# Patient Record
Sex: FEMALE | Race: WHITE | Employment: OTHER
[De-identification: names, ages, dates, MRNs, and addresses within clinical notes are randomized per-mention and may not be internally consistent; named-entity substitution may affect disease eponyms.]

---

## 2017-01-24 ENCOUNTER — OFFICE VISIT (OUTPATIENT)
Dept: ORTHOPEDIC SURGERY | Facility: CLINIC | Age: 57
End: 2017-01-24

## 2017-01-24 VITALS — WEIGHT: 101.41 LBS | HEIGHT: 65 IN | BODY MASS INDEX: 16.9 KG/M2

## 2017-01-24 DIAGNOSIS — S72.141A INTERTROCHANTERIC FRACTURE OF RIGHT FEMUR, CLOSED, INITIAL ENCOUNTER (HCC): Primary | ICD-10-CM

## 2017-01-24 PROCEDURE — 99024 POSTOP FOLLOW-UP VISIT: CPT | Performed by: ORTHOPAEDIC SURGERY

## 2017-01-29 ENCOUNTER — OUTSIDE SERVICES (OUTPATIENT)
Dept: INTERNAL MEDICINE | Age: 57
End: 2017-01-29

## 2017-01-29 DIAGNOSIS — E11.9 TYPE 2 DIABETES MELLITUS WITHOUT COMPLICATION, WITHOUT LONG-TERM CURRENT USE OF INSULIN (HCC): Chronic | ICD-10-CM

## 2017-01-29 DIAGNOSIS — S72.141D INTERTROCHANTERIC FRACTURE OF RIGHT FEMUR, CLOSED, WITH ROUTINE HEALING, SUBSEQUENT ENCOUNTER: Primary | ICD-10-CM

## 2017-01-29 DIAGNOSIS — E44.0 MODERATE MALNUTRITION (HCC): ICD-10-CM

## 2017-01-29 DIAGNOSIS — K70.10 ALCOHOLIC HEPATITIS WITHOUT ASCITES: ICD-10-CM

## 2017-01-29 DIAGNOSIS — I10 ESSENTIAL HYPERTENSION: ICD-10-CM

## 2017-01-29 DIAGNOSIS — K86.0 ALCOHOL-INDUCED CHRONIC PANCREATITIS (HCC): ICD-10-CM

## 2017-01-29 DIAGNOSIS — J44.9 CHRONIC OBSTRUCTIVE PULMONARY DISEASE, UNSPECIFIED COPD TYPE (HCC): Chronic | ICD-10-CM

## 2017-01-29 PROCEDURE — 99308 SBSQ NF CARE LOW MDM 20: CPT | Performed by: INTERNAL MEDICINE

## 2017-02-08 RX ORDER — NICOTINE 21 MG/24HR
1 PATCH, TRANSDERMAL 24 HOURS TRANSDERMAL DAILY
Qty: 30 PATCH | Refills: 0 | Status: SHIPPED | OUTPATIENT
Start: 2017-02-08 | End: 2020-09-17

## 2017-02-08 RX ORDER — ALBUTEROL SULFATE 2.5 MG/3ML
2.5 SOLUTION RESPIRATORY (INHALATION) EVERY 6 HOURS PRN
Qty: 120 EACH | Refills: 0 | Status: ON HOLD | OUTPATIENT
Start: 2017-02-08 | End: 2022-01-01 | Stop reason: HOSPADM

## 2017-02-08 RX ORDER — ASCORBIC ACID 500 MG
500 TABLET ORAL 2 TIMES DAILY
Qty: 60 TABLET | Refills: 0 | Status: SHIPPED | OUTPATIENT
Start: 2017-02-08 | End: 2020-09-17

## 2017-02-08 RX ORDER — PHENOL 1.4 %
1 AEROSOL, SPRAY (ML) MUCOUS MEMBRANE DAILY
Qty: 60 TABLET | Refills: 0 | Status: ON HOLD | OUTPATIENT
Start: 2017-02-08 | End: 2022-01-01 | Stop reason: HOSPADM

## 2017-02-08 RX ORDER — ALBUTEROL SULFATE 90 UG/1
1 AEROSOL, METERED RESPIRATORY (INHALATION) EVERY 4 HOURS PRN
Qty: 1 INHALER | Refills: 0 | Status: SHIPPED | OUTPATIENT
Start: 2017-02-08 | End: 2021-01-01

## 2017-02-08 RX ORDER — FERROUS SULFATE 325(65) MG
325 TABLET ORAL 2 TIMES DAILY WITH MEALS
Qty: 90 TABLET | Refills: 0 | Status: SHIPPED | OUTPATIENT
Start: 2017-02-08 | End: 2020-09-17

## 2017-02-08 RX ORDER — ERGOCALCIFEROL (VITAMIN D2) 1250 MCG
50000 CAPSULE ORAL WEEKLY
Qty: 6 CAPSULE | Refills: 0 | Status: SHIPPED | OUTPATIENT
Start: 2017-02-08 | End: 2020-09-17

## 2017-02-08 RX ORDER — OMEPRAZOLE 20 MG/1
20 CAPSULE, DELAYED RELEASE ORAL DAILY
Qty: 30 CAPSULE | Refills: 0 | Status: SHIPPED | OUTPATIENT
Start: 2017-02-08 | End: 2020-09-17

## 2017-02-08 RX ORDER — ONDANSETRON 4 MG/1
4 TABLET, FILM COATED ORAL EVERY 8 HOURS PRN
Qty: 20 TABLET | Refills: 0 | Status: SHIPPED | OUTPATIENT
Start: 2017-02-08 | End: 2020-09-17

## 2017-02-08 RX ORDER — MIRTAZAPINE 7.5 MG/1
7.5 TABLET, FILM COATED ORAL NIGHTLY
COMMUNITY
End: 2017-02-08 | Stop reason: SDUPTHER

## 2017-02-08 RX ORDER — ASCORBIC ACID 500 MG
500 TABLET ORAL 2 TIMES DAILY
COMMUNITY
End: 2017-02-08 | Stop reason: SDUPTHER

## 2017-02-08 RX ORDER — MIDODRINE HYDROCHLORIDE 2.5 MG/1
2.5 TABLET ORAL 3 TIMES DAILY
Qty: 90 TABLET | Refills: 0 | Status: SHIPPED | OUTPATIENT
Start: 2017-02-08 | End: 2020-09-17

## 2017-02-08 RX ORDER — TRAMADOL HYDROCHLORIDE 50 MG/1
50 TABLET ORAL EVERY 6 HOURS PRN
Qty: 30 TABLET | Refills: 0 | OUTPATIENT
Start: 2017-02-08 | End: 2021-01-01

## 2017-02-08 RX ORDER — OXYCODONE HYDROCHLORIDE 5 MG/1
5 TABLET ORAL EVERY 4 HOURS PRN
COMMUNITY
End: 2021-01-01

## 2017-02-08 RX ORDER — MIRTAZAPINE 7.5 MG/1
7.5 TABLET, FILM COATED ORAL NIGHTLY
Qty: 30 TABLET | Refills: 0 | Status: SHIPPED | OUTPATIENT
Start: 2017-02-08 | End: 2020-09-17

## 2017-03-01 ENCOUNTER — TELEPHONE (OUTPATIENT)
Dept: INTERNAL MEDICINE | Age: 57
End: 2017-03-01

## 2017-03-02 DIAGNOSIS — S72.141A INTERTROCHANTERIC FRACTURE OF RIGHT FEMUR, CLOSED, INITIAL ENCOUNTER (HCC): Primary | ICD-10-CM

## 2018-05-24 ENCOUNTER — APPOINTMENT (OUTPATIENT)
Dept: GENERAL RADIOLOGY | Age: 58
End: 2018-05-24
Payer: MEDICARE

## 2018-05-24 ENCOUNTER — HOSPITAL ENCOUNTER (EMERGENCY)
Age: 58
Discharge: HOME | End: 2018-05-24
Attending: EMERGENCY MEDICINE
Payer: MEDICARE

## 2018-05-24 VITALS
HEART RATE: 90 BPM | RESPIRATION RATE: 19 BRPM | OXYGEN SATURATION: 97 % | WEIGHT: 105 LBS | TEMPERATURE: 97.9 F | SYSTOLIC BLOOD PRESSURE: 113 MMHG | BODY MASS INDEX: 17.71 KG/M2 | DIASTOLIC BLOOD PRESSURE: 66 MMHG

## 2018-05-24 DIAGNOSIS — F10.10 ETOH ABUSE: Primary | ICD-10-CM

## 2018-05-24 LAB
ETHANOL PERCENT: 0.32 %
ETHANOL: 316 MG/DL

## 2018-05-24 PROCEDURE — G0480 DRUG TEST DEF 1-7 CLASSES: HCPCS

## 2018-05-24 PROCEDURE — 71046 X-RAY EXAM CHEST 2 VIEWS: CPT

## 2018-05-24 PROCEDURE — 99284 EMERGENCY DEPT VISIT MOD MDM: CPT

## 2018-05-24 ASSESSMENT — ENCOUNTER SYMPTOMS
COLOR CHANGE: 0
FACIAL SWELLING: 0
DIARRHEA: 0
CHOKING: 0
CHEST TIGHTNESS: 0
PHOTOPHOBIA: 0
STRIDOR: 0
NAUSEA: 0
VOMITING: 0
ABDOMINAL PAIN: 0
WHEEZING: 0
SHORTNESS OF BREATH: 0
BLOOD IN STOOL: 0
TROUBLE SWALLOWING: 0

## 2018-05-24 ASSESSMENT — PAIN DESCRIPTION - LOCATION: LOCATION: HIP

## 2018-05-24 ASSESSMENT — PAIN DESCRIPTION - FREQUENCY: FREQUENCY: CONTINUOUS

## 2018-05-24 ASSESSMENT — PAIN DESCRIPTION - DESCRIPTORS: DESCRIPTORS: CONSTANT

## 2018-05-24 ASSESSMENT — PAIN DESCRIPTION - ORIENTATION: ORIENTATION: LEFT

## 2018-05-24 ASSESSMENT — PAIN DESCRIPTION - PAIN TYPE: TYPE: CHRONIC PAIN;ACUTE PAIN

## 2018-05-24 ASSESSMENT — PAIN SCALES - GENERAL: PAINLEVEL_OUTOF10: 8

## 2019-01-14 ENCOUNTER — APPOINTMENT (OUTPATIENT)
Dept: GENERAL RADIOLOGY | Age: 59
End: 2019-01-14
Payer: MEDICARE

## 2019-01-14 ENCOUNTER — HOSPITAL ENCOUNTER (EMERGENCY)
Age: 59
Discharge: HOME | End: 2019-01-14
Attending: EMERGENCY MEDICINE
Payer: MEDICARE

## 2019-01-14 VITALS
RESPIRATION RATE: 18 BRPM | HEIGHT: 64 IN | TEMPERATURE: 98.1 F | OXYGEN SATURATION: 100 % | DIASTOLIC BLOOD PRESSURE: 60 MMHG | WEIGHT: 106 LBS | HEART RATE: 88 BPM | SYSTOLIC BLOOD PRESSURE: 120 MMHG | BODY MASS INDEX: 18.1 KG/M2
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
## 2019-01-14 DIAGNOSIS — S46.001A INJURY OF RIGHT ROTATOR CUFF, INITIAL ENCOUNTER: ICD-10-CM

## 2019-01-14 PROCEDURE — 99283 EMERGENCY DEPT VISIT LOW MDM: CPT

## 2019-01-14 PROCEDURE — 72040 X-RAY EXAM NECK SPINE 2-3 VW: CPT

## 2019-01-14 PROCEDURE — 73030 X-RAY EXAM OF SHOULDER: CPT

## 2019-01-14 RX ORDER — CYCLOBENZAPRINE HCL 10 MG
10 TABLET ORAL 3 TIMES DAILY PRN
Qty: 15 TABLET | Refills: 0 | Status: SHIPPED | OUTPATIENT
Start: 2019-01-14 | End: 2019-01-24

## 2019-01-14 ASSESSMENT — ENCOUNTER SYMPTOMS
PHOTOPHOBIA: 0
FACIAL SWELLING: 0
COLOR CHANGE: 0
VOMITING: 0
BACK PAIN: 0
SHORTNESS OF BREATH: 0
EYE PAIN: 0
ABDOMINAL PAIN: 0
NAUSEA: 0

## 2019-01-14 ASSESSMENT — PAIN DESCRIPTION - DESCRIPTORS: DESCRIPTORS: SHARP

## 2019-01-14 ASSESSMENT — PAIN DESCRIPTION - ORIENTATION: ORIENTATION: RIGHT;UPPER

## 2019-01-14 ASSESSMENT — PAIN SCALES - GENERAL: PAINLEVEL_OUTOF10: 10

## 2019-01-14 ASSESSMENT — PAIN DESCRIPTION - LOCATION: LOCATION: ARM

## 2019-01-14 ASSESSMENT — PAIN DESCRIPTION - ONSET: ONSET: SUDDEN

## 2019-01-14 ASSESSMENT — PAIN DESCRIPTION - PAIN TYPE: TYPE: ACUTE PAIN

## 2019-01-21 DIAGNOSIS — M25.531 RIGHT WRIST PAIN: Primary | ICD-10-CM

## 2019-01-23 ENCOUNTER — OFFICE VISIT (OUTPATIENT)
Dept: ORTHOPEDIC SURGERY | Age: 59
End: 2019-01-23
Payer: MEDICARE

## 2019-01-23 VITALS — HEIGHT: 65 IN | BODY MASS INDEX: 16.66 KG/M2 | WEIGHT: 100 LBS

## 2019-01-23 DIAGNOSIS — S40.021A CONTUSION OF RIGHT UPPER EXTREMITY, INITIAL ENCOUNTER: Primary | ICD-10-CM

## 2019-01-23 PROCEDURE — 3017F COLORECTAL CA SCREEN DOC REV: CPT | Performed by: ORTHOPAEDIC SURGERY

## 2019-01-23 PROCEDURE — G8427 DOCREV CUR MEDS BY ELIG CLIN: HCPCS | Performed by: ORTHOPAEDIC SURGERY

## 2019-01-23 PROCEDURE — G8419 CALC BMI OUT NRM PARAM NOF/U: HCPCS | Performed by: ORTHOPAEDIC SURGERY

## 2019-01-23 PROCEDURE — G8484 FLU IMMUNIZE NO ADMIN: HCPCS | Performed by: ORTHOPAEDIC SURGERY

## 2019-01-23 PROCEDURE — 99203 OFFICE O/P NEW LOW 30 MIN: CPT | Performed by: ORTHOPAEDIC SURGERY

## 2019-01-23 PROCEDURE — 4004F PT TOBACCO SCREEN RCVD TLK: CPT | Performed by: ORTHOPAEDIC SURGERY

## 2019-01-23 RX ORDER — HYDROCODONE BITARTRATE AND ACETAMINOPHEN 5; 325 MG/1; MG/1
1 TABLET ORAL EVERY 4 HOURS PRN
Qty: 30 TABLET | Refills: 0 | Status: SHIPPED | OUTPATIENT
Start: 2019-01-23 | End: 2019-01-28

## 2019-01-23 RX ORDER — ONDANSETRON 4 MG/1
4 TABLET, FILM COATED ORAL 3 TIMES DAILY PRN
Qty: 30 TABLET | Refills: 0 | Status: SHIPPED | OUTPATIENT
Start: 2019-01-23 | End: 2020-09-17

## 2019-02-12 ENCOUNTER — APPOINTMENT (OUTPATIENT)
Dept: CT IMAGING | Age: 59
End: 2019-02-12
Payer: MEDICARE

## 2019-02-12 ENCOUNTER — APPOINTMENT (OUTPATIENT)
Dept: GENERAL RADIOLOGY | Age: 59
End: 2019-02-12
Payer: MEDICARE

## 2019-02-12 ENCOUNTER — HOSPITAL ENCOUNTER (EMERGENCY)
Age: 59
Discharge: HOME | End: 2019-02-13
Attending: EMERGENCY MEDICINE
Payer: MEDICARE

## 2019-02-12 VITALS
HEART RATE: 98 BPM | DIASTOLIC BLOOD PRESSURE: 78 MMHG | TEMPERATURE: 98 F | HEIGHT: 64 IN | WEIGHT: 100 LBS | SYSTOLIC BLOOD PRESSURE: 118 MMHG | BODY MASS INDEX: 17.07 KG/M2 | OXYGEN SATURATION: 100 % | RESPIRATION RATE: 18 BRPM

## 2019-02-12 DIAGNOSIS — S63.502A SPRAIN OF LEFT WRIST, INITIAL ENCOUNTER: Primary | ICD-10-CM

## 2019-02-12 DIAGNOSIS — F10.920 ACUTE ALCOHOLIC INTOXICATION WITHOUT COMPLICATION (HCC): ICD-10-CM

## 2019-02-12 DIAGNOSIS — S09.90XA INJURY OF HEAD, INITIAL ENCOUNTER: ICD-10-CM

## 2019-02-12 DIAGNOSIS — E87.1 HYPONATREMIA: ICD-10-CM

## 2019-02-12 LAB
ABSOLUTE EOS #: 0.32 K/UL (ref 0–0.44)
ABSOLUTE IMMATURE GRANULOCYTE: 0.03 K/UL (ref 0–0.3)
ABSOLUTE LYMPH #: 3.17 K/UL (ref 1.1–3.7)
ABSOLUTE MONO #: 0.51 K/UL (ref 0.1–1.2)
ANION GAP SERPL CALCULATED.3IONS-SCNC: 16 MMOL/L (ref 9–17)
BASOPHILS # BLD: 1 % (ref 0–2)
BASOPHILS ABSOLUTE: 0.06 K/UL (ref 0–0.2)
BUN BLDV-MCNC: 4 MG/DL (ref 6–20)
BUN/CREAT BLD: ABNORMAL (ref 9–20)
CALCIUM SERPL-MCNC: 9.2 MG/DL (ref 8.6–10.4)
CHLORIDE BLD-SCNC: 92 MMOL/L (ref 98–107)
CO2: 20 MMOL/L (ref 20–31)
CREAT SERPL-MCNC: 0.4 MG/DL (ref 0.5–0.9)
DIFFERENTIAL TYPE: ABNORMAL
EOSINOPHILS RELATIVE PERCENT: 4 % (ref 1–4)
ETHANOL PERCENT: 0.31 %
ETHANOL: 310 MG/DL
GFR AFRICAN AMERICAN: >60 ML/MIN
GFR NON-AFRICAN AMERICAN: >60 ML/MIN
GFR SERPL CREATININE-BSD FRML MDRD: ABNORMAL ML/MIN/{1.73_M2}
GFR SERPL CREATININE-BSD FRML MDRD: ABNORMAL ML/MIN/{1.73_M2}
GLUCOSE BLD-MCNC: 100 MG/DL (ref 70–99)
HCT VFR BLD CALC: 37.3 % (ref 36.3–47.1)
HEMOGLOBIN: 13 G/DL (ref 11.9–15.1)
IMMATURE GRANULOCYTES: 0 %
LYMPHOCYTES # BLD: 42 % (ref 24–43)
MAGNESIUM: 1.6 MG/DL (ref 1.6–2.6)
MCH RBC QN AUTO: 34.5 PG (ref 25.2–33.5)
MCHC RBC AUTO-ENTMCNC: 34.9 G/DL (ref 28.4–34.8)
MCV RBC AUTO: 98.9 FL (ref 82.6–102.9)
MONOCYTES # BLD: 7 % (ref 3–12)
NRBC AUTOMATED: 0 PER 100 WBC
PDW BLD-RTO: 11.6 % (ref 11.8–14.4)
PLATELET # BLD: 236 K/UL (ref 138–453)
PLATELET ESTIMATE: ABNORMAL
PMV BLD AUTO: 9.8 FL (ref 8.1–13.5)
POTASSIUM SERPL-SCNC: 3.8 MMOL/L (ref 3.7–5.3)
RBC # BLD: 3.77 M/UL (ref 3.95–5.11)
RBC # BLD: ABNORMAL 10*6/UL
SEG NEUTROPHILS: 46 % (ref 36–65)
SEGMENTED NEUTROPHILS ABSOLUTE COUNT: 3.49 K/UL (ref 1.5–8.1)
SODIUM BLD-SCNC: 128 MMOL/L (ref 135–144)
WBC # BLD: 7.6 K/UL (ref 3.5–11.3)
WBC # BLD: ABNORMAL 10*3/UL

## 2019-02-12 PROCEDURE — 70450 CT HEAD/BRAIN W/O DYE: CPT

## 2019-02-12 PROCEDURE — 73110 X-RAY EXAM OF WRIST: CPT

## 2019-02-12 PROCEDURE — 6370000000 HC RX 637 (ALT 250 FOR IP): Performed by: PHYSICIAN ASSISTANT

## 2019-02-12 PROCEDURE — 2580000003 HC RX 258: Performed by: PHYSICIAN ASSISTANT

## 2019-02-12 PROCEDURE — 85025 COMPLETE CBC W/AUTO DIFF WBC: CPT

## 2019-02-12 PROCEDURE — 80048 BASIC METABOLIC PNL TOTAL CA: CPT

## 2019-02-12 PROCEDURE — 83735 ASSAY OF MAGNESIUM: CPT

## 2019-02-12 PROCEDURE — G0480 DRUG TEST DEF 1-7 CLASSES: HCPCS

## 2019-02-12 PROCEDURE — 99284 EMERGENCY DEPT VISIT MOD MDM: CPT

## 2019-02-12 RX ORDER — 0.9 % SODIUM CHLORIDE 0.9 %
1000 INTRAVENOUS SOLUTION INTRAVENOUS ONCE
Status: COMPLETED | OUTPATIENT
Start: 2019-02-12 | End: 2019-02-12

## 2019-02-12 RX ORDER — TRAMADOL HYDROCHLORIDE 50 MG/1
50 TABLET ORAL ONCE
Status: COMPLETED | OUTPATIENT
Start: 2019-02-12 | End: 2019-02-12

## 2019-02-12 RX ADMIN — SODIUM CHLORIDE 1000 ML: 9 INJECTION, SOLUTION INTRAVENOUS at 18:35

## 2019-02-12 RX ADMIN — TRAMADOL HYDROCHLORIDE 50 MG: 50 TABLET, FILM COATED ORAL at 18:35

## 2019-02-12 ASSESSMENT — PAIN DESCRIPTION - PAIN TYPE: TYPE: ACUTE PAIN

## 2019-02-12 ASSESSMENT — PAIN DESCRIPTION - DESCRIPTORS: DESCRIPTORS: SHARP

## 2019-02-12 ASSESSMENT — ENCOUNTER SYMPTOMS
NAUSEA: 0
BACK PAIN: 0
WHEEZING: 0
VOMITING: 0
RHINORRHEA: 0
COLOR CHANGE: 0
COUGH: 0
SORE THROAT: 0

## 2019-02-12 ASSESSMENT — PAIN DESCRIPTION - FREQUENCY: FREQUENCY: CONTINUOUS

## 2019-02-12 ASSESSMENT — PAIN SCALES - GENERAL: PAINLEVEL_OUTOF10: 10

## 2019-02-12 ASSESSMENT — PAIN DESCRIPTION - ONSET: ONSET: PROGRESSIVE

## 2019-02-12 ASSESSMENT — PAIN DESCRIPTION - LOCATION: LOCATION: WRIST

## 2019-02-12 ASSESSMENT — PAIN DESCRIPTION - PROGRESSION: CLINICAL_PROGRESSION: GRADUALLY WORSENING

## 2019-02-12 ASSESSMENT — PAIN DESCRIPTION - ORIENTATION: ORIENTATION: LEFT

## 2019-06-17 ENCOUNTER — HOSPITAL ENCOUNTER (EMERGENCY)
Age: 59
Discharge: HOME | End: 2019-06-18
Attending: EMERGENCY MEDICINE
Payer: MEDICARE

## 2019-06-17 VITALS
OXYGEN SATURATION: 96 % | SYSTOLIC BLOOD PRESSURE: 141 MMHG | TEMPERATURE: 96.8 F | DIASTOLIC BLOOD PRESSURE: 91 MMHG | HEART RATE: 62 BPM | RESPIRATION RATE: 20 BRPM

## 2019-06-17 DIAGNOSIS — F10.920 ACUTE ALCOHOLIC INTOXICATION WITHOUT COMPLICATION (HCC): Primary | ICD-10-CM

## 2019-06-17 PROCEDURE — 99284 EMERGENCY DEPT VISIT MOD MDM: CPT

## 2019-06-17 PROCEDURE — 96372 THER/PROPH/DIAG INJ SC/IM: CPT

## 2019-06-17 RX ORDER — ZIPRASIDONE MESYLATE 20 MG/ML
10 INJECTION, POWDER, LYOPHILIZED, FOR SOLUTION INTRAMUSCULAR ONCE
Status: DISCONTINUED | OUTPATIENT
Start: 2019-06-17 | End: 2019-06-18 | Stop reason: HOSPADM

## 2019-06-17 ASSESSMENT — ENCOUNTER SYMPTOMS
VOMITING: 0
EYE ITCHING: 0
ABDOMINAL PAIN: 0
SHORTNESS OF BREATH: 0
COUGH: 0
DIARRHEA: 0
RHINORRHEA: 0
BACK PAIN: 0
NAUSEA: 0
EYE REDNESS: 0

## 2019-06-17 NOTE — ED NOTES
Pt is now much more cooperative, laying on cot, security no longer at bedside. Writer attempting to contact pt artem Cates who is listed as emergency contact. No answer, writer left voice mail.       Marilee Hilton RN  06/17/19 8700

## 2019-06-17 NOTE — PROGRESS NOTES
Social Work    Attempted to call patients friend Sophia Toscano that is listed, no answer, voicemail comes on but did not leave message.

## 2019-06-17 NOTE — ED NOTES
Pt to ed via [facility]. Pt states its her birthday today and she was drinking liquor, she never drinks liquor she is a daily beer drinker. Pt was found passed out on a neighbors porch and EMS was unable to verify her permanent address and pt was not able to tell EMS where she lives. Pt is intoxicated, speech is slow and slurred. Pt wants to go home but unable to contact a sober ride at this time. Pt denies suicidal/homicidal ideations at this time.       Hilda Green RN  06/17/19 4858

## 2019-06-17 NOTE — ED NOTES
Pt is in [facility] angry, screaming at staff. Pt wants to go home. Pt unable to provide writer with contact information for someone to come and pick her up. Pt assisted back to bed, security at bedside.       Deb Velazquez RN  06/17/19 0118

## 2019-06-17 NOTE — ED PROVIDER NOTES
[facility]  ED  Emergency Department Encounter  EmergencyMedicine Resident     Pt Name:Emmie Jackson  MRN: [id number]  [place] 1960  Date of evaluation: 6/17/19  PCP:  Luisana Hernandez PA-C    CHIEF COMPLAINT       Chief Complaint   Patient presents with    Alcohol Intoxication       HISTORY OF PRESENT ILLNESS  (Location/Symptom, Timing/Onset, Context/Setting, Quality, Duration, Modifying Factors, Severity.)      Jez Borjas is a 64 y.o. female who presents with education. Patient was found on a neighbor's porch, unable to state where she lived. The neighbors are also unaware of which house exactly was hers, EMS brought her to our emergency department without anywhere else to take her. Patient states that she has been drinking because it is her birthday. Denies other ingestions, does not remember the events that led her to the emergency department. She denies pain, shortness of breath abdominal pain nausea vomiting diarrhea. States she normally drinks beer, drink liquor today. PAST MEDICAL / SURGICAL / SOCIAL / FAMILY HISTORY      has a past medical history of Alcohol withdrawal ([address] Alcohol withdrawal seizure with complication ([address] Alcohol-induced chronic pancreatitis ([address] Alcoholic hepatitis, Cellulitis of right hip, COPD (chronic obstructive pulmonary disease) ([address] Diabetes mellitus ([address] DM type 2 (diabetes mellitus, type 2) ([address] Dysphagia, Eczema, Elevated liver enzymes, FTT (failure to thrive) in adult, Hoarseness, Hypertension, Hypomagnesemia, Hyponatremia, Intertrochanteric fracture of right femur ([address] Iron deficiency anemia, Lesion of hard palate, Moderate malnutrition ([address] New onset seizure ([address] Poor historian, and Smoker. has a past surgical history that includes Tonsillectomy; Hip fracture surgery (Left); laryngoscopy (10/28/2016); and Femur fracture surgery (Right, 12/07/2016).     Social History     Socioeconomic History    Marital status: Single     Spouse name: Not on file    Number of children: Not on file    Years of education: Not on file    Highest education level: Not on file   Occupational History    Not on file   Social Needs    Financial resource strain: Not on file    Food insecurity:     Worry: Not on file     Inability: Not on file    Transportation needs:     Medical: Not on file     Non-medical: Not on file   Tobacco Use    Smoking status: Current Every Day Smoker     Packs/day: 0.50     Years: 35.00     Pack years: 17.50     Types: Cigarettes    Smokeless tobacco: Never Used   Substance and Sexual Activity    Alcohol use: Yes     Comment: daily    Drug use: No    Sexual activity: Not on file   Lifestyle    Physical activity:     Days per week: Not on file     Minutes per session: Not on file    Stress: Not on file   Relationships    Social connections:     Talks on phone: Not on file     Gets together: Not on file     Attends Amish service: Not on file     Active member of club or organization: Not on file     Attends meetings of clubs or organizations: Not on file     Relationship status: Not on file    Intimate partner violence:     Fear of current or ex partner: Not on file     Emotionally abused: Not on file     Physically abused: Not on file     Forced sexual activity: Not on file   Other Topics Concern    Not on file   Social History Narrative    Not on file       Family History   Problem Relation Age of Onset    Diabetes Father     Asthma Sister     Asthma Brother        Allergies:  Ibuprofen    Home Medications:  Prior to Admission medications    Medication Sig Start Date End Date Taking? Authorizing Provider   ondansetron (ZOFRAN) 4 MG tablet Take 1 tablet by mouth 3 times daily as needed for Nausea or Vomiting 1/23/19   Brie Baum MD   oxyCODONE (ROXICODONE) 5 MG immediate release tablet Take 5 mg by mouth every 4 hours as needed for Pain .     Historical Provider, MD Calcium-Vitamin D 600-200 MG-UNIT TABS Take 1 tablet by mouth 2 times daily    Historical Provider, MD   ergocalciferol (ERGOCALCIFEROL) 86521 UNITS capsule Take 1 capsule by mouth once a week for 11 doses 2/8/17 4/20/17  Aaron Dwyer, DO   ferrous sulfate 325 (65 FE) MG tablet Take 1 tablet by mouth 2 times daily (with meals) Do not take with calcium 2/8/17   Aaron Dwyer, DO   omeprazole (PRILOSEC) 20 MG delayed release capsule Take 1 capsule by mouth daily 2/8/17   Krupa Dwyer DO   nicotine (NICODERM CQ) 21 MG/24HR Place 1 patch onto the skin daily 2/8/17   Krupa Dwyer, DO   traMADol (ULTRAM) 50 MG tablet Take 1 tablet by mouth every 6 hours as needed for Pain 2/8/17   Aaron Dwyer DO   midodrine (PROAMATINE) 2.5 MG tablet Take 1 tablet by mouth 3 times daily 2/8/17   Aaron Dwyer, DO   albuterol (PROVENTIL) (2.5 MG/3ML) 0.083% nebulizer solution Take 3 mLs by nebulization every 6 hours as needed for Wheezing 2/8/17   Aaron Dwyer DO   albuterol sulfate  (90 BASE) MCG/ACT inhaler Inhale 1 puff into the lungs every 4 hours as needed for Wheezing or Shortness of Breath 2/8/17 2/22/17  Aaron Dwyer DO   ondansetron (ZOFRAN) 4 MG tablet Take 1 tablet by mouth every 8 hours as needed for Nausea 2/8/17   Krupa Dwyer DO   mirtazapine (REMERON) 7.5 MG tablet Take 1 tablet by mouth nightly 2/8/17   Aaron Dwyer DO   ascorbic acid (VITAMIN C) 500 MG tablet Take 1 tablet by mouth 2 times daily 2/8/17   Aaron Dwyer, DO   calcium carbonate (CALCIUM 600) 600 MG TABS tablet Take 1 tablet by mouth daily Do not take with iron 2/8/17   Aaron Dwyer, DO       REVIEW OF SYSTEMS    (2-9 systems for level 4, 10 or more for level 5)      Review of Systems   Constitutional: Negative for chills and fever. HENT: Negative for congestion and rhinorrhea. Eyes: Negative for redness and itching. Respiratory: Negative for cough and shortness of breath. Cardiovascular: Negative for chest pain, palpitations and leg swelling. Gastrointestinal: Negative for abdominal pain, diarrhea, nausea and vomiting. Genitourinary: Negative for dysuria and frequency. Musculoskeletal: Negative for back pain, joint swelling and myalgias. Skin: Negative for pallor and rash. Neurological: Negative for dizziness, weakness, light-headedness, numbness and headaches. Psychiatric/Behavioral: Positive for agitation and confusion. PHYSICAL EXAM   (up to 7 for level 4, 8 or more for level 5)      INITIAL VITALS:   BP (!) 141/91   Pulse 62   Temp 96.8 °F (36 °C) (Oral)   Resp 20   SpO2 96%     Physical Exam   Constitutional: She appears well-developed and well-nourished. No distress. HENT:   Head: Normocephalic and atraumatic. Eyes: Pupils are equal, round, and reactive to light. EOM are normal.   Cardiovascular: Normal rate, regular rhythm and normal heart sounds. Pulmonary/Chest: Effort normal and breath sounds normal. No respiratory distress. Abdominal: Soft. Bowel sounds are normal. She exhibits no distension. There is no tenderness. Musculoskeletal: Normal range of motion. Neurological: She is alert. She is not oriented to situation. Patient has an ataxic gait, nystagmus, loss of smooth pursuit. She is demanding to leave the emergency department. Skin: Skin is warm. No rash noted. DIFFERENTIAL  DIAGNOSIS     PLAN (LABS / IMAGING / EKG):  No orders of the defined types were placed in this encounter. MEDICATIONS ORDERED:  Orders Placed This Encounter   Medications    AND Linked Order Group     ziprasidone (GEODON) injection 10 mg     sterile water injection 1.2 mL       DIAGNOSTIC RESULTS / EMERGENCY DEPARTMENT COURSE / MDM     LABS:  No results found for this visit on 06/17/19.     IMPRESSION: Scarlett Maharaj is a 64 y.o. patient with alcohol intoxication, no coingestions suicidal homicidal ideation or hallucinations. Patient appears intoxicated, ataxic gait with slurred speech. Patient will be observed for sobriety. She is currently agitated, unable to calm down will be given Geodon for staff safety. RADIOLOGY:  None    EKG  None    All EKG's are interpreted by the Emergency Department Physician who either signs or Co-signs this chart in the absence of a cardiologist.    EMERGENCY DEPARTMENT COURSE:  ED Course as of Jun 18 0229   Mon Jun 17, 2019 2119 Reassessed at this time, still clinically intoxicated    [BA]   Tue Jun 18, 2019   0130 Patient sober at this time, patient discharged    [BA]      ED Course User Index  [BA] Jennifer Junior MD       PROCEDURES:  None    CONSULTS:  None    CRITICAL CARE:  None    FINAL IMPRESSION      1.  Acute alcoholic intoxication without complication ([facility] [address]          DISPOSITION / PLAN     DISPOSITION Decision To Discharge 06/18/2019 01:30:25 AM      PATIENT REFERRED TO:  Nathen Salas, [address]  [phone]    Schedule an appointment as soon as possible for a visit         DISCHARGE MEDICATIONS:  Discharge Medication List as of 6/18/2019  1:31 AM          Jennifer Junior MD  Emergency Medicine Resident    (Please note that portions of thisnote were completed with a voice recognition program.  Efforts were made to edit the dictations but occasionally words are mis-transcribed.)        Jennifer Junior MD  06/18/19 0661

## 2019-06-18 NOTE — ED NOTES
Dr Carolina Dubose and Dr Palma Dandy to see pt. Pt given boxed lunch and agreed to stay.      Elzbieta Castro RN  06/17/19 0571

## 2019-06-18 NOTE — ED PROVIDER NOTES
ORIENTED TO PERSON, DATE, HOME ADDRESS, NOT TO CIRCUMSTANCES RESULTING IN ED EVALUATION. APPEARS CLINICALLY SOBER. WILL DISCHARGE. LSW TO [facility].       Gregg Hugo MD  06/18/19 9423

## 2019-06-18 NOTE — ED NOTES
Pt c/o it being too bright for her to sleep. Pt agreed to go into room and have door closed for the quiet and darkness. Pt agreeable and calmer.         Trinh Escalante RN  06/17/19 5009

## 2019-06-18 NOTE — ED NOTES
Pt at desk yelling requesting shoes and to go home. Informed pt that she is to wait for Dr to come see and address her. Pt very impatient. Pt redirected to use inside voices.       Dorcas Landry RN  06/17/19 5314

## 2019-06-18 NOTE — ED NOTES
Report from [facility], [address] resting on cot with eyes closed, NAD noted. Pt quiet and cooperative to hold geodon.       Arnulfo Molina RN  06/17/19 4961

## 2020-09-17 ENCOUNTER — HOSPITAL ENCOUNTER (EMERGENCY)
Age: 60
Discharge: HOME | End: 2020-09-17
Attending: EMERGENCY MEDICINE
Payer: MEDICARE

## 2020-09-17 VITALS
DIASTOLIC BLOOD PRESSURE: 106 MMHG | HEIGHT: 64 IN | RESPIRATION RATE: 16 BRPM | TEMPERATURE: 97.6 F | SYSTOLIC BLOOD PRESSURE: 161 MMHG | HEART RATE: 80 BPM | OXYGEN SATURATION: 100 % | BODY MASS INDEX: 16.22 KG/M2 | WEIGHT: 95 LBS

## 2020-09-17 PROCEDURE — 6370000000 HC RX 637 (ALT 250 FOR IP): Performed by: STUDENT IN AN ORGANIZED HEALTH CARE EDUCATION/TRAINING PROGRAM

## 2020-09-17 PROCEDURE — 99282 EMERGENCY DEPT VISIT SF MDM: CPT

## 2020-09-17 RX ORDER — HYDROCODONE BITARTRATE AND ACETAMINOPHEN 5; 325 MG/1; MG/1
1 TABLET ORAL ONCE
Status: COMPLETED | OUTPATIENT
Start: 2020-09-17 | End: 2020-09-17

## 2020-09-17 RX ORDER — PREDNISONE 20 MG/1
TABLET ORAL
Qty: 40 TABLET | Refills: 0 | Status: SHIPPED | OUTPATIENT
Start: 2020-09-17 | End: 2020-10-07

## 2020-09-17 RX ORDER — DIPHENHYDRAMINE HCL 25 MG
25 TABLET ORAL ONCE
Status: COMPLETED | OUTPATIENT
Start: 2020-09-17 | End: 2020-09-17

## 2020-09-17 RX ORDER — DIPHENHYDRAMINE HCL 25 MG
25 CAPSULE ORAL EVERY 6 HOURS PRN
Qty: 12 CAPSULE | Refills: 0 | Status: ON HOLD | OUTPATIENT
Start: 2020-09-17 | End: 2022-01-01 | Stop reason: HOSPADM

## 2020-09-17 RX ADMIN — HYDROCODONE BITARTRATE AND ACETAMINOPHEN 1 TABLET: 5; 325 TABLET ORAL at 13:10

## 2020-09-17 RX ADMIN — DIPHENHYDRAMINE HCL 25 MG: 25 TABLET ORAL at 13:10

## 2020-09-17 ASSESSMENT — PAIN DESCRIPTION - PAIN TYPE: TYPE: ACUTE PAIN

## 2020-09-17 ASSESSMENT — ENCOUNTER SYMPTOMS
SHORTNESS OF BREATH: 0
BLOOD IN STOOL: 0
NAUSEA: 0
VOMITING: 0
COUGH: 0
WHEEZING: 0

## 2020-09-17 ASSESSMENT — PAIN SCALES - GENERAL
PAINLEVEL_OUTOF10: 9
PAINLEVEL_OUTOF10: 9

## 2020-09-17 NOTE — ED NOTES
Bed: 42  Expected date:   Expected time:   Means of arrival:   Comments:     Blanca Kelley RN  09/17/20 5500

## 2020-09-17 NOTE — ED PROVIDER NOTES
[address] [facility]     Emergency Department     Faculty Attestation        I performed a history and physical examination of the patient and discussed management with the resident. I reviewed the residents note and agree with the documented findings and plan of care. Any areas of disagreement are noted on the chart. I was personally present for the key portions of any procedures. I have documented in the chart those procedures where I was not present during the key portions. I have reviewed the emergency nurses triage note. I agree with the chief complaint, past medical history, past surgical history, allergies, medications, social and family history as documented unless otherwise noted below. For Physician Assistant/ Nurse Practitioner cases/documentation I have personally evaluated this patient and have completed at least one if not all key elements of the E/M (history, physical exam, and MDM). Additional findings are as noted. Vital Signs: BP: (!) 161/106  Pulse: 80  Resp: 16  Temp: 97.6 °F (36.4 °C) SpO2: 100 %  PCP:  Rosaura Chen PA-C    Pertinent Comments:         Critical Care  None    This patient was evaluated in the Emergency Department for symptoms described in the history of present illness. He/she was evaluated in the context of the global COVID-19 pandemic, which necessitated consideration that the patient might be at risk for infection with the SARS-CoV-2 virus that causes COVID-19. Institutional protocols and algorithms that pertain to the evaluation of patients at risk for COVID-19 are in a state of rapid change based on information released by regulatory bodies including the CDC and federal and state organizations. These policies and algorithms were followed during the patient's care in the ED.     (Please note that portions of this note were completed with a voice recognition program. Efforts were made to edit the dictations but occasionally words are mis-transcribed.  Whenever words are used in this note in any gender, they shall be construed as though they were used in the gender appropriate to the circumstances; and whenever words are used in this note in the singular or plural form, they shall be construed as though they were used in the form appropriate to the circumstances.)    Avery Abbe, MD Aletta Denver  Attending Emergency Medicine Physician           Harsh Leal MD  09/17/20 2732

## 2020-09-17 NOTE — ED TRIAGE NOTES
Pt arrived to the ED with c/o a rash that has been going on for the last couple of days. Pt states she was cleaning up some weeds and wasn't sure if it was poison ivy or poison  Oak. Pt states the rash is very painful and itching. Pt states the rash is all over her body. Pt is alert oriented x4.

## 2020-09-17 NOTE — ED PROVIDER NOTES
laryngoscopy (10/28/2016); and Femur fracture surgery (Right, 12/07/2016). Social History     Socioeconomic History    Marital status: Single     Spouse name: Not on file    Number of children: Not on file    Years of education: Not on file    Highest education level: Not on file   Occupational History    Not on file   Social Needs    Financial resource strain: Not on file    Food insecurity     Worry: Not on file     Inability: Not on file    Transportation needs     Medical: Not on file     Non-medical: Not on file   Tobacco Use    Smoking status: Current Every Day Smoker     Packs/day: 0.50     Years: 35.00     Pack years: 17.50     Types: Cigarettes    Smokeless tobacco: Never Used   Substance and Sexual Activity    Alcohol use: Yes     Comment: daily    Drug use: No    Sexual activity: Not on file   Lifestyle    Physical activity     Days per week: Not on file     Minutes per session: Not on file    Stress: Not on file   Relationships    Social connections     Talks on phone: Not on file     Gets together: Not on file     Attends Bahai service: Not on file     Active member of club or organization: Not on file     Attends meetings of clubs or organizations: Not on file     Relationship status: Not on file    Intimate partner violence     Fear of current or ex partner: Not on file     Emotionally abused: Not on file     Physically abused: Not on file     Forced sexual activity: Not on file   Other Topics Concern    Not on file   Social History Narrative    Not on file       Family History   Problem Relation Age of Onset    Diabetes Father     Asthma Sister     Asthma Brother        Allergies:  Ibuprofen    Home Medications:  Prior to Admission medications    Medication Sig Start Date End Date Taking?  Authorizing Provider   diphenhydrAMINE (BENADRYL) 25 MG capsule Take 1 capsule by mouth every 6 hours as needed for Itching 9/17/20  Yes Archana Parisi, DO   predniSONE (Dustin [place]aels) 20 MG tablet Take 3 tablets by mouth daily for 4 days, THEN 2.5 tablets daily for 4 days, THEN 2 tablets daily for 4 days, THEN 1.5 tablets daily for 4 days, THEN 1 tablet daily for 4 days. 9/17/20 10/7/20 Yes Archana Parisi,    oxyCODONE (ROXICODONE) 5 MG immediate release tablet Take 5 mg by mouth every 4 hours as needed for Pain . Historical Provider, MD   Calcium-Vitamin D 600-200 MG-UNIT TABS Take 1 tablet by mouth 2 times daily    Historical Provider, MD   traMADol (ULTRAM) 50 MG tablet Take 1 tablet by mouth every 6 hours as needed for Pain 2/8/17   Aaron Dwyer DO   albuterol (PROVENTIL) (2.5 MG/3ML) 0.083% nebulizer solution Take 3 mLs by nebulization every 6 hours as needed for Wheezing 2/8/17   Aaron Dwyer DO   albuterol sulfate  (90 BASE) MCG/ACT inhaler Inhale 1 puff into the lungs every 4 hours as needed for Wheezing or Shortness of Breath 2/8/17 2/22/17  Aaron Dwyer DO   calcium carbonate (CALCIUM 600) 600 MG TABS tablet Take 1 tablet by mouth daily Do not take with iron 2/8/17   Aaron Dwyer DO       REVIEW OF SYSTEMS    (2-9 systems for level 4, 10 or more for level 5)      Review of Systems   Constitutional: Negative for chills and fever. Respiratory: Negative for cough, shortness of breath and wheezing. Cardiovascular: Negative for chest pain and palpitations. Gastrointestinal: Negative for blood in stool, nausea and vomiting. Skin: Positive for rash. Negative for wound. Allergic/Immunologic: Negative for environmental allergies and food allergies. Neurological: Negative for weakness and numbness. PHYSICAL EXAM   (up to 7 for level 4, 8 or more for level 5)      INITIAL VITALS:   BP (!) 161/106   Pulse 80   Temp 97.6 °F (36.4 °C)   Resp 16   Ht 5' 4\" (1.626 m)   Wt 95 lb (43.1 kg)   LMP  (Approximate)   SpO2 100%   BMI 16.31 kg/m²     Physical Exam  Vitals signs and nursing note reviewed.    Constitutional: General: She is not in acute distress. Appearance: She is well-developed. She is not toxic-appearing or diaphoretic. HENT:      Head: Normocephalic and atraumatic. Right Ear: External ear normal.      Left Ear: External ear normal.      Nose: Nose normal.   Eyes:      Extraocular Movements: Extraocular movements intact. Conjunctiva/sclera: Conjunctivae normal.   Neck:      Musculoskeletal: Normal range of motion. No neck rigidity. Vascular: No JVD. Trachea: No tracheal deviation. Cardiovascular:      Rate and Rhythm: Normal rate and regular rhythm. Pulses: Normal pulses. Heart sounds: S1 normal and S2 normal.   Pulmonary:      Effort: Pulmonary effort is normal. No respiratory distress. Breath sounds: Normal breath sounds. Musculoskeletal: Normal range of motion. General: No tenderness. Skin:     General: Skin is warm and dry. Capillary Refill: Capillary refill takes less than 2 seconds. Comments: Numerous erythematous linear rashes on bilateral upper and lower extremities along with face and neck with excoriation marks. Neurological:      Mental Status: She is alert and oriented to person, place, and time. Motor: No abnormal muscle tone. DIFFERENTIAL  DIAGNOSIS     PLAN (LABS / IMAGING / EKG):  No orders of the defined types were placed in this encounter. MEDICATIONS ORDERED:  Orders Placed This Encounter   Medications    HYDROcodone-acetaminophen (NORCO) 5-325 MG per tablet 1 tablet    diphenhydrAMINE (BENADRYL) tablet 25 mg    diphenhydrAMINE (BENADRYL) 25 MG capsule     Sig: Take 1 capsule by mouth every 6 hours as needed for Itching     Dispense:  12 capsule     Refill:  0    predniSONE (DELTASONE) 20 MG tablet     Sig: Take 3 tablets by mouth daily for 4 days, THEN 2.5 tablets daily for 4 days, THEN 2 tablets daily for 4 days, THEN 1.5 tablets daily for 4 days, THEN 1 tablet daily for 4 days.      Dispense:  40 tablet Refill:  0       DDX: Poison oak/ivy, contact dermatitis    DIAGNOSTIC RESULTS / EMERGENCY DEPARTMENT COURSE / MDM   LAB RESULTS:  No results found for this visit on 09/17/20. IMPRESSION: 61-year-old female presenting with linear pruritic burning rashes. Patient appears to be in no acute distress and nontoxic-appearing. Patient initial vitals were stable and as above hypertension of 161/106 however is not needed for treatment as she is denying any other concerning review system questions. Patient appears to have diffuse linear rashes on all extremities neck and face with excoriation marks. Concern for above differential diagnosis. Will order Benadryl, and send patient home with a prescription for a Prednisone. RADIOLOGY:  None    EKG  None    All EKG's are interpreted by the Emergency Department Physician who either signs or Co-signs this chart in the absence of a cardiologist.    EMERGENCY DEPARTMENT COURSE:  Patient was agreeable to discharge plan. Patient was educated on strict return precautions. Patient ambulated the ER without difficulty. PROCEDURES:  None    CONSULTS:  None    CRITICAL CARE:  Please see attending note    FINAL IMPRESSION      1.  Irritant contact dermatitis due to plants, except food          DISPOSITION / PLAN     DISPOSITION Decision To Discharge 09/17/2020 12:56:28 PM      PATIENT REFERRED TO:  Lynne Douglas, [address] [facility] [address]  [phone]    Schedule an appointment as soon as possible for a visit in 5 days  If symptoms do not improve    [facility]  [address] [facility] [address]  [phone]  Go to   If symptoms worsen      DISCHARGE MEDICATIONS:  Discharge Medication List as of 9/17/2020  1:18 PM      START taking these medications    Details   diphenhydrAMINE (BENADRYL) 25 MG capsule Take 1 capsule by mouth every 6 hours as needed for Itching, Disp-12 capsule,R-0Print      predniSONE (DELTASONE) 20 MG tablet Take 3 tablets by mouth daily for 4 days, THEN 2.5 tablets daily for 4 days, THEN 2 tablets daily for 4 days, THEN 1.5 tablets daily for 4 days, THEN 1 tablet daily for 4 days. , Disp-40 tablet,R-0Print             Lambert Hernandez DO  Emergency Medicine Resident    (Please note that portions of thisnote were completed with a voice recognition program.  Efforts were made to edit the dictations but occasionally words are mis-transcribed.)       Lambert Hernandez DO  Resident  09/17/20 2120

## 2020-12-27 ENCOUNTER — APPOINTMENT (OUTPATIENT)
Dept: GENERAL RADIOLOGY | Age: 60
End: 2020-12-27
Payer: MEDICARE

## 2020-12-27 ENCOUNTER — HOSPITAL ENCOUNTER (EMERGENCY)
Age: 60
Discharge: LEFT BEFORE DISCHARGE | End: 2020-12-27
Payer: MEDICARE

## 2020-12-27 ENCOUNTER — HOSPITAL ENCOUNTER (EMERGENCY)
Age: 60
Discharge: LEFT BEFORE DISCHARGE | End: 2020-12-27
Attending: EMERGENCY MEDICINE
Payer: MEDICARE

## 2020-12-27 VITALS
SYSTOLIC BLOOD PRESSURE: 129 MMHG | RESPIRATION RATE: 18 BRPM | HEART RATE: 85 BPM | DIASTOLIC BLOOD PRESSURE: 68 MMHG | OXYGEN SATURATION: 97 % | HEIGHT: 64 IN | TEMPERATURE: 97.5 F | BODY MASS INDEX: 16.22 KG/M2 | WEIGHT: 95 LBS

## 2020-12-27 VITALS
HEART RATE: 89 BPM | DIASTOLIC BLOOD PRESSURE: 68 MMHG | HEIGHT: 64 IN | SYSTOLIC BLOOD PRESSURE: 112 MMHG | WEIGHT: 95 LBS | OXYGEN SATURATION: 100 % | TEMPERATURE: 97.7 F | RESPIRATION RATE: 16 BRPM | BODY MASS INDEX: 16.22 KG/M2

## 2020-12-27 PROCEDURE — 99282 EMERGENCY DEPT VISIT SF MDM: CPT

## 2020-12-27 ASSESSMENT — ENCOUNTER SYMPTOMS
VOMITING: 0
ABDOMINAL PAIN: 0
SORE THROAT: 0
CONSTIPATION: 0
SHORTNESS OF BREATH: 0
NAUSEA: 0
DIARRHEA: 0

## 2020-12-27 ASSESSMENT — PAIN SCALES - GENERAL: PAINLEVEL_OUTOF10: 0

## 2020-12-27 NOTE — ED PROVIDER NOTES
[facility]  ED  Emergency Department Encounter  EmergencyMedicine Resident     Pt Name:Emmie Cadena  MRN: [id number]  [place] 1960  Date of evaluation: 12/27/20  PCP:  Emi Martínez PA-C    CHIEF COMPLAINT       Chief Complaint   Patient presents with    Toe Pain     bilateral        HISTORY OF PRESENT ILLNESS  (Location/Symptom, Timing/Onset, Context/Setting, Quality, Duration, Modifying Factors, Severity.)      Sheree Valles is a 64 y.o. female who presents to the emergency department with a 1 month history of right great toe pain after an unspecified stubbing injury. Boyfriend brought patient to the emergency department because he was concerned that the pain was due to a blood clot. Patient himself denies fever, chills, chest pain, shortness of breath, nausea or vomiting, abdominal pain, problems with urinary bowel movements, or numbness or tingling anywhere. She states that the right great toe is painful when she moves it but she has no calf pain or swelling. Chart review does not demonstrate a personal history of blood clots but the patient does smoke heavily and spends a lot of time laying in the back of the [place]. Both patient and boyfriend refused  and resources on smoking cessation when offered.     PAST MEDICAL / SURGICAL / SOCIAL / FAMILY HISTORY      has a past medical history of Alcohol withdrawal ([address] Alcohol withdrawal seizure with complication ([address] Alcohol-induced chronic pancreatitis ([address] Alcoholic hepatitis, Cellulitis of right hip, COPD (chronic obstructive pulmonary disease) ([address] Diabetes mellitus ([address] DM type 2 (diabetes mellitus, type 2) ([address] Dysphagia, Eczema, Elevated liver enzymes, FTT (failure to thrive) in adult, Hoarseness, Hypertension, Hypomagnesemia, Hyponatremia, Intertrochanteric fracture of right femur ([address] Iron deficiency anemia, Lesion of hard palate, Moderate malnutrition ([address] New onset seizure ([address] Take 1 capsule by mouth every 6 hours as needed for Itching 9/17/20   Carol Pelayo, DO   oxyCODONE (ROXICODONE) 5 MG immediate release tablet Take 5 mg by mouth every 4 hours as needed for Pain . Historical Provider, MD   Calcium-Vitamin D 600-200 MG-UNIT TABS Take 1 tablet by mouth 2 times daily    Historical Provider, MD   traMADol (ULTRAM) 50 MG tablet Take 1 tablet by mouth every 6 hours as needed for Pain 2/8/17   Aaron Dwyer DO   albuterol (PROVENTIL) (2.5 MG/3ML) 0.083% nebulizer solution Take 3 mLs by nebulization every 6 hours as needed for Wheezing 2/8/17   Aaron Dwyer, DO   albuterol sulfate  (90 BASE) MCG/ACT inhaler Inhale 1 puff into the lungs every 4 hours as needed for Wheezing or Shortness of Breath 2/8/17 2/22/17  Aaron Dwyer DO   calcium carbonate (CALCIUM 600) 600 MG TABS tablet Take 1 tablet by mouth daily Do not take with iron 2/8/17   Aaron Dwyer, DO       REVIEW OF SYSTEMS    (2-9 systems for level 4, 10 or more for level 5)      Review of Systems   Constitutional: Negative for chills and fever. HENT: Negative for ear pain, hearing loss and sore throat. Eyes: Negative for visual disturbance. Respiratory: Negative for shortness of breath. Cardiovascular: Negative for chest pain. Gastrointestinal: Negative for abdominal pain, constipation, diarrhea, nausea and vomiting. Genitourinary: Negative for difficulty urinating and dysuria. Musculoskeletal: Negative for arthralgias and myalgias. Neurological: Negative for numbness. Psychiatric/Behavioral: Negative for agitation and confusion. PHYSICAL EXAM   (up to 7 for level 4, 8 or more for level 5)      INITIAL VITALS:   /68   Pulse 89   Temp 97.7 °F (36.5 °C) (Infrared)   Resp 16   Ht 5' 4\" (1.626 m)   Wt 95 lb (43.1 kg)   SpO2 100%   BMI 16.31 kg/m²     Physical Exam  Vitals signs and nursing note reviewed.    Constitutional:       General: She is not in acute distress. Appearance: Normal appearance. She is well-developed. She is not ill-appearing or diaphoretic. HENT:      Head: Normocephalic and atraumatic. Right Ear: External ear normal.      Left Ear: External ear normal.      Nose: Nose normal.      Mouth/Throat:      Mouth: Mucous membranes are moist.   Eyes:      Extraocular Movements: Extraocular movements intact. Conjunctiva/sclera: Conjunctivae normal.   Neck:      Musculoskeletal: Normal range of motion and neck supple. Trachea: No tracheal deviation. Cardiovascular:      Rate and Rhythm: Normal rate and regular rhythm. Pulmonary:      Effort: Pulmonary effort is normal. No respiratory distress. Abdominal:      General: Abdomen is flat. There is no distension. Musculoskeletal: Normal range of motion. General: No swelling, deformity or signs of injury. Comments: There is moderate clubbing of the toes with symmetric appearance of the right great toe compared to the left. Left foot is full strength, right foot great toe strength restricted secondary to pain. Appears well perfused with no asymmetric subungual swelling. No bluish or cyanotic appearance. No crepitus or deformity. Skin:     General: Skin is warm and dry. Capillary Refill: Capillary refill takes less than 2 seconds. Coloration: Skin is not jaundiced. Findings: No bruising or lesion. Neurological:      General: No focal deficit present. Mental Status: She is alert and oriented to person, place, and time. Mental status is at baseline. Motor: No abnormal muscle tone. DIFFERENTIAL  DIAGNOSIS     PLAN (LABS / IMAGING / EKG):  Orders Placed This Encounter   Procedures    XR FOOT RIGHT (MIN 3 VIEWS)    [employer] Post Op Shoe, Unisex - Right; SM (M5.5-7/F6.5-8)       MEDICATIONS ORDERED:  No orders of the defined types were placed in this encounter. DDX: Danica Bee is a 64 y.o. female who presents to the emergency department with right great toe pain for 1 month. Differential diagnosis includes overuse injury, poor footwear, occult fracture, clubbing    DIAGNOSTIC RESULTS / EMERGENCY DEPARTMENT COURSE / MDM   LAB RESULTS:  No results found for this visit on 12/27/20. IMPRESSION: Ricardo King is a 64 y.o. female who presents to the emergency department with right great toe pain for 1 month. On examination she is afebrile, vital signs unremarkable and examination demonstrates an otherwise thin appearing woman of stated age with strong smell of cigarettes, no posterior calf tenderness to palpation, and no asymmetric abnormalities of the right great toe and symmetric clubbing of the bilateral great toes. Counseled patient thoroughly on the need to stop smoking and increase physical activity to avoid blood clots as this was their primary concern, otherwise will obtain x-ray and place patient in Ortho shoe. Patient is precontemplative with lifestyle changes. RADIOLOGY:  No results found. EKG  None    All EKG's are interpreted by the Emergency Department Physician who either signs or co-signs this chart in the absence of a cardiologist.    EMERGENCY DEPARTMENT COURSE:       PROCEDURES:  None    CONSULTS:  None    CRITICAL CARE:  Please see attending note. FINAL IMPRESSION      1. Right foot pain          DISPOSITION / PLAN     DISPOSITION        PATIENT REFERRED TO:  No follow-up provider specified. DISCHARGE MEDICATIONS:  Discharge Medication List as of 12/27/2020  4:42 PM          Edmund Austin MD  Emergency Medicine Resident    This patient was evaluated in the Emergency Department for symptoms described in the history of present illness. He/she was evaluated in the context of the global COVID-19 pandemic, which necessitated consideration that the patient might be at risk for infection with the SARS-CoV-2 virus that causes COVID-19.  Institutional protocols and

## 2020-12-27 NOTE — ED PROVIDER NOTES
[facility] [address] [facility]     Emergency Department     Faculty Attestation        I performed a history and physical examination of the patient and discussed management with the resident. I reviewed the residents note and agree with the documented findings and plan of care. Any areas of disagreement are noted on the chart. I was personally present for the key portions of any procedures. I have documented in the chart those procedures where I was not present during the key portions. I have reviewed the emergency nurses triage note. I agree with the chief complaint, past medical history, past surgical history, allergies, medications, social and family history as documented unless otherwise noted below. For mid-level providers such as nurse practitioners as well as physicians assistants:    I have personally seen and evaluated the patient. I find the patient's history and physical exam are consistent with NP/PA documentation. I agree with the care provided, treatment rendered, disposition, & follow-up plan. Additional findings are as noted. Vital Signs: /68   Pulse 89   Temp 97.7 °F (36.5 °C) (Infrared)   Resp 16   Ht 5' 4\" (1.626 m)   Wt 95 lb (43.1 kg)   SpO2 100%   BMI 16.31 kg/m²   PCP:  Toño Redd PA-C, PA-C    Pertinent Comments:     Right big toe pain after hitting it about multiple months ago. There is no bruising ecchymosis erythema warmth digit is pink warm perfused well. Will image.       Critical Care  None          Theo Blanchard MD  Attending Emergency Medicine Physician              Georgina Galvan MD  12/27/20 1220

## 2020-12-27 NOTE — ED TRIAGE NOTES
Pt arrived to the ED with c/o \"cold feet\". Pt states her feet hurt but she is unable to feel her toes. Upon assessment she felt my hands on her feet and stated her toes are very cold and painful to the touch. Pt states she has been living [facility] for quite some time. Pt is ssaying with her significant other in the [facility] and he tested positive for COVID on Nov 22. Pt is alert and orineted x4.

## 2021-01-01 ENCOUNTER — HOSPITAL ENCOUNTER (EMERGENCY)
Age: 61
Discharge: HOME | End: 2021-06-29
Attending: EMERGENCY MEDICINE
Payer: MEDICARE

## 2021-01-01 ENCOUNTER — APPOINTMENT (OUTPATIENT)
Dept: CT IMAGING | Age: 61
DRG: 441 | End: 2021-01-01
Payer: MEDICARE

## 2021-01-01 ENCOUNTER — TELEPHONE (OUTPATIENT)
Dept: GASTROENTEROLOGY | Age: 61
End: 2021-01-01

## 2021-01-01 ENCOUNTER — HOSPITAL ENCOUNTER (OUTPATIENT)
Dept: CT IMAGING | Age: 61
Discharge: HOME | End: 2021-05-23
Payer: MEDICARE

## 2021-01-01 ENCOUNTER — APPOINTMENT (OUTPATIENT)
Dept: GENERAL RADIOLOGY | Age: 61
DRG: 441 | End: 2021-01-01
Payer: MEDICARE

## 2021-01-01 ENCOUNTER — HOSPITAL ENCOUNTER (OUTPATIENT)
Age: 61
Discharge: HOME | End: 2021-04-27
Payer: MEDICARE

## 2021-01-01 ENCOUNTER — APPOINTMENT (OUTPATIENT)
Dept: GENERAL RADIOLOGY | Age: 61
End: 2021-01-01
Payer: MEDICARE

## 2021-01-01 ENCOUNTER — ANESTHESIA (OUTPATIENT)
Dept: OPERATING ROOM | Age: 61
DRG: 441 | End: 2021-01-01
Payer: MEDICARE

## 2021-01-01 ENCOUNTER — HOSPITAL ENCOUNTER (OUTPATIENT)
Dept: ULTRASOUND IMAGING | Age: 61
Discharge: HOME | End: 2021-04-17
Payer: MEDICARE

## 2021-01-01 ENCOUNTER — ANESTHESIA EVENT (OUTPATIENT)
Dept: OPERATING ROOM | Age: 61
DRG: 441 | End: 2021-01-01
Payer: MEDICARE

## 2021-01-01 ENCOUNTER — TELEPHONE (OUTPATIENT)
Dept: ONCOLOGY | Age: 61
End: 2021-01-01

## 2021-01-01 ENCOUNTER — HOSPITAL ENCOUNTER (INPATIENT)
Age: 61
LOS: 7 days | Discharge: NURSING, REHAB OR LONG-TERM CARE | DRG: 441 | End: 2021-08-28
Attending: STUDENT IN AN ORGANIZED HEALTH CARE EDUCATION/TRAINING PROGRAM | Admitting: INTERNAL MEDICINE
Payer: MEDICARE

## 2021-01-01 VITALS
BODY MASS INDEX: 17.23 KG/M2 | DIASTOLIC BLOOD PRESSURE: 98 MMHG | RESPIRATION RATE: 14 BRPM | HEART RATE: 100 BPM | TEMPERATURE: 98.6 F | WEIGHT: 100.9 LBS | HEIGHT: 64 IN | OXYGEN SATURATION: 99 % | SYSTOLIC BLOOD PRESSURE: 145 MMHG

## 2021-01-01 VITALS
HEIGHT: 64 IN | OXYGEN SATURATION: 96 % | TEMPERATURE: 98.4 F | SYSTOLIC BLOOD PRESSURE: 110 MMHG | HEART RATE: 83 BPM | WEIGHT: 105.06 LBS | BODY MASS INDEX: 17.93 KG/M2 | RESPIRATION RATE: 16 BRPM | DIASTOLIC BLOOD PRESSURE: 70 MMHG

## 2021-01-01 VITALS
DIASTOLIC BLOOD PRESSURE: 85 MMHG | OXYGEN SATURATION: 93 % | RESPIRATION RATE: 12 BRPM | SYSTOLIC BLOOD PRESSURE: 122 MMHG

## 2021-01-01 DIAGNOSIS — Z76.0 ENCOUNTER FOR MEDICATION REFILL: ICD-10-CM

## 2021-01-01 DIAGNOSIS — E87.6 HYPOKALEMIA: ICD-10-CM

## 2021-01-01 DIAGNOSIS — R14.0 BLOATING: ICD-10-CM

## 2021-01-01 DIAGNOSIS — J18.9 MULTIFOCAL PNEUMONIA: ICD-10-CM

## 2021-01-01 DIAGNOSIS — K92.2 GASTROINTESTINAL HEMORRHAGE, UNSPECIFIED GASTROINTESTINAL HEMORRHAGE TYPE: Primary | ICD-10-CM

## 2021-01-01 DIAGNOSIS — D64.9 ANEMIA, UNSPECIFIED TYPE: ICD-10-CM

## 2021-01-01 DIAGNOSIS — Z87.09 HISTORY OF ASTHMA: ICD-10-CM

## 2021-01-01 DIAGNOSIS — R74.8 ELEVATED LIVER ENZYMES: ICD-10-CM

## 2021-01-01 DIAGNOSIS — K76.0 FATTY LIVER: ICD-10-CM

## 2021-01-01 DIAGNOSIS — F10.20 UNCOMPLICATED ALCOHOL DEPENDENCE (HCC): ICD-10-CM

## 2021-01-01 DIAGNOSIS — F10.29 ALCOHOL DEPENDENCE WITH UNSPECIFIED ALCOHOL-INDUCED DISORDER (HCC): ICD-10-CM

## 2021-01-01 DIAGNOSIS — M79.671 PAIN OF RIGHT HEEL: Primary | ICD-10-CM

## 2021-01-01 DIAGNOSIS — Z01.812 PRE-OPERATIVE LABORATORY EXAMINATION: ICD-10-CM

## 2021-01-01 DIAGNOSIS — F17.210 CIGARETTE SMOKER: ICD-10-CM

## 2021-01-01 LAB
-: ABNORMAL
ABO/RH: NORMAL
ABSOLUTE EOS #: 0 K/UL (ref 0–0.4)
ABSOLUTE EOS #: 0 K/UL (ref 0–0.44)
ABSOLUTE EOS #: 0.12 K/UL (ref 0–0.44)
ABSOLUTE EOS #: 0.21 K/UL (ref 0–0.4)
ABSOLUTE IMMATURE GRANULOCYTE: 0 K/UL (ref 0–0.3)
ABSOLUTE IMMATURE GRANULOCYTE: 0.06 K/UL (ref 0–0.3)
ABSOLUTE IMMATURE GRANULOCYTE: 0.06 K/UL (ref 0–0.3)
ABSOLUTE IMMATURE GRANULOCYTE: 0.09 K/UL (ref 0–0.3)
ABSOLUTE LYMPH #: 0.45 K/UL (ref 1.1–3.7)
ABSOLUTE LYMPH #: 0.46 K/UL (ref 1–4.8)
ABSOLUTE LYMPH #: 0.54 K/UL (ref 1.1–3.7)
ABSOLUTE LYMPH #: 0.7 K/UL (ref 1–4.8)
ABSOLUTE MONO #: 0.19 K/UL (ref 0.1–1.2)
ABSOLUTE MONO #: 0.3 K/UL (ref 0.1–1.2)
ABSOLUTE MONO #: 0.42 K/UL (ref 0.2–0.8)
ABSOLUTE MONO #: 0.62 K/UL (ref 0.2–0.8)
ALBUMIN SERPL-MCNC: 2 G/DL (ref 3.5–5.2)
ALBUMIN SERPL-MCNC: 2.1 G/DL (ref 3.5–5.2)
ALBUMIN SERPL-MCNC: 2.1 G/DL (ref 3.5–5.2)
ALBUMIN SERPL-MCNC: 2.2 G/DL (ref 3.5–5.2)
ALBUMIN SERPL-MCNC: 2.4 G/DL (ref 3.5–5.2)
ALBUMIN SERPL-MCNC: 2.8 G/DL (ref 3.5–5.2)
ALBUMIN/GLOBULIN RATIO: ABNORMAL (ref 1–2.5)
ALP BLD-CCNC: 253 U/L (ref 35–104)
ALP BLD-CCNC: 254 U/L (ref 35–104)
ALP BLD-CCNC: 263 U/L (ref 35–104)
ALP BLD-CCNC: 273 U/L (ref 35–104)
ALP BLD-CCNC: 299 U/L (ref 35–104)
ALP BLD-CCNC: 379 U/L (ref 35–104)
ALT SERPL-CCNC: 39 U/L (ref 5–33)
ALT SERPL-CCNC: 50 U/L (ref 5–33)
ALT SERPL-CCNC: 52 U/L (ref 5–33)
ALT SERPL-CCNC: 55 U/L (ref 5–33)
ALT SERPL-CCNC: 65 U/L (ref 5–33)
ALT SERPL-CCNC: 78 U/L (ref 5–33)
AMMONIA: 48 UMOL/L (ref 11–51)
AMMONIA: 54 UMOL/L (ref 11–51)
AMORPHOUS: ABNORMAL
ANION GAP SERPL CALCULATED.3IONS-SCNC: 10 MMOL/L (ref 9–17)
ANION GAP SERPL CALCULATED.3IONS-SCNC: 12 MMOL/L (ref 9–17)
ANION GAP SERPL CALCULATED.3IONS-SCNC: 32 MMOL/L (ref 9–17)
ANION GAP SERPL CALCULATED.3IONS-SCNC: 6 MMOL/L (ref 9–17)
ANION GAP SERPL CALCULATED.3IONS-SCNC: 8 MMOL/L (ref 9–17)
ANION GAP SERPL CALCULATED.3IONS-SCNC: 9 MMOL/L (ref 9–17)
ANION GAP SERPL CALCULATED.3IONS-SCNC: 9 MMOL/L (ref 9–17)
ANTIBODY SCREEN: NEGATIVE
ARM BAND NUMBER: NORMAL
AST SERPL-CCNC: 152 U/L
AST SERPL-CCNC: 236 U/L
AST SERPL-CCNC: 248 U/L
AST SERPL-CCNC: 253 U/L
AST SERPL-CCNC: 301 U/L
AST SERPL-CCNC: 370 U/L
BACTERIA: ABNORMAL
BASOPHILS # BLD: 0 %
BASOPHILS # BLD: 0 % (ref 0–2)
BASOPHILS # BLD: 0 % (ref 0–2)
BASOPHILS # BLD: 1 %
BASOPHILS ABSOLUTE: 0 K/UL (ref 0–0.2)
BASOPHILS ABSOLUTE: 0.04 K/UL (ref 0–0.2)
BILIRUB SERPL-MCNC: 2.71 MG/DL (ref 0.3–1.2)
BILIRUB SERPL-MCNC: 3.71 MG/DL (ref 0.3–1.2)
BILIRUB SERPL-MCNC: 3.93 MG/DL (ref 0.3–1.2)
BILIRUB SERPL-MCNC: 3.97 MG/DL (ref 0.3–1.2)
BILIRUB SERPL-MCNC: 4.15 MG/DL (ref 0.3–1.2)
BILIRUB SERPL-MCNC: 4.38 MG/DL (ref 0.3–1.2)
BILIRUBIN DIRECT: 2.32 MG/DL
BILIRUBIN DIRECT: 3.32 MG/DL
BILIRUBIN DIRECT: 3.38 MG/DL
BILIRUBIN DIRECT: 3.42 MG/DL
BILIRUBIN DIRECT: 3.55 MG/DL
BILIRUBIN URINE: ABNORMAL
BILIRUBIN, INDIRECT: 0.39 MG/DL (ref 0–1)
BILIRUBIN, INDIRECT: 0.42 MG/DL (ref 0–1)
BILIRUBIN, INDIRECT: 0.55 MG/DL (ref 0–1)
BILIRUBIN, INDIRECT: 0.73 MG/DL (ref 0–1)
BILIRUBIN, INDIRECT: 1.06 MG/DL (ref 0–1)
BLD PROD TYP BPU: NORMAL
BLD PROD TYP BPU: NORMAL
BUN BLDV-MCNC: 2 MG/DL (ref 6–20)
BUN BLDV-MCNC: 2 MG/DL (ref 6–20)
BUN BLDV-MCNC: 3 MG/DL (ref 6–20)
BUN BLDV-MCNC: 4 MG/DL (ref 6–20)
BUN BLDV-MCNC: 5 MG/DL (ref 6–20)
BUN BLDV-MCNC: 8 MG/DL (ref 6–20)
BUN BLDV-MCNC: <2 MG/DL (ref 6–20)
BUN/CREAT BLD: 20 (ref 9–20)
BUN/CREAT BLD: ABNORMAL (ref 9–20)
CALCIUM SERPL-MCNC: 6.5 MG/DL (ref 8.6–10.4)
CALCIUM SERPL-MCNC: 6.6 MG/DL (ref 8.6–10.4)
CALCIUM SERPL-MCNC: 6.6 MG/DL (ref 8.6–10.4)
CALCIUM SERPL-MCNC: 6.7 MG/DL (ref 8.6–10.4)
CALCIUM SERPL-MCNC: 7 MG/DL (ref 8.6–10.4)
CALCIUM SERPL-MCNC: 7.2 MG/DL (ref 8.6–10.4)
CALCIUM SERPL-MCNC: 8.1 MG/DL (ref 8.6–10.4)
CASTS UA: ABNORMAL /LPF
CHLORIDE BLD-SCNC: 101 MMOL/L (ref 98–107)
CHLORIDE BLD-SCNC: 102 MMOL/L (ref 98–107)
CHLORIDE BLD-SCNC: 102 MMOL/L (ref 98–107)
CHLORIDE BLD-SCNC: 103 MMOL/L (ref 98–107)
CHLORIDE BLD-SCNC: 107 MMOL/L (ref 98–107)
CHLORIDE BLD-SCNC: 90 MMOL/L (ref 98–107)
CHLORIDE BLD-SCNC: 98 MMOL/L (ref 98–107)
CO2: 12 MMOL/L (ref 20–31)
CO2: 19 MMOL/L (ref 20–31)
CO2: 20 MMOL/L (ref 20–31)
CO2: 21 MMOL/L (ref 20–31)
CO2: 21 MMOL/L (ref 20–31)
CO2: 22 MMOL/L (ref 20–31)
CO2: 22 MMOL/L (ref 20–31)
COLOR: YELLOW
COMMENT UA: ABNORMAL
CREAT SERPL-MCNC: 0.4 MG/DL (ref 0.5–0.9)
CREAT SERPL-MCNC: 0.49 MG/DL (ref 0.5–0.9)
CREAT SERPL-MCNC: <0.4 MG/DL (ref 0.5–0.9)
CROSSMATCH RESULT: NORMAL
CROSSMATCH RESULT: NORMAL
CRYSTALS, UA: ABNORMAL /HPF
CULTURE: NORMAL
CULTURE: NORMAL
DATE, STOOL #1: ABNORMAL
DATE, STOOL #2: ABNORMAL
DATE, STOOL #3: ABNORMAL
DIFFERENTIAL TYPE: ABNORMAL
DISPENSE STATUS BLOOD BANK: NORMAL
DISPENSE STATUS BLOOD BANK: NORMAL
EOSINOPHILS RELATIVE PERCENT: 0 % (ref 1–4)
EOSINOPHILS RELATIVE PERCENT: 0 % (ref 1–4)
EOSINOPHILS RELATIVE PERCENT: 2 % (ref 1–4)
EOSINOPHILS RELATIVE PERCENT: 5 % (ref 1–4)
EPITHELIAL CELLS UA: ABNORMAL /HPF (ref 0–5)
ETHANOL PERCENT: 0.04 %
ETHANOL: 45 MG/DL
EXPIRATION DATE: NORMAL
FERRITIN: 3079 UG/L (ref 13–150)
FIBRINOGEN: 311 MG/DL (ref 179–518)
FOLATE: 3 NG/ML
GFR AFRICAN AMERICAN: >60 ML/MIN
GFR AFRICAN AMERICAN: >60 ML/MIN
GFR AFRICAN AMERICAN: ABNORMAL ML/MIN
GFR NON-AFRICAN AMERICAN: >60 ML/MIN
GFR NON-AFRICAN AMERICAN: >60 ML/MIN
GFR NON-AFRICAN AMERICAN: ABNORMAL ML/MIN
GFR SERPL CREATININE-BSD FRML MDRD: ABNORMAL ML/MIN/{1.73_M2}
GLOBULIN: ABNORMAL G/DL (ref 1.5–3.8)
GLUCOSE BLD-MCNC: 103 MG/DL (ref 70–99)
GLUCOSE BLD-MCNC: 111 MG/DL (ref 70–99)
GLUCOSE BLD-MCNC: 115 MG/DL (ref 65–105)
GLUCOSE BLD-MCNC: 122 MG/DL (ref 70–99)
GLUCOSE BLD-MCNC: 129 MG/DL (ref 70–99)
GLUCOSE BLD-MCNC: 79 MG/DL (ref 70–99)
GLUCOSE URINE: NEGATIVE
HAV IGM SER IA-ACNC: NONREACTIVE
HCT VFR BLD CALC: 16.5 % (ref 36.3–47.1)
HCT VFR BLD CALC: 19.4 % (ref 36.3–47.1)
HCT VFR BLD CALC: 22.4 % (ref 36.3–47.1)
HCT VFR BLD CALC: 22.4 % (ref 36.3–47.1)
HCT VFR BLD CALC: 22.7 % (ref 36.3–47.1)
HCT VFR BLD CALC: 22.8 % (ref 36.3–47.1)
HCT VFR BLD CALC: 22.9 % (ref 36.3–47.1)
HCT VFR BLD CALC: 23.1 % (ref 36.3–47.1)
HCT VFR BLD CALC: 24.1 % (ref 36.3–47.1)
HCT VFR BLD CALC: 24.4 % (ref 36.3–47.1)
HCT VFR BLD CALC: 24.6 % (ref 36.3–47.1)
HCT VFR BLD CALC: 24.8 % (ref 36.3–47.1)
HCT VFR BLD CALC: 25.4 % (ref 36.3–47.1)
HCT VFR BLD CALC: 25.7 % (ref 36.3–47.1)
HCT VFR BLD CALC: 25.8 % (ref 36.3–47.1)
HCT VFR BLD CALC: 27.2 % (ref 36.3–47.1)
HCT VFR BLD CALC: 28.6 % (ref 36.3–47.1)
HEMOCCULT SP1 STL QL: POSITIVE
HEMOCCULT SP2 STL QL: ABNORMAL
HEMOCCULT SP3 STL QL: ABNORMAL
HEMOGLOBIN: 5.1 G/DL (ref 11.9–15.1)
HEMOGLOBIN: 6.3 G/DL (ref 11.9–15.1)
HEMOGLOBIN: 7.5 G/DL (ref 11.9–15.1)
HEMOGLOBIN: 7.5 G/DL (ref 11.9–15.1)
HEMOGLOBIN: 7.6 G/DL (ref 11.9–15.1)
HEMOGLOBIN: 7.8 G/DL (ref 11.9–15.1)
HEMOGLOBIN: 7.8 G/DL (ref 11.9–15.1)
HEMOGLOBIN: 8 G/DL (ref 11.9–15.1)
HEMOGLOBIN: 8 G/DL (ref 11.9–15.1)
HEMOGLOBIN: 8.1 G/DL (ref 11.9–15.1)
HEMOGLOBIN: 8.2 G/DL (ref 11.9–15.1)
HEMOGLOBIN: 8.2 G/DL (ref 11.9–15.1)
HEMOGLOBIN: 8.4 G/DL (ref 11.9–15.1)
HEMOGLOBIN: 8.5 G/DL (ref 11.9–15.1)
HEMOGLOBIN: 8.6 G/DL (ref 11.9–15.1)
HEMOGLOBIN: 8.8 G/DL (ref 11.9–15.1)
HEPATITIS B CORE IGM ANTIBODY: NONREACTIVE
HEPATITIS B SURFACE ANTIGEN: NONREACTIVE
HEPATITIS C ANTIBODY: NONREACTIVE
IMMATURE GRANULOCYTES: 0 %
IMMATURE GRANULOCYTES: 1 %
INR BLD: 1.1
INR BLD: 1.2
IRON SATURATION: ABNORMAL % (ref 20–55)
IRON SATURATION: ABNORMAL % (ref 20–55)
IRON: 121 UG/DL (ref 37–145)
IRON: 144 UG/DL (ref 37–145)
KETONES, URINE: ABNORMAL
LACTIC ACID, SEPSIS WHOLE BLOOD: ABNORMAL MMOL/L (ref 0.5–1.9)
LACTIC ACID, SEPSIS WHOLE BLOOD: ABNORMAL MMOL/L (ref 0.5–1.9)
LACTIC ACID, SEPSIS WHOLE BLOOD: NORMAL MMOL/L (ref 0.5–1.9)
LACTIC ACID, SEPSIS: 1.7 MMOL/L (ref 0.5–1.9)
LACTIC ACID, SEPSIS: 2.1 MMOL/L (ref 0.5–1.9)
LACTIC ACID, SEPSIS: 5.9 MMOL/L (ref 0.5–1.9)
LACTIC ACID: 8.2 MMOL/L (ref 0.5–2.2)
LEUKOCYTE ESTERASE, URINE: NEGATIVE
LIPASE: 150 U/L (ref 13–60)
LYMPHOCYTES # BLD: 11 % (ref 24–44)
LYMPHOCYTES # BLD: 7 % (ref 24–43)
LYMPHOCYTES # BLD: 8 % (ref 24–44)
LYMPHOCYTES # BLD: 9 % (ref 24–43)
Lab: NORMAL
Lab: NORMAL
MAGNESIUM: 0.9 MG/DL (ref 1.6–2.6)
MAGNESIUM: 1.6 MG/DL (ref 1.6–2.6)
MAGNESIUM: 1.6 MG/DL (ref 1.6–2.6)
MAGNESIUM: 2.2 MG/DL (ref 1.6–2.6)
MCH RBC QN AUTO: 31.4 PG (ref 25.2–33.5)
MCH RBC QN AUTO: 31.5 PG (ref 25.2–33.5)
MCH RBC QN AUTO: 31.9 PG (ref 25.2–33.5)
MCH RBC QN AUTO: 31.9 PG (ref 25.2–33.5)
MCH RBC QN AUTO: 32.1 PG (ref 25.2–33.5)
MCH RBC QN AUTO: 32.8 PG (ref 25.2–33.5)
MCH RBC QN AUTO: 32.9 PG (ref 25.2–33.5)
MCH RBC QN AUTO: 34.9 PG (ref 25.2–33.5)
MCHC RBC AUTO-ENTMCNC: 30.9 G/DL (ref 28.4–34.8)
MCHC RBC AUTO-ENTMCNC: 31.7 G/DL (ref 28.4–34.8)
MCHC RBC AUTO-ENTMCNC: 31.8 G/DL (ref 28.4–34.8)
MCHC RBC AUTO-ENTMCNC: 32.5 G/DL (ref 28.4–34.8)
MCHC RBC AUTO-ENTMCNC: 32.5 G/DL (ref 28.4–34.8)
MCHC RBC AUTO-ENTMCNC: 32.8 G/DL (ref 28.4–34.8)
MCHC RBC AUTO-ENTMCNC: 33.3 G/DL (ref 28.4–34.8)
MCHC RBC AUTO-ENTMCNC: 33.9 G/DL (ref 28.4–34.8)
MCV RBC AUTO: 100.4 FL (ref 82.6–102.9)
MCV RBC AUTO: 101 FL (ref 82.6–102.9)
MCV RBC AUTO: 113 FL (ref 82.6–102.9)
MCV RBC AUTO: 92.6 FL (ref 82.6–102.9)
MCV RBC AUTO: 97.2 FL (ref 82.6–102.9)
MCV RBC AUTO: 98.7 FL (ref 82.6–102.9)
MCV RBC AUTO: 98.7 FL (ref 82.6–102.9)
MCV RBC AUTO: 99.2 FL (ref 82.6–102.9)
MONOCYTES # BLD: 10 % (ref 1–7)
MONOCYTES # BLD: 3 % (ref 3–12)
MONOCYTES # BLD: 5 % (ref 3–12)
MONOCYTES # BLD: 7 % (ref 1–7)
MORPHOLOGY: ABNORMAL
MUCUS: ABNORMAL
NITRITE, URINE: NEGATIVE
NRBC AUTOMATED: 0 PER 100 WBC
NRBC AUTOMATED: ABNORMAL PER 100 WBC
OTHER OBSERVATIONS UA: ABNORMAL
PARTIAL THROMBOPLASTIN TIME: 27.8 SEC (ref 23.9–33.8)
PARTIAL THROMBOPLASTIN TIME: 28 SEC (ref 23.9–33.8)
PDW BLD-RTO: 15 % (ref 11.8–14.4)
PDW BLD-RTO: 16 % (ref 11.8–14.4)
PDW BLD-RTO: 16.1 % (ref 11.8–14.4)
PDW BLD-RTO: 16.3 % (ref 11.8–14.4)
PDW BLD-RTO: 16.7 % (ref 11.8–14.4)
PDW BLD-RTO: 18.5 % (ref 11.8–14.4)
PDW BLD-RTO: 19 % (ref 11.8–14.4)
PDW BLD-RTO: 19.3 % (ref 11.8–14.4)
PH UA: 6 (ref 5–8)
PLATELET # BLD: 103 K/UL (ref 138–453)
PLATELET # BLD: 138 K/UL (ref 138–453)
PLATELET # BLD: 148 K/UL (ref 138–453)
PLATELET # BLD: 164 K/UL (ref 138–453)
PLATELET # BLD: 179 K/UL (ref 138–453)
PLATELET # BLD: 200 K/UL (ref 138–453)
PLATELET # BLD: 302 K/UL (ref 138–453)
PLATELET # BLD: 99 K/UL (ref 138–453)
PLATELET ESTIMATE: ABNORMAL
PMV BLD AUTO: 10.4 FL (ref 8.1–13.5)
PMV BLD AUTO: 10.6 FL (ref 8.1–13.5)
PMV BLD AUTO: 10.7 FL (ref 8.1–13.5)
PMV BLD AUTO: 10.8 FL (ref 8.1–13.5)
PMV BLD AUTO: 10.8 FL (ref 8.1–13.5)
PMV BLD AUTO: 10.9 FL (ref 8.1–13.5)
PMV BLD AUTO: 10.9 FL (ref 8.1–13.5)
PMV BLD AUTO: 11.7 FL (ref 8.1–13.5)
POTASSIUM SERPL-SCNC: 2.7 MMOL/L (ref 3.7–5.3)
POTASSIUM SERPL-SCNC: 3.4 MMOL/L (ref 3.7–5.3)
POTASSIUM SERPL-SCNC: 3.5 MMOL/L (ref 3.7–5.3)
POTASSIUM SERPL-SCNC: 3.6 MMOL/L (ref 3.7–5.3)
POTASSIUM SERPL-SCNC: 3.7 MMOL/L (ref 3.7–5.3)
POTASSIUM SERPL-SCNC: 4 MMOL/L (ref 3.7–5.3)
POTASSIUM SERPL-SCNC: 4 MMOL/L (ref 3.7–5.3)
POTASSIUM SERPL-SCNC: 4.9 MMOL/L (ref 3.7–5.3)
PROTEIN UA: NEGATIVE
PROTHROMBIN TIME: 14 SEC (ref 11.5–14.2)
PROTHROMBIN TIME: 15.4 SEC (ref 11.5–14.2)
RBC # BLD: 1.46 M/UL (ref 3.95–5.11)
RBC # BLD: 1.92 M/UL (ref 3.95–5.11)
RBC # BLD: 2.31 M/UL (ref 3.95–5.11)
RBC # BLD: 2.34 M/UL (ref 3.95–5.11)
RBC # BLD: 2.42 M/UL (ref 3.95–5.11)
RBC # BLD: 2.48 M/UL (ref 3.95–5.11)
RBC # BLD: 2.51 M/UL (ref 3.95–5.11)
RBC # BLD: 2.57 M/UL (ref 3.95–5.11)
RBC # BLD: ABNORMAL 10*6/UL
RBC UA: ABNORMAL /HPF (ref 0–2)
RENAL EPITHELIAL, UA: ABNORMAL /HPF
SARS-COV-2, RAPID: NOT DETECTED
SEG NEUTROPHILS: 73 % (ref 36–66)
SEG NEUTROPHILS: 83 % (ref 36–65)
SEG NEUTROPHILS: 84 % (ref 36–66)
SEG NEUTROPHILS: 89 % (ref 36–65)
SEGMENTED NEUTROPHILS ABSOLUTE COUNT: 3.07 K/UL (ref 1.8–7.7)
SEGMENTED NEUTROPHILS ABSOLUTE COUNT: 4.98 K/UL (ref 1.5–8.1)
SEGMENTED NEUTROPHILS ABSOLUTE COUNT: 5.7 K/UL (ref 1.5–8.1)
SEGMENTED NEUTROPHILS ABSOLUTE COUNT: 7.39 K/UL (ref 1.8–7.7)
SODIUM BLD-SCNC: 129 MMOL/L (ref 135–144)
SODIUM BLD-SCNC: 130 MMOL/L (ref 135–144)
SODIUM BLD-SCNC: 131 MMOL/L (ref 135–144)
SODIUM BLD-SCNC: 132 MMOL/L (ref 135–144)
SODIUM BLD-SCNC: 134 MMOL/L (ref 135–144)
SODIUM BLD-SCNC: 134 MMOL/L (ref 135–144)
SODIUM BLD-SCNC: 136 MMOL/L (ref 135–144)
SPECIFIC GRAVITY UA: 1.01 (ref 1–1.03)
SPECIMEN DESCRIPTION: NORMAL
SURGICAL PATHOLOGY REPORT: NORMAL
THYROXINE, FREE: 0.81 NG/DL (ref 0.93–1.7)
TIME, STOOL #1: ABNORMAL
TIME, STOOL #2: ABNORMAL
TIME, STOOL #3: ABNORMAL
TOTAL IRON BINDING CAPACITY: ABNORMAL UG/DL (ref 250–450)
TOTAL IRON BINDING CAPACITY: ABNORMAL UG/DL (ref 250–450)
TOTAL PROTEIN: 4.3 G/DL (ref 6.4–8.3)
TOTAL PROTEIN: 4.4 G/DL (ref 6.4–8.3)
TOTAL PROTEIN: 4.4 G/DL (ref 6.4–8.3)
TOTAL PROTEIN: 4.8 G/DL (ref 6.4–8.3)
TOTAL PROTEIN: 5.1 G/DL (ref 6.4–8.3)
TOTAL PROTEIN: 5.4 G/DL (ref 6.4–8.3)
TRANSFUSION STATUS: NORMAL
TRANSFUSION STATUS: NORMAL
TRICHOMONAS: ABNORMAL
TSH SERPL DL<=0.05 MIU/L-ACNC: 7.05 MIU/L (ref 0.3–5)
TURBIDITY: CLEAR
UNIT DIVISION: 0
UNIT DIVISION: 0
UNIT NUMBER: NORMAL
UNIT NUMBER: NORMAL
UNSATURATED IRON BINDING CAPACITY: <17 UG/DL (ref 112–347)
UNSATURATED IRON BINDING CAPACITY: <17 UG/DL (ref 112–347)
URINE HGB: ABNORMAL
UROBILINOGEN, URINE: ABNORMAL
VITAMIN B-12: 1060 PG/ML (ref 232–1245)
WBC # BLD: 4.2 K/UL (ref 3.5–11.3)
WBC # BLD: 6 K/UL (ref 3.5–11.3)
WBC # BLD: 6.3 K/UL (ref 3.5–11.3)
WBC # BLD: 6.4 K/UL (ref 3.5–11.3)
WBC # BLD: 7 K/UL (ref 3.5–11.3)
WBC # BLD: 7.2 K/UL (ref 3.5–11.3)
WBC # BLD: 7.6 K/UL (ref 3.5–11.3)
WBC # BLD: 8.8 K/UL (ref 3.5–11.3)
WBC # BLD: ABNORMAL 10*3/UL
WBC UA: ABNORMAL /HPF (ref 0–5)
YEAST: ABNORMAL

## 2021-01-01 PROCEDURE — 82746 ASSAY OF FOLIC ACID SERUM: CPT

## 2021-01-01 PROCEDURE — 80074 ACUTE HEPATITIS PANEL: CPT

## 2021-01-01 PROCEDURE — 97167 OT EVAL HIGH COMPLEX 60 MIN: CPT

## 2021-01-01 PROCEDURE — 6360000002 HC RX W HCPCS: Performed by: INTERNAL MEDICINE

## 2021-01-01 PROCEDURE — 3700000000 HC ANESTHESIA ATTENDED CARE: Performed by: INTERNAL MEDICINE

## 2021-01-01 PROCEDURE — 2500000003 HC RX 250 WO HCPCS: Performed by: NURSE ANESTHETIST, CERTIFIED REGISTERED

## 2021-01-01 PROCEDURE — 6360000002 HC RX W HCPCS: Performed by: CLINICAL NURSE SPECIALIST

## 2021-01-01 PROCEDURE — 84439 ASSAY OF FREE THYROXINE: CPT

## 2021-01-01 PROCEDURE — 94640 AIRWAY INHALATION TREATMENT: CPT

## 2021-01-01 PROCEDURE — 6360000004 HC RX CONTRAST MEDICATION: Performed by: STUDENT IN AN ORGANIZED HEALTH CARE EDUCATION/TRAINING PROGRAM

## 2021-01-01 PROCEDURE — 2580000003 HC RX 258: Performed by: STUDENT IN AN ORGANIZED HEALTH CARE EDUCATION/TRAINING PROGRAM

## 2021-01-01 PROCEDURE — 6370000000 HC RX 637 (ALT 250 FOR IP): Performed by: INTERNAL MEDICINE

## 2021-01-01 PROCEDURE — C9113 INJ PANTOPRAZOLE SODIUM, VIA: HCPCS | Performed by: STUDENT IN AN ORGANIZED HEALTH CARE EDUCATION/TRAINING PROGRAM

## 2021-01-01 PROCEDURE — 36430 TRANSFUSION BLD/BLD COMPNT: CPT

## 2021-01-01 PROCEDURE — 96374 THER/PROPH/DIAG INJ IV PUSH: CPT

## 2021-01-01 PROCEDURE — 97535 SELF CARE MNGMENT TRAINING: CPT | Performed by: NURSE PRACTITIONER

## 2021-01-01 PROCEDURE — 82565 ASSAY OF CREATININE: CPT

## 2021-01-01 PROCEDURE — 2580000003 HC RX 258: Performed by: INTERNAL MEDICINE

## 2021-01-01 PROCEDURE — 2580000003 HC RX 258: Performed by: CLINICAL NURSE SPECIALIST

## 2021-01-01 PROCEDURE — 81001 URINALYSIS AUTO W/SCOPE: CPT

## 2021-01-01 PROCEDURE — 85018 HEMOGLOBIN: CPT

## 2021-01-01 PROCEDURE — 83605 ASSAY OF LACTIC ACID: CPT

## 2021-01-01 PROCEDURE — 97530 THERAPEUTIC ACTIVITIES: CPT | Performed by: NURSE PRACTITIONER

## 2021-01-01 PROCEDURE — 97530 THERAPEUTIC ACTIVITIES: CPT

## 2021-01-01 PROCEDURE — 2700000000 HC OXYGEN THERAPY PER DAY

## 2021-01-01 PROCEDURE — 88305 TISSUE EXAM BY PATHOLOGIST: CPT

## 2021-01-01 PROCEDURE — G0328 FECAL BLOOD SCRN IMMUNOASSAY: HCPCS

## 2021-01-01 PROCEDURE — 99284 EMERGENCY DEPT VISIT MOD MDM: CPT

## 2021-01-01 PROCEDURE — 86901 BLOOD TYPING SEROLOGIC RH(D): CPT

## 2021-01-01 PROCEDURE — 85027 COMPLETE CBC AUTOMATED: CPT

## 2021-01-01 PROCEDURE — 74177 CT ABD & PELVIS W/CONTRAST: CPT

## 2021-01-01 PROCEDURE — 83550 IRON BINDING TEST: CPT

## 2021-01-01 PROCEDURE — 99232 SBSQ HOSP IP/OBS MODERATE 35: CPT | Performed by: INTERNAL MEDICINE

## 2021-01-01 PROCEDURE — 2580000003 HC RX 258: Performed by: PHYSICIAN ASSISTANT

## 2021-01-01 PROCEDURE — 85025 COMPLETE CBC W/AUTO DIFF WBC: CPT

## 2021-01-01 PROCEDURE — 82140 ASSAY OF AMMONIA: CPT

## 2021-01-01 PROCEDURE — C9113 INJ PANTOPRAZOLE SODIUM, VIA: HCPCS | Performed by: CLINICAL NURSE SPECIALIST

## 2021-01-01 PROCEDURE — 99223 1ST HOSP IP/OBS HIGH 75: CPT | Performed by: INTERNAL MEDICINE

## 2021-01-01 PROCEDURE — 80048 BASIC METABOLIC PNL TOTAL CA: CPT

## 2021-01-01 PROCEDURE — 94761 N-INVAS EAR/PLS OXIMETRY MLT: CPT

## 2021-01-01 PROCEDURE — 74174 CTA ABD&PLVS W/CONTRAST: CPT

## 2021-01-01 PROCEDURE — 86850 RBC ANTIBODY SCREEN: CPT

## 2021-01-01 PROCEDURE — APPSS30 APP SPLIT SHARED TIME 16-30 MINUTES: Performed by: NURSE PRACTITIONER

## 2021-01-01 PROCEDURE — 36415 COLL VENOUS BLD VENIPUNCTURE: CPT

## 2021-01-01 PROCEDURE — 87635 SARS-COV-2 COVID-19 AMP PRB: CPT

## 2021-01-01 PROCEDURE — 97535 SELF CARE MNGMENT TRAINING: CPT

## 2021-01-01 PROCEDURE — 83690 ASSAY OF LIPASE: CPT

## 2021-01-01 PROCEDURE — 96361 HYDRATE IV INFUSION ADD-ON: CPT

## 2021-01-01 PROCEDURE — 99222 1ST HOSP IP/OBS MODERATE 55: CPT | Performed by: INTERNAL MEDICINE

## 2021-01-01 PROCEDURE — 80053 COMPREHEN METABOLIC PANEL: CPT

## 2021-01-01 PROCEDURE — 86920 COMPATIBILITY TEST SPIN: CPT

## 2021-01-01 PROCEDURE — 82947 ASSAY GLUCOSE BLOOD QUANT: CPT

## 2021-01-01 PROCEDURE — 7100000001 HC PACU RECOVERY - ADDTL 15 MIN: Performed by: INTERNAL MEDICINE

## 2021-01-01 PROCEDURE — C9113 INJ PANTOPRAZOLE SODIUM, VIA: HCPCS | Performed by: INTERNAL MEDICINE

## 2021-01-01 PROCEDURE — 86900 BLOOD TYPING SEROLOGIC ABO: CPT

## 2021-01-01 PROCEDURE — 85730 THROMBOPLASTIN TIME PARTIAL: CPT

## 2021-01-01 PROCEDURE — 6360000002 HC RX W HCPCS: Performed by: NURSE ANESTHETIST, CERTIFIED REGISTERED

## 2021-01-01 PROCEDURE — 85014 HEMATOCRIT: CPT

## 2021-01-01 PROCEDURE — 85610 PROTHROMBIN TIME: CPT

## 2021-01-01 PROCEDURE — 96375 TX/PRO/DX INJ NEW DRUG ADDON: CPT

## 2021-01-01 PROCEDURE — 80076 HEPATIC FUNCTION PANEL: CPT

## 2021-01-01 PROCEDURE — 97116 GAIT TRAINING THERAPY: CPT

## 2021-01-01 PROCEDURE — 2000000000 HC ICU R&B

## 2021-01-01 PROCEDURE — 71045 X-RAY EXAM CHEST 1 VIEW: CPT

## 2021-01-01 PROCEDURE — 6370000000 HC RX 637 (ALT 250 FOR IP): Performed by: NURSE PRACTITIONER

## 2021-01-01 PROCEDURE — 84443 ASSAY THYROID STIM HORMONE: CPT

## 2021-01-01 PROCEDURE — 88342 IMHCHEM/IMCYTCHM 1ST ANTB: CPT

## 2021-01-01 PROCEDURE — 6360000004 HC RX CONTRAST MEDICATION: Performed by: PHYSICIAN ASSISTANT

## 2021-01-01 PROCEDURE — 6360000002 HC RX W HCPCS: Performed by: STUDENT IN AN ORGANIZED HEALTH CARE EDUCATION/TRAINING PROGRAM

## 2021-01-01 PROCEDURE — 83735 ASSAY OF MAGNESIUM: CPT

## 2021-01-01 PROCEDURE — 99283 EMERGENCY DEPT VISIT LOW MDM: CPT

## 2021-01-01 PROCEDURE — 99233 SBSQ HOSP IP/OBS HIGH 50: CPT | Performed by: INTERNAL MEDICINE

## 2021-01-01 PROCEDURE — 73630 X-RAY EXAM OF FOOT: CPT

## 2021-01-01 PROCEDURE — 2580000003 HC RX 258: Performed by: NURSE ANESTHETIST, CERTIFIED REGISTERED

## 2021-01-01 PROCEDURE — 0DB78ZX EXCISION OF STOMACH, PYLORUS, VIA NATURAL OR ARTIFICIAL OPENING ENDOSCOPIC, DIAGNOSTIC: ICD-10-PCS | Performed by: INTERNAL MEDICINE

## 2021-01-01 PROCEDURE — 2060000000 HC ICU INTERMEDIATE R&B

## 2021-01-01 PROCEDURE — 82728 ASSAY OF FERRITIN: CPT

## 2021-01-01 PROCEDURE — 84132 ASSAY OF SERUM POTASSIUM: CPT

## 2021-01-01 PROCEDURE — 87040 BLOOD CULTURE FOR BACTERIA: CPT

## 2021-01-01 PROCEDURE — P9016 RBC LEUKOCYTES REDUCED: HCPCS

## 2021-01-01 PROCEDURE — 97163 PT EVAL HIGH COMPLEX 45 MIN: CPT

## 2021-01-01 PROCEDURE — 82607 VITAMIN B-12: CPT

## 2021-01-01 PROCEDURE — 76705 ECHO EXAM OF ABDOMEN: CPT

## 2021-01-01 PROCEDURE — 85384 FIBRINOGEN ACTIVITY: CPT

## 2021-01-01 PROCEDURE — 83540 ASSAY OF IRON: CPT

## 2021-01-01 PROCEDURE — 43239 EGD BIOPSY SINGLE/MULTIPLE: CPT | Performed by: INTERNAL MEDICINE

## 2021-01-01 PROCEDURE — 7100000000 HC PACU RECOVERY - FIRST 15 MIN: Performed by: INTERNAL MEDICINE

## 2021-01-01 PROCEDURE — G0480 DRUG TEST DEF 1-7 CLASSES: HCPCS

## 2021-01-01 PROCEDURE — 3609012400 HC EGD TRANSORAL BIOPSY SINGLE/MULTIPLE: Performed by: INTERNAL MEDICINE

## 2021-01-01 PROCEDURE — 2709999900 HC NON-CHARGEABLE SUPPLY: Performed by: INTERNAL MEDICINE

## 2021-01-01 PROCEDURE — 99239 HOSP IP/OBS DSCHRG MGMT >30: CPT | Performed by: INTERNAL MEDICINE

## 2021-01-01 RX ORDER — MAGNESIUM SULFATE 1 G/100ML
1000 INJECTION INTRAVENOUS PRN
Status: DISCONTINUED | OUTPATIENT
Start: 2021-01-01 | End: 2021-01-01 | Stop reason: HOSPADM

## 2021-01-01 RX ORDER — AZITHROMYCIN 250 MG/1
500 TABLET, FILM COATED ORAL DAILY
Status: COMPLETED | OUTPATIENT
Start: 2021-01-01 | End: 2021-01-01

## 2021-01-01 RX ORDER — PANTOPRAZOLE SODIUM 40 MG/10ML
40 INJECTION, POWDER, LYOPHILIZED, FOR SOLUTION INTRAVENOUS DAILY
Status: DISCONTINUED | OUTPATIENT
Start: 2021-01-01 | End: 2021-01-01

## 2021-01-01 RX ORDER — CEFDINIR 300 MG/1
300 CAPSULE ORAL 2 TIMES DAILY
Qty: 11 CAPSULE | Refills: 0 | DISCHARGE
Start: 2021-01-01 | End: 2021-01-01

## 2021-01-01 RX ORDER — SODIUM CHLORIDE 0.9 % (FLUSH) 0.9 %
10 SYRINGE (ML) INJECTION PRN
Status: DISCONTINUED | OUTPATIENT
Start: 2021-01-01 | End: 2021-01-01

## 2021-01-01 RX ORDER — SODIUM CHLORIDE 9 MG/ML
25 INJECTION, SOLUTION INTRAVENOUS PRN
Status: DISCONTINUED | OUTPATIENT
Start: 2021-01-01 | End: 2021-01-01 | Stop reason: HOSPADM

## 2021-01-01 RX ORDER — CEFDINIR 300 MG/1
300 CAPSULE ORAL 2 TIMES DAILY
Qty: 10 CAPSULE | Refills: 0 | DISCHARGE
Start: 2021-01-01 | End: 2021-01-01 | Stop reason: SDUPTHER

## 2021-01-01 RX ORDER — PREDNISONE 20 MG/1
20 TABLET ORAL DAILY
Status: DISCONTINUED | OUTPATIENT
Start: 2021-01-01 | End: 2021-01-01 | Stop reason: HOSPADM

## 2021-01-01 RX ORDER — HYDROCODONE BITARTRATE AND ACETAMINOPHEN 5; 325 MG/1; MG/1
1 TABLET ORAL ONCE
Status: COMPLETED | OUTPATIENT
Start: 2021-01-01 | End: 2021-01-01

## 2021-01-01 RX ORDER — PANTOPRAZOLE SODIUM 40 MG/1
40 TABLET, DELAYED RELEASE ORAL
Qty: 30 TABLET | Refills: 3 | Status: ON HOLD | DISCHARGE
Start: 2021-01-01 | End: 2022-01-01 | Stop reason: HOSPADM

## 2021-01-01 RX ORDER — LANOLIN ALCOHOL/MO/W.PET/CERES
100 CREAM (GRAM) TOPICAL DAILY
Status: DISCONTINUED | OUTPATIENT
Start: 2021-01-01 | End: 2021-01-01 | Stop reason: SDUPTHER

## 2021-01-01 RX ORDER — 0.9 % SODIUM CHLORIDE 0.9 %
1000 INTRAVENOUS SOLUTION INTRAVENOUS ONCE
Status: COMPLETED | OUTPATIENT
Start: 2021-01-01 | End: 2021-01-01

## 2021-01-01 RX ORDER — POTASSIUM CHLORIDE 20 MEQ/1
40 TABLET, EXTENDED RELEASE ORAL PRN
Status: DISCONTINUED | OUTPATIENT
Start: 2021-01-01 | End: 2021-01-01 | Stop reason: HOSPADM

## 2021-01-01 RX ORDER — ALBUTEROL SULFATE 2.5 MG/3ML
2.5 SOLUTION RESPIRATORY (INHALATION) EVERY 6 HOURS PRN
Status: DISCONTINUED | OUTPATIENT
Start: 2021-01-01 | End: 2021-01-01

## 2021-01-01 RX ORDER — 0.9 % SODIUM CHLORIDE 0.9 %
80 INTRAVENOUS SOLUTION INTRAVENOUS ONCE
Status: COMPLETED | OUTPATIENT
Start: 2021-01-01 | End: 2021-01-01

## 2021-01-01 RX ORDER — NICOTINE 21 MG/24HR
1 PATCH, TRANSDERMAL 24 HOURS TRANSDERMAL DAILY
Status: DISCONTINUED | OUTPATIENT
Start: 2021-01-01 | End: 2021-01-01 | Stop reason: HOSPADM

## 2021-01-01 RX ORDER — ARFORMOTEROL TARTRATE 15 UG/2ML
15 SOLUTION RESPIRATORY (INHALATION) 2 TIMES DAILY
Status: DISCONTINUED | OUTPATIENT
Start: 2021-01-01 | End: 2021-01-01 | Stop reason: HOSPADM

## 2021-01-01 RX ORDER — IPRATROPIUM BROMIDE AND ALBUTEROL SULFATE 2.5; .5 MG/3ML; MG/3ML
1 SOLUTION RESPIRATORY (INHALATION) 4 TIMES DAILY
Status: DISCONTINUED | OUTPATIENT
Start: 2021-01-01 | End: 2021-01-01

## 2021-01-01 RX ORDER — PROPRANOLOL HYDROCHLORIDE 10 MG/1
10 TABLET ORAL 3 TIMES DAILY
Status: DISCONTINUED | OUTPATIENT
Start: 2021-01-01 | End: 2021-01-01 | Stop reason: HOSPADM

## 2021-01-01 RX ORDER — FOLIC ACID 1 MG/1
1 TABLET ORAL DAILY
Qty: 30 TABLET | Refills: 3 | Status: ON HOLD | DISCHARGE
Start: 2021-01-01 | End: 2022-01-01 | Stop reason: HOSPADM

## 2021-01-01 RX ORDER — SODIUM CHLORIDE, SODIUM LACTATE, POTASSIUM CHLORIDE, CALCIUM CHLORIDE 600; 310; 30; 20 MG/100ML; MG/100ML; MG/100ML; MG/100ML
INJECTION, SOLUTION INTRAVENOUS CONTINUOUS PRN
Status: DISCONTINUED | OUTPATIENT
Start: 2021-01-01 | End: 2021-01-01 | Stop reason: SDUPTHER

## 2021-01-01 RX ORDER — LANOLIN ALCOHOL/MO/W.PET/CERES
100 CREAM (GRAM) TOPICAL DAILY
Qty: 30 TABLET | Refills: 3 | Status: ON HOLD | DISCHARGE
Start: 2021-01-01 | End: 2022-01-01 | Stop reason: HOSPADM

## 2021-01-01 RX ORDER — SODIUM CHLORIDE 0.9 % (FLUSH) 0.9 %
5-40 SYRINGE (ML) INJECTION PRN
Status: DISCONTINUED | OUTPATIENT
Start: 2021-01-01 | End: 2021-01-01 | Stop reason: HOSPADM

## 2021-01-01 RX ORDER — FENTANYL CITRATE 50 UG/ML
INJECTION, SOLUTION INTRAMUSCULAR; INTRAVENOUS PRN
Status: DISCONTINUED | OUTPATIENT
Start: 2021-01-01 | End: 2021-01-01 | Stop reason: SDUPTHER

## 2021-01-01 RX ORDER — MORPHINE SULFATE 4 MG/ML
4 INJECTION, SOLUTION INTRAMUSCULAR; INTRAVENOUS ONCE
Status: COMPLETED | OUTPATIENT
Start: 2021-01-01 | End: 2021-01-01

## 2021-01-01 RX ORDER — PROPOFOL 10 MG/ML
INJECTION, EMULSION INTRAVENOUS PRN
Status: DISCONTINUED | OUTPATIENT
Start: 2021-01-01 | End: 2021-01-01 | Stop reason: SDUPTHER

## 2021-01-01 RX ORDER — SODIUM CHLORIDE 0.9 % (FLUSH) 0.9 %
10 SYRINGE (ML) INJECTION PRN
Status: DISCONTINUED | OUTPATIENT
Start: 2021-01-01 | End: 2021-01-01 | Stop reason: HOSPADM

## 2021-01-01 RX ORDER — ONDANSETRON 2 MG/ML
4 INJECTION INTRAMUSCULAR; INTRAVENOUS EVERY 6 HOURS PRN
Status: DISCONTINUED | OUTPATIENT
Start: 2021-01-01 | End: 2021-01-01 | Stop reason: HOSPADM

## 2021-01-01 RX ORDER — HYDROCODONE BITARTRATE AND ACETAMINOPHEN 5; 325 MG/1; MG/1
1 TABLET ORAL EVERY 6 HOURS PRN
Qty: 9 TABLET | Refills: 0 | Status: SHIPPED | OUTPATIENT
Start: 2021-01-01 | End: 2021-01-01

## 2021-01-01 RX ORDER — LORAZEPAM 1 MG/1
1 TABLET ORAL
Status: DISCONTINUED | OUTPATIENT
Start: 2021-01-01 | End: 2021-01-01 | Stop reason: HOSPADM

## 2021-01-01 RX ORDER — SODIUM CHLORIDE 0.9 % (FLUSH) 0.9 %
5-40 SYRINGE (ML) INJECTION EVERY 12 HOURS SCHEDULED
Status: DISCONTINUED | OUTPATIENT
Start: 2021-01-01 | End: 2021-01-01 | Stop reason: HOSPADM

## 2021-01-01 RX ORDER — PREDNISONE 20 MG/1
20 TABLET ORAL DAILY
Qty: 4 TABLET | Refills: 0 | DISCHARGE
Start: 2021-01-01 | End: 2021-01-01

## 2021-01-01 RX ORDER — IBUPROFEN 600 MG/1
600 TABLET ORAL EVERY 6 HOURS PRN
Qty: 15 TABLET | Refills: 0 | Status: SHIPPED | OUTPATIENT
Start: 2021-01-01 | End: 2021-01-01

## 2021-01-01 RX ORDER — AZITHROMYCIN 250 MG/1
250 TABLET, FILM COATED ORAL DAILY
Refills: 0 | DISCHARGE
Start: 2021-01-01 | End: 2021-01-01

## 2021-01-01 RX ORDER — THIAMINE HYDROCHLORIDE 100 MG/ML
100 INJECTION, SOLUTION INTRAMUSCULAR; INTRAVENOUS DAILY
Status: DISCONTINUED | OUTPATIENT
Start: 2021-01-01 | End: 2021-01-01

## 2021-01-01 RX ORDER — GAUZE BANDAGE 2" X 2"
100 BANDAGE TOPICAL DAILY
Status: DISCONTINUED | OUTPATIENT
Start: 2021-01-01 | End: 2021-01-01 | Stop reason: HOSPADM

## 2021-01-01 RX ORDER — POTASSIUM CHLORIDE 7.45 MG/ML
10 INJECTION INTRAVENOUS PRN
Status: DISCONTINUED | OUTPATIENT
Start: 2021-01-01 | End: 2021-01-01 | Stop reason: HOSPADM

## 2021-01-01 RX ORDER — ONDANSETRON 4 MG/1
4 TABLET, ORALLY DISINTEGRATING ORAL EVERY 8 HOURS PRN
Status: DISCONTINUED | OUTPATIENT
Start: 2021-01-01 | End: 2021-01-01 | Stop reason: HOSPADM

## 2021-01-01 RX ORDER — ALBUTEROL SULFATE 2.5 MG/3ML
2.5 SOLUTION RESPIRATORY (INHALATION) 2 TIMES DAILY
Status: DISCONTINUED | OUTPATIENT
Start: 2021-01-01 | End: 2021-01-01

## 2021-01-01 RX ORDER — ONDANSETRON 2 MG/ML
4 INJECTION INTRAMUSCULAR; INTRAVENOUS ONCE
Status: COMPLETED | OUTPATIENT
Start: 2021-01-01 | End: 2021-01-01

## 2021-01-01 RX ORDER — FOLIC ACID 1 MG/1
1 TABLET ORAL DAILY
Status: DISCONTINUED | OUTPATIENT
Start: 2021-01-01 | End: 2021-01-01 | Stop reason: HOSPADM

## 2021-01-01 RX ORDER — THIAMINE HYDROCHLORIDE 100 MG/ML
100 INJECTION, SOLUTION INTRAMUSCULAR; INTRAVENOUS DAILY
Qty: 1 SYRINGE | Refills: 0 | DISCHARGE
Start: 2021-01-01 | End: 2021-01-01 | Stop reason: HOSPADM

## 2021-01-01 RX ORDER — AZITHROMYCIN 250 MG/1
250 TABLET, FILM COATED ORAL DAILY
Status: DISCONTINUED | OUTPATIENT
Start: 2021-01-01 | End: 2021-01-01 | Stop reason: HOSPADM

## 2021-01-01 RX ORDER — SODIUM CHLORIDE, SODIUM LACTATE, POTASSIUM CHLORIDE, AND CALCIUM CHLORIDE .6; .31; .03; .02 G/100ML; G/100ML; G/100ML; G/100ML
1000 INJECTION, SOLUTION INTRAVENOUS ONCE
Status: COMPLETED | OUTPATIENT
Start: 2021-01-01 | End: 2021-01-01

## 2021-01-01 RX ORDER — PREDNISONE 20 MG/1
20 TABLET ORAL DAILY
Qty: 3 TABLET | Refills: 0 | DISCHARGE
Start: 2021-01-01 | End: 2021-01-01

## 2021-01-01 RX ORDER — CEFDINIR 300 MG/1
300 CAPSULE ORAL ONCE
Status: COMPLETED | OUTPATIENT
Start: 2021-01-01 | End: 2021-01-01

## 2021-01-01 RX ORDER — ACETAMINOPHEN 325 MG/1
650 TABLET ORAL EVERY 4 HOURS PRN
Status: DISCONTINUED | OUTPATIENT
Start: 2021-01-01 | End: 2021-01-01 | Stop reason: HOSPADM

## 2021-01-01 RX ORDER — LORAZEPAM 2 MG/ML
1 INJECTION INTRAMUSCULAR
Status: DISCONTINUED | OUTPATIENT
Start: 2021-01-01 | End: 2021-01-01 | Stop reason: HOSPADM

## 2021-01-01 RX ORDER — ALBUTEROL SULFATE 2.5 MG/3ML
2.5 SOLUTION RESPIRATORY (INHALATION) 4 TIMES DAILY
Status: DISCONTINUED | OUTPATIENT
Start: 2021-01-01 | End: 2021-01-01 | Stop reason: HOSPADM

## 2021-01-01 RX ORDER — PROPRANOLOL HYDROCHLORIDE 10 MG/1
10 TABLET ORAL 3 TIMES DAILY
Qty: 90 TABLET | Refills: 3 | Status: ON HOLD | DISCHARGE
Start: 2021-01-01 | End: 2022-01-01 | Stop reason: HOSPADM

## 2021-01-01 RX ORDER — PANTOPRAZOLE SODIUM 40 MG/1
40 TABLET, DELAYED RELEASE ORAL
Status: DISCONTINUED | OUTPATIENT
Start: 2021-01-01 | End: 2021-01-01 | Stop reason: HOSPADM

## 2021-01-01 RX ORDER — SODIUM CHLORIDE 9 MG/ML
INJECTION, SOLUTION INTRAVENOUS PRN
Status: DISCONTINUED | OUTPATIENT
Start: 2021-01-01 | End: 2021-01-01 | Stop reason: HOSPADM

## 2021-01-01 RX ORDER — LORAZEPAM 2 MG/ML
2 INJECTION INTRAMUSCULAR
Status: DISCONTINUED | OUTPATIENT
Start: 2021-01-01 | End: 2021-01-01 | Stop reason: HOSPADM

## 2021-01-01 RX ORDER — POTASSIUM CHLORIDE AND SODIUM CHLORIDE 900; 300 MG/100ML; MG/100ML
INJECTION, SOLUTION INTRAVENOUS CONTINUOUS
Status: DISCONTINUED | OUTPATIENT
Start: 2021-01-01 | End: 2021-01-01

## 2021-01-01 RX ORDER — CEFDINIR 300 MG/1
300 CAPSULE ORAL 2 TIMES DAILY
Qty: 11 CAPSULE | Refills: 0 | Status: CANCELLED | DISCHARGE
Start: 2021-01-01 | End: 2021-01-01

## 2021-01-01 RX ORDER — SODIUM CHLORIDE 9 MG/ML
10 INJECTION INTRAVENOUS DAILY
Status: DISCONTINUED | OUTPATIENT
Start: 2021-01-01 | End: 2021-01-01 | Stop reason: HOSPADM

## 2021-01-01 RX ORDER — MAGNESIUM SULFATE IN WATER 40 MG/ML
2000 INJECTION, SOLUTION INTRAVENOUS ONCE
Status: COMPLETED | OUTPATIENT
Start: 2021-01-01 | End: 2021-01-01

## 2021-01-01 RX ORDER — ALBUTEROL SULFATE 2.5 MG/3ML
2.5 SOLUTION RESPIRATORY (INHALATION)
Status: DISCONTINUED | OUTPATIENT
Start: 2021-01-01 | End: 2021-01-01 | Stop reason: HOSPADM

## 2021-01-01 RX ORDER — POTASSIUM CHLORIDE AND SODIUM CHLORIDE 900; 300 MG/100ML; MG/100ML
INJECTION, SOLUTION INTRAVENOUS CONTINUOUS
Status: DISCONTINUED | OUTPATIENT
Start: 2021-01-01 | End: 2021-01-01 | Stop reason: SDUPTHER

## 2021-01-01 RX ORDER — ALBUTEROL SULFATE 90 UG/1
2 AEROSOL, METERED RESPIRATORY (INHALATION) EVERY 6 HOURS PRN
Qty: 1 INHALER | Refills: 0 | Status: ON HOLD
Start: 2021-01-01 | End: 2022-01-01 | Stop reason: HOSPADM

## 2021-01-01 RX ORDER — LORAZEPAM 1 MG/1
2 TABLET ORAL
Status: DISCONTINUED | OUTPATIENT
Start: 2021-01-01 | End: 2021-01-01 | Stop reason: HOSPADM

## 2021-01-01 RX ORDER — CEFDINIR 300 MG/1
300 CAPSULE ORAL 2 TIMES DAILY
Qty: 14 CAPSULE | Refills: 0 | DISCHARGE
Start: 2021-01-01 | End: 2021-01-01 | Stop reason: SDUPTHER

## 2021-01-01 RX ORDER — LIDOCAINE HYDROCHLORIDE 20 MG/ML
INJECTION, SOLUTION EPIDURAL; INFILTRATION; INTRACAUDAL; PERINEURAL PRN
Status: DISCONTINUED | OUTPATIENT
Start: 2021-01-01 | End: 2021-01-01 | Stop reason: SDUPTHER

## 2021-01-01 RX ADMIN — ARFORMOTEROL TARTRATE 15 MCG: 15 SOLUTION RESPIRATORY (INHALATION) at 20:17

## 2021-01-01 RX ADMIN — ARFORMOTEROL TARTRATE 15 MCG: 15 SOLUTION RESPIRATORY (INHALATION) at 07:37

## 2021-01-01 RX ADMIN — LORAZEPAM 1 MG: 2 INJECTION INTRAMUSCULAR; INTRAVENOUS at 06:31

## 2021-01-01 RX ADMIN — AZITHROMYCIN MONOHYDRATE 500 MG: 250 TABLET ORAL at 10:55

## 2021-01-01 RX ADMIN — IPRATROPIUM BROMIDE AND ALBUTEROL SULFATE 1 AMPULE: .5; 2.5 SOLUTION RESPIRATORY (INHALATION) at 11:36

## 2021-01-01 RX ADMIN — PANTOPRAZOLE SODIUM 40 MG: 40 TABLET, DELAYED RELEASE ORAL at 05:25

## 2021-01-01 RX ADMIN — THIAMINE HYDROCHLORIDE 100 MG: 100 INJECTION, SOLUTION INTRAMUSCULAR; INTRAVENOUS at 09:38

## 2021-01-01 RX ADMIN — ONDANSETRON 4 MG: 4 TABLET, ORALLY DISINTEGRATING ORAL at 16:54

## 2021-01-01 RX ADMIN — MAGNESIUM SULFATE HEPTAHYDRATE 1000 MG: 1 INJECTION, SOLUTION INTRAVENOUS at 06:30

## 2021-01-01 RX ADMIN — IOPAMIDOL 75 ML: 755 INJECTION, SOLUTION INTRAVENOUS at 12:59

## 2021-01-01 RX ADMIN — PROPRANOLOL HYDROCHLORIDE 10 MG: 10 TABLET ORAL at 09:21

## 2021-01-01 RX ADMIN — PROPRANOLOL HYDROCHLORIDE 10 MG: 10 TABLET ORAL at 09:15

## 2021-01-01 RX ADMIN — MAGNESIUM SULFATE HEPTAHYDRATE 1000 MG: 1 INJECTION, SOLUTION INTRAVENOUS at 09:11

## 2021-01-01 RX ADMIN — ALBUTEROL SULFATE 2.5 MG: 2.5 SOLUTION RESPIRATORY (INHALATION) at 20:28

## 2021-01-01 RX ADMIN — Medication 50 MCG: at 10:17

## 2021-01-01 RX ADMIN — ARFORMOTEROL TARTRATE 15 MCG: 15 SOLUTION RESPIRATORY (INHALATION) at 20:28

## 2021-01-01 RX ADMIN — Medication 100 MG: at 08:02

## 2021-01-01 RX ADMIN — ARFORMOTEROL TARTRATE 15 MCG: 15 SOLUTION RESPIRATORY (INHALATION) at 18:29

## 2021-01-01 RX ADMIN — FOLIC ACID 1 MG: 1 TABLET ORAL at 08:02

## 2021-01-01 RX ADMIN — OCTREOTIDE ACETATE 25 MCG/HR: 200 INJECTION, SOLUTION INTRAVENOUS; SUBCUTANEOUS at 23:01

## 2021-01-01 RX ADMIN — ARFORMOTEROL TARTRATE 15 MCG: 15 SOLUTION RESPIRATORY (INHALATION) at 07:14

## 2021-01-01 RX ADMIN — ARFORMOTEROL TARTRATE 15 MCG: 15 SOLUTION RESPIRATORY (INHALATION) at 07:17

## 2021-01-01 RX ADMIN — SODIUM CHLORIDE, PRESERVATIVE FREE 10 ML: 5 INJECTION INTRAVENOUS at 08:15

## 2021-01-01 RX ADMIN — IOHEXOL 30 ML: 300 INJECTION, SOLUTION INTRAVENOUS at 13:00

## 2021-01-01 RX ADMIN — SODIUM CHLORIDE, PRESERVATIVE FREE 10 ML: 5 INJECTION INTRAVENOUS at 14:04

## 2021-01-01 RX ADMIN — CEFTRIAXONE SODIUM 1000 MG: 1 INJECTION, POWDER, FOR SOLUTION INTRAMUSCULAR; INTRAVENOUS at 15:09

## 2021-01-01 RX ADMIN — ARFORMOTEROL TARTRATE 15 MCG: 15 SOLUTION RESPIRATORY (INHALATION) at 08:13

## 2021-01-01 RX ADMIN — POTASSIUM CHLORIDE AND SODIUM CHLORIDE: 900; 300 INJECTION, SOLUTION INTRAVENOUS at 20:05

## 2021-01-01 RX ADMIN — PANTOPRAZOLE SODIUM 8 MG/HR: 40 INJECTION, POWDER, FOR SOLUTION INTRAVENOUS at 09:54

## 2021-01-01 RX ADMIN — MAGNESIUM SULFATE HEPTAHYDRATE 1000 MG: 1 INJECTION, SOLUTION INTRAVENOUS at 00:26

## 2021-01-01 RX ADMIN — MAGNESIUM SULFATE HEPTAHYDRATE 1000 MG: 1 INJECTION, SOLUTION INTRAVENOUS at 01:29

## 2021-01-01 RX ADMIN — SODIUM CHLORIDE, PRESERVATIVE FREE 10 ML: 5 INJECTION INTRAVENOUS at 21:08

## 2021-01-01 RX ADMIN — MAGNESIUM GLUCONATE 500 MG ORAL TABLET 400 MG: 500 TABLET ORAL at 22:24

## 2021-01-01 RX ADMIN — PANTOPRAZOLE SODIUM 8 MG/HR: 40 INJECTION, POWDER, FOR SOLUTION INTRAVENOUS at 03:58

## 2021-01-01 RX ADMIN — ARFORMOTEROL TARTRATE 15 MCG: 15 SOLUTION RESPIRATORY (INHALATION) at 23:46

## 2021-01-01 RX ADMIN — ARFORMOTEROL TARTRATE 15 MCG: 15 SOLUTION RESPIRATORY (INHALATION) at 18:11

## 2021-01-01 RX ADMIN — ONDANSETRON 4 MG: 2 INJECTION INTRAMUSCULAR; INTRAVENOUS at 22:14

## 2021-01-01 RX ADMIN — ONDANSETRON 4 MG: 4 TABLET, ORALLY DISINTEGRATING ORAL at 20:32

## 2021-01-01 RX ADMIN — PANTOPRAZOLE SODIUM 40 MG: 40 TABLET, DELAYED RELEASE ORAL at 10:03

## 2021-01-01 RX ADMIN — FOLIC ACID 1 MG: 1 TABLET ORAL at 09:14

## 2021-01-01 RX ADMIN — THIAMINE HYDROCHLORIDE 100 MG: 100 INJECTION, SOLUTION INTRAMUSCULAR; INTRAVENOUS at 09:01

## 2021-01-01 RX ADMIN — MORPHINE SULFATE 4 MG: 4 INJECTION INTRAVENOUS at 13:27

## 2021-01-01 RX ADMIN — ARFORMOTEROL TARTRATE 15 MCG: 15 SOLUTION RESPIRATORY (INHALATION) at 06:29

## 2021-01-01 RX ADMIN — FOLIC ACID 1 MG: 1 TABLET ORAL at 09:38

## 2021-01-01 RX ADMIN — IOPAMIDOL 100 ML: 755 INJECTION, SOLUTION INTRAVENOUS at 14:04

## 2021-01-01 RX ADMIN — ONDANSETRON 4 MG: 4 TABLET, ORALLY DISINTEGRATING ORAL at 10:56

## 2021-01-01 RX ADMIN — Medication 100 MG: at 09:14

## 2021-01-01 RX ADMIN — POTASSIUM CHLORIDE AND SODIUM CHLORIDE: 900; 300 INJECTION, SOLUTION INTRAVENOUS at 15:05

## 2021-01-01 RX ADMIN — SODIUM CHLORIDE, POTASSIUM CHLORIDE, SODIUM LACTATE AND CALCIUM CHLORIDE: 600; 310; 30; 20 INJECTION, SOLUTION INTRAVENOUS at 10:22

## 2021-01-01 RX ADMIN — PREDNISONE 20 MG: 20 TABLET ORAL at 08:02

## 2021-01-01 RX ADMIN — FOLIC ACID 1 MG: 1 TABLET ORAL at 09:15

## 2021-01-01 RX ADMIN — SODIUM CHLORIDE 80 ML: 9 INJECTION, SOLUTION INTRAVENOUS at 12:59

## 2021-01-01 RX ADMIN — IPRATROPIUM BROMIDE AND ALBUTEROL SULFATE 1 AMPULE: .5; 2.5 SOLUTION RESPIRATORY (INHALATION) at 15:43

## 2021-01-01 RX ADMIN — CEFDINIR 300 MG: 300 CAPSULE ORAL at 09:29

## 2021-01-01 RX ADMIN — PANTOPRAZOLE SODIUM 8 MG/HR: 40 INJECTION, POWDER, FOR SOLUTION INTRAVENOUS at 00:50

## 2021-01-01 RX ADMIN — THIAMINE HYDROCHLORIDE 100 MG: 100 INJECTION, SOLUTION INTRAMUSCULAR; INTRAVENOUS at 10:04

## 2021-01-01 RX ADMIN — ONDANSETRON 4 MG: 2 INJECTION INTRAMUSCULAR; INTRAVENOUS at 09:51

## 2021-01-01 RX ADMIN — MAGNESIUM GLUCONATE 500 MG ORAL TABLET 400 MG: 500 TABLET ORAL at 09:21

## 2021-01-01 RX ADMIN — FOLIC ACID 1 MG: 1 TABLET ORAL at 09:01

## 2021-01-01 RX ADMIN — ALBUTEROL SULFATE 2.5 MG: 2.5 SOLUTION RESPIRATORY (INHALATION) at 08:17

## 2021-01-01 RX ADMIN — PANTOPRAZOLE SODIUM 8 MG/HR: 40 INJECTION, POWDER, FOR SOLUTION INTRAVENOUS at 09:22

## 2021-01-01 RX ADMIN — ALBUTEROL SULFATE 2.5 MG: 2.5 SOLUTION RESPIRATORY (INHALATION) at 07:14

## 2021-01-01 RX ADMIN — OCTREOTIDE ACETATE 25 MCG/HR: 200 INJECTION, SOLUTION INTRAVENOUS; SUBCUTANEOUS at 13:59

## 2021-01-01 RX ADMIN — POTASSIUM CHLORIDE AND SODIUM CHLORIDE: 900; 300 INJECTION, SOLUTION INTRAVENOUS at 22:42

## 2021-01-01 RX ADMIN — SODIUM CHLORIDE, PRESERVATIVE FREE 10 ML: 5 INJECTION INTRAVENOUS at 12:59

## 2021-01-01 RX ADMIN — PROPOFOL 30 MG: 10 INJECTION, EMULSION INTRAVENOUS at 10:25

## 2021-01-01 RX ADMIN — ALBUTEROL SULFATE 2.5 MG: 2.5 SOLUTION RESPIRATORY (INHALATION) at 06:29

## 2021-01-01 RX ADMIN — ALBUTEROL SULFATE 2.5 MG: 2.5 SOLUTION RESPIRATORY (INHALATION) at 18:15

## 2021-01-01 RX ADMIN — PANTOPRAZOLE SODIUM 8 MG/HR: 40 INJECTION, POWDER, FOR SOLUTION INTRAVENOUS at 23:25

## 2021-01-01 RX ADMIN — FOLIC ACID 1 MG: 1 TABLET ORAL at 09:49

## 2021-01-01 RX ADMIN — PROPOFOL 30 MG: 10 INJECTION, EMULSION INTRAVENOUS at 10:29

## 2021-01-01 RX ADMIN — POTASSIUM CHLORIDE AND SODIUM CHLORIDE 200 ML/HR: 900; 300 INJECTION, SOLUTION INTRAVENOUS at 14:32

## 2021-01-01 RX ADMIN — POTASSIUM CHLORIDE AND SODIUM CHLORIDE: 900; 300 INJECTION, SOLUTION INTRAVENOUS at 06:02

## 2021-01-01 RX ADMIN — PANTOPRAZOLE SODIUM 40 MG: 40 TABLET, DELAYED RELEASE ORAL at 06:07

## 2021-01-01 RX ADMIN — Medication 100 MG: at 09:05

## 2021-01-01 RX ADMIN — SODIUM CHLORIDE, PRESERVATIVE FREE 10 ML: 5 INJECTION INTRAVENOUS at 10:04

## 2021-01-01 RX ADMIN — PANTOPRAZOLE SODIUM 40 MG: 40 TABLET, DELAYED RELEASE ORAL at 06:30

## 2021-01-01 RX ADMIN — Medication 50 MCG: at 10:21

## 2021-01-01 RX ADMIN — ARFORMOTEROL TARTRATE 15 MCG: 15 SOLUTION RESPIRATORY (INHALATION) at 08:15

## 2021-01-01 RX ADMIN — MAGNESIUM SULFATE HEPTAHYDRATE 1000 MG: 1 INJECTION, SOLUTION INTRAVENOUS at 10:00

## 2021-01-01 RX ADMIN — POTASSIUM CHLORIDE AND SODIUM CHLORIDE: 900; 300 INJECTION, SOLUTION INTRAVENOUS at 15:35

## 2021-01-01 RX ADMIN — LORAZEPAM 2 MG: 2 INJECTION INTRAMUSCULAR; INTRAVENOUS at 03:24

## 2021-01-01 RX ADMIN — PROPOFOL 30 MG: 10 INJECTION, EMULSION INTRAVENOUS at 10:23

## 2021-01-01 RX ADMIN — ALBUTEROL SULFATE 2.5 MG: 2.5 SOLUTION RESPIRATORY (INHALATION) at 23:38

## 2021-01-01 RX ADMIN — SODIUM CHLORIDE, PRESERVATIVE FREE 10 ML: 5 INJECTION INTRAVENOUS at 09:25

## 2021-01-01 RX ADMIN — PANTOPRAZOLE SODIUM 8 MG/HR: 40 INJECTION, POWDER, FOR SOLUTION INTRAVENOUS at 18:59

## 2021-01-01 RX ADMIN — ALBUTEROL SULFATE 2.5 MG: 2.5 SOLUTION RESPIRATORY (INHALATION) at 11:18

## 2021-01-01 RX ADMIN — THIAMINE HYDROCHLORIDE 100 MG: 100 INJECTION, SOLUTION INTRAMUSCULAR; INTRAVENOUS at 12:00

## 2021-01-01 RX ADMIN — THIAMINE HYDROCHLORIDE 100 MG: 100 INJECTION, SOLUTION INTRAMUSCULAR; INTRAVENOUS at 23:28

## 2021-01-01 RX ADMIN — POTASSIUM CHLORIDE AND SODIUM CHLORIDE: 900; 300 INJECTION, SOLUTION INTRAVENOUS at 23:01

## 2021-01-01 RX ADMIN — SODIUM CHLORIDE 1000 ML: 9 INJECTION, SOLUTION INTRAVENOUS at 12:48

## 2021-01-01 RX ADMIN — PROPRANOLOL HYDROCHLORIDE 10 MG: 10 TABLET ORAL at 08:09

## 2021-01-01 RX ADMIN — CEFTRIAXONE SODIUM 1000 MG: 1 INJECTION, POWDER, FOR SOLUTION INTRAMUSCULAR; INTRAVENOUS at 12:01

## 2021-01-01 RX ADMIN — MAGNESIUM GLUCONATE 500 MG ORAL TABLET 400 MG: 500 TABLET ORAL at 20:32

## 2021-01-01 RX ADMIN — MAGNESIUM GLUCONATE 500 MG ORAL TABLET 400 MG: 500 TABLET ORAL at 09:14

## 2021-01-01 RX ADMIN — MAGNESIUM SULFATE HEPTAHYDRATE 2000 MG: 40 INJECTION, SOLUTION INTRAVENOUS at 17:24

## 2021-01-01 RX ADMIN — OCTREOTIDE ACETATE 25 MCG/HR: 200 INJECTION, SOLUTION INTRAVENOUS; SUBCUTANEOUS at 07:05

## 2021-01-01 RX ADMIN — AZITHROMYCIN MONOHYDRATE 250 MG: 250 TABLET ORAL at 08:02

## 2021-01-01 RX ADMIN — PREDNISONE 20 MG: 20 TABLET ORAL at 09:15

## 2021-01-01 RX ADMIN — OCTREOTIDE ACETATE 25 MCG/HR: 200 INJECTION, SOLUTION INTRAVENOUS; SUBCUTANEOUS at 19:49

## 2021-01-01 RX ADMIN — ONDANSETRON 4 MG: 4 TABLET, ORALLY DISINTEGRATING ORAL at 22:24

## 2021-01-01 RX ADMIN — PANTOPRAZOLE SODIUM 8 MG/HR: 40 INJECTION, POWDER, FOR SOLUTION INTRAVENOUS at 16:39

## 2021-01-01 RX ADMIN — LORAZEPAM 2 MG: 2 INJECTION INTRAMUSCULAR; INTRAVENOUS at 21:04

## 2021-01-01 RX ADMIN — ARFORMOTEROL TARTRATE 15 MCG: 15 SOLUTION RESPIRATORY (INHALATION) at 20:31

## 2021-01-01 RX ADMIN — MAGNESIUM SULFATE HEPTAHYDRATE 1000 MG: 1 INJECTION, SOLUTION INTRAVENOUS at 08:04

## 2021-01-01 RX ADMIN — SODIUM CHLORIDE 80 MG: 9 INJECTION, SOLUTION INTRAVENOUS at 14:42

## 2021-01-01 RX ADMIN — SODIUM CHLORIDE, PRESERVATIVE FREE 10 ML: 5 INJECTION INTRAVENOUS at 22:18

## 2021-01-01 RX ADMIN — ALBUTEROL SULFATE 2.5 MG: 2.5 SOLUTION RESPIRATORY (INHALATION) at 00:57

## 2021-01-01 RX ADMIN — MAGNESIUM SULFATE HEPTAHYDRATE 1000 MG: 1 INJECTION, SOLUTION INTRAVENOUS at 07:09

## 2021-01-01 RX ADMIN — IPRATROPIUM BROMIDE AND ALBUTEROL SULFATE 1 AMPULE: .5; 2.5 SOLUTION RESPIRATORY (INHALATION) at 08:15

## 2021-01-01 RX ADMIN — ALBUTEROL SULFATE 2.5 MG: 2.5 SOLUTION RESPIRATORY (INHALATION) at 07:37

## 2021-01-01 RX ADMIN — POTASSIUM CHLORIDE 40 MEQ: 20 TABLET, EXTENDED RELEASE ORAL at 10:48

## 2021-01-01 RX ADMIN — IPRATROPIUM BROMIDE AND ALBUTEROL SULFATE 1 AMPULE: .5; 2.5 SOLUTION RESPIRATORY (INHALATION) at 07:17

## 2021-01-01 RX ADMIN — SODIUM CHLORIDE 80 ML: 9 INJECTION, SOLUTION INTRAVENOUS at 14:04

## 2021-01-01 RX ADMIN — POTASSIUM CHLORIDE AND SODIUM CHLORIDE: 900; 300 INJECTION, SOLUTION INTRAVENOUS at 07:05

## 2021-01-01 RX ADMIN — POTASSIUM CHLORIDE 40 MEQ: 20 TABLET, EXTENDED RELEASE ORAL at 09:05

## 2021-01-01 RX ADMIN — ALBUTEROL SULFATE 2.5 MG: 2.5 SOLUTION RESPIRATORY (INHALATION) at 11:33

## 2021-01-01 RX ADMIN — ALBUTEROL SULFATE 2.5 MG: 2.5 SOLUTION RESPIRATORY (INHALATION) at 20:31

## 2021-01-01 RX ADMIN — PROPRANOLOL HYDROCHLORIDE 10 MG: 10 TABLET ORAL at 22:24

## 2021-01-01 RX ADMIN — ALBUTEROL SULFATE 2.5 MG: 2.5 SOLUTION RESPIRATORY (INHALATION) at 14:27

## 2021-01-01 RX ADMIN — MAGNESIUM GLUCONATE 500 MG ORAL TABLET 400 MG: 500 TABLET ORAL at 08:02

## 2021-01-01 RX ADMIN — ALBUTEROL SULFATE 2.5 MG: 2.5 SOLUTION RESPIRATORY (INHALATION) at 14:17

## 2021-01-01 RX ADMIN — HYDROCODONE BITARTRATE AND ACETAMINOPHEN 1 TABLET: 5; 325 TABLET ORAL at 10:38

## 2021-01-01 RX ADMIN — POTASSIUM CHLORIDE AND SODIUM CHLORIDE: 900; 300 INJECTION, SOLUTION INTRAVENOUS at 22:40

## 2021-01-01 RX ADMIN — ONDANSETRON 4 MG: 2 INJECTION INTRAMUSCULAR; INTRAVENOUS at 13:27

## 2021-01-01 RX ADMIN — IPRATROPIUM BROMIDE AND ALBUTEROL SULFATE 1 AMPULE: .5; 2.5 SOLUTION RESPIRATORY (INHALATION) at 18:09

## 2021-01-01 RX ADMIN — IPRATROPIUM BROMIDE AND ALBUTEROL SULFATE 1 AMPULE: .5; 2.5 SOLUTION RESPIRATORY (INHALATION) at 12:00

## 2021-01-01 RX ADMIN — POTASSIUM CHLORIDE AND SODIUM CHLORIDE: 900; 300 INJECTION, SOLUTION INTRAVENOUS at 06:39

## 2021-01-01 RX ADMIN — SODIUM CHLORIDE, PRESERVATIVE FREE 10 ML: 5 INJECTION INTRAVENOUS at 09:20

## 2021-01-01 RX ADMIN — PROPRANOLOL HYDROCHLORIDE 10 MG: 10 TABLET ORAL at 20:32

## 2021-01-01 RX ADMIN — ONDANSETRON 4 MG: 2 INJECTION INTRAMUSCULAR; INTRAVENOUS at 17:46

## 2021-01-01 RX ADMIN — LIDOCAINE HYDROCHLORIDE 100 MG: 20 INJECTION, SOLUTION EPIDURAL; INFILTRATION; INTRACAUDAL; PERINEURAL at 10:23

## 2021-01-01 RX ADMIN — ARFORMOTEROL TARTRATE 15 MCG: 15 SOLUTION RESPIRATORY (INHALATION) at 18:15

## 2021-01-01 RX ADMIN — SODIUM CHLORIDE 1000 ML: 9 INJECTION, SOLUTION INTRAVENOUS at 13:26

## 2021-01-01 RX ADMIN — MAGNESIUM SULFATE HEPTAHYDRATE 1000 MG: 1 INJECTION, SOLUTION INTRAVENOUS at 10:48

## 2021-01-01 RX ADMIN — SODIUM CHLORIDE, POTASSIUM CHLORIDE, SODIUM LACTATE AND CALCIUM CHLORIDE 1000 ML: 600; 310; 30; 20 INJECTION, SOLUTION INTRAVENOUS at 14:36

## 2021-01-01 RX ADMIN — LORAZEPAM 2 MG: 2 INJECTION INTRAMUSCULAR; INTRAVENOUS at 15:04

## 2021-01-01 RX ADMIN — IPRATROPIUM BROMIDE AND ALBUTEROL SULFATE 1 AMPULE: .5; 2.5 SOLUTION RESPIRATORY (INHALATION) at 18:29

## 2021-01-01 ASSESSMENT — PULMONARY FUNCTION TESTS
PIF_VALUE: 1
PIF_VALUE: 0
PIF_VALUE: 0
PIF_VALUE: 1
PIF_VALUE: 0
PIF_VALUE: 1
PIF_VALUE: 0

## 2021-01-01 ASSESSMENT — PAIN SCALES - GENERAL
PAINLEVEL_OUTOF10: 0
PAINLEVEL_OUTOF10: 5
PAINLEVEL_OUTOF10: 0
PAINLEVEL_OUTOF10: 5
PAINLEVEL_OUTOF10: 8
PAINLEVEL_OUTOF10: 0
PAINLEVEL_OUTOF10: 0

## 2021-01-01 ASSESSMENT — ENCOUNTER SYMPTOMS
EYE DISCHARGE: 0
EYE REDNESS: 0
COLOR CHANGE: 0
ABDOMINAL PAIN: 1
SHORTNESS OF BREATH: 1
BACK PAIN: 0
VOMITING: 1
NAUSEA: 1

## 2021-01-13 ENCOUNTER — APPOINTMENT (OUTPATIENT)
Dept: CT IMAGING | Age: 61
End: 2021-01-13
Payer: MEDICARE

## 2021-01-13 ENCOUNTER — APPOINTMENT (OUTPATIENT)
Dept: GENERAL RADIOLOGY | Age: 61
End: 2021-01-13
Payer: MEDICARE

## 2021-01-13 ENCOUNTER — HOSPITAL ENCOUNTER (EMERGENCY)
Age: 61
Discharge: HOME | End: 2021-01-13
Attending: EMERGENCY MEDICINE
Payer: MEDICARE

## 2021-01-13 VITALS
OXYGEN SATURATION: 98 % | BODY MASS INDEX: 16.9 KG/M2 | RESPIRATION RATE: 16 BRPM | SYSTOLIC BLOOD PRESSURE: 122 MMHG | TEMPERATURE: 98.2 F | WEIGHT: 99 LBS | HEART RATE: 84 BPM | HEIGHT: 64 IN | DIASTOLIC BLOOD PRESSURE: 75 MMHG

## 2021-01-13 DIAGNOSIS — K40.90 INDIRECT INGUINAL HERNIA: Primary | ICD-10-CM

## 2021-01-13 LAB
ABSOLUTE EOS #: 0.29 K/UL (ref 0–0.44)
ABSOLUTE IMMATURE GRANULOCYTE: 0.02 K/UL (ref 0–0.3)
ABSOLUTE LYMPH #: 2.47 K/UL (ref 1.1–3.7)
ABSOLUTE MONO #: 0.69 K/UL (ref 0.1–1.2)
ALBUMIN SERPL-MCNC: 4.1 G/DL (ref 3.5–5.2)
ALBUMIN/GLOBULIN RATIO: ABNORMAL (ref 1–2.5)
ALP BLD-CCNC: 94 U/L (ref 35–104)
ALT SERPL-CCNC: 30 U/L (ref 5–33)
ANION GAP SERPL CALCULATED.3IONS-SCNC: 18 MMOL/L (ref 9–17)
AST SERPL-CCNC: 73 U/L
BASOPHILS # BLD: 1 % (ref 0–2)
BASOPHILS ABSOLUTE: 0.06 K/UL (ref 0–0.2)
BILIRUB SERPL-MCNC: 0.31 MG/DL (ref 0.3–1.2)
BUN BLDV-MCNC: 7 MG/DL (ref 6–20)
BUN/CREAT BLD: 17 (ref 9–20)
CALCIUM SERPL-MCNC: 9.1 MG/DL (ref 8.6–10.4)
CHLORIDE BLD-SCNC: 98 MMOL/L (ref 98–107)
CO2: 18 MMOL/L (ref 20–31)
CREAT SERPL-MCNC: 0.41 MG/DL (ref 0.5–0.9)
DIFFERENTIAL TYPE: ABNORMAL
EOSINOPHILS RELATIVE PERCENT: 4 % (ref 1–4)
GFR AFRICAN AMERICAN: >60 ML/MIN
GFR NON-AFRICAN AMERICAN: >60 ML/MIN
GFR SERPL CREATININE-BSD FRML MDRD: ABNORMAL ML/MIN/{1.73_M2}
GFR SERPL CREATININE-BSD FRML MDRD: ABNORMAL ML/MIN/{1.73_M2}
GLUCOSE BLD-MCNC: 90 MG/DL (ref 70–99)
HCT VFR BLD CALC: 33.5 % (ref 36.3–47.1)
HEMOGLOBIN: 11.4 G/DL (ref 11.9–15.1)
IMMATURE GRANULOCYTES: 0 %
LIPASE: 93 U/L (ref 13–60)
LYMPHOCYTES # BLD: 36 % (ref 24–43)
MAGNESIUM: 1.7 MG/DL (ref 1.6–2.6)
MCH RBC QN AUTO: 34.2 PG (ref 25.2–33.5)
MCHC RBC AUTO-ENTMCNC: 34 G/DL (ref 28.4–34.8)
MCV RBC AUTO: 100.6 FL (ref 82.6–102.9)
MONOCYTES # BLD: 10 % (ref 3–12)
NRBC AUTOMATED: 0 PER 100 WBC
PDW BLD-RTO: 12.9 % (ref 11.8–14.4)
PLATELET # BLD: 169 K/UL (ref 138–453)
PLATELET ESTIMATE: ABNORMAL
PMV BLD AUTO: 10 FL (ref 8.1–13.5)
POTASSIUM SERPL-SCNC: 3.4 MMOL/L (ref 3.7–5.3)
RBC # BLD: 3.33 M/UL (ref 3.95–5.11)
RBC # BLD: ABNORMAL 10*6/UL
SEG NEUTROPHILS: 49 % (ref 36–65)
SEGMENTED NEUTROPHILS ABSOLUTE COUNT: 3.41 K/UL (ref 1.5–8.1)
SODIUM BLD-SCNC: 134 MMOL/L (ref 135–144)
TOTAL PROTEIN: 6.9 G/DL (ref 6.4–8.3)
WBC # BLD: 6.9 K/UL (ref 3.5–11.3)
WBC # BLD: ABNORMAL 10*3/UL

## 2021-01-13 PROCEDURE — 83690 ASSAY OF LIPASE: CPT

## 2021-01-13 PROCEDURE — 99284 EMERGENCY DEPT VISIT MOD MDM: CPT

## 2021-01-13 PROCEDURE — 96374 THER/PROPH/DIAG INJ IV PUSH: CPT

## 2021-01-13 PROCEDURE — 96376 TX/PRO/DX INJ SAME DRUG ADON: CPT

## 2021-01-13 PROCEDURE — 83735 ASSAY OF MAGNESIUM: CPT

## 2021-01-13 PROCEDURE — 6370000000 HC RX 637 (ALT 250 FOR IP): Performed by: EMERGENCY MEDICINE

## 2021-01-13 PROCEDURE — 74177 CT ABD & PELVIS W/CONTRAST: CPT

## 2021-01-13 PROCEDURE — 6360000002 HC RX W HCPCS: Performed by: EMERGENCY MEDICINE

## 2021-01-13 PROCEDURE — 96375 TX/PRO/DX INJ NEW DRUG ADDON: CPT

## 2021-01-13 PROCEDURE — 6360000004 HC RX CONTRAST MEDICATION: Performed by: EMERGENCY MEDICINE

## 2021-01-13 PROCEDURE — 2580000003 HC RX 258: Performed by: EMERGENCY MEDICINE

## 2021-01-13 PROCEDURE — 85025 COMPLETE CBC W/AUTO DIFF WBC: CPT

## 2021-01-13 PROCEDURE — 80053 COMPREHEN METABOLIC PANEL: CPT

## 2021-01-13 RX ORDER — ONDANSETRON 4 MG/1
4 TABLET, ORALLY DISINTEGRATING ORAL ONCE
Status: COMPLETED | OUTPATIENT
Start: 2021-01-13 | End: 2021-01-13

## 2021-01-13 RX ORDER — ONDANSETRON 2 MG/ML
4 INJECTION INTRAMUSCULAR; INTRAVENOUS ONCE
Status: COMPLETED | OUTPATIENT
Start: 2021-01-13 | End: 2021-01-13

## 2021-01-13 RX ORDER — ONDANSETRON 4 MG/1
4 TABLET, ORALLY DISINTEGRATING ORAL EVERY 8 HOURS PRN
Qty: 20 TABLET | Refills: 0 | Status: SHIPPED | OUTPATIENT
Start: 2021-01-13 | End: 2021-01-01

## 2021-01-13 RX ORDER — SODIUM CHLORIDE 0.9 % (FLUSH) 0.9 %
10 SYRINGE (ML) INJECTION PRN
Status: DISCONTINUED | OUTPATIENT
Start: 2021-01-13 | End: 2021-01-13 | Stop reason: HOSPADM

## 2021-01-13 RX ORDER — MORPHINE SULFATE 4 MG/ML
4 INJECTION, SOLUTION INTRAMUSCULAR; INTRAVENOUS ONCE
Status: COMPLETED | OUTPATIENT
Start: 2021-01-13 | End: 2021-01-13

## 2021-01-13 RX ORDER — 0.9 % SODIUM CHLORIDE 0.9 %
80 INTRAVENOUS SOLUTION INTRAVENOUS ONCE
Status: COMPLETED | OUTPATIENT
Start: 2021-01-13 | End: 2021-01-13

## 2021-01-13 RX ORDER — 0.9 % SODIUM CHLORIDE 0.9 %
1000 INTRAVENOUS SOLUTION INTRAVENOUS ONCE
Status: COMPLETED | OUTPATIENT
Start: 2021-01-13 | End: 2021-01-13

## 2021-01-13 RX ADMIN — SODIUM CHLORIDE 80 ML: 9 INJECTION, SOLUTION INTRAVENOUS at 03:17

## 2021-01-13 RX ADMIN — IOPAMIDOL 75 ML: 755 INJECTION, SOLUTION INTRAVENOUS at 03:17

## 2021-01-13 RX ADMIN — ONDANSETRON 4 MG: 4 TABLET, ORALLY DISINTEGRATING ORAL at 09:47

## 2021-01-13 RX ADMIN — ONDANSETRON 4 MG: 2 INJECTION INTRAMUSCULAR; INTRAVENOUS at 03:33

## 2021-01-13 RX ADMIN — ONDANSETRON 4 MG: 2 INJECTION INTRAMUSCULAR; INTRAVENOUS at 01:59

## 2021-01-13 RX ADMIN — MORPHINE SULFATE 4 MG: 4 INJECTION, SOLUTION INTRAMUSCULAR; INTRAVENOUS at 02:00

## 2021-01-13 RX ADMIN — SODIUM CHLORIDE 1000 ML: 9 INJECTION, SOLUTION INTRAVENOUS at 01:59

## 2021-01-13 RX ADMIN — Medication 10 ML: at 03:17

## 2021-01-13 ASSESSMENT — PAIN SCALES - GENERAL: PAINLEVEL_OUTOF10: 7

## 2021-01-13 NOTE — ED PROVIDER NOTES
EMERGENCY DEPARTMENT ENCOUNTER    Pt Name: Sofie Lawrence  MRN: [id number]  [place] 1960  Date of evaluation: 1/13/21  CHIEF COMPLAINT       Chief Complaint   Patient presents with    Abdominal Pain    Flank Pain     HISTORY OF PRESENT ILLNESS   Patient is a 62-year-old female with multiple comorbidities listed below the ED for evaluation of abdominal pain. Pain started after dinner this evening. Pain located left lower quadrant. Pain described as constant, severe, rated 8/10, nonradiating. No fever, nausea, vomiting, diarrhea. She has never had this pain before. No history of kidney stones. Also no reports of cough, shortness of breath, chest pain. REVIEW OF SYSTEMS     Review of Systems   All other systems reviewed and are negative.     PASTMEDICAL HISTORY     Past Medical History:   Diagnosis Date    Alcohol withdrawal ([address]     Alcohol withdrawal seizure with complication ([address] 63/3/4269    Alcohol-induced chronic pancreatitis ([address] 88/4/5976    Alcoholic hepatitis 88/7/8561    Cellulitis of right hip 12/18/2016    COPD (chronic obstructive pulmonary disease) ([address] 10/2/2015    Diabetes mellitus ([address]     \"borderline\"    DM type 2 (diabetes mellitus, type 2) ([address] 10/2/2015    Dysphagia     Eczema     Elevated liver enzymes 10/2/2015    FTT (failure to thrive) in adult 10/2/2015    Hoarseness     Hypertension     denies,used to take bp meds    Hypomagnesemia     Hyponatremia 12/7/2016    Intertrochanteric fracture of right femur ([address] 12/7/2016    Iron deficiency anemia 12/18/2016    Lesion of hard palate     rt side,dx with laryngoscopy 8/11/16    Moderate malnutrition ([address] 10/2/2015    New onset seizure ([address]     states last one was oct 2015, but states told she had an \"alcohol seizure\" this past month    Poor historian     Smoker 10/2/2015     SURGICAL HISTORY       Past Surgical History:   Procedure Laterality Date    FEMUR FRACTURE SURGERY Right 12/07/2016    HIP (*)     Sodium 134 (*)     Potassium 3.4 (*)     CO2 18 (*)     Anion Gap 18 (*)     AST 73 (*)     All other components within normal limits   LIPASE - Abnormal; Notable for the following components:    Lipase 93 (*)     All other components within normal limits   MAGNESIUM       EMERGENCY DEPARTMENTCOURSE:   Patient did well in the ED. Pain well controlled morphine and Zofran. CT and pelvis shows left indirect inguinal hernia without involvement of bowel. Can follow-up with surgery as an outpatient. No further work-up indicated this time. Return to this emergency room immediately if your symptoms persist, worsen or if new ones form. Make sure you follow-up with your primary care doctor within the next 1-2 business days. Vitals:    Vitals:    01/13/21 0131   BP: 122/75   Pulse: 84   Resp: 16   Temp: 98.2 °F (36.8 °C)   TempSrc: Oral   SpO2: 98%   Weight: 99 lb (44.9 kg)   Height: 5' 4\" (1.626 m)       The patient was given the following medications while in the emergency department:  Orders Placed This Encounter   Medications    0.9 % sodium chloride bolus    morphine sulfate (PF) injection 4 mg    ondansetron (ZOFRAN) injection 4 mg    0.9 % sodium chloride bolus    iopamidol (ISOVUE-370) 76 % injection 75 mL    sodium chloride flush 0.9 % injection 10 mL    ondansetron (ZOFRAN) injection 4 mg     CONSULTS:  IP CONSULT TO GENERAL SURGERY    FINAL IMPRESSION      1.  Indirect inguinal hernia          DISPOSITION/PLAN   DISPOSITION        PATIENT REFERRED TO:  Mikayla Smith PA-C  [address]  [phone]    In 2 days      Ron Page MD  . [facility] [address] [facility]  [phone]    Call   As needed    DISCHARGE MEDICATIONS:  New Prescriptions    No medications on file     Montez Bull MD  Attending Emergency Physician                    Charisse Kenney MD  01/13/21 8432

## 2021-01-13 NOTE — ED NOTES
Pt awakened again sts staying at a hotel but does not know name of hotel. Pt told  if remembers it we can send her there by cab.      Senaida Ahumada, RN  01/13/21 ADONIS Salomon  01/13/21 [id number]

## 2021-01-13 NOTE — CARE COORDINATION
LSW met with patient to discuss concern of alcohol abuse. Patient stated she drinks 2-3 beers every few days. Patient denied any further issues issue and declined resources. RN notified.

## 2021-01-13 NOTE — ED NOTES
Pt sleeping on cart awakened and tried friend for ride home. sts not answering.      Cheryl Hu RN  01/13/21 8315

## 2021-06-29 NOTE — ED NOTES
Pt states she stepped on a rock four days ago. She reports unable to bear weight. Pt smells strongly of cigarettes and states her asthma is flaring up. Lungs diminished.       Tyler Hanna RN  06/29/21 0488

## 2021-06-29 NOTE — ED PROVIDER NOTES
eMERGENCY dEPARTMENT eNCOUnter   [address] Name: Dale Sage  MRN: [id number]  [place] 1960  Date of evaluation: 6/29/21     Dale Sage is a 63 y.o. female with CC: Foot Injury      Based on the medical record the care appears appropriate. I was personally available for consultation in the Emergency Department.     Zeferino Alvarado MD  Attending Emergency Physician                    Zeferino Alvarado MD  06/29/21 7921

## 2021-06-29 NOTE — ED NOTES
She needs pain pills and an inhaler per pt. Andres PEREZ is notified.      Rosario Mccray, ADONIS  06/29/21 9333

## 2021-06-29 NOTE — ED PROVIDER NOTES
[facility] ED  eMERGENCY dEPARTMENT eNCOUnter      Pt Name: Kandy Horne  MRN: [id number]  [place] 1960  Date of evaluation: 6/29/2021  Provider: RONI Baum 6626       Chief Complaint   Patient presents with    Foot Injury         HISTORY OF PRESENT ILLNESS  (Location/Symptom, Timing/Onset, Context/Setting, Quality, Duration, Modifying Factors, Severity.)   Kandy Horne is a 63 y.o. female who presents to the emergency department via private auto for pain to her right heel. Onset was after stepping onto the threshold of a doorway 2 days ago. States she cannot bear weight on the heel area due to the pain. States the pain is now affecting her breathing. Rates her pain 5/10 at this time. She is also requesting a refill of an albuterol inhaler due to her history of asthma. Nursing Notes were reviewed. ALLERGIES     Ibuprofen    CURRENT MEDICATIONS       Previous Medications    ALBUTEROL (PROVENTIL) (2.5 MG/3ML) 0.083% NEBULIZER SOLUTION    Take 3 mLs by nebulization every 6 hours as needed for Wheezing    CALCIUM CARBONATE (CALCIUM 600) 600 MG TABS TABLET    Take 1 tablet by mouth daily Do not take with iron    CALCIUM-VITAMIN D 600-200 MG-UNIT TABS    Take 1 tablet by mouth 2 times daily    DIPHENHYDRAMINE (BENADRYL) 25 MG CAPSULE    Take 1 capsule by mouth every 6 hours as needed for Itching    ONDANSETRON (ZOFRAN ODT) 4 MG DISINTEGRATING TABLET    Take 1 tablet by mouth every 8 hours as needed for Nausea    OXYCODONE (ROXICODONE) 5 MG IMMEDIATE RELEASE TABLET    Take 5 mg by mouth every 4 hours as needed for Pain .     TRAMADOL (ULTRAM) 50 MG TABLET    Take 1 tablet by mouth every 6 hours as needed for Pain       PAST MEDICAL HISTORY         Diagnosis Date    Alcohol withdrawal ([address]     Alcohol withdrawal seizure with complication ([address] 69/7/5399    Alcohol-induced chronic pancreatitis ([facility] 12/1/6439    Alcoholic hepatitis 89/5/5191    Cellulitis of right hip 2016    COPD (chronic obstructive pulmonary disease) ([address] 10/2/2015    Diabetes mellitus ([address]     \"borderline\"    DM type 2 (diabetes mellitus, type 2) ([address] 10/2/2015    Dysphagia     Eczema     Elevated liver enzymes 10/2/2015    FTT (failure to thrive) in adult 10/2/2015    Hoarseness     Hypertension     denies,used to take bp meds    Hypomagnesemia     Hyponatremia 2016    Intertrochanteric fracture of right femur ([address] 2016    Iron deficiency anemia 2016    Lesion of hard palate     rt side,dx with laryngoscopy 16    Moderate malnutrition ([address] 10/2/2015    New onset seizure ([facility]     states last one was oct 2015, but states told she had an \"alcohol seizure\" this past month    Poor historian     Smoker 10/2/2015       SURGICAL HISTORY           Procedure Laterality Date    FEMUR FRACTURE SURGERY Right 2016    HIP FRACTURE SURGERY Left     LARYNGOSCOPY  10/28/2016    biopsie base of tongue    TONSILLECTOMY           FAMILY HISTORY           Problem Relation Age of Onset    Diabetes Father     Asthma Sister     Asthma Brother      Family Status   Relation Name Status    Mother      Father      Sister  Alive    Brother  Alive    Brother  Alive    Sister  [address]      reports that she has been smoking cigarettes. She has a 17.50 pack-year smoking history. She has never used smokeless tobacco. She reports current alcohol use. She reports that she does not use drugs. REVIEW OF SYSTEMS    (2-9 systems for level 4, 10 or more for level 5)     Review of Systems   Constitutional: Negative for chills, diaphoresis, fatigue and fever. Musculoskeletal: Positive for arthralgias and myalgias. Skin: Negative for color change, rash and wound. Neurological: Negative for weakness and numbness. Except as noted above the remainder of the review of systems was reviewed and negative.      PHYSICAL EXAM    (up to 7 for level 4, 8 or more for level 5)     ED Triage Vitals [06/29/21 0913]   BP Temp Temp Source Pulse Resp SpO2 Height Weight   (!) 145/98 98.6 °F (37 °C) Oral 100 14 99 % 5' 4\" (1.626 m) 100 lb 14.4 oz (45.8 kg)     Physical Exam  Vitals reviewed. Constitutional:       General: She is not in acute distress. Appearance: She is well-developed. She is not diaphoretic. Eyes:      General: No scleral icterus. Conjunctiva/sclera: Conjunctivae normal.   Cardiovascular:      Rate and Rhythm: Normal rate. Pulses: Normal pulses. Pulmonary:      Effort: Pulmonary effort is normal. No respiratory distress. Breath sounds: No stridor. Musculoskeletal:      Right foot: Normal range of motion and normal capillary refill. Tenderness present. No swelling, deformity or bony tenderness. Normal pulse. Feet:    Skin:     General: Skin is warm and dry. Capillary Refill: Capillary refill takes less than 2 seconds. Findings: No rash. Neurological:      Mental Status: She is alert and oriented to person, place, and time. Psychiatric:         Behavior: Behavior normal.         DIAGNOSTIC RESULTS     RADIOLOGY:   Non-plain film images such as CT, Ultrasound and MRI are read by the radiologist. Plain radiographic images are visualized and preliminarily interpreted by the emergency physician with the below findings:    Interpretation per the Radiologist below, if available at the time of this note:    XR FOOT RIGHT (MIN 3 VIEWS)    Addendum Date: 6/29/2021    ADDENDUM: There is no evidence of acute fracture. There is normal alignment of the tarsometatarsal joints. No acute joint abnormality. No focal osseous lesion. No focal soft tissue abnormality. Result Date: 6/29/2021  EXAMINATION: THREE XRAY VIEWS OF THE RIGHT FOOT 6/29/2021 9:57 am COMPARISON: None. HISTORY: ORDERING SYSTEM PROVIDED HISTORY: fell 2 days ago. Cannot put wt on foot heel pain.  TECHNOLOGIST PROVIDED HISTORY: fell 2 days ago. Cannot put wt on foot heel pain. Reason for Exam: Patient states heel pain after fall 1 day ago Acuity: Acute Type of Exam: Initial FINDINGS: Insert normal bone       EMERGENCY DEPARTMENT COURSE and DIFFERENTIAL DIAGNOSIS/MDM:   Vitals:    Vitals:    06/29/21 0913   BP: (!) 145/98   Pulse: 100   Resp: 14   Temp: 98.6 °F (37 °C)   TempSrc: Oral   SpO2: 99%   Weight: 100 lb 14.4 oz (45.8 kg)   Height: 5' 4\" (1.626 m)       MEDICATIONS GIVEN IN THE ED:  Medications   HYDROcodone-acetaminophen (NORCO) 5-325 MG per tablet 1 tablet (1 tablet Oral Given 6/29/21 1038)       CLINICAL DECISION MAKING:  The patient presented alert with a nontoxic appearance and was seen in conjunction with Dr. Sahil Katz. Imaging was negative for fracture. Tendonitis was discussed with the patient. OARRS was reviewed. Prescriptions were written for motrin, norco, and Proventil. A ride was present. The patient was advised to not drink alcohol, drive, or operate heavy machinery while taking the norco. Follow up with pcp and podiatry, return to ED if condition worsens. A post-op shoe was applied. The application was checked and was appropriate; the RLE remained NVI. She was fitted for crutches and teaching was done. FINAL IMPRESSION      1. Pain of right heel    2. Encounter for medication refill    3.  History of asthma            Problem List  Patient Active Problem List   Diagnosis Code    Hypertension I10    Alcohol withdrawal seizure with complication (HCC) L87.259, R56.9    Alcohol withdrawal ([facility] F10.239    Alcohol-induced chronic pancreatitis ([facility] O37.7    Alcoholic hepatitis G72.15    Elevated liver enzymes R74.8    FTT (failure to thrive) in adult R62.7    Moderate malnutrition (HCC) E44.0    Smoker F17.200    COPD (chronic obstructive pulmonary disease) ([address] J44.9    Noncompliance Z91.19    Intertrochanteric fracture of right femur (HCC) S72.141A    Hyponatremia E87.1    Anemia D64.9    Cellulitis of right hip L03.115    Iron deficiency anemia D50.9    Type 2 diabetes mellitus without complication, without long-term current use of insulin ([facility] E11.9         DISPOSITION/PLAN   DISPOSITION Decision To Discharge 06/29/2021 10:25:21 AM      PATIENT REFERRED TO:   Krupa Rice PA-C  [address] [facility] [address]  [phone]          [facility] IF, [address] [facility] [address]  [address] [place] [address]  [phone]    Schedule an appointment as soon as possible for a visit         DISCHARGE MEDICATIONS:     New Prescriptions    ALBUTEROL SULFATE HFA (PROVENTIL HFA) 108 (90 BASE) MCG/ACT INHALER    Inhale 2 puffs into the lungs every 6 hours as needed for Wheezing or Shortness of Breath    HYDROCODONE-ACETAMINOPHEN (NORCO) 5-325 MG PER TABLET    Take 1 tablet by mouth every 6 hours as needed for Pain (\) for up to 3 days.     IBUPROFEN (IBU) 600 MG TABLET    Take 1 tablet by mouth every 6 hours as needed for Pain           (Please note that portions of this note were completed with a voice recognition program.  Efforts were made to edit the dictations but occasionally words are mis-transcribed.)    Anu Lama, [address] [facility], APRN - CNP  06/29/21 0436

## 2021-08-13 NOTE — LETTER
8/13/2021        [address] [facility] [address]    Dear Glenis Rucker: Your healthcare provider has ordered a low dose CT scan of the chest for lung cancer screening. You will find enclosed, information about CT lung screening. Please review the statement of understanding, you will be asked to sign a copy of this at the time of your CT scan    If you have not already been contacted to make the appointment for your scan, please call our scheduling department at [phone]    Keep in mind that CT lung screening does not take the place of smoking cessation. If you are a current smoker, you will find enclosed smoking cessation resources. Please do not hesitate to contact me if you have any questions or concerns.     [address] [place] [address] Lung Screening Program  [phone]

## 2021-08-21 PROBLEM — F10.10 ALCOHOL ABUSE: Status: ACTIVE | Noted: 2021-01-01

## 2021-08-21 PROBLEM — R79.89 ELEVATED LFTS: Status: ACTIVE | Noted: 2021-01-01

## 2021-08-21 PROBLEM — K92.2 GIB (GASTROINTESTINAL BLEEDING): Status: ACTIVE | Noted: 2021-01-01

## 2021-08-21 PROBLEM — K76.0 HEPATIC STEATOSIS: Chronic | Status: ACTIVE | Noted: 2021-01-01

## 2021-08-21 NOTE — TELEPHONE ENCOUNTER
Writer spoke to male who answered the phone and he states pt is an inpt at [facility] right now for a blood transfusion. Writer informed male we will call back another time and thanked him for information.

## 2021-08-21 NOTE — H&P
[facility]  Office: [address], DO, Ines Smoker, DO, Yudycarolina Ruffin, DO, Marietta Robinsn Blood, DO, Colonel Ashley MD, Ladonna Barbour MD, Adrienne Mckeon MD, Vivi Martel MD, Baljinder Ahn MD, Valerio Rodriguez MD, Fanny Licona MD, Floridalma Harley, DO, Siddhartha Umana MD, Dillon Shin, DO, Livia Chapin MD,  Robert Yost, DO, Abhijit Wilkerson MD, Chana Gillespie MD, Oliva Albert MD, Reyna Mclean MD, Eliseo Cazares MD, Batool Watson MD, Alex Pham MD, Idris Lopez, [facility], [facility], CNP, Radha Saleem, CNP, Lydia Quevedo, CNS, Tashi Pittman, CNP, Emir Dawson, CNP, Delana Spatz, CNP, Maykel Mary, CNP, Jerome Danielle, CNP, Elva Kent PA-C, Alice Osei, [facility], Solange Ceja, CNP, Tristan Covington, CNP, Lakshmi Burdick, CNP, Yoly Terry, CNP, Hardik Rios, CNP, Kyle Wu, CNP, Cleo Mackey, CNP, Isis Haddad, CNP         [facility]    HISTORY AND PHYSICAL EXAMINATION            Date:   8/21/2021  Patient name:  Brennen Justin  Date of admission:  8/21/2021 12:30 PM  MRN:   [id number]  Account:  [de-identified]  YOB: 1960  PCP:    Pedro Pablo Owens PA-C  Room:   [place] [address]  Code Status:    Prior    Chief Complaint:     Chief Complaint   Patient presents with    Emesis    Diarrhea       History Obtained From:     patient    History of Present Illness:     Brennen Justin is a 63 y.o. Non- / non  female who presents with Emesis and Diarrhea   and is admitted to the hospital for the management of GIB (gastrointestinal bleeding). This is a 61-year-old female that presents with progressive weakness with vomiting and diarrhea over the last several days. She denies any black stools or lele blood in her bowel movements or emesis. She is found to have anemia with a hemoglobin less than 6 concerning for gastrointestinal bleed.   She has a history of alcohol abuse with hepatic steatosis, denies any history of cirrhosis. Denies any known history of varices or prior gastrointestinal bleeding. She denies any abdominal pain or other acute complaints. She has a longstanding history of alcohol abuse and is drinking at least a pint of vodka daily. She reports a history of diabetes, hypertension, COPD. Reports that she currently takes no medications at home. Past Medical History:     Past Medical History:   Diagnosis Date    Alcohol withdrawal ([facility]     Alcohol withdrawal seizure with complication ([facility] 54/4/5047    Alcohol-induced chronic pancreatitis ([facility] 80/6/2220    Alcoholic hepatitis 91/0/0249    Cellulitis of right hip 12/18/2016    COPD (chronic obstructive pulmonary disease) ([address] 10/2/2015    Diabetes mellitus ([facility]     \"borderline\"    DM type 2 (diabetes mellitus, type 2) ([facility] 10/2/2015    Dysphagia     Eczema     Elevated liver enzymes 10/2/2015    FTT (failure to thrive) in adult 10/2/2015    Hoarseness     Hypertension     denies,used to take bp meds    Hypomagnesemia     Hyponatremia 12/7/2016    Intertrochanteric fracture of right femur ([address] 12/7/2016    Iron deficiency anemia 12/18/2016    Lesion of hard palate     rt side,dx with laryngoscopy 8/11/16    Moderate malnutrition ([address] 10/2/2015    New onset seizure ([facility]     states last one was oct 2015, but states told she had an \"alcohol seizure\" this past month    Poor historian     Smoker 10/2/2015        Past Surgical History:     Past Surgical History:   Procedure Laterality Date    FEMUR FRACTURE SURGERY Right 12/07/2016    HIP FRACTURE SURGERY Left     LARYNGOSCOPY  10/28/2016    biopsie base of tongue    TONSILLECTOMY          Medications Prior to Admission:     Prior to Admission medications    Medication Sig Start Date End Date Taking?  Authorizing Provider   salmeterol (SEREVENT DISKUS) 50 MCG/DOSE diskus inhaler Inhale 1 puff into the lungs 2 times daily    Historical Provider, MD   albuterol sulfate HFA (PROVENTIL HFA) 108 (90 Base) MCG/ACT inhaler Inhale 2 puffs into the lungs every 6 hours as needed for Wheezing or Shortness of Breath 6/29/21   RONI Ramirez CNP   diphenhydrAMINE (BENADRYL) 25 MG capsule Take 1 capsule by mouth every 6 hours as needed for Itching 9/17/20   Martine Rajan,    Calcium-Vitamin D 600-200 MG-UNIT TABS Take 1 tablet by mouth 2 times daily    Historical Provider, MD   albuterol (PROVENTIL) (2.5 MG/3ML) 0.083% nebulizer solution Take 3 mLs by nebulization every 6 hours as needed for Wheezing 2/8/17   Aaron Dwyer DO   calcium carbonate (CALCIUM 600) 600 MG TABS tablet Take 1 tablet by mouth daily Do not take with iron 2/8/17   Marian Dwyer, DO        Allergies:     Ibuprofen    Social History:     Tobacco:    reports that she has been smoking cigarettes. She has a 35.00 pack-year smoking history. She has never used smokeless tobacco.  Alcohol:      reports current alcohol use. Drug Use:  reports no history of drug use. Family History:     Family History   Problem Relation Age of Onset    Heart Disease Mother     Diabetes Father     Asthma Sister     Asthma Brother        Review of Systems:     Positive and Negative as described in HPI.     CONSTITUTIONAL:  negative for fevers, chills, sweats, weight loss, positive for fatigue and weakness  HEENT:  negative for vision, hearing changes, runny nose, throat pain  RESPIRATORY:  negative for shortness of breath, cough, congestion, wheezing  CARDIOVASCULAR:  negative for chest pain, palpitations  GASTROINTESTINAL: Positive for nausea, vomiting, diarrhea, negative for constipation, change in bowel habits, abdominal pain, denies hematemesis, melena or hematochezia  GENITOURINARY:  negative for difficulty of urination, burning with urination, frequency   INTEGUMENT:  negative for rash, skin lesions, easy bruising   HEMATOLOGIC/LYMPHATIC:  negative for swelling/edema   ALLERGIC/IMMUNOLOGIC: negative for urticaria , itching  ENDOCRINE:  negative increase in drinking, increase in urination, hot or cold intolerance  MUSCULOSKELETAL:  negative joint pains, muscle aches, swelling of joints  NEUROLOGICAL:  negative for headaches, dizziness, lightheadedness, numbness, pain, tingling extremities  BEHAVIOR/PSYCH:  negative for depression, anxiety    Physical Exam:   /68   Pulse 112   Temp 98 °F (36.7 °C) (Oral)   Resp 18   Ht 5' 4\" (1.626 m)   Wt 105 lb (47.6 kg)   SpO2 96%   BMI 18.02 kg/m²   Temp (24hrs), Av °F (36.7 °C), Min:98 °F (36.7 °C), Max:98 °F (36.7 °C)    No results for input(s): POCGLU in the last 72 hours. No intake or output data in the 24 hours ending 21 1831    General Appearance:  alert, thin frail and chronically ill appearing, and in no acute distress  Mental status: oriented to person, place, and time  Head:  normocephalic, atraumatic  Eye: no icterus, redness, pupils equal and reactive, extraocular eye movements intact, conjunctiva clear  Ear: normal external ear, no discharge, hearing intact  Nose:  no drainage noted  Mouth: mucous membranes moist  Neck: supple, no carotid bruits, thyroid not palpable  Lungs: Bilateral equal air entry, clear to auscultation, no wheezing, rales or rhonchi, normal effort  Cardiovascular: normal rate, regular rhythm, no murmur, gallop, rub.   Abdomen: Soft, nontender, nondistended, normal bowel sounds, no hepatomegaly or splenomegaly  Neurologic: There are no new focal motor or sensory deficits, normal muscle tone, no abnormal sensation, normal speech, cranial nerves II through XII grossly intact, positive for generalized weakness  Skin: No gross lesions, rashes, bruising or bleeding on exposed skin area  Extremities:  peripheral pulses palpable, no pedal edema or calf pain with palpation, decreased muscular bulk  Psych: normal affect     Investigations:      Laboratory Testing:  Recent Results (from the past 24 hour(s))   CBC Auto Differential    Collection Time: 08/21/21 12:38 PM   Result Value Ref Range    WBC 8.8 3.5 - 11.3 k/uL    RBC 1.46 (L) 3.95 - 5.11 m/uL    Hemoglobin 5.1 (LL) 11.9 - 15.1 g/dL    Hematocrit 16.5 (L) 36.3 - 47.1 %    .0 (H) 82.6 - 102.9 fL    MCH 34.9 (H) 25.2 - 33.5 pg    MCHC 30.9 28.4 - 34.8 g/dL    RDW 15.0 (H) 11.8 - 14.4 %    Platelets 057 538 - 617 k/uL    MPV 10.9 8.1 - 13.5 fL    NRBC Automated NOT REPORTED 0.0 per 100 WBC    Differential Type NOT REPORTED     WBC Morphology NOT REPORTED     RBC Morphology NOT REPORTED     Platelet Estimate NOT REPORTED     Seg Neutrophils 84 (H) 36 - 66 %    Lymphocytes 8 (L) 24 - 44 %    Monocytes 7 1 - 7 %    Eosinophils % 0 (L) 1 - 4 %    Basophils 0 %    Immature Granulocytes 1 (H) 0 %    Segs Absolute 7.39 1.8 - 7.7 k/uL    Absolute Lymph # 0.70 (L) 1.0 - 4.8 k/uL    Absolute Mono # 0.62 0.2 - 0.8 k/uL    Absolute Eos # 0.00 0.0 - 0.4 k/uL    Basophils Absolute 0.00 0.0 - 0.2 k/uL    Absolute Immature Granulocyte 0.09 0.00 - 0.30 k/uL    Morphology MACROCYTOSIS PRESENT    Comprehensive Metabolic Panel    Collection Time: 08/21/21 12:38 PM   Result Value Ref Range    Glucose 103 (H) 70 - 99 mg/dL    BUN 8 6 - 20 mg/dL    CREATININE 0.40 (L) 0.50 - 0.90 mg/dL    Bun/Cre Ratio 20 9 - 20    Calcium 8.1 (L) 8.6 - 10.4 mg/dL    Sodium 134 (L) 135 - 144 mmol/L    Potassium 2.7 (LL) 3.7 - 5.3 mmol/L    Chloride 90 (L) 98 - 107 mmol/L    CO2 12 (L) 20 - 31 mmol/L    Anion Gap 32 (H) 9 - 17 mmol/L    Alkaline Phosphatase 379 (H) 35 - 104 U/L    ALT 78 (H) 5 - 33 U/L     (H) <32 U/L    Total Bilirubin 3.71 (H) 0.3 - 1.2 mg/dL    Total Protein 5.4 (L) 6.4 - 8.3 g/dL    Albumin 2.8 (L) 3.5 - 5.2 g/dL    Albumin/Globulin Ratio NOT REPORTED 1.0 - 2.5    GFR Non-African American >60 >60 mL/min    GFR African American >60 >60 mL/min    GFR Comment          GFR Staging NOT REPORTED    Lipase    Collection Time: 08/21/21 12:38 PM   Result Value Ref Range    Lipase 150 (H) 13 - 60 U/L   TSH w/reflex to FT4    Collection Time: 08/21/21 12:38 PM   Result Value Ref Range    TSH 7.05 (H) 0.30 - 5.00 mIU/L   Lactic Acid    Collection Time: 08/21/21 12:38 PM   Result Value Ref Range    Lactic Acid 8.2 (H) 0.5 - 2.2 mmol/L   T4, Free    Collection Time: 08/21/21 12:38 PM   Result Value Ref Range    Thyroxine, Free 0.81 (L) 0.93 - 1.70 ng/dL   TYPE AND SCREEN    Collection Time: 08/21/21  1:57 PM   Result Value Ref Range    Expiration Date 08/24/2021,2359     Arm Band Number [id number]     ABO/Rh O NEGATIVE     Antibody Screen NEGATIVE     Unit Number [id number]     Product Code Leukocyte Reduced Red Cell     Unit Divison 00     Dispense Status ISSUED     Transfusion Status OK TO TRANSFUSE     Crossmatch Result COMPATIBLE     Unit Number [id number]     Product Code Leukocyte Reduced Red Cell     Unit Divison 00     Dispense Status ALLOCATED     Transfusion Status OK TO TRANSFUSE     Crossmatch Result COMPATIBLE    APTT    Collection Time: 08/21/21  1:57 PM   Result Value Ref Range    PTT 27.8 23.9 - 33.8 sec   Protime-INR    Collection Time: 08/21/21  1:57 PM   Result Value Ref Range    Protime 15.4 (H) 11.5 - 14.2 sec    INR 1.2    ETHANOL    Collection Time: 08/21/21  1:57 PM   Result Value Ref Range    Ethanol 45 (H) <10 mg/dL    Ethanol percent 0.045 (H) <0.010 %   Ammonia    Collection Time: 08/21/21  1:57 PM   Result Value Ref Range    Ammonia 54 (H) 11 - 51 umol/L   Occ Bld, Fecal Scrn    Collection Time: 08/21/21  2:20 PM   Result Value Ref Range    Occult Blood, Stool #1 POSITIVE (A) NEGATIVE    Date, Stool #1 NOT REPORTED     Time, Stool #1 NOT REPORTED     Occult Blood, Stool #2 NOT REPORTED NEGATIVE    Date, Stool #2 NOT REPORTED     Time, Stool #2 NOT REPORTED     Occult Blood, Stool #3 NOT REPORTED NEGATIVE    Date, Stool #3 NOT REPORTED     Time, Stool #3 NOT REPORTED    Lactate, Sepsis    Collection Time: 08/21/21  3:15 PM   Result Value Ref Range    Lactic Acid, Sepsis 5.9 (H) 0.5 - 1.9 mmol/L    Lactic Acid, Sepsis, Whole Blood NOT REPORTED 0.5 - 1.9 mmol/L       Imaging/Diagnostics:  XR CHEST PORTABLE    Result Date: 8/21/2021  Probable bibasilar atelectasis; no consolidation or sizable pleural effusion. No pneumothorax. Possible tiny nodular density right upper lung. RECOMMENDATION: Depending upon the patient's history, consider follow-up two-view chest x-ray in 3-4 months versus CT chest (if smoking history or other risk factors). CTA ABDOMEN PELVIS W CONTRAST    Result Date: 8/21/2021  1. No convincing evidence for bowel ischemia. The aorta is normal in caliber with scattered atheromatous plaque. 2.  Mild apparent wall thickening of the ascending and transverse colon, for which underlying colitis may be present. This finding may be accentuated by underdistention. 3.  Marked hepatic steatosis. 4.  Mild central airway congestion. Assessment :      Hospital Problems         Last Modified POA    * (Principal) GIB (gastrointestinal bleeding) 8/21/2021 Yes    Hypokalemia 8/21/2021 Yes    Tobacco abuse (Chronic) 8/21/2021 Yes    COPD without exacerbation ([facility] 8/21/2021 Yes    Macrocytic anemia 8/21/2021 Yes    Alcohol abuse 8/21/2021 Yes    Hepatic steatosis (Chronic) 8/21/2021 Yes    Elevated LFTs 8/21/2021 Yes          Plan:     Patient status inpatient in the Medical ICU    Admit to ICU  Protonix and octreotide drips  GI consultation, will likely need upper and lower endoscopies  Check iron studies, J44 and folic acid  Transfuse packed cells to keep hemoglobin above 7  Alcohol cessation discussed in detail. Will require outpatient comprehensive treatment program.  Discussed need to change relationships as her significant other also has a drinking problem. Tobacco cessation  CIWA scale while hospitalized. Seizure precautions, concern for withdrawal seizure with chronic alcoholism.   PT and OT  Dietitian evaluation, has low BMI and likely malnourished  Oxygen and aerosols as needed  Supplement electrolytes  Discussed with family at bedside. Consultations:   IP CONSULT TO PRIMARY CARE PROVIDER  IP CONSULT TO GI  IP CONSULT TO SOCIAL WORK     Patient is admitted as inpatient status because of co-morbidities listed above, severity of signs and symptoms as outlined, requirement for current medical therapies and most importantly because of direct risk to patient if care not provided in a hospital setting. Expected length of stay > 48 hours.     Karime Benavidez DO  8/21/2021  6:31 PM    Copy sent to Dr. Krupa Rice PA-C

## 2021-08-21 NOTE — ED PROVIDER NOTES
 Highest education level: Not on file   Occupational History    Not on file   Tobacco Use    Smoking status: Current Every Day Smoker     Packs/day: 1.00     Years: 35.00     Pack years: 35.00     Types: Cigarettes    Smokeless tobacco: Never Used   Substance and Sexual Activity    Alcohol use: Yes     Comment: daily    Drug use: No    Sexual activity: Not on file   Other Topics Concern    Not on file   Social History Narrative    Not on file     Social Determinants of Health     Financial Resource Strain:     Difficulty of Paying Living Expenses:    Food Insecurity:     Worried About Running Out of Food in the Last Year:     Ran Out of Food in the Last Year:    Transportation Needs:     Lack of Transportation (Medical):  Lack of Transportation (Non-Medical):    Physical Activity:     Days of Exercise per Week:     Minutes of Exercise per Session:    Stress:     Feeling of Stress :    Social Connections:     Frequency of Communication with Friends and Family:     Frequency of Social Gatherings with Friends and Family:     Attends Restorationist Services:     Active Member of Clubs or Organizations:     Attends Club or Organization Meetings:     Marital Status:    Intimate Partner Violence:     Fear of Current or Ex-Partner:     Emotionally Abused:     Physically Abused:     Sexually Abused:        Family History   Problem Relation Age of Onset    Heart Disease Mother     Diabetes Father     Asthma Sister     Asthma Brother        Allergies:  Ibuprofen    Home Medications:  Prior to Admission medications    Medication Sig Start Date End Date Taking?  Authorizing Provider   salmeterol (SEREVENT DISKUS) 50 MCG/DOSE diskus inhaler Inhale 1 puff into the lungs 2 times daily    Historical Provider, MD   albuterol sulfate HFA (PROVENTIL HFA) 108 (90 Base) MCG/ACT inhaler Inhale 2 puffs into the lungs every 6 hours as needed for Wheezing or Shortness of Breath 6/29/21   RONI Molina - CNP   diphenhydrAMINE (BENADRYL) 25 MG capsule Take 1 capsule by mouth every 6 hours as needed for Itching 9/17/20   Floridalma Cools, DO   Calcium-Vitamin D 600-200 MG-UNIT TABS Take 1 tablet by mouth 2 times daily    Historical Provider, MD   albuterol (PROVENTIL) (2.5 MG/3ML) 0.083% nebulizer solution Take 3 mLs by nebulization every 6 hours as needed for Wheezing 2/8/17   Aaron Dwyer, DO   calcium carbonate (CALCIUM 600) 600 MG TABS tablet Take 1 tablet by mouth daily Do not take with iron 2/8/17   Aaron Dwyer, DO       REVIEW OF SYSTEMS    (2-9 systems for level 4, 10 or more for level 5)      Review of Systems   Constitutional: Positive for chills and fatigue. Negative for fever. Eyes: Negative for discharge and redness. Respiratory: Positive for shortness of breath. Cardiovascular: Negative for chest pain. Gastrointestinal: Positive for abdominal pain, nausea and vomiting. Genitourinary: Negative for flank pain. Musculoskeletal: Negative for back pain. Skin: Negative for rash. Allergic/Immunologic: Negative for environmental allergies. Neurological: Negative for headaches. Psychiatric/Behavioral: Negative for agitation and confusion. PHYSICAL EXAM   (up to 7 for level 4, 8 or more for level 5)     INITIAL VITALS:    height is 5' 4\" (1.626 m) and weight is 105 lb (47.6 kg). Her oral temperature is 98 °F (36.7 °C). Her blood pressure is 135/82 and her pulse is 94. Her respiration is 13 and oxygen saturation is 98%. Physical Exam  Vitals and nursing note reviewed. Constitutional:       Appearance: She is well-developed. HENT:      Head: Normocephalic and atraumatic. Nose: Nose normal.      Mouth/Throat:      Mouth: Mucous membranes are moist.   Eyes:      General: No scleral icterus. Conjunctiva/sclera: Conjunctivae normal.      Pupils: Pupils are equal, round, and reactive to light. Neck:      Trachea: No tracheal deviation.    Cardiovascular: Rate and Rhythm: Normal rate and regular rhythm. Heart sounds: Normal heart sounds. No murmur heard. No friction rub. No gallop. Pulmonary:      Effort: Pulmonary effort is normal. No respiratory distress. Breath sounds: Normal breath sounds. No wheezing or rales. Abdominal:      General: Bowel sounds are normal. There is no distension. Palpations: Abdomen is soft. There is no mass. Tenderness: There is abdominal tenderness. There is no guarding or rebound. Comments: Patient with mild generalized abdominal tenderness, no guarding no mass no rebound   Genitourinary:     Comments: Rectal exam performed with RN chaperone in room, small amount of dark stool in rectal vault, sent for Hemoccult testing, no active bleeding  Musculoskeletal:         General: Normal range of motion. Cervical back: Neck supple. Skin:     General: Skin is warm and dry. Coloration: Skin is jaundiced and pale. Neurological:      Mental Status: She is alert and oriented to person, place, and time.    Psychiatric:         Behavior: Behavior normal.         DIFFERENTIAL  DIAGNOSIS     PLAN (LABS / IMAGING / EKG):  Orders Placed This Encounter   Procedures    Culture, Blood 1    Culture, Blood 1    CTA ABDOMEN PELVIS W CONTRAST    XR CHEST PORTABLE    CBC Auto Differential    Comprehensive Metabolic Panel    Lipase    Urinalysis Reflex to Culture    TSH w/reflex to FT4    Lactic Acid    APTT    Protime-INR    T4, Free    ETHANOL    Ammonia    Occ Bld, Fecal Scrn    Lactate, Sepsis    CBC Auto Differential    Vitamin B12 & Folate    Iron and TIBC    Ferritin    VITAL SIGNS PER TRANSFUSION PROTOCOL    Verify hospital blood consent form has been signed and witnessed   [address] consult to Primary Care Provider    TYPE AND SCREEN    PREPARE RBC (CROSSMATCH), 2 Units    PATIENT STATUS (FROM ED OR OR/PROCEDURAL) Inpatient       MEDICATIONS (*)     Albumin 2.8 (*)     All other components within normal limits   LIPASE - Abnormal; Notable for the following components:    Lipase 150 (*)     All other components within normal limits   TSH WITH REFLEX - Abnormal; Notable for the following components:    TSH 7.05 (*)     All other components within normal limits   LACTIC ACID - Abnormal; Notable for the following components:    Lactic Acid 8.2 (*)     All other components within normal limits   PROTIME-INR - Abnormal; Notable for the following components:    Protime 15.4 (*)     All other components within normal limits   T4, FREE - Abnormal; Notable for the following components:    Thyroxine, Free 0.81 (*)     All other components within normal limits   ETHANOL - Abnormal; Notable for the following components:    Ethanol 45 (*)     Ethanol percent 0.045 (*)     All other components within normal limits   AMMONIA - Abnormal; Notable for the following components:    Ammonia 54 (*)     All other components within normal limits   OCCULT BLOOD SCREEN - Abnormal; Notable for the following components:    Occult Blood, Stool #1 POSITIVE (*)     All other components within normal limits   LACTATE, SEPSIS - Abnormal; Notable for the following components:    Lactic Acid, Sepsis 5.9 (*)     All other components within normal limits   CBC WITH AUTO DIFFERENTIAL - Abnormal; Notable for the following components:    RBC 1.92 (*)     Hemoglobin 6.3 (*)     Hematocrit 19.4 (*)     RDW 16.3 (*)     Platelets 520 (*)     All other components within normal limits   CULTURE, BLOOD 1   CULTURE, BLOOD 1   APTT   URINE RT REFLEX TO CULTURE   VITAMIN B12 & FOLATE   IRON AND TIBC   FERRITIN   TYPE AND SCREEN   PREPARE RBC (CROSSMATCH)          RADIOLOGY:  XR CHEST PORTABLE    Result Date: 8/21/2021  EXAMINATION: ONE XRAY VIEW OF THE CHEST 8/21/2021 1:46 pm COMPARISON: Two-view chest from 05/24/2018 HISTORY: ORDERING SYSTEM PROVIDED HISTORY: SOB TECHNOLOGIST PROVIDED HISTORY: SOB Acuity: Acute Type of Exam: Unknown FINDINGS: Overlying ECG monitor leads. Cardiac silhouette WNL for AP technique, and likely unchanged. Mediastinal structures midline, again with calcification aortic knob. Probable mild basilar atelectasis and slight cephalization of blood flow. Possible tiny nodular density right upper lung. No other localized pulmonary opacity or significant blunting of the costophrenic angles. No pneumothorax. Bones appear intact. Probable bibasilar atelectasis; no consolidation or sizable pleural effusion. No pneumothorax. Possible tiny nodular density right upper lung. RECOMMENDATION: Depending upon the patient's history, consider follow-up two-view chest x-ray in 3-4 months versus CT chest (if smoking history or other risk factors). CTA ABDOMEN PELVIS W CONTRAST    Result Date: 8/21/2021  EXAMINATION: CTA OF THE ABDOMEN AND PELVIS WITH CONTRAST 8/21/2021 1:50 pm: TECHNIQUE: CTA of the abdomen and pelvis was performed with the administration of intravenous contrast. Multiplanar reformatted images are provided for review. MIP images are provided for review. Dose modulation, iterative reconstruction, and/or weight based adjustment of the mA/kV was utilized to reduce the radiation dose to as low as reasonably achievable. COMPARISON: 05/21/2021 HISTORY: ORDERING SYSTEM PROVIDED HISTORY: Anemic, generalized abdominal pain, lactic acidosis, hemoglobin 5, rule out ischemic bowel TECHNOLOGIST PROVIDED HISTORY: Anemic, generalized abdominal pain, lactic acidosis, hemoglobin 5, rule out ischemic bowel Decision Support Exception - unselect if not a suspected or confirmed emergency medical condition->Emergency Medical Condition (MA) Reason for Exam: Anemic, generalized abdominal pain, lactic acidosis, hemoglobin 5, rule out ischemic bowel Acuity: Acute Type of Exam: Initial FINDINGS: VASCULAR: The aorta is normal in caliber. No dissection. Scattered atheromatous plaque is present.   The visceral branches are patent without significant luminal narrowing. NONVASCULAR ABDOMEN AND PELVIS: The visualized lung bases reveal mild central airway congestion. No focal airspace disease or effusion. Marked hepatic steatosis. The gallbladder, pancreas, spleen, adrenals and kidneys reveal no acute findings. Incidental pancreas divisum. Punctate stone in the lower pole of the right kidney. No bowel dilatation or wall thickening identified questionable long segment wall thickening of the ascending and transverse colon. No significant surrounding inflammatory change. No evidence for appendicitis. No free air or hematoma. No ascites. No mesenteric edema. No lymphadenopathy. The bladder and pelvic viscera reveal no acute findings. Trochanteric fixation hardware in the right femur is partially visualized. No acute osseous abnormality identified. Advanced disc disease at L4-5 and L5-S1.     1.  No convincing evidence for bowel ischemia. The aorta is normal in caliber with scattered atheromatous plaque. 2.  Mild apparent wall thickening of the ascending and transverse colon, for which underlying colitis may be present. This finding may be accentuated by underdistention. 3.  Marked hepatic steatosis. 4.  Mild central airway congestion. EMERGENCY DEPARTMENT COURSE:  ED Course as of Aug 21 2036   Sat Aug 21, 2021   1329 Hemoglobin Quant(!!): 5.1 [MS]   1332 Potassium(!!): 2.7 [MS]   1513 Will discuss with PCP for ICU admission. No active bleeding. Currently however grossly Hemoccult positive dark stool. [MS]      ED Course User Index  [MS] Sherryle CreamerDO     Patient found to have elevated lactic at 8, anemic at 5, potassium 2.7. Remarkably patient with normotensive blood pressures, is markedly tachycardic in the 130s. Does have elevated lactic acid of 8. Most likely due to demand with low hemoglobin.   We will transfuse 2 units PRBCs, IV potassium patient is continually nauseous, does have OG as well. Electrolyte replacement. CTA obtained patient with elevated lactic rule out bowel ischemia. No signs of bowel ischemia. Most likely upper versus lower GI bleed. Started on Protonix bolus, Rocephin however doubt infectious cause. Blood cultures obtained. Lactic did not improve to 5. Although not hemodynamically unstable discussed with Jonathan for admission will benefit from ICU admission. No active bleeding emergency department, patient admitted to ICU. Awaiting bed. PROCEDURES:  None    CONSULTS:  IP CONSULT TO PRIMARY CARE PROVIDER  IP CONSULT TO GI  IP CONSULT TO SOCIAL WORK    CRITICAL CARE:  CRITICAL CARE TIME     Due to the high probability of sudden and clinically significant deterioration in the patient's condition she required highest level of my preparedness to intervene urgently. I provided critical care time including documentation time, medication orders and management, reevaluation, vital sign assessment, ordering and reviewing of of lab tests ordering and reviewing of x-ray studies, and admission orders. Aggregate critical care time is between 50 minutes including only time during which I was engaged in work directly related to her care and did not include time spent treating other patients simultaneously. FINAL IMPRESSION      1. Gastrointestinal hemorrhage, unspecified gastrointestinal hemorrhage type    2. Anemia, unspecified type    3. Hypokalemia    4. Alcohol dependence with unspecified alcohol-induced disorder ([facility] [address]          DISPOSITION / PLAN     DISPOSITION Admitted 08/21/2021 04:08:13 PM        PATIENTREFERRED TO:  No follow-up provider specified.     DISCHARGE MEDICATIONS:  New Prescriptions    No medications on file       Tina Player, DO  EmergencyMedicine Attending    (Please note that portions of this note were completed with a voice recognition program.  Efforts were made to edit the dictations but occasionally words are mis-transcribed.)       Laureen Johnson DO Candy  08/21/21 2035

## 2021-08-22 PROBLEM — D52.0 DIETARY FOLATE DEFICIENCY ANEMIA: Status: ACTIVE | Noted: 2021-01-01

## 2021-08-22 NOTE — PROGRESS NOTES
Dr. Luis Fuentes phones in and updated on pt. Informed of hg, patient history, blood given, vitals stable. No new orders received. States to call if patient develops any further issues.

## 2021-08-22 NOTE — FLOWSHEET NOTE
Increase in agitation and anxiety. Oriented to person and year as on admission. Had improved/less confusion in late morning, and early afternoon.

## 2021-08-22 NOTE — RT PROTOCOL NOTE

## 2021-08-22 NOTE — PROGRESS NOTES
[facility]  Office: [address], DO, Janice Roman, DO, Alley Mary, DO, Melvina Irena Joel, DO, Candida Baker MD, Alvina Iqbal MD, Carla Gongora MD, Vinh Mcgee MD, Reina Jeffrey MD, Libby Irvin MD, Flor Teixeira MD, Camila Galvan, DO, Pascual Carlos MD, Marlon Davila DO, Tammie Enrique MD,  Amanda Pascal DO, Cristhian Tobar MD, Lucio Riggs MD, Chilango Reddy MD, Lou Salinas MD, Hubert Soriano MD, Champ Newman MD, Xiao Cordero MD, Randy Hein [facility], [facility], CNP, Justino Dias, CNP, Eamon Bellamy, CNS, Rowena Medellin, CNP, Cecille Patricia, CNP, Ariane Wiseman, CNP, Theresa Mabry, [facility], Jaden Garcia, CNP, Dominga Reynolds PA-C, Naty Pritchett, [facility], Sravani Delaney, CNP, Rand Spence, CNP, Rodriguez Pisano, CNP, Bia Torres, CNP, Binh Mcghee, CNP, Jennifer De León, CNP, Aide Lazaro CNP, [facility]    Progress Note    8/22/2021    2:20 PM    Name:   Lisa Quiñones  MRN:     [id number]     Acct:      [de-identified]   Room:   [address] [place] [address] Day:  1  Admit Date:  8/21/2021 12:30 PM    PCP:   Aarti Green PA-C  Code Status:  Full Code    Subjective:     C/C:   Chief Complaint   Patient presents with    Emesis    Diarrhea     Interval History Status: improved. Patient is resting comfortably denies any further nausea, vomiting or diarrhea. Denies any abdominal pain, melena, hematochezia, chest pain, shortness of breath or other acute complaints. No tremors or signs or symptoms of withdrawal.    Brief History: This is a 51-year-old female who presents with progressive weakness associated with vomiting and diarrhea over several days. She is found to have significant anemia with a hemoglobin less than 6 with concern for gastrointestinal bleeding. She denied any melena, hematemesis or hematochezia. She is admitted for Protonix drip, octreotide drip and GI consultation. She is been placed on CIWA scale due to her history of alcoholism. Review of Systems:     Constitutional:  negative for chills, fevers, sweats  Respiratory:  negative for cough, dyspnea on exertion, shortness of breath, wheezing  Cardiovascular:  negative for chest pain, chest pressure/discomfort, lower extremity edema, palpitations  Gastrointestinal:  negative for abdominal pain, constipation, diarrhea, nausea, vomiting  Neurological:  negative for dizziness, headache    Medications: Allergies:     Allergies   Allergen Reactions    Ibuprofen Nausea And Vomiting       Current Meds:   Scheduled Meds:    folic acid  1 mg Oral Daily    Arformoterol Tartrate  15 mcg Nebulization BID    sodium chloride flush  5-40 mL Intravenous 2 times per day    pantoprazole (PROTONIX) bolus  80 mg Intravenous Once    [START ON 8/24/2021] pantoprazole  40 mg Intravenous Daily    And    [START ON 8/24/2021] sodium chloride (PF)  10 mL Intravenous Daily    thiamine  100 mg Intravenous Daily     Continuous Infusions:    sodium chloride      sodium chloride      0.9% NaCl with KCl 40 mEq 125 mL/hr at 08/21/21 8395    pantoprozole (PROTONIX) infusion 8 mg/hr (08/22/21 0954)    octreotide (SANDOSTATIN) infusion 25 mcg/hr (08/22/21 1359)     PRN Meds: sodium chloride, albuterol, sodium chloride flush, sodium chloride, ondansetron **OR** ondansetron, potassium chloride **OR** potassium alternative oral replacement **OR** potassium chloride, magnesium sulfate, LORazepam **OR** LORazepam **OR** LORazepam **OR** LORazepam    Data:     Past Medical History:   has a past medical history of Alcohol withdrawal ([facility] [address] [facility] Alcohol-induced chronic pancreatitis ([facility] Alcoholic hepatitis, Cellulitis of right hip, COPD (chronic obstructive pulmonary disease) ([facility] [address] Diabetes mellitus ([facility] [address] DM type 2 (diabetes mellitus, type 2) ([facility] Dysphagia, Eczema, Elevated liver enzymes, FTT (failure to thrive) in adult, Hoarseness, Hypertension, Hypomagnesemia, Hyponatremia, Intertrochanteric fracture of right femur (HCC), Iron deficiency anemia, Lesion of hard palate, Moderate malnutrition ([address] New onset seizure ([address] Poor historian, and Smoker. Social History:   reports that she has been smoking cigarettes. She has a 35.00 pack-year smoking history. She has never used smokeless tobacco. She reports current alcohol use. She reports that she does not use drugs. Family History:   Family History   Problem Relation Age of Onset    Heart Disease Mother     Diabetes Father     Asthma Sister     Asthma Brother        Vitals:  /74   Pulse 89   Temp 98.1 °F (36.7 °C) (Oral)   Resp 24   Ht 5' 4\" (1.626 m)   Wt 105 lb (47.6 kg)   SpO2 (!) 84%   BMI 18.02 kg/m²   Temp (24hrs), Av.1 °F (36.7 °C), Min:97.9 °F (36.6 °C), Max:98.6 °F (37 °C)    No results for input(s): POCGLU in the last 72 hours. I/O (24Hr): Intake/Output Summary (Last 24 hours) at 2021 1420  Last data filed at 2021 1330  Gross per 24 hour   Intake 1999.17 ml   Output 325 ml   Net 1674.17 ml       Labs:  Hematology:  Recent Labs     21  1238 21  1238 21  1357 21  2351 21  0717 21  1303   WBC 8.8  --   --  6.4  --   --  6.0  --    RBC 1.46*  --   --  1.92*  --   --  2.51*  --    HGB 5.1*   < >  --  6.3*   < > 8.8* 8.0* 7.6*   HCT 16.5*   < >  --  19.4*   < > 28.6* 24.4* 22.7*   .0*  --   --  101.0  --   --  97.2  --    MCH 34.9*  --   --  32.8  --   --  31.9  --    MCHC 30.9  --   --  32.5  --   --  32.8  --    RDW 15.0*  --   --  16.3*  --   --  16.1*  --      --   --  103*  --   --  99*  --    MPV 10.9  --   --  10.9  --   --  10.8  --    INR  --   --  1.2  --   --   --  1.1  --     < > = values in this interval not displayed.      Chemistry:  Recent Labs     21  1238 21  2351 21  0717   *  --  134*   K 2.7* 3. 6* 4.0   CL 90*  --  102   CO2 12*  --  20   GLUCOSE 103*  --  129*   BUN 8  --  3*   CREATININE 0.40*  --  <0.40*   MG  --  1.6  --    ANIONGAP 32*  --  12   LABGLOM >60  --  CANNOT BE CALCULATED   GFRAA >60  --  CANNOT BE CALCULATED   CALCIUM 8.1*  --  6.6*     Recent Labs     08/21/21  1238 08/21/21  1357 08/22/21  0717   PROT 5.4*  --   --    LABALBU 2.8*  --   --    TSH 7.05*  --   --    *  --   --    ALT 78*  --   --    ALKPHOS 379*  --   --    BILITOT 3.71*  --   --    AMMONIA  --  54* 48   LIPASE 150*  --   --      ABG:No results found for: POCPH, PHART, PH, POCPCO2, DRC0IOY, PCO2, POCPO2, PO2ART, PO2, POCHCO3, ZRL9TMN, HCO3, NBEA, PBEA, BEART, BE, THGBART, THB, FWQ4JYQ, HRYF6SON, D6APVGFY, O2SAT, FIO2  Lab Results   Component Value Date/Time    SPECIAL NOT REPORTED 08/21/2021 01:45 PM    SPECIAL NOT REPORTED 08/21/2021 01:45 PM     Lab Results   Component Value Date/Time    CULTURE NO GROWTH 16 HOURS 08/21/2021 01:45 PM    CULTURE NO GROWTH 16 HOURS 08/21/2021 01:45 PM       Radiology:  XR CHEST PORTABLE    Result Date: 8/21/2021  Probable bibasilar atelectasis; no consolidation or sizable pleural effusion. No pneumothorax. Possible tiny nodular density right upper lung. RECOMMENDATION: Depending upon the patient's history, consider follow-up two-view chest x-ray in 3-4 months versus CT chest (if smoking history or other risk factors). CTA ABDOMEN PELVIS W CONTRAST    Result Date: 8/21/2021  1. No convincing evidence for bowel ischemia. The aorta is normal in caliber with scattered atheromatous plaque. 2.  Mild apparent wall thickening of the ascending and transverse colon, for which underlying colitis may be present. This finding may be accentuated by underdistention. 3.  Marked hepatic steatosis. 4.  Mild central airway congestion.        Physical Examination:        General appearance:  alert, cooperative and no distress  Mental Status:  oriented to person, place and time and normal affect  Lungs:  clear to auscultation bilaterally, normal effort  Heart:  regular rate and rhythm, no murmur  Abdomen:  soft, nontender, nondistended, normal bowel sounds, no masses, hepatomegaly, splenomegaly  Extremities:  no edema, redness, tenderness in the calves  Skin:  no gross lesions, rashes, induration    Assessment:        Hospital Problems         Last Modified POA    * (Principal) GIB (gastrointestinal bleeding) 8/21/2021 Yes    Hypokalemia 8/21/2021 Yes    Tobacco abuse (Chronic) 8/21/2021 Yes    COPD without exacerbation ([facility] [address] 8/21/2021 Yes    Noncompliance (Chronic) 8/21/2021 Yes    Macrocytic anemia 8/21/2021 Yes    Alcohol abuse 8/21/2021 Yes    Hepatic steatosis (Chronic) 8/21/2021 Yes    Elevated LFTs 8/21/2021 Yes    Dietary folate deficiency anemia 8/22/2021 Yes    Alcohol dependence with unspecified alcohol-induced disorder [facility] 8/22/2021 Yes          Plan:        Continue serial H&H, transfuse as needed  Octreotide and Protonix drips  GI consultation  Tobacco and alcohol cessation  She will likely need EEG +/- colonoscopy  DVT prophylaxis, EPC cuffs  Oxygen and aerosols as needed  Thiamine and folic acid as ordered  Correct electrolytes as needed  See orders for details    Daniel Carcamo DO  8/22/2021  2:20 PM

## 2021-08-22 NOTE — CONSULTS
GI Consult Note:    Name: Bing Hernnadez  MRN: [id number]     [place]: [de-identified]  Room: [address] [place] [address]    Admit Date: 8/21/2021  PCP: Carlos Amor PA-C    Physician Requesting Consult: Marco A Thomas, DO     Reason for Consult:    Severe anemia  Melanotic stools? Nausea vomiting and diarrhea  Elevated LFTs  Elevated lipase  Alcohol abuse  Malnutrition      Chief Complaint:     Chief Complaint   Patient presents with    Emesis    Diarrhea       History Obtained From:     Patient and EMR    History of Present Illness:      Bing Hernandez is a  63 y.o.  female who presents with Emesis and Diarrhea    This 81-year-old  female has history significant multiple chronic medical issues  She history for chronic alcohol abuse  Has been admitted with history of weakness fatigue tiredness nausea vomiting and diarrhea  She has rather moderate to severe malnutrition  She was found to be severely anemic with hemoglobin less than 6  At home she denies having any black stools nausea vomiting hematemesis  However in ICU nursing staff says that she had black stool this morning?   She was given blood transfusion  Currently seems to be going some mild to moderate withdrawal symptoms  Has some abdominal discomfort  Complains of weakness fatigue and tiredness  Has no current hematemesis coffee-ground emesis  She is a chronic smoker  Denies any illicit drug usage  No GI work-up history is available  Symptoms:  Onset:  Location:  abdomen  Duration:  day(s)  Severity:  moderate  Quality:  intermittent      Past Medical History:     Past Medical History:   Diagnosis Date    [facility]     Alcohol withdrawal seizure with complication ([facility] 08/8/4891    Alcohol-induced chronic pancreatitis ([facility] 58/3/9408    Alcoholic hepatitis 39/9/2730    Cellulitis of right hip 12/18/2016    COPD (chronic obstructive pulmonary disease) ([facility] 10/2/2015    Diabetes mellitus ([facility]     \"borderline\"    DM type 2 (diabetes mellitus, type 2) ([facility] 10/2/2015    Dysphagia     Eczema     Elevated liver enzymes 10/2/2015    FTT (failure to thrive) in adult 10/2/2015    Hoarseness     Hypertension     denies,used to take bp meds    Hypomagnesemia     Hyponatremia 12/7/2016    Intertrochanteric fracture of right femur ([address] 12/7/2016    Iron deficiency anemia 12/18/2016    Lesion of hard palate     rt side,dx with laryngoscopy 8/11/16    Moderate malnutrition ([address] 10/2/2015    New onset seizure ([facility]     states last one was oct 2015, but states told she had an \"alcohol seizure\" this past month    Poor historian     Smoker 10/2/2015        Past Surgical History:     Past Surgical History:   Procedure Laterality Date    FEMUR FRACTURE SURGERY Right 12/07/2016    HIP FRACTURE SURGERY Left     LARYNGOSCOPY  10/28/2016    biopsie base of tongue    TONSILLECTOMY          Medications Prior to Admission:       Prior to Admission medications    Medication Sig Start Date End Date Taking?  Authorizing Provider   salmeterol (SEREVENT DISKUS) 50 MCG/DOSE diskus inhaler Inhale 1 puff into the lungs 2 times daily    Historical Provider, MD   albuterol sulfate HFA (PROVENTIL HFA) 108 (90 Base) MCG/ACT inhaler Inhale 2 puffs into the lungs every 6 hours as needed for Wheezing or Shortness of Breath 6/29/21   RONI Das CNP   diphenhydrAMINE (BENADRYL) 25 MG capsule Take 1 capsule by mouth every 6 hours as needed for Itching 9/17/20   Carol Pelayo DO   Calcium-Vitamin D 600-200 MG-UNIT TABS Take 1 tablet by mouth 2 times daily    Historical Provider, MD   albuterol (PROVENTIL) (2.5 MG/3ML) 0.083% nebulizer solution Take 3 mLs by nebulization every 6 hours as needed for Wheezing 2/8/17   Aaron Dwyer DO   calcium carbonate (CALCIUM 600) 600 MG TABS tablet Take 1 tablet by mouth daily Do not take with iron 2/8/17   Vivian Dwyer, DO        Allergies:       Ibuprofen    Social History: Tobacco:    reports that she has been smoking cigarettes. She has a 35.00 pack-year smoking history. She has never used smokeless tobacco.  Alcohol:      reports current alcohol use. Drug Use:  reports no history of drug use.     Family History:     Family History   Problem Relation Age of Onset    Heart Disease Mother     Diabetes Father     Asthma Sister     Asthma Brother        Review of Systems:     Positive and Negative as described in HPI    Constitutional:  negative for  fevers, chills, sweats, positive fatigue, and weight loss  HEENT:  negative for vision or hearing changes,   Respiratory:  negative for shortness of breath, cough, or congestion  Cardiovascular:  negative for  chest pain, palpitations  Gastrointestinal: Positive nausea, vomiting, diarrhea, constipation, abdominal pain  Genitourinary:  negative for frequency, dysuria  Integument: Positive rash, skin lesions  Musculoskeletal: Positive muscle aches or joint pain  Neurological: Positive headaches, dizziness, lightheadedness, numbness, pain and tingling extrimities  Behavior/Psych: Positive depression and positive anxiety    Code Status:  Full Code    Physical Exam:     Vitals:  /68   Pulse 88   Temp 98.1 °F (36.7 °C) (Oral)   Resp 21   Ht 5' 4\" (1.626 m)   Wt 105 lb (47.6 kg)   SpO2 (!) 84%   BMI 18.02 kg/m²   Temp (24hrs), Av.1 °F (36.7 °C), Min:97.9 °F (36.6 °C), Max:98.6 °F (37 °C)      General appearance - alert, weak lethargic sick appearing, and in no acute distress  Mental status - oriented to person, place, and time with anxious affect  Head - normocephalic and atraumatic  Eyes - pupils equal and reactive, extraocular eye movements intact, conjunctiva clear  Ears - hearing appears to be intact  Nose - no drainage noted  Mouth - mucous membranes dry  Neck - supple, no carotid bruits, thyroid not palpable  Chest -bilateral Rales to auscultation, normal effort  Heart - normal rate, regular rhythm, no murmurs  Abdomen - soft, diffuse tenderness , nondistended, bowel sounds present all four quadrants, no masses, hepatomegaly or splenomegaly.  No hernias  Neurological -slow slurry speech, positive focal findings or movement disorder noted, cranial nerves II not tested  Extremities mild pedal edema or calf pain with palpation  Skin -positive induration noted  Cranial Nerves not done at this time  Lymph nodes: not done at this time    Data:   CBC:   Lab Results   Component Value Date    WBC 6.0 08/22/2021    RBC 2.51 08/22/2021    HGB 8.0 08/22/2021    HCT 24.4 08/22/2021    MCV 97.2 08/22/2021    MCH 31.9 08/22/2021    MCHC 32.8 08/22/2021    RDW 16.1 08/22/2021    PLT 99 08/22/2021    MPV 10.8 08/22/2021     CBC with Differential:    Lab Results   Component Value Date    WBC 6.0 08/22/2021    RBC 2.51 08/22/2021    HGB 8.0 08/22/2021    HCT 24.4 08/22/2021    PLT 99 08/22/2021    MCV 97.2 08/22/2021    MCH 31.9 08/22/2021    MCHC 32.8 08/22/2021    RDW 16.1 08/22/2021    METASPCT 1 10/05/2015    LYMPHOPCT 9 08/22/2021    LYMPHOPCT 24 07/10/2013    MONOPCT 5 08/22/2021    MONOPCT 5 07/10/2013    EOSPCT 4 07/10/2013    BASOPCT 0 08/22/2021    BASOPCT 1 07/10/2013    MONOSABS 0.30 08/22/2021    MONOSABS 0.46 07/10/2013    LYMPHSABS 0.54 08/22/2021    LYMPHSABS 2.19 07/10/2013    EOSABS 0.12 08/22/2021    EOSABS 0.36 07/10/2013    BASOSABS 0.00 08/22/2021    DIFFTYPE NOT REPORTED 08/22/2021     Hemoglobin/Hematocrit:    Lab Results   Component Value Date    HGB 8.0 08/22/2021    HCT 24.4 08/22/2021     CMP:    Lab Results   Component Value Date     08/22/2021    K 4.0 08/22/2021     08/22/2021    CO2 20 08/22/2021    BUN 3 08/22/2021    CREATININE <0.40 08/22/2021    GFRAA CANNOT BE CALCULATED 08/22/2021    LABGLOM CANNOT BE CALCULATED 08/22/2021    GLUCOSE 129 08/22/2021    PROT 5.4 08/21/2021    LABALBU 2.8 08/21/2021    CALCIUM 6.6 08/22/2021    BILITOT 3.71 08/21/2021    ALKPHOS 379 08/21/2021     08/21/2021    ALT 78 08/21/2021     BMP:    Lab Results   Component Value Date     08/22/2021    K 4.0 08/22/2021     08/22/2021    CO2 20 08/22/2021    BUN 3 08/22/2021    LABALBU 2.8 08/21/2021    CREATININE <0.40 08/22/2021    CALCIUM 6.6 08/22/2021    GFRAA CANNOT BE CALCULATED 08/22/2021    LABGLOM CANNOT BE CALCULATED 08/22/2021    GLUCOSE 129 08/22/2021     PT/INR:    Lab Results   Component Value Date    PROTIME 14.0 08/22/2021    INR 1.1 08/22/2021     PTT:    Lab Results   Component Value Date    APTT 28.0 08/22/2021   [APTT}    Assesment:     Primary Problem  GIB (gastrointestinal bleeding)    Active Hospital Problems    Diagnosis Date Noted    Dietary folate deficiency anemia [D52.0] 08/22/2021    GIB (gastrointestinal bleeding) [K92.2] 08/21/2021    Alcohol abuse [F10.10] 08/21/2021    Hepatic steatosis [K76.0] 08/21/2021    Elevated LFTs [R79.89] 08/21/2021    Macrocytic anemia [D53.9]     Noncompliance [Z91.19] 10/02/2015    Hypokalemia [E87.6]     COPD without exacerbation (HCC) [J44.9]     Tobacco abuse [Z72.0]      Severe anemia  Melanotic stools? Nausea vomiting and diarrhea  Elevated LFTs  Elevated lipase  Alcohol abuse  Malnutrition  Plan:     1. Continue supportive symptomatic care  2. IV PPI  3. IV octreotide  4. Follow hemoglobin hematocrit  5. Transfuse hemoglobin drops below 7  6. Keep her n.p.o.  7. Thiamine folic acid multivitamins EtOH rehab  8. Plan upper endoscopy on her tomorrow  9. Explained to the patient  10. Discussed with nursing staff in ICU        Thank you for allowing me to participate in the care of your patient. Please feel free to contact me with any questions or concerns.      Electronically signed by Dalton Hamilton MD on 8/22/2021 at 11:48 AM     Copy sent to Dr. Gregorio Navarro PA-C

## 2021-08-22 NOTE — PROGRESS NOTES
One unit packed cells given. Repeat hg 8.8. Mg 1.6 and given 2 gm mag sulfate. K+ 3.6. Pt dozing. No complaints voiced. Monitor sr without ectopy. No family present. Side rails up x2. Call light within reach.  Pleasant and cooperative during shift

## 2021-08-23 PROBLEM — K31.89 PORTAL HYPERTENSIVE GASTROPATHY (HCC): Status: ACTIVE | Noted: 2021-01-01

## 2021-08-23 PROBLEM — E44.0 MODERATE MALNUTRITION (HCC): Status: ACTIVE | Noted: 2021-01-01

## 2021-08-23 PROBLEM — K76.6 PORTAL HYPERTENSIVE GASTROPATHY (HCC): Status: ACTIVE | Noted: 2021-01-01

## 2021-08-23 NOTE — PLAN OF CARE
Problem: Skin Integrity:  Goal: Absence of new skin breakdown  Description: Absence of new skin breakdown  Outcome: Ongoing   Continuing to monitor for skin integrity risks. Patient intervention includes turn and position every 2 hours. Turning/repositioning encouraged at least once every 2 hrs, and prn basis. Hygiene care being completed dependently per nursing staff; assistance provided when necessary from pt. No evidence of any new skin integrity issues noted. Problem: Falls - Risk of:  Goal: Will remain free from falls  Description: Will remain free from falls  Outcome: Ongoing   Pt remains free from falls and in fall precautions at this time. Bed is in lowest position with all wheels locked and 3/4 side rails up. Bed alarm is on d/t pt having intermittent confusion. Call light, bedside table, and personal belongings within reach of pt. Nurse educated on proper use of call light. Pt acknowledged teaching. Nurse will continue to assess and monitor pt with hourly rounds and offer toileting. Problem: Falls - Risk of:  Goal: Absence of physical injury  Description: Absence of physical injury  Outcome: Ongoing   Pt remains free from any physical injuries at this time. Will continue to provide a safe environment for pt. Will continue to assess and monitor pt with hourly rounds. Problem: Bleeding:  Goal: Will show no signs and symptoms of excessive bleeding  Description: Will show no signs and symptoms of excessive bleeding  Outcome: Ongoing   Hgb being monitored q6 hrs. Hgb stable at this time. Will continue to monitor.

## 2021-08-23 NOTE — FLOWSHEET NOTE
Per RN, patient would like information on Advance Directives. Patient in bed; appears very sleepy; daughter at bedside. Daughter states patient just woke up. Patient does not engage with writer; declines spiritual care at this time. Writer provides brief presence and support; leaves Advance Directive booklet with patient. Later in hallway daughter approaches; thanks writer for information; expresses concern that patient's boyfriend may try to get a POA for the patient. Writer explains that patient must have capacity to complete documents; reassures daughter that boyfriend cannot complete a POA for patient. Daughter expresses gratitude for support and information. Spiritual Care will follow as needed.      08/23/21 1416   Encounter Summary   Services provided to: Patient and family together   Referral/Consult From: Rounding;Nurse   Support System Children   Continue Visiting   (8/23/21)   Complexity of Encounter Low   Length of Encounter 15 minutes   Spiritual Assessment Completed Yes   Routine   Type Initial   Assessment Passive   Intervention Active listening   Outcome Expressed gratitude

## 2021-08-23 NOTE — ANESTHESIA PRE PROCEDURE
0.9 % sodium chloride infusion   Intravenous PRN Yandel Christine, APRN - CNS        [MAR Hold] Arformoterol Tartrate (BROVANA) nebulizer solution 15 mcg  15 mcg Nebulization BID Yandel Christine, APRN - CNS   15 mcg at 08/23/21 0813    [MAR Hold] sodium chloride flush 0.9 % injection 5-40 mL  5-40 mL Intravenous 2 times per day Yandel Christine, APRN - CNS       [facility] Hold] sodium chloride flush 0.9 % injection 5-40 mL  5-40 mL Intravenous PRN Yandel Christine, APRN - CNS       [facility] Hold] 0.9 % sodium chloride infusion  25 mL Intravenous PRN Yandel Christine, APRN - CNS        [MAR Hold] ondansetron (ZOFRAN-ODT) disintegrating tablet 4 mg  4 mg Oral Q8H PRN Yandel Christine, APRN - CNS        Or    [MAR Hold] ondansetron (ZOFRAN) injection 4 mg  4 mg Intravenous Q6H PRN Yandel Christine, APRN - CNS   4 mg at 08/22/21 0951    [MAR Hold] 0.9% NaCl with KCl 40 mEq infusion   Intravenous Continuous Yandel Christine, APRN -  mL/hr at 08/23/21 0705 New Bag at 08/23/21 0705    [MAR Hold] pantoprazole (PROTONIX) 80 mg in sodium chloride 0.9 % 50 mL bolus  80 mg Intravenous Once Yandel Christine, APRN - CNS        [MAR Hold] pantoprazole (PROTONIX) 80 mg in sodium chloride 0.9 % 100 mL infusion  8 mg/hr Intravenous Continuous Yandel Christine, APRN - CNS 10 mL/hr at 08/23/21 0922 8 mg/hr at 08/23/21 0922    [MAR Hold] pantoprazole (PROTONIX) injection 40 mg  40 mg Intravenous Daily Yandel Christine, APRN - CNS        And   [facility] Hold] sodium chloride (PF) 0.9 % injection 10 mL  10 mL Intravenous Daily Yandel Christine, APRN - CNS       [facility] Hold] potassium chloride (KLOR-CON M) extended release tablet 40 mEq  40 mEq Oral PRN Yandel Christine, APRN - CNS        Or   [facility] Hold] potassium bicarb-citric acid (EFFER-K) effervescent tablet 40 mEq  40 mEq Oral PRN RONI Uribe - [facility]        Or   [facility] Hold] potassium chloride 10 mEq/100 mL IVPB (Peripheral Line)  10 mEq Intravenous PRN RONI Uribe - CNS        [MAR Hold] magnesium sulfate 1000 mg in dextrose 5% 100 mL IVPB  1,000 mg Intravenous PRN Debbie Bumps, APRN - CNS   Stopped at 08/22/21 0237    [MAR Hold] thiamine (B-1) injection 100 mg  100 mg Intravenous Daily Debbie Bumps, APRN - CNS   100 mg at 08/22/21 0938    [MAR Hold] LORazepam (ATIVAN) tablet 1 mg  1 mg Oral Q3H PRN Debbie Bumps, APRN - CNS        Or   [facility] Hold] LORazepam (ATIVAN) injection 1 mg  1 mg Intravenous Q3H PRN Debbie Bumps, APRN - CNS        Or   [facility] Hold] LORazepam (ATIVAN) tablet 2 mg  2 mg Oral Q3H PRN Debbie Bumps, APRN - CNS        Or   [facility] Hold] LORazepam (ATIVAN) injection 2 mg  2 mg Intravenous Q3H PRN Debbie Bumps, APRN - CNS   2 mg at 08/22/21 2104    [MAR Hold] Octreotide Acetate 500 mcg in sodium chloride 0.9 % 100 mL infusion  25 mcg/hr Intravenous Continuous [facility] DO 5 mL/hr at 08/23/21 0705 25 mcg/hr at 08/23/21 0705     Facility-Administered Medications Ordered in Other Encounters   Medication Dose Route Frequency Provider Last Rate Last Admin    lactated ringers infusion   Intravenous Continuous PRN Forest Grates, APRN - CRNA   New Bag at 08/23/21 1022    fentaNYL (SUBLIMAZE) injection   Intravenous PRN Forest Grates, APRN - CRNA   50 mcg at 08/23/21 1021    propofol injection   Intravenous PRN Forest Grates, APRN - CRNA   30 mg at 08/23/21 1029    lidocaine PF 2 % injection   Intravenous PRN Forest Grates, APRN - CRNA   100 mg at 08/23/21 1023       Allergies:     Allergies   Allergen Reactions    Ibuprofen Nausea And Vomiting       Problem List:    Patient Active Problem List   Diagnosis Code    Hypertension I10    Alcohol withdrawal seizure with complication ([facility] H41.693, R56.9    Alcohol withdrawal ([facility] F10.239    Hypokalemia E87.6    Alcohol-induced chronic pancreatitis ([facility] B94.0    Alcoholic hepatitis V82.62    Elevated liver enzymes R74.8    FTT (failure to thrive) in adult R62.7    Moderate malnutrition ([address] E44.0    Tobacco abuse Z72.0    COPD without exacerbation ([address] J44.9    Noncompliance Z91.19    Intertrochanteric fracture of right femur ([address] S72.141A    Hyponatremia E87.1    Macrocytic anemia D53.9    Cellulitis of right hip L03.115    Iron deficiency anemia D50.9    Type 2 diabetes mellitus without complication, without long-term current use of insulin (HCC) E11.9    GIB (gastrointestinal bleeding) K92.2    Alcohol abuse F10.10    Hepatic steatosis K76.0    Elevated LFTs R79.89    Dietary folate deficiency anemia D52.0    Alcohol dependence with unspecified alcohol-induced disorder (HCC) F10.29       Past Medical History:        Diagnosis Date    Alcohol withdrawal ([address]     Alcohol withdrawal seizure with complication ([address] 05/4/9799    Alcohol-induced chronic pancreatitis ([address] 94/0/9994    Alcoholic hepatitis 64/1/0313    Cellulitis of right hip 12/18/2016    COPD (chronic obstructive pulmonary disease) ([address] 10/2/2015    Diabetes mellitus ([address]     \"borderline\"    DM type 2 (diabetes mellitus, type 2) ([address] 10/2/2015    Dysphagia     Eczema     Elevated liver enzymes 10/2/2015    FTT (failure to thrive) in adult 10/2/2015    Hoarseness     Hypertension     denies,used to take bp meds    Hypomagnesemia     Hyponatremia 12/7/2016    Intertrochanteric fracture of right femur ([address] 12/7/2016    Iron deficiency anemia 12/18/2016    Lesion of hard palate     rt side,dx with laryngoscopy 8/11/16    Moderate malnutrition ([address] 10/2/2015    New onset seizure ([address]     states last one was oct 2015, but states told she had an \"alcohol seizure\" this past month    Poor historian     Smoker 10/2/2015       Past Surgical History:        Procedure Laterality Date    FEMUR FRACTURE SURGERY Right 12/07/2016    HIP FRACTURE SURGERY Left     LARYNGOSCOPY  10/28/2016    biopsie base of tongue    TONSILLECTOMY         Social History:    Social History     Tobacco Use  Smoking status: Current Every Day Smoker     Packs/day: 1.00     Years: 35.00     Pack years: 35.00     Types: Cigarettes    Smokeless tobacco: Never Used   Substance Use Topics    Alcohol use: Yes     Comment: daily                                Ready to quit: Not Answered  Counseling given: Not Answered      Vital Signs (Current):   Vitals:    08/23/21 0700 08/23/21 0800 08/23/21 0814 08/23/21 0900   BP: 139/89 (!) 130/92  127/83   Pulse:  107  102   Resp:  25 29   Temp:  99.8 °F (37.7 °C)     TempSrc:  Oral     SpO2: 95% 98% 97% 92%   Weight:       Height:                                                  BP Readings from Last 3 Encounters:   08/23/21 127/83   08/23/21 122/85   06/29/21 (!) 145/98       NPO Status:                                                                                 BMI:   Wt Readings from Last 3 Encounters:   08/21/21 105 lb (47.6 kg)   06/29/21 100 lb 14.4 oz (45.8 kg)   01/13/21 99 lb (44.9 kg)     Body mass index is 18.02 kg/m². CBC:   Lab Results   Component Value Date    WBC 4.2 08/23/2021    RBC 2.31 08/23/2021    HGB 7.6 08/23/2021    HCT 22.8 08/23/2021    MCV 98.7 08/23/2021    RDW 16.7 08/23/2021     08/23/2021       CMP:   Lab Results   Component Value Date     08/23/2021    K 4.9 08/23/2021     08/23/2021    CO2 19 08/23/2021    BUN 2 08/23/2021    CREATININE <0.40 08/23/2021    GFRAA CANNOT BE CALCULATED 08/23/2021    LABGLOM CANNOT BE CALCULATED 08/23/2021    GLUCOSE 122 08/23/2021    PROT 4.4 08/23/2021    CALCIUM 7.0 08/23/2021    BILITOT 2.71 08/23/2021    ALKPHOS 253 08/23/2021     08/23/2021    ALT 55 08/23/2021       POC Tests: No results for input(s): POCGLU, POCNA, POCK, POCCL, POCBUN, POCHEMO, POCHCT in the last 72 hours.     Coags:   Lab Results   Component Value Date    PROTIME 14.0 08/22/2021    INR 1.1 08/22/2021    APTT 28.0 08/22/2021       HCG (If Applicable): No results found for: PREGTESTUR, PREGSERUM, HCG, HCGQUANT ABGs: No results found for: PHART, PO2ART, XCX1VCJ, KYS7IIQ, BEART, Y4NZEIOS     Type & Screen (If Applicable):  No results found for: LABABO, LABRH    Drug/Infectious Status (If Applicable):  Lab Results   Component Value Date    HEPCAB NONREACTIVE 04/27/2021       COVID-19 Screening (If Applicable):   Lab Results   Component Value Date    COVID19 Not Detected 08/21/2021           Anesthesia Evaluation  Patient summary reviewed and Nursing notes reviewed no history of anesthetic complications:   Airway: Mallampati: II  TM distance: >3 FB   Neck ROM: full  Mouth opening: > = 3 FB Dental: normal exam         Pulmonary:normal exam    (+) COPD:                             Cardiovascular:  Exercise tolerance: no interval change,   (+) hypertension:,           Rate: normal                    Neuro/Psych:   (+) psychiatric history:            GI/Hepatic/Renal:   (+) liver disease:,           Endo/Other:    (+) Diabetes, . Abdominal:             Vascular: Other Findings:             Anesthesia Plan      MAC and general     ASA 3       Induction: intravenous. MIPS: prophylactic pharmacologic antiemetic agents not administered perioperatively for documented reasons. Anesthetic plan and risks discussed with patient. Plan discussed with CRNA.     Attending anesthesiologist reviewed and agrees with Preprocedure content              Latea Day DO   8/23/2021

## 2021-08-23 NOTE — ANESTHESIA POSTPROCEDURE EVALUATION
Department of Anesthesiology  Postprocedure Note    Patient: Jessica Hopkins  MRN: [id number]  YOB: 1960  Date of evaluation: 8/23/2021  Time:  12:05 PM     Procedure Summary     Date: 08/23/21 Room / Location: [facility] [address] [address]    Anesthesia Start: 1782 Anesthesia Stop: 8379    Procedure: EGD BIOPSY (N/A ) Diagnosis: (GI BLEED)    Surgeons: Anthony Townsend MD Responsible Provider: Gifty Quan DO    Anesthesia Type: MAC, general ASA Status: 3          Anesthesia Type: MAC, general    Maricruz Phase I: Maricruz Score: 8    Maricruz Phase II:      Last vitals: Reviewed and per EMR flowsheets.        Anesthesia Post Evaluation    Patient location during evaluation: PACU  Patient participation: complete - patient participated  Level of consciousness: awake and alert  Airway patency: patent  Nausea & Vomiting: no nausea and no vomiting  Complications: no  Cardiovascular status: hemodynamically stable  Respiratory status: acceptable  Hydration status: stable

## 2021-08-23 NOTE — PROGRESS NOTES
Comprehensive Nutrition Assessment    Type and Reason for Visit:  Positive Nutrition Screen (Dietitian consult needed: poor intake)    Nutrition Recommendations/Plan:   1. Continue ADULT DIET; Easy to Chew  2. Start Ensure Julia Char and Ensure Clear 2x/day so that patient can decide which supplement she prefers  3. Monitor p.o intakes, diet tolerance, labs and GI status    Nutrition Assessment:  Patient admission is related to hypokalemia, GI bleed, alcohol dependence and severe anemia. Patient was admitted with a hemoglobin of 5.1 (L) and received two units of PRBC. Patient was NPO this morning for EGD which revealed evidence of portal vein hypertension. Patient diet advanced to [facility]. Patient only took a few bites of beef and noodles at lunch time. Patient states she usually has an appetite (this is subjective)  but she does not feel well today and is very weak. RN reports patient lives in a hotel with her boyfriend and is a heavy drinker and smoker. Patient has been to rehab several times without success. At this time patient is refusing rehab for substance abuse. Patient has noticable fat and muscle mass loss and appears to be moderately malnourished. Continue Easy to [employer]. Will allow patient to try Ensure Enlive and Ensure Clear 2x/day to decide which she prefers. Monitor p.o intakes, diet tolerance, labs and GI status. Malnutrition Assessment:  Malnutrition Status:   Moderate malnutrition    Context:  Social/Environmental Circumstances     Findings of the 6 clinical characteristics of malnutrition:  Energy Intake:  7 - 50% or less estimated energy requirements for 1 month or longer  Weight Loss:  No significant weight loss     Body Fat Loss:  1 - Mild body fat loss Triceps   Muscle Mass Loss:  1 - Mild muscle mass loss Clavicles (pectoralis & deltoids)  Fluid Accumulation:  No significant fluid accumulation Extremities   Strength:  Not Performed    Estimated Daily Nutrient Needs:  Energy (kcal):  8838-8363 kcal (30-33 kcal/kg); Weight Used for Energy Requirements:  Current     Protein (g):  62-71 gm of protein (1.3-1.5 gm/kg); Weight Used for Protein Requirements:  Current          Nutrition Related Findings:  No edema. GI issues: Nausea, mild jaundice, lack of appetite. Med Hx: Alcohol abuse and heavy smoker. Labs: alk phos: 253 (H), AST: 253 (H), ALT: 55 (H), bilirubin: 2.71 (H).  Patient is very weak      Wounds:  None       Current Nutrition Therapies:    ADULT DIET; Easy to Chew  Adult Oral Nutrition Supplement; Standard High Calorie/High Protein Oral Supplement  Adult Oral Nutrition Supplement; Clear Liquid Oral Supplement    Anthropometric Measures:  · Height: 5' 4\" (162.6 cm)  · Current Body Weight: 105 lb (47.6 kg)   · Admission Body Weight: 105 lb (47.6 kg)    · Usual Body Weight: 100 lb (45.4 kg)     · Ideal Body Weight: 120 lbs; % Ideal Body Weight 87.5 %   · BMI: 18   · BMI Categories: Underweight (BMI less than 22) age over 72       Nutrition Diagnosis:   · In context of social or environmental circumstances, Moderate malnutrition related to inadequate protein-energy intake as evidenced by intake 0-25%, poor intake prior to admission, BMI, mild muscle loss, mild loss of subcutaneous fat (underweight)      Nutrition Interventions:   Food and/or Nutrient Delivery:  Continue Current Diet, Continue Oral Nutrition Supplement  Nutrition Education/Counseling:  Education not indicated   Coordination of Nutrition Care:  Continue to monitor while inpatient    Goals:  PO intakes are greater than 50% at meals       Nutrition Monitoring and Evaluation:   Food/Nutrient Intake Outcomes:  Supplement Intake, Food and Nutrient Intake  Physical Signs/Symptoms Outcomes:  Biochemical Data, Skin, Weight     Discharge Planning:    Continue current diet, Continue Oral Nutrition Supplement       Bella XIONG, RDN, LDN  Lead Clinical Dietitian  RD Office Phone [phone]

## 2021-08-23 NOTE — PROGRESS NOTES
[facility]  Office: [address], DO, Miryam Shell, DO, Kemperlisa Mancera, DO, Emmett Montalvo Blood, DO, Gonzalo Yao MD, Bill Fletcher MD, Milton Briceno MD, Leonard Manley MD, Harmeet Jack MD, Eben Lehman MD, Mayte Holliday MD, Ellyn Baltazar, DO, Nicole Garcia MD, Timothy Lopez, DO, Salina Ortiz MD,  Alvaro Carrillo, DO, Kiet Campos MD, James Collins MD, John Velazquez MD, Hope Opitz, MD, Santos Bran MD, Lior Armendariz MD, Manohar Sanon MD, Wendy Jackson, [facility], [facility], CNP, Obi Palm, CNP, Zachery Carbajal, CNS, Baylee Marks, CNP, Alicja Juares, CNP, Anneliese Arreaga, CNP, Valentin Lopez, CNP, Susanne Dunlap, CNP, Deb Torres PA-C, Paul Louis, [place], Abdoulaye Daniels, CNP, Erick Chandler, CNP, Paige Burkett, CNP, Natalie Bagley, CNP, Barbra Prader, CNP, Hali Bain, CNP, Brant Grider, [facility], Priyanka Freedman [facility]    Progress Note    8/23/2021    11:41 AM    Name:   Tammie Boateng  MRN:     [id number]     Acct:      [de-identified]   Room:   [address] [place] [address] Day:  2  Admit Date:  8/21/2021 12:30 PM    PCP:   Rickie Galaviz PA-C  Code Status:  Full Code    Subjective:     C/C:   Chief Complaint   Patient presents with   Sheree Curl Emesis    Diarrhea     Interval History Status: not changed. Patient is resting comfortably, sleeping at the time I am in the room. Was able to be aroused with tactile stimuli. Denies any chest pain, shortness of breath, nausea or vomiting. She is more confused today. Brief History: This is a 65-year-old female who presents with progressive weakness associated with vomiting and diarrhea over several days. She is found to have significant anemia with a hemoglobin less than 6 with concern for gastrointestinal bleeding. She denied any melena, hematemesis or hematochezia. She is admitted for Protonix drip, octreotide drip and GI consultation.   She is been placed on > = values in this interval not displayed. Chemistry:  Recent Labs     08/21/21  1238 08/21/21  1238 08/21/21  2351 08/22/21  0717 08/23/21  0418   *  --   --  134* 136   K 2.7*   < > 3.6* 4.0 4.9   CL 90*  --   --  102 107   CO2 12*  --   --  20 19*   GLUCOSE 103*  --   --  129* 122*   BUN 8  --   --  3* 2*   CREATININE 0.40*  --   --  <0.40* <0.40*   MG  --   --  1.6  --   --    ANIONGAP 32*  --   --  12 10   LABGLOM >60  --   --  CANNOT BE CALCULATED CANNOT BE CALCULATED   GFRAA >60  --   --  CANNOT BE CALCULATED CANNOT BE CALCULATED   CALCIUM 8.1*  --   --  6.6* 7.0*    < > = values in this interval not displayed. Recent Labs     08/21/21  1238 08/21/21  1357 08/22/21 0717 08/23/21 0418 08/23/21  1057   PROT 5.4*  --   --  4.4*  --    LABALBU 2.8*  --   --  2.2*  --    TSH 7.05*  --   --   --   --    *  --   --  253*  --    ALT 78*  --   --  55*  --    ALKPHOS 379*  --   --  253*  --    BILITOT 3.71*  --   --  2.71*  --    BILIDIR  --   --   --  2.32*  --    AMMONIA  --  54* 48  --   --    LIPASE 150*  --   --   --   --    POCGLU  --   --   --   --  115*     ABG:No results found for: POCPH, PHART, PH, POCPCO2, XZZ0ATZ, PCO2, POCPO2, PO2ART, PO2, POCHCO3, SJJ1UAA, HCO3, NBEA, PBEA, BEART, BE, THGBART, THB, HDC1BEV, HLNE5CEO, K7AFPRRG, O2SAT, FIO2  Lab Results   Component Value Date/Time    SPECIAL NOT REPORTED 08/21/2021 01:45 PM    SPECIAL NOT REPORTED 08/21/2021 01:45 PM     Lab Results   Component Value Date/Time    CULTURE NO GROWTH 2 DAYS 08/21/2021 01:45 PM    CULTURE NO GROWTH 2 DAYS 08/21/2021 01:45 PM       Radiology:  XR CHEST PORTABLE    Result Date: 8/21/2021  Probable bibasilar atelectasis; no consolidation or sizable pleural effusion. No pneumothorax. Possible tiny nodular density right upper lung. RECOMMENDATION: Depending upon the patient's history, consider follow-up two-view chest x-ray in 3-4 months versus CT chest (if smoking history or other risk factors).      CTA ABDOMEN PELVIS W CONTRAST    Result Date: 8/21/2021  1. No convincing evidence for bowel ischemia. The aorta is normal in caliber with scattered atheromatous plaque. 2.  Mild apparent wall thickening of the ascending and transverse colon, for which underlying colitis may be present. This finding may be accentuated by underdistention. 3.  Marked hepatic steatosis. 4.  Mild central airway congestion. Physical Examination:        General appearance:  alert, cooperative and no distress  Mental Status:  oriented to person only with normal affect, confused, no tremors noted.   Had received a few doses of lorazepam overnight for CIWA protocol  Lungs:  clear to auscultation bilaterally, normal effort  Heart:  regular rate and rhythm, no murmur  Abdomen:  soft, nontender, nondistended, normal bowel sounds, no masses, hepatomegaly, splenomegaly  Extremities:  no edema, redness, tenderness in the calves  Skin:  no gross lesions, rashes, induration    Assessment:        Hospital Problems         Last Modified POA    * (Principal) GIB (gastrointestinal bleeding) 8/21/2021 Yes    Hypokalemia 8/21/2021 Yes    Tobacco abuse (Chronic) 8/21/2021 Yes    COPD without exacerbation ([address] 8/21/2021 Yes    Noncompliance (Chronic) 8/21/2021 Yes    Macrocytic anemia 8/21/2021 Yes    Alcohol abuse 8/21/2021 Yes    Hepatic steatosis (Chronic) 8/21/2021 Yes    Elevated LFTs 8/21/2021 Yes    Dietary folate deficiency anemia 8/22/2021 Yes    Alcohol dependence with unspecified alcohol-induced disorder ([address] 8/22/2021 Yes    Portal hypertensive gastropathy ([address] 8/23/2021 Yes          Plan:        Continue CIWA scale  EGD today  Trend H&H, transfuse as needed  Strongly encourage alcohol cessation  GI and DVT prophylaxis, EPC cuffs  PT and OT  Oxygen and aerosols as needed  Folic acid supplementation  Thiamine as ordered  Further recommendations and plans pending EGD findings  Possible transfer out of ICU if no significant withdrawal symptoms throughout the day.     Anton Soni DO  8/23/2021  11:41 AM

## 2021-08-23 NOTE — OP NOTE
PROCEDURE NOTE    DATE OF PROCEDURE: 8/23/2021     SURGEON: Aleksandar Nava MD    ASSISTANT: None    PREOPERATIVE DIAGNOSIS: SEVERE ANEMIA  ETOH ABUSE  ? MELENA    POSTOPERATIVE DIAGNOSIS: As described below    OPERATION: Upper GI endoscopy with Biopsy    ANESTHESIA: MAC PER ANESTHESIA     ESTIMATED BLOOD LOSS: Less than 50 ml    COMPLICATIONS: None. SPECIMENS:  Was Obtained:     HISTORY: The patient is a 63y.o. year old female with history of above preop diagnosis. I recommended esophagogastroduodenoscopy with possible biopsy and I explained the risk, benefits, expected outcome, and alternatives to the procedure. Risks included but are not limited to bleeding, infection, respiratory distress, hypotension, and perforation of the esophagus, stomach, or duodenum. Patient understands and is in agreement. PROCEDURE: The patient was given IV conscious sedation. The patient's SPO2 remained above 90% throughout the procedure. The gastroscope was inserted orally and advanced under direct vision through the esophagus, through the stomach, through the pylorus, and into the descending duodenum. Findings:    Retropharyngeal area was grossly normal appearing    Esophagus: abnormal: SMALL HIATAL HERNIA 1-2 CM  NO OBVIOUS VARICES WERE SEEN    Stomach:    Fundus: abnormal: EVIDENCE OF PORTAL HYPERTENSIVE GASTROPATHY    Body: abnormal: EVIDENCE OF PORTAL HYPERTENSIVE GASTROPATHY    Antrum: abnormal: EVIDENCE OF PORTAL HYPERTENSIVE GASTROPATHY BIOPSIES WERE TAKEN NO ACTIVE BLEEDING     Duodenum:     Descending: normal    Bulb: normal    The scope was removed and the patient tolerated the procedure well. Recommendations/Plan:   1. F/U Biopsies  2. CONT PPI  3. F/U HGB  4. SOFT DIET  5. ETOH REHAB  6.  Post sedation patient was stable with stable vital signs and stable O2 saturations    Electronically signed by Aleksandar Nava MD  on 8/23/2021 at 10:30 AM

## 2021-08-24 NOTE — PROGRESS NOTES
Physician Progress Note      Nicol Hooker  CSN #:                  [id number]  :                       1960  ADMIT DATE:       2021 12:30 PM  DISCH DATE:  RESPONDING  PROVIDER #:        Leena OBRIEN BLOOD DO          QUERY TEXT:    Patient admitted with anemia and suspicion of GI bleeding with stool positive   for occult blood. Patient noted to have portal hypertensive gastropathy on   EGD. If possible, please document in progress notes and discharge summary the   cause of the GI bleeding: The medical record reflects the following:  Risk Factors: alcoholism with hepatic steatosis and portal hypertensive   gastropathy on  EGD  Clinical Indicators: patient presented with malaise, fatigue, n/v/d and   admitted with anemia Hgb 5.1, found to have stool positive for occult blood     EGD:  portal hypertensive gastropathy without active bleeding  Treatment: protonix and octreotide, monitoring H/H, transfusion pRBCs, GI   consult and EGD  Options provided:  -- GI bleeding due to portal hypertensive gastropathy  -- Melena due to, Please specify. -- Other - I will add my own diagnosis  -- Disagree - Not applicable / Not valid  -- Disagree - Clinically unable to determine / Unknown  -- Refer to Clinical Documentation Reviewer    PROVIDER RESPONSE TEXT:    This patient has GI bleeding due to port hypertensive gastropathy. Query created by: Pau Hurst on 2021 3:03 PM      QUERY TEXT:    Patient admitted with melena and nutritional anemia with documented moderate   to severe malnutrition by GI consultant. If possible, please document in   progress notes and discharge summary if you are evaluating and /or treating   any of the following: The medical record reflects the following:  Risk Factors: alcoholism with folate deficiency, reported poor intake/appetite  Clinical Indicators:  GI consultant:  Kristy Rodriguez has rather moderate to severe   malnutrition. \"  Per nursing screening malnutrition assessment score 1 out of 5   (5 being highest risk) as patient with poor appetite and intake but no   unintentional weight loss;  patient described as thin and frail per H & P   physical assessment;  Per 8/23 dietician note:  patient meets criteria for   moderate malnutrition in the setting of social/environmental circumstances   based on the following: Energy Intake:  7 - 50% or less estimated energy   requirements for 1 month or longer, Body Fat Loss:  1 - M  Treatment: ordered easy to chew diet with supplements twice daily    ASPEN Criteria:    https://aspenjournals. onlinelibrary. uribe. com/doi/full/10.1177/853246367275994  5  Options provided:  -- Moderate Protein calorie malnutrition  -- Other - I will add my own diagnosis  -- Disagree - Not applicable / Not valid  -- Disagree - Clinically unable to determine / Unknown  -- Refer to Clinical Documentation Reviewer    PROVIDER RESPONSE TEXT:    This patient has moderate protein calorie malnutrition.     Query created by: Zandra Ho on 8/24/2021 3:41 PM      Electronically signed by:  Charlie CASEY DO 8/24/2021 5:45 PM

## 2021-08-24 NOTE — PLAN OF CARE
Problem: Skin Integrity:  Goal: Will show no infection signs and symptoms  Description: Will show no infection signs and symptoms  Outcome: Ongoing  Goal: Absence of new skin breakdown  Description: Absence of new skin breakdown  8/23/2021 2112 by Kulwinder Sanchez RN  Outcome: Ongoing  8/23/2021 1445 by Awilda Najera RN  Outcome: Ongoing     Problem: Falls - Risk of:  Goal: Will remain free from falls  Description: Will remain free from falls  8/23/2021 2112 by Kulwinder Sanchez RN  Outcome: Ongoing  8/23/2021 1445 by Awilda Najera RN  Outcome: Ongoing  Goal: Absence of physical injury  Description: Absence of physical injury  8/23/2021 2112 by Kulwinder Sanchez RN  Outcome: Ongoing  8/23/2021 1445 by Awilda Najera RN  Outcome: Ongoing     Problem: Bleeding:  Goal: Will show no signs and symptoms of excessive bleeding  Description: Will show no signs and symptoms of excessive bleeding  8/23/2021 2112 by Kulwinder Sanchez RN  Outcome: Ongoing  8/23/2021 1445 by Awilda Najera RN  Outcome: Ongoing     Problem: Sleep Pattern Disturbance:  Goal: Appears well-rested  Description: Appears well-rested  Outcome: Ongoing

## 2021-08-24 NOTE — PROGRESS NOTES
[facility]  Office: [address], DO, Sharon Angelo, DO, Yobany Leblanc, DO, Denia Reddy Blood, DO, Romeo Rios MD, Lakisha Briscoe MD, Janak Cameron MD, Amna Wilson MD, Walt Avila MD, Aylin Collins MD, Dano Leblanc MD, Margoth Anna, DO, Franciso Schaumann, MD, Yecenia Jackson DO, Ollie Barrow MD,  Celina Castro DO, Janeth Mancini MD, Denman Najjar, MD, Dontae Fine MD, Pawan Templeton MD, Radha Apodaca MD, Theresa Gray MD, Bethany Rayo MD, Ignacio Meadows, [facility], [facility], CNP, Kika Pham, CNP, Silvia Alvarado, CNS, Michael Avalos, CNP, Cristobal Johnson, CNP, Charisse Freire, CNP, Lima Hewitt, CNP, Andrzej Rasheed CNP, Rocky Dillard PA-C, Sukh Sharma, [facility], Valerie Mclean, CNP, Alverto Mcneil, CNP, Marycarmen Henderson, CNP, Bhaskar Fermin CNP, Thom Blake CNP, Abiodun Ayala CNP, Cecy Jefferson, CNP, Geeta Gonzalez[facility]    Progress Note    8/24/2021    7:54 AM    Name:   Sidney Webster  MRN:     [id number]     Acct:      [de-identified]   Room:   [address] [place] [address] Day:  3  Admit Date:  8/21/2021 12:30 PM    PCP:   Celine Mckinley PA-C  Code Status:  Full Code    Subjective:     C/C:   Chief Complaint   Patient presents with    Emesis    Diarrhea     Interval History Status: improved. Overall feels better  Had egd which showed portal hypertensive gastropathy    Denies cp/sob/n/v    No further bleeding noted    Brief History:     Per my partner: \"This is a 60-year-old female who presents with progressive weakness associated with vomiting and diarrhea over several days. Alessandra Frazier is found to have significant anemia with a hemoglobin less than 6 with concern for gastrointestinal bleeding.  She denied any melena, hematemesis or hematochezia.  She is admitted for Protonix drip, octreotide drip and GI consultation. Alessandra Frazier is been placed on CIWA scale due to her history of alcoholism. \"    Review of Systems: Constitutional:  negative for chills, fevers, sweats  Respiratory:  negative for cough, dyspnea on exertion, shortness of breath, wheezing  Cardiovascular:  negative for chest pain, chest pressure/discomfort, lower extremity edema, palpitations  Gastrointestinal:  negative for abdominal pain, constipation, diarrhea, nausea, vomiting  Neurological:  negative for dizziness, headache    Medications: Allergies:     Allergies   Allergen Reactions    Ibuprofen Nausea And Vomiting       Current Meds:   Scheduled Meds:    nicotine  1 patch Transdermal Daily    ipratropium-albuterol  1 ampule Inhalation 4x daily    folic acid  1 mg Oral Daily    Arformoterol Tartrate  15 mcg Nebulization BID    sodium chloride flush  5-40 mL Intravenous 2 times per day    pantoprazole (PROTONIX) bolus  80 mg Intravenous Once    pantoprazole  40 mg Intravenous Daily    And    sodium chloride (PF)  10 mL Intravenous Daily    thiamine  100 mg Intravenous Daily     Continuous Infusions:    sodium chloride      sodium chloride      0.9% NaCl with KCl 40 mEq 125 mL/hr at 08/24/21 0602    pantoprozole (PROTONIX) infusion 8 mg/hr (08/24/21 0358)    octreotide (SANDOSTATIN) infusion 25 mcg/hr (08/23/21 2301)     PRN Meds: acetaminophen, albuterol, sodium chloride, sodium chloride flush, sodium chloride, ondansetron **OR** ondansetron, potassium chloride **OR** potassium alternative oral replacement **OR** potassium chloride, magnesium sulfate, LORazepam **OR** LORazepam **OR** LORazepam **OR** LORazepam    Data:     Past Medical History:   has a past medical history of Alcohol withdrawal ([facility] Alcohol withdrawal seizure with complication ([facility] Alcohol-induced chronic pancreatitis ([facility] Alcoholic hepatitis, Cellulitis of right hip, COPD (chronic obstructive pulmonary disease) ([facility] Diabetes mellitus ([facility] DM type 2 (diabetes mellitus, type 2) ([facility] Dysphagia, Eczema, Elevated liver enzymes, FTT (failure to thrive) in adult, Hoarseness, Hypertension, Hypomagnesemia, Hyponatremia, Intertrochanteric fracture of right femur (HCC), Iron deficiency anemia, Lesion of hard palate, Moderate malnutrition ([facility]), New onset seizure ([facility]), Poor historian, and Smoker. Social History:   reports that she has been smoking cigarettes. She has a 35.00 pack-year smoking history. She has never used smokeless tobacco. She reports current alcohol use. She reports that she does not use drugs. Family History:   Family History   Problem Relation Age of Onset    Heart Disease Mother     Diabetes Father     Asthma Sister     Asthma Brother        Vitals:  /76   Pulse 111   Temp 98.5 °F (36.9 °C) (Oral)   Resp 29   Ht 5' 4\" (1.626 m)   Wt 105 lb (47.6 kg)   SpO2 97%   BMI 18.02 kg/m²   Temp (24hrs), Av.7 °F (37.1 °C), Min:97.7 °F (36.5 °C), Max:99.8 °F (37.7 °C)    Recent Labs     21  1057   POCGLU 115*       I/O (24Hr):     Intake/Output Summary (Last 24 hours) at 2021 0754  Last data filed at 2021 0510  Gross per 24 hour   Intake 3492.36 ml   Output --   Net 3492.36 ml       Labs:  Hematology:  Recent Labs     21  1238 21  1357 21  2351 21  0717 21  1303 21  0418 21  1134 21  1628 21  2221 21  0433   WBC   < >  --  6.4  --  6.0  --  4.2  --   --   --   --    RBC   < >  --  1.92*  --  2.51*  --  2.31*  --   --   --   --    HGB   < >  --  6.3*   < > 8.0*   < > 7.6*   < > 8.6* 8.1* 8.4*   HCT   < >  --  19.4*   < > 24.4*   < > 22.8*   < > 27.2* 24.8* 25.4*   MCV   < >  --  101.0  --  97.2  --  98.7  --   --   --   --    MCH   < >  --  32.8  --  31.9  --  32.9  --   --   --   --    MCHC   < >  --  32.5  --  32.8  --  33.3  --   --   --   --    RDW   < >  --  16.3*  --  16.1*  --  16.7*  --   --   --   --    PLT   < >  --  103*  --  99*  --  164  --   --   --   --    MPV   < >  --  10.9  --  10.8  --  10.8  --   --   --   --    INR  --  1.2  --   -- 1.1  --   --   --   --   --   --     < > = values in this interval not displayed. Chemistry:  Recent Labs     08/21/21  1238 08/21/21  1238 08/21/21  2351 08/22/21  0717 08/23/21  0418   *  --   --  134* 136   K 2.7*   < > 3.6* 4.0 4.9   CL 90*  --   --  102 107   CO2 12*  --   --  20 19*   GLUCOSE 103*  --   --  129* 122*   BUN 8  --   --  3* 2*   CREATININE 0.40*  --   --  <0.40* <0.40*   MG  --   --  1.6  --   --    ANIONGAP 32*  --   --  12 10   LABGLOM >60  --   --  CANNOT BE CALCULATED CANNOT BE CALCULATED   GFRAA >60  --   --  CANNOT BE CALCULATED CANNOT BE CALCULATED   CALCIUM 8.1*  --   --  6.6* 7.0*    < > = values in this interval not displayed. Recent Labs     08/21/21  1238 08/21/21  1357 08/22/21  0717 08/23/21  0418 08/23/21  1057 08/24/21  0433   PROT 5.4*  --   --  4.4*  --  5.1*   LABALBU 2.8*  --   --  2.2*  --  2.4*   TSH 7.05*  --   --   --   --   --    *  --   --  253*  --  301*   ALT 78*  --   --  55*  --  65*   ALKPHOS 379*  --   --  253*  --  299*   BILITOT 3.71*  --   --  2.71*  --  4.15*   BILIDIR  --   --   --  2.32*  --  3.42*   AMMONIA  --  54* 48  --   --   --    LIPASE 150*  --   --   --   --   --    POCGLU  --   --   --   --  115*  --      ABG:No results found for: POCPH, PHART, PH, POCPCO2, VKN6DLF, PCO2, POCPO2, PO2ART, PO2, POCHCO3, SFN9RXD, HCO3, NBEA, PBEA, BEART, BE, THGBART, THB, RIC1CCZ, CPAH5ACO, Z0DEAMBD, O2SAT, FIO2  Lab Results   Component Value Date/Time    SPECIAL NOT REPORTED 08/21/2021 01:45 PM    SPECIAL NOT REPORTED 08/21/2021 01:45 PM     Lab Results   Component Value Date/Time    CULTURE NO GROWTH 3 DAYS 08/21/2021 01:45 PM    CULTURE NO GROWTH 3 DAYS 08/21/2021 01:45 PM       Radiology:  XR CHEST PORTABLE    Result Date: 8/21/2021  Probable bibasilar atelectasis; no consolidation or sizable pleural effusion. No pneumothorax. Possible tiny nodular density right upper lung.  RECOMMENDATION: Depending upon the patient's history, consider follow-up two-view chest x-ray in 3-4 months versus CT chest (if smoking history or other risk factors). CTA ABDOMEN PELVIS W CONTRAST    Result Date: 8/21/2021  1. No convincing evidence for bowel ischemia. The aorta is normal in caliber with scattered atheromatous plaque. 2.  Mild apparent wall thickening of the ascending and transverse colon, for which underlying colitis may be present. This finding may be accentuated by underdistention. 3.  Marked hepatic steatosis. 4.  Mild central airway congestion.        Physical Examination:        General appearance:  alert, cooperative and no distress  Mental Status:  oriented to person, place and time and normal affect  Lungs:  clear to auscultation bilaterally, normal effort  Heart: tachy, regular rhythm, no murmur  Abdomen:  soft, nontender, nondistended, normal bowel sounds, no masses, hepatomegaly, splenomegaly  Extremities:  no edema, redness, tenderness in the calves  Skin:  no gross lesions, rashes, induration    Assessment:        Hospital Problems         Last Modified POA    * (Principal) GIB (gastrointestinal bleeding) 8/21/2021 Yes    Hypokalemia 8/21/2021 Yes    Moderate malnutrition ([address] 8/23/2021 Yes    Tobacco abuse (Chronic) 8/21/2021 Yes    COPD without exacerbation ([address] 8/21/2021 Yes    Noncompliance (Chronic) 8/21/2021 Yes    Macrocytic anemia 8/21/2021 Yes    Alcohol abuse 8/21/2021 Yes    Hepatic steatosis (Chronic) 8/21/2021 Yes    Elevated LFTs 8/21/2021 Yes    Dietary folate deficiency anemia 8/22/2021 Yes    Alcohol dependence with unspecified alcohol-induced disorder ([address] 8/22/2021 Yes    Portal hypertensive gastropathy ([address] 8/23/2021 Yes          Plan:        Transfer out of icu to stepdown  Reduce frequency of h/h checks  Stop octreotide and protonix drips: no bleeding seen    Rosa M Joel DO  8/24/2021  7:54 AM

## 2021-08-24 NOTE — PROGRESS NOTES
Physical Therapy    [place] [facility]  [place] Assessment    NAME: Tammie Boateng  : 1960  MRN: [id number]    Date of Service: 2021    Discharge Recommendations: Will continue to assess       Assessment   Body structures, Functions, Activity limitations: Decreased functional mobility ; Decreased safe awareness;Decreased balance;Decreased cognition;Decreased endurance;Decreased strength  Assessment: Pt. lethargic and confused at time of eval.  Standing / steps at bedside-  Did not amb. away from bed. Will continue to progress as tolerated. Specific instructions for Next Treatment: attempt up to chair if approp. Prognosis: Good  Decision Making: High Complexity  PT Education: Goals;PT Role;Plan of Care;General Safety  Patient Education: impt. of OOB, walker safety- will need review  Barriers to Learning: currently confused- unsure of baseline  REQUIRES PT FOLLOW UP: Yes  Activity Tolerance  Activity Tolerance: Treatment limited secondary to medical complications (free text)       Patient Diagnosis(es): The primary encounter diagnosis was Gastrointestinal hemorrhage, unspecified gastrointestinal hemorrhage type. Diagnoses of Anemia, unspecified type, Hypokalemia, and Alcohol dependence with unspecified alcohol-induced disorder [facility]) were also pertinent to this visit.      has a past medical history of Alcohol withdrawal ([address] Alcohol withdrawal seizure with complication ([address] Alcohol-induced chronic pancreatitis ([address] Alcoholic hepatitis, Cellulitis of right hip, COPD (chronic obstructive pulmonary disease) ([address] Diabetes mellitus ([address] DM type 2 (diabetes mellitus, type 2) ([address] Dysphagia, Eczema, Elevated liver enzymes, FTT (failure to thrive) in adult, Hoarseness, Hypertension, Hypomagnesemia, Hyponatremia, Intertrochanteric fracture of right femur ([address] Iron deficiency anemia, Lesion of hard palate, Moderate malnutrition ([address] New onset seizure ([address] Poor historian, and Smoker. has a past surgical history that includes Tonsillectomy; Hip fracture surgery (Left); laryngoscopy (10/28/2016); Femur fracture surgery (Right, 12/07/2016); and Upper gastrointestinal endoscopy (N/A, 8/23/2021). Restrictions  Restrictions/Precautions  Restrictions/Precautions: General Precautions, Fall Risk  Position Activity Restriction  Other position/activity restrictions: seizure precautions, ETOH/ drug assessment  Vision/Hearing        Subjective  General  Chart Reviewed: Yes  Patient assessed for rehabilitation services?: Yes  Family / Caregiver Present: No  Follows Commands: Within Functional Limits (pt. more alert after sitting EOB)  Pain Screening  Patient Currently in Pain: Denies          Orientation  Orientation  Overall Orientation Status: Impaired  Orientation Level: Disoriented to place; Disoriented to time;Disoriented to situation  Social/Functional History  Social/Functional History  Lives With: Significant other  Type of Home:  [facility])  Home Layout: One level  Bathroom Shower/Tub: Tub/Shower unit  Ambulation Assistance: Independent (pt. stated at one time she used a cane but \"went to therapy\" and now does not need cane. Also no longer has the cane.)  Active : Yes  Additional Comments: Pt. lethargic, confused and with poor memory at time of eval.  Need to confirm and add to above info. Cognition   Cognition  Overall Cognitive Status: Exceptions  Following Commands: Follows one step commands with increased time; Follows one step commands with repetition  Memory: Decreased recall of recent events;Decreased recall of biographical Information;Decreased short term memory  Safety Judgement: Decreased awareness of need for assistance;Decreased awareness of need for safety  Problem Solving: Assistance required to generate solutions;Assistance required to implement solutions;Assistance required to identify errors made;Assistance required to correct errors made  Insights: Decreased awareness of deficits  Initiation: Requires cues for some  Sequencing: Requires cues for some    Objective     Observation/Palpation  Posture: Fair (weakness)  Observation: telemetry, mariyack, IV    AROM RLE (degrees)  RLE AROM: WFL  AROM LLE (degrees)  LLE AROM : WFL  Strength RLE  Strength RLE: WFL  Strength LLE  Strength LLE: WFL  Strength Other  Other: difficulty following commands for MMT; see OT eval for UE ROM/MMT  Tone RLE  RLE Tone: Normotonic  Tone LLE  LLE Tone: Normotonic     Bed mobility  Supine to Sit: Moderate assistance;2 Person assistance  Sit to Supine: Moderate assistance  Transfers  Sit to Stand: Moderate Assistance;2 Person Assistance  Stand to sit: Minimal Assistance;2 Person Assistance (fair eccentric control)  Ambulation  Ambulation?: Yes  Ambulation 1  Device: Rolling Walker  Assistance: Minimal assistance;2 Person assistance  Quality of Gait: sidestepping with RW along EOB;  pt. too lethartic and confused to amb. away from bed at this time. Distance: 4 ft. Comments: back to bed     Balance  Posture: Fair  Sitting - Static: Fair;+  Sitting - Dynamic: Fair;+  Standing - Static: Poor        Plan   Plan  Times per week: 1-2x/day; 5-6days/wk  Specific instructions for Next Treatment: attempt up to chair if approp. Current Treatment Recommendations: Strengthening, Transfer Training, ROM, Balance Training, Functional Mobility Training, Gait Training, Safety Education & Training, Home Exercise Program, Patient/Caregiver Education & Training  Safety Devices  Type of devices:  All fall risk precautions in place, Gait belt, Patient at risk for falls, Left in bed, Nurse notified, Call light within reach    G-Code       OutComes Score                                                  AM-PAC Score  AM-PAC Inpatient Mobility Raw Score : 10 (08/24/21 1304)  AM-PAC Inpatient T-Scale Score : 32.29 (08/24/21 1304)  Mobility Inpatient CMS 0-100% Score: 76.75 (08/24/21 1304)  Mobility Inpatient CMS G-Code Modifier : CL (08/24/21 9714)       Functional Outcome Measure-   Single Leg Stance Test: 0 sec. (<5 sec.= fall risk)     Goals  Short term goals  Time Frame for Short term goals: 12 visits:  Short term goal 1: Pt. to be indep with bed mob. Short term goal 2: Pt. to be SBA for sit to stand with approp. AD  Short term goal 3: Pt. to be SBA for gait with approp. AD, 75ft. Short term goal 4: Pt. to tolerate 25+ min. of PT daily for ther ex/ gait/ balance/ functional mob. training  Short term goal 5: Pt. may need to practice stairs based on location of discharge. Will continue to assess. (At time of eval, pt. living in hotel)  Patient Goals   Patient goals : did not state       Therapy Time   Individual Concurrent Group Co-treatment   Time In 1107         Time Out 1140         Minutes 33              Treatment time:  23min. Co-treatment with OT warranted secondary to decreased safety and independence requiring 2 skilled therapy professionals to address individual discipline's goals.   Johnny Laboy, PT

## 2021-08-24 NOTE — PROGRESS NOTES
Devices in place: Yes  Type of devices: Call light within reach;Nurse notified; Left in bed;Gait belt;Patient at risk for falls           Patient Diagnosis(es): The primary encounter diagnosis was Gastrointestinal hemorrhage, unspecified gastrointestinal hemorrhage type. Diagnoses of Anemia, unspecified type, Hypokalemia, and Alcohol dependence with unspecified alcohol-induced disorder [facility]) were also pertinent to this visit. has a past medical history of Alcohol withdrawal ([address] Alcohol withdrawal seizure with complication ([address] Alcohol-induced chronic pancreatitis ([address] Alcoholic hepatitis, Cellulitis of right hip, COPD (chronic obstructive pulmonary disease) ([address] Diabetes mellitus ([address] DM type 2 (diabetes mellitus, type 2) ([address] Dysphagia, Eczema, Elevated liver enzymes, FTT (failure to thrive) in adult, Hoarseness, Hypertension, Hypomagnesemia, Hyponatremia, Intertrochanteric fracture of right femur ([address] Iron deficiency anemia, Lesion of hard palate, Moderate malnutrition ([address] New onset seizure ([address] Poor historian, and Smoker. has a past surgical history that includes Tonsillectomy; Hip fracture surgery (Left); laryngoscopy (10/28/2016); Femur fracture surgery (Right, 12/07/2016); and Upper gastrointestinal endoscopy (N/A, 8/23/2021).            Restrictions  Restrictions/Precautions  Restrictions/Precautions: General Precautions, Fall Risk  Position Activity Restriction  Other position/activity restrictions: seizure precautions, ETOH/ drug assessment    Subjective   General  Chart Reviewed: Yes  Patient assessed for rehabilitation services?: Yes  Family / Caregiver Present: No  Vital Signs  Resp: 20  Oxygen Therapy  SpO2: 95 %  Pulse Oximeter Device Mode: Continuous  Pulse Oximeter Device Location: Finger  O2 Device: None (Room air)  Social/Functional History  Social/Functional History  Lives With: Significant other  Type of Home:  [facility])  Home Layout: One level  Bathroom Shower/Tub: Tub/Shower unit  Ambulation Assistance: Independent (pt. stated at one time she used a cane but \"went to therapy\" and now does not need cane. Also no longer has the cane.)  Active : Yes  Additional Comments: Pt. lethargic, confused and with poor memory at time of eval.  Need to confirm and add to above info. Objective   Hearing: Within functional limits    Orientation  Overall Orientation Status: Impaired  Orientation Level: Oriented to person;Disoriented to situation;Disoriented to time  Observation/Palpation  Posture: Fair (weakness)  Observation: telemetry, selene, IV  Balance  Sitting Balance: Stand by assistance  Standing Balance: Moderate assistance (min-mod A x 2)  Functional Mobility  Functional - Mobility Device: Rolling Walker  Assist Level: Moderate assistance (min-mod A x 2 for ~2-3 small side steps along bedside. Pt is unsteady, fatigues very easily)  ADL  Feeding: Stand by assistance;Setup  Grooming: Moderate assistance;Minimal assistance  UE Bathing: Moderate assistance  LE Bathing: Maximum assistance  UE Dressing: Moderate assistance  LE Dressing: Maximum assistance  Toileting: Maximum assistance  Additional Comments: pt with dec. activity tolerance in general; very lethargic and needing mod-max cues for initiation of all tasks. Tone RUE  RUE Tone: Normotonic  Tone LUE  LUE Tone: Normotonic  Coordination  Movements Are Fluid And Coordinated: Yes     Bed mobility  Supine to Sit: Moderate assistance;2 Person assistance  Sit to Supine: Moderate assistance  Comment: mod verbal cues for tech to use bedrails/UB assist  Transfers  Sit to stand: Moderate assistance;Minimal assistance;2 Person assistance  Stand to sit: 2 Person assistance; Moderate assistance;Minimal assistance  Transfer Comments: mod cues for hand placement/RW safety techs. Cognition  Overall Cognitive Status: Exceptions  Following Commands: Follows one step commands with increased time; Follows one step commands with repetition  Memory: Decreased recall of recent events;Decreased recall of biographical Information;Decreased short term memory  Safety Judgement: Decreased awareness of need for assistance;Decreased awareness of need for safety  Problem Solving: Assistance required to generate solutions;Assistance required to implement solutions;Assistance required to identify errors made;Assistance required to correct errors made  Insights: Decreased awareness of deficits  Initiation: Requires cues for some  Sequencing: Requires cues for some  Perception  Overall Perceptual Status: WFL     Sensation  Overall Sensation Status: WFL        LUE AROM (degrees)  LUE AROM : WFL  RUE AROM (degrees)  RUE AROM : WFL  LUE Strength  Gross LUE Strength: Exceptions to [facility] [address] [facility]  LUE Strength Comment: B UE 3+/5  RUE Strength  Gross RUE Strength: Exceptions to [address] [place] [address]  Times per week: 4-5x/week, 1-2x/day  Current Treatment Recommendations: Strengthening, Balance Training, Functional Mobility Training, Endurance Training, Safety Education & Training, Self-Care / ADL, Patient/Caregiver Education & Training, Equipment Evaluation, Education, & procurement, Positioning, Cognitive/Perceptual Training       AM-[facility] Inpatient Daily Activity Raw Score: 16 (08/24/21 1337)  AM-PAC Inpatient ADL T-Scale Score : 35.96 (08/24/21 1337)  ADL Inpatient CMS 0-100% Score: 53.32 (08/24/21 1337)  ADL Inpatient CMS G-Code Modifier : CK (08/24/21 1337)    Goals  Short term goals  Time Frame for Short term goals: by discharge, pt will  Short term goal 1: demo CGA with ADL transfers with AD/DME and good safety  Short term goal 2: demo CGA with functional mob in room distances with good safety/pacing for ADL completion  Short term goal 3: demo CGA with toileting routine with good safety  Short term goal 4: demo SBA with UB ADLs and CGA with UB ADLs with DME as needed and good safety/pacing  Short term goal 5: demo and verb good understanding of fall prevention techs, EC/WS techs, equip needs, B UE HEP, and d/c recommendations  Patient Goals   Patient goals : to go home       Therapy Time   Individual Concurrent Group Co-treatment   Time In 1119         Time Out 1148         Minutes 29             treatment: 23    Co-treatment with PT warranted secondary to decreased safety and independence requiring 2 skilled therapy professionals to address individual discipline's goals. OT addressing preparation for ADL transfer, sitting balance for increased ADL performance, sitting/activity tolerance, functional reaching, environmental safety/scanning, fall prevention, functional mobility for ADL transfers, ability to sequence and follow directions and functional UE strength.     Yennifer Youssef, OT

## 2021-08-24 NOTE — PROGRESS NOTES
Transitions of Care Pharmacy Service   Medication Review    The patient's list of current home medications has been reviewed. Source(s) of information: PCP Shiraz Bynum, nina    Based on information provided by the above source(s), no changes to the patient's home medication list were necessary. Other Notes Attempted to interview patient, she is unsure of what medications she is supposed to be on - Not on any maintenance medications per PCP office - No changes made to med list          Please feel free to call me with any questions about this encounter. Thank you. This note will be reviewed and co-signed by the Transitions of [facility] [place] Pharmacist. The pharmacist will review inpatient orders and contact the physician about any discrepancies. Andres Antonio, pharmacy technician  Transitions of [facility] [place] Pharmacy Service  Phone:  [phone]  Fax: [phone]      Electronically signed by Andres Antonio on 8/24/2021 at 5:05 PM     Prior to Admission medications    Medication Sig Start Date End Date Taking?  Authorizing Provider   salmeterol (SEREVENT DISKUS) 50 MCG/DOSE diskus inhaler Inhale 1 puff into the lungs 2 times daily       albuterol sulfate HFA (PROVENTIL HFA) 108 (90 Base) MCG/ACT inhaler Inhale 2 puffs into the lungs every 6 hours as needed for Wheezing or Shortness of Breath       diphenhydrAMINE (BENADRYL) 25 MG capsule Take 1 capsule by mouth every 6 hours as needed for Itching       Calcium-Vitamin D 600-200 MG-UNIT TABS Take 1 tablet by mouth 2 times daily       albuterol (PROVENTIL) (2.5 MG/3ML) 0.083% nebulizer solution Take 3 mLs by nebulization every 6 hours as needed for Wheezing       calcium carbonate (CALCIUM 600) 600 MG TABS tablet Take 1 tablet by mouth daily Do not take with iron

## 2021-08-24 NOTE — RT PROTOCOL NOTE
RT Nebulizer Bronchodilator Protocol Note    There is a bronchodilator order in the chart from a provider indicating to follow the RT Bronchodilator Protocol and there is an Initiate RT Bronchodilator Protocol order as well (see protocol at bottom of note). The findings from the last RT Protocol Assessment were as follows:  Smoking: >15 Pack years  Surgical Status: No surgery  Xray: One lobe infiltrates/atelectasis/pleural effusion/edema  Respiratory Pattern: Dyspnea with exertion  Mental Status: Confused but follows commands  Breath Sounds: Wheezing and/or rhonchi  Cough: Weak, non-productive  Activity Level: Walking with assistance  Oxygen Requirement: Room Air - 2LNC/28% or home setting  Indication for Bronchodilator Therapy: On home bronchodilators, Wheezing associated with pulm disorder  Bronchodilator Assessment Score: 12    Aerosolized bronchodilator medication orders have been revised according to the RT Bronchodilator Protocol. RT Bronchodilator Protocol:    Respiratory Therapist to perform RT Therapy Protocol Assessment then follow the protocol. No Indications - adjust the frequency to every 6 hours PRN wheezing or bronchospasm, if no treatments needed after 48 hours then discontinue using Per Protocol order mode. If indication present, adjust the RT bronchodilator orders based on the Bronchodilator Assessment Score as follows:    0-6 - enter or revise RT Bronchodilator order to Albuterol Nebulizer order with frequency of every 2 hours PRN for wheezing or increased work of breathing using Per Protocol order mode. Repeat RT Therapy Protocol Assessment as needed. 7-10 - discontinue any other Inpatient aerosolized bronchodilator medication orders and enter or revise two Albuterol Nebulizer orders, one with BID frequency and one with frequency of every 2 hours PRN wheezing or increased work of breathing using Per Protocol order mode.   Repeat RT Therapy Protocol Assessment with second treatment then BID and as needed. 11-13 - discontinue any other Inpatient aerosolized bronchodilator medication orders and enter DuoNeb Nebulizer order with QID frequency and an Albuterol Nebulizer order with frequency of every 2 hours PRN wheezing or increased work of breathing using Per Protocol order mode. Repeat RT Therapy Protocol Assessment with second treatment then QID and as needed. Greater than 13 - discontinue any other Inpatient aerosolized bronchodilator medication orders and enter a DuoNeb Nebulizer order with every 4 hours frequency and an Albuterol Nebulizer order with frequency of every 2 hours PRN wheezing or increased work of breathing using Per Protocol order mode. Repeat RT Therapy Protocol Assessment with second treatment then every 4 hours and as needed. RT to enter RT Home Evaluation for COPD & MDI Assessment order using Per Protocol order mode.     Electronically signed by Berta Garibay RCP on 8/24/2021 at 8:42 AM

## 2021-08-25 NOTE — PROGRESS NOTES
Occupational Therapy  Facility/Department: [facility] PROGRESSIVE CARE  Daily Treatment Note  NAME: Paul Andersen  : 1960  MRN: [id number]    Date of Service: 2021    Discharge Recommendations:  Patient would benefit from continued therapy after discharge   d/t recent hospitalization and medical conditions, pt would benefit from additional therapy at time of discharge to ensure safety. Please refer to AM-PAC score for current functional status. Assessment   Performance deficits / Impairments: Decreased functional mobility ; Decreased ADL status; Decreased strength;Decreased safe awareness;Decreased endurance;Decreased balance;Decreased cognition;Decreased posture  Assessment: Pt required skilled OT services to increase IND and safety with self-care and functional tasks to return to OF. Prognosis: Good  OT Education: OT Role;Plan of Care;Precautions;Transfer Training  Patient Education: benefits of moving/OOB activity, safety/fall prevention wtih RW, use of call light  Barriers to Learning: decreased cognition  REQUIRES OT FOLLOW UP: Yes  Activity Tolerance  Activity Tolerance: Patient limited by fatigue  Safety Devices  Safety Devices in place: Yes  Type of devices: Call light within reach;Nurse notified;Gait belt;Patient at risk for falls; Chair alarm in place; Left in chair         Patient Diagnosis(es): The primary encounter diagnosis was Gastrointestinal hemorrhage, unspecified gastrointestinal hemorrhage type. Diagnoses of Anemia, unspecified type, Hypokalemia, and Alcohol dependence with unspecified alcohol-induced disorder [facility]) were also pertinent to this visit.       has a past medical history of Alcohol withdrawal ([facility] [address] Alcohol withdrawal seizure with complication ([facility] [address] Alcohol-induced chronic pancreatitis ([facility] [address] Alcoholic hepatitis, Cellulitis of right hip, COPD (chronic obstructive pulmonary disease) ([facility] [address] Diabetes mellitus ([facility] [address] DM type 2 (diabetes mellitus, type 2) ([facility] [address] Dysphagia, Eczema, Elevated liver enzymes, FTT (failure to thrive) in adult, Hoarseness, Hypertension, Hypomagnesemia, Hyponatremia, Intertrochanteric fracture of right femur (HCC), Iron deficiency anemia, Lesion of hard palate, Moderate malnutrition ([address] New onset seizure ([address] Poor historian, and Smoker. has a past surgical history that includes Tonsillectomy; Hip fracture surgery (Left); laryngoscopy (10/28/2016); Femur fracture surgery (Right, 12/07/2016); and Upper gastrointestinal endoscopy (N/A, 8/23/2021). Restrictions  Restrictions/Precautions  Restrictions/Precautions: General Precautions, Fall Risk  Position Activity Restriction  Other position/activity restrictions: seizure precautions, ETOH/ drug assessment  Subjective   General  Chart Reviewed: Yes  Patient assessed for rehabilitation services?: Yes  Family / Caregiver Present: No        Objective    ADL  Additional Comments: With MAX education and encourgement, pt declined all ADLs. Pt only willing to sit up in a chair for breakfast. OT provided MAX education on benefits of OOB activity. Balance  Sitting Balance: Stand by assistance  Standing Balance: Minimal assistance  Standing Balance  Time: ~1 min act tolerance  Activity: standing EOB and functional mob to chair  Functional Mobility  Functional - Mobility Device: Rolling Walker  Assist Level: Moderate assistance (min to mod A)  Functional Mobility Comments: Pt required MAX VC/tactile cues for RW mgmt/safety, scanning room, upright posture, all to increase safety and reduce fall risk. Bed mobility  Supine to Sit: Stand by assistance  Comment: HOB raised and Good use of bed rails  Transfers  Sit to stand: Minimal assistance  Stand to sit: Minimal assistance  Transfer Comments: MAX VC/tactile cues for hand placement and square self/AD to surface                       Cognition  Overall Cognitive Status: Exceptions  Following Commands: Follows one step commands with increased time; Follows one step commands with repetition  Memory: Decreased recall of recent events;Decreased recall of biographical Information;Decreased short term memory  Safety Judgement: Decreased awareness of need for assistance;Decreased awareness of need for safety  Problem Solving: Assistance required to generate solutions;Assistance required to implement solutions;Assistance required to identify errors made;Assistance required to correct errors made  Insights: Decreased awareness of deficits  Initiation: Requires cues for some  Sequencing: Requires cues for some                                         Plan   Plan  Times per week: 4-5x/week, 1-2x/day  Current Treatment Recommendations: Strengthening, Balance Training, Functional Mobility Training, Endurance Training, Safety Education & Training, Self-Care / ADL, Patient/Caregiver Education & Training, Equipment Evaluation, Education, & procurement, Positioning, Cognitive/Perceptual Training    AM-PAC Score        AM-PAC Inpatient Daily Activity Raw Score: 16 (08/25/21 1107)  AM-PAC Inpatient ADL T-Scale Score : 35.96 (08/25/21 1107)  ADL Inpatient CMS 0-100% Score: 53.32 (08/25/21 1107)  ADL Inpatient CMS G-Code Modifier : CK (08/25/21 1107)    Goals  Short term goals  Time Frame for Short term goals: by discharge, pt will  Short term goal 1: demo CGA with ADL transfers with AD/DME and good safety  Short term goal 2: demo CGA with functional mob in room distances with good safety/pacing for ADL completion  Short term goal 3: demo CGA with toileting routine with good safety  Short term goal 4: demo SBA with UB ADLs and CGA with UB ADLs with DME as needed and good safety/pacing  Short term goal 5: demo and verb good understanding of fall prevention techs, EC/WS techs, equip needs, B UE HEP, and d/c recommendations  Patient Goals   Patient goals : to go home       Therapy Time   Individual Concurrent Group Co-treatment   Time In 1005         Time Out 1024         Minutes 19 Alana Romero OTR/L

## 2021-08-25 NOTE — FLOWSHEET NOTE
08/25/21 1200   Vital Signs   Temp 99.1 °F (37.3 °C)   Temp Source Oral   Pulse 114   Heart Rate Source Monitor   Resp 18   BP (!) 78/53   BP Location Left upper arm   MAP (mmHg) (!) 62   Patient Position Right side   Dr. Joel notified with secure message. See orders.

## 2021-08-25 NOTE — PLAN OF CARE
Problem: Skin Integrity:  Goal: Will show no infection signs and symptoms  Description: Will show no infection signs and symptoms  Outcome: Ongoing  Goal: Absence of new skin breakdown  Description: Absence of new skin breakdown  Outcome: Ongoing     Problem: Falls - Risk of:  Goal: Will remain free from falls  Description: Will remain free from falls  Outcome: Ongoing  Goal: Absence of physical injury  Description: Absence of physical injury  Outcome: Ongoing     Problem: Bleeding:  Goal: Will show no signs and symptoms of excessive bleeding  Description: Will show no signs and symptoms of excessive bleeding  Outcome: Ongoing

## 2021-08-25 NOTE — DISCHARGE INSTR - COC
Continuity of Care Form    Patient Name: Andrea Forbes   :  1960  MRN:  [id number]    Admit date:  2021  Discharge date:  2021    Code Status Order: Full Code   Advance Directives:      Admitting Physician:  Meagan Hearn DO  PCP: Gregorio Navarro PA-C    Discharging Nurse: Leida Freeman [address] Unit/Room#: 1001/1001-02  Discharging Unit Phone Number: [phone]    Emergency Contact:   Extended Emergency Contact Information  Primary Emergency Contact: Cecy Zavala [address] Phone: [phone]  Relation: Other   needed? No  Secondary Emergency Contact: ToyaMichelle  Address: NOT AVAILABLE  Home Phone: [phone]  Relation: Child    Past Surgical History:  Past Surgical History:   Procedure Laterality Date    FEMUR FRACTURE SURGERY Right 2016    HIP FRACTURE SURGERY Left     LARYNGOSCOPY  10/28/2016    biopsie base of tongue    TONSILLECTOMY      UPPER GASTROINTESTINAL ENDOSCOPY N/A 2021    EGD BIOPSY performed by Dalton Hamilton MD at [facility]       Immunization History: There is no immunization history on file for this patient.     Active Problems:  Patient Active Problem List   Diagnosis Code    Hypertension I10    Alcohol withdrawal seizure with complication (HCC) G16.862, R56.9    Alcohol withdrawal ([facility] [address] F10.239    Hypokalemia E87.6    Alcohol-induced chronic pancreatitis ([facility] [address] N51.6    Alcoholic hepatitis F68.94    Elevated liver enzymes R74.8    FTT (failure to thrive) in adult R62.7    Moderate malnutrition (HCC) E44.0    Tobacco abuse Z72.0    COPD without exacerbation (HCC) J44.9    Noncompliance Z91.19    Intertrochanteric fracture of right femur (HCC) S72.141A    Hyponatremia E87.1    Macrocytic anemia D53.9    Cellulitis of right hip L03.115    Iron deficiency anemia D50.9    Type 2 diabetes mellitus without complication, without long-term current use of insulin (HCC) E11.9    GIB (gastrointestinal bleeding) K92.2    Alcohol abuse F10.10    Hepatic steatosis K76.0    Elevated LFTs R79.89    Dietary folate deficiency anemia D52.0    Alcohol dependence with unspecified alcohol-induced disorder (HCC) F10.29    Portal hypertensive gastropathy (HCC) K76.6, K31.89       Isolation/Infection:   Isolation            No Isolation          Patient Infection Status       None to display            Nurse Assessment:  Last Vital Signs: /84   Pulse 115   Temp 99 °F (37.2 °C) (Oral)   Resp 16   Ht 5' 4\" (1.626 m)   Wt 105 lb (47.6 kg)   SpO2 95%   BMI 18.02 kg/m²     Last documented pain score (0-10 scale): Pain Level: 0  Last Weight:   Wt Readings from Last 1 Encounters:   08/21/21 105 lb (47.6 kg)     Mental Status:  alert/oriented; has occasional disorientation/confusion    IV Access:  - None    Nursing Mobility/ADLs:  Walking   Assisted  Transfer  Assisted  Bathing  Assisted  Dressing  Assisted  Toileting  Assisted  Feeding  Assisted  Med Admin  Independent  Med Delivery   whole    Wound Care Documentation and Therapy: N/A    Elimination:  Continence: occasional incontinence  · Bowel: No  · Bladder: No  Urinary Catheter: None   Colostomy/Ileostomy/Ileal Conduit: No       Date of Last BM: 08/27/2021    Intake/Output Summary (Last 24 hours) at 8/25/2021 0824  Last data filed at 8/25/2021 0446  Gross per 24 hour   Intake --   Output 1400 ml   Net -1400 ml     I/O last 3 completed shifts:  In: -   Out: [id number] [[id number]]    Safety Concerns: At Risk for Falls and Aspiration Risk    Impairments/Disabilities:      Hearing    Nutrition Therapy:  Current Nutrition Therapy:   - Oral Diet:  Dental Soft    Routes of Feeding: Oral  Liquids: Thin Liquids  Daily Fluid Restriction: no  Last Modified Barium Swallow with Video (Video Swallowing Test): not done    Treatments at the Time of Hospital Discharge:   Respiratory Treatments: N/A   Oxygen Therapy:  is not on home oxygen therapy.   Ventilator: - No ventilator support    Rehab Therapies: Physical Therapy and Occupational Therapy  Weight Bearing Status/Restrictions: No weight bearing restirctions  Other Medical Equipment (for information only, NOT a DME order):  wheelchair, walker, and bath bench  Other Treatments:   1. Skilled RN assessment     Patient's personal belongings (please select all that are sent with patient):  None    RN SIGNATURE:  Electronically signed by Nathaniel Man RN on 8/27/21 at 5:42 PM EDT    CASE MANAGEMENT/SOCIAL WORK SECTION    Inpatient Status Date: 8/21    Readmission Risk Assessment Score:  Readmission Risk              Risk of Unplanned Readmission:  16           Discharging to Facility/ Agency   · Name: Bryce Morgan  · Address[facility] [address] [place] [address] [facility]  · Phone: [phone]  · Fax:[phone]      / signature: Electronically signed by WESLEY Arrington, LSW on 8/26/21 at 2:48 PM EDT    PHYSICIAN SECTION    Prognosis: Fair    Condition at Discharge: Stable    Rehab Potential (if transferring to Rehab): Fair    Recommended Labs or Other Treatments After Discharge: pt/ot    Physician Certification: I certify the above information and transfer of Elma Patel  is necessary for the continuing treatment of the diagnosis listed and that she requires [facility] for less 30 days.      Update Admission H&P: No change in H&P    PHYSICIAN SIGNATURE:  Electronically signed by Omkar Joel DO on 8/25/21 at 8:32 AM EDT

## 2021-08-25 NOTE — PROGRESS NOTES
[facility]  Office: [address], DO, Levell Ebbs, DO, Solomon Carlee, DO, Rosita Nelson Blood, DO, Tom Howell MD, Javier Whitley MD, Marychuy Yi MD, Tomy Baltazar MD, Natividad Rascon MD, Yohana Talavera MD, Juanita Adame MD, Glen Sawyer, DO, Cristela De León MD, Luis Eduardo Horvath, DO, Inez Giles MD,  Pierre Junior, DO, Carolina Lubin MD, Gareth Gaona MD, Raenette Felty, MD, Oumar Shaw MD, Jovanni Bragg MD, Vinay Augustin MD, Claudia Pisano MD, Nory Zapata, [facility], [facility], CNP, Stephanie Garcia, CNP, Calderon Griggs, CNS, Homero Reis, CNP, Yoshi Talley, CNP, Jessica Vazquez, CNP, Darrion Patterson, CNP, Daniella Gorman, CNP, Walker Waite PA-C, Alicia Shore, [facility], Marcel Martel, CNP, Rere Alex, CNP, Roldan Potts, CNP, Lexii Sigala, CNP, Carlos Slade, CNP, Rain Colon, CNP, Mj Renteria, CNP, René Watt, Hernandez [facility]    Progress Note    8/25/2021    8:24 AM    Name:   Elva Moreno  MRN:     [id number]     Acct:      [de-identified]   Room:   [address] [place] [address] Day:  4  Admit Date:  8/21/2021 12:30 PM    PCP:   Krupa Rice PA-C  Code Status:  Full Code    Subjective:     C/C:   Chief Complaint   Patient presents with    Emesis    Diarrhea     Interval History Status: improved. Overall feels better  Had egd which showed portal hypertensive gastropathy    Denies cp/sob/n/v    No further bleeding noted    Brief History:     Per my partner: \"This is a 68-year-old female who presents with progressive weakness associated with vomiting and diarrhea over several days. Yoshi Guzman is found to have significant anemia with a hemoglobin less than 6 with concern for gastrointestinal bleeding.  She denied any melena, hematemesis or hematochezia.  She is admitted for Protonix drip, octreotide drip and GI consultation. Yoshi Guzman is been placed on CIWA scale due to her history of alcoholism. \"    Review of Systems: Moderate malnutrition ([address] New onset seizure ([facility] [address] Poor historian, and Smoker. Social History:   reports that she has been smoking cigarettes. She has a 35.00 pack-year smoking history. She has never used smokeless tobacco. She reports current alcohol use. She reports that she does not use drugs. Family History:   Family History   Problem Relation Age of Onset    Heart Disease Mother     Diabetes Father     Asthma Sister     Asthma Brother        Vitals:  /84   Pulse 115   Temp 99 °F (37.2 °C) (Oral)   Resp 16   Ht 5' 4\" (1.626 m)   Wt 105 lb (47.6 kg)   SpO2 95%   BMI 18.02 kg/m²   Temp (24hrs), Av.7 °F (37.1 °C), Min:97.3 °F (36.3 °C), Max:99.5 °F (37.5 °C)    Recent Labs     21  1057   POCGLU 115*       I/O (24Hr): Intake/Output Summary (Last 24 hours) at 2021 0824  Last data filed at 2021 0446  Gross per 24 hour   Intake --   Output 1400 ml   Net -1400 ml       Labs:  Hematology:  Recent Labs     21  0418 21  1134 21  0433 21  1634 21  0441   WBC 4.2  --   --   --  7.2   RBC 2.31*  --   --   --  2.42*   HGB 7.6*   < > 8.4* 8.5* 7.6*   HCT 22.8*   < > 25.4* 25.7* 22.4*   MCV 98.7  --   --   --  92.6   MCH 32.9  --   --   --  31.4   MCHC 33.3  --   --   --  33.9   RDW 16.7*  --   --   --  16.0*     --   --   --  148   MPV 10.8  --   --   --  10.4    < > = values in this interval not displayed.      Chemistry:  Recent Labs     218 21  0441    129*   K 4.9 4.0    98   CO2 19* 22   GLUCOSE 122* 111*   BUN 2* <2*   CREATININE <0.40* <0.40*   ANIONGAP 10 9   LABGLOM CANNOT BE CALCULATED CANNOT BE CALCULATED   GFRAA CANNOT BE CALCULATED CANNOT BE CALCULATED   CALCIUM 7.0* 6.5*     Recent Labs     21  0418 21  1057 21  0433 21  0441   PROT 4.4*  --  5.1* 4.3*   LABALBU 2.2*  --  2.4* 2.0*   *  --  301* 248*   ALT 55*  --  65* 52*   ALKPHOS 253*  --  299* 263*   BILITOT 2.71* --  4.15* 3.97*   BILIDIR 2.32*  --  3.42* 3.55*   POCGLU  --  115*  --   --      ABG:No results found for: POCPH, PHART, PH, POCPCO2, LXR0OLX, PCO2, POCPO2, PO2ART, PO2, POCHCO3, NEJ3RAR, HCO3, NBEA, PBEA, BEART, BE, THGBART, THB, WZK9CTG, PODF6SBO, O9BHOTST, O2SAT, FIO2  Lab Results   Component Value Date/Time    SPECIAL NOT REPORTED 08/21/2021 01:45 PM    SPECIAL NOT REPORTED 08/21/2021 01:45 PM     Lab Results   Component Value Date/Time    CULTURE NO GROWTH 4 DAYS 08/21/2021 01:45 PM    CULTURE NO GROWTH 4 DAYS 08/21/2021 01:45 PM       Radiology:  XR CHEST PORTABLE    Result Date: 8/21/2021  Probable bibasilar atelectasis; no consolidation or sizable pleural effusion. No pneumothorax. Possible tiny nodular density right upper lung. RECOMMENDATION: Depending upon the patient's history, consider follow-up two-view chest x-ray in 3-4 months versus CT chest (if smoking history or other risk factors). CTA ABDOMEN PELVIS W CONTRAST    Result Date: 8/21/2021  1. No convincing evidence for bowel ischemia. The aorta is normal in caliber with scattered atheromatous plaque. 2.  Mild apparent wall thickening of the ascending and transverse colon, for which underlying colitis may be present. This finding may be accentuated by underdistention. 3.  Marked hepatic steatosis. 4.  Mild central airway congestion.        Physical Examination:        General appearance:  alert, cooperative and no distress  Mental Status:  oriented to person, place and time and normal affect  Lungs:  clear to auscultation bilaterally, normal effort  Heart: tachy, regular rhythm, no murmur  Abdomen:  soft, nontender, nondistended, normal bowel sounds, no masses, hepatomegaly, splenomegaly  Extremities:  no edema, redness, tenderness in the calves  Skin:  no gross lesions, rashes, induration    Assessment:        Hospital Problems         Last Modified POA    * (Principal) GIB (gastrointestinal bleeding) 8/21/2021 Yes    Hypokalemia 8/21/2021

## 2021-08-25 NOTE — PROGRESS NOTES
Patient awoke and was confused,attempting to get out of bed pulling @ lines and gown. See ciwa score . Medicated with ativan as ordered.

## 2021-08-25 NOTE — PROGRESS NOTES
GI Progress notes    8/25/2021   10:06 AM    Name:  Sanaz Lopez  MRN:    [id number]     Acct:     [de-identified]   Room:  [address] [place] [address] Day: 4     Admit Date: 8/21/2021 12:30 PM  PCP: Judythe Paget, PA-C    Subjective:     C/C:   Chief Complaint   Patient presents with    Emesis    Diarrhea       Interval History: Status: not changed. Patient seen and examined. No acute events overnight. Patient is drowsy but arousable to verbal stim. Alert to person, place. No abdominal pain, nausea, vomiting. LFTs slowly improving  Poor oral intake  Hgb 7.6  No overt GI bleeding  Planning d/c to SNF    ROS:  Constitutional: negative for chills, fevers and sweats  Gastrointestinal: negative for abdominal pain, constipation, diarrhea, nausea and vomiting      Medications: Allergies:    Allergies   Allergen Reactions    Ibuprofen Nausea And Vomiting       Current Meds: albuterol (PROVENTIL) nebulizer solution 2.5 mg, BID  acetaminophen (TYLENOL) tablet 650 mg, Q4H PRN  pantoprazole (PROTONIX) tablet 40 mg, QAM AC  nicotine (NICODERM CQ) 21 MG/24HR 1 patch, Daily  folic acid (FOLVITE) tablet 1 mg, Daily  albuterol (PROVENTIL) nebulizer solution 2.5 mg, Q2H PRN  0.9 % sodium chloride infusion, PRN  Arformoterol Tartrate (BROVANA) nebulizer solution 15 mcg, BID  sodium chloride flush 0.9 % injection 5-40 mL, 2 times per day  sodium chloride flush 0.9 % injection 5-40 mL, PRN  0.9 % sodium chloride infusion, PRN  ondansetron (ZOFRAN-ODT) disintegrating tablet 4 mg, Q8H PRN   Or  ondansetron (ZOFRAN) injection 4 mg, Q6H PRN  pantoprazole (PROTONIX) 80 mg in sodium chloride 0.9 % 50 mL bolus, Once  sodium chloride (PF) 0.9 % injection 10 mL, Daily  potassium chloride (KLOR-CON M) extended release tablet 40 mEq, PRN   Or  potassium bicarb-citric acid (EFFER-K) effervescent tablet 40 mEq, PRN   Or  potassium chloride 10 mEq/100 mL IVPB (Peripheral Line), PRN  magnesium sulfate 1000 mg in dextrose 5% 100 mL IVPB, PRN  thiamine (B-1) injection 100 mg, Daily  LORazepam (ATIVAN) tablet 1 mg, Q3H PRN   Or  LORazepam (ATIVAN) injection 1 mg, Q3H PRN   Or  LORazepam (ATIVAN) tablet 2 mg, Q3H PRN   Or  LORazepam (ATIVAN) injection 2 mg, Q3H PRN        Data:     Code Status:  Full Code    Family History   Problem Relation Age of Onset    Heart Disease Mother     Diabetes Father     Asthma Sister     Asthma Brother        Social History     Socioeconomic History    Marital status: Single     Spouse name: Not on file    Number of children: Not on file    Years of education: Not on file    Highest education level: Not on file   Occupational History    Not on file   Tobacco Use    Smoking status: Current Every Day Smoker     Packs/day: 1.00     Years: 35.00     Pack years: 35.00     Types: Cigarettes    Smokeless tobacco: Never Used   Substance and Sexual Activity    Alcohol use: Yes     Comment: daily    Drug use: No    Sexual activity: Not on file   Other Topics Concern    Not on file   Social History Narrative    Not on file     Social Determinants of Health     Financial Resource Strain:     Difficulty of Paying Living Expenses:    Food Insecurity:     Worried About Running Out of Food in the Last Year:     Ran Out of Food in the Last Year:    Transportation Needs:     Lack of Transportation (Medical):      Lack of Transportation (Non-Medical):    Physical Activity:     Days of Exercise per Week:     Minutes of Exercise per Session:    Stress:     Feeling of Stress :    Social Connections:     Frequency of Communication with Friends and Family:     Frequency of Social Gatherings with Friends and Family:     Attends [employer] Services:     Active Member of Clubs or Organizations:     Attends Club or Organization Meetings:     Marital Status:    Intimate Partner Violence:     Fear of Current or Ex-Partner:     Emotionally Abused:     Physically Abused:     Sexually Abused:        Vitals:  /84 Pulse 115   Temp 99 °F (37.2 °C) (Oral)   Resp 16   Ht 5' 4\" (1.626 m)   Wt 105 lb (47.6 kg)   SpO2 95%   BMI 18.02 kg/m²   Temp (24hrs), Av.7 °F (37.1 °C), Min:97.3 °F (36.3 °C), Max:99.5 °F (37.5 °C)    Recent Labs     21  1057   POCGLU 115*       I/O (24Hr):     Intake/Output Summary (Last 24 hours) at 2021 1006  Last data filed at 2021 0446  Gross per 24 hour   Intake --   Output 1400 ml   Net -1400 ml       Labs:      CBC:   Lab Results   Component Value Date    WBC 7.2 2021    RBC 2.42 2021    HGB 7.6 2021    HCT 22.4 2021    MCV 92.6 2021    MCH 31.4 2021    MCHC 33.9 2021    RDW 16.0 2021     2021    MPV 10.4 2021     CBC with Differential:    Lab Results   Component Value Date    WBC 7.2 2021    RBC 2.42 2021    HGB 7.6 2021    HCT 22.4 2021     2021    MCV 92.6 2021    MCH 31.4 2021    MCHC 33.9 2021    RDW 16.0 2021    METASPCT 1 10/05/2015    LYMPHOPCT 11 2021    LYMPHOPCT 24 07/10/2013    MONOPCT 10 2021    MONOPCT 5 07/10/2013    EOSPCT 4 07/10/2013    BASOPCT 1 2021    BASOPCT 1 07/10/2013    MONOSABS 0.42 2021    MONOSABS 0.46 07/10/2013    LYMPHSABS 0.46 2021    LYMPHSABS 2.19 07/10/2013    EOSABS 0.21 2021    EOSABS 0.36 07/10/2013    BASOSABS 0.04 2021    DIFFTYPE NOT REPORTED 2021     Hemoglobin/Hematocrit:    Lab Results   Component Value Date    HGB 7.6 2021    HCT 22.4 2021     CMP:    Lab Results   Component Value Date     2021    K 4.0 2021    CL 98 2021    CO2 22 2021    BUN <2 2021    CREATININE <0.40 2021    GFRAA CANNOT BE CALCULATED 2021    LABGLOM CANNOT BE CALCULATED 2021    GLUCOSE 111 2021    PROT 4.3 2021    LABALBU 2.0 2021    CALCIUM 6.5 2021    BILITOT 3.97 2021    ALKPHOS 263 2021  08/25/2021    ALT 52 08/25/2021     BMP:    Lab Results   Component Value Date     08/25/2021    K 4.0 08/25/2021    CL 98 08/25/2021    CO2 22 08/25/2021    BUN <2 08/25/2021    LABALBU 2.0 08/25/2021    CREATININE <0.40 08/25/2021    CALCIUM 6.5 08/25/2021    GFRAA CANNOT BE CALCULATED 08/25/2021    LABGLOM CANNOT BE CALCULATED 08/25/2021    GLUCOSE 111 08/25/2021     PT/INR:    Lab Results   Component Value Date    PROTIME 14.0 08/22/2021    INR 1.1 08/22/2021     PTT:    Lab Results   Component Value Date    APTT 28.0 08/22/2021   [APTT}    Physical Examination:        General appearance: alert, cooperative and no distress  Mental Status: oriented to person, place and normal affect  Abdomen: soft, nontender, nondistended, bowel sounds present    Assessment:        Primary Problem  GIB (gastrointestinal bleeding)     Active Hospital Problems    Diagnosis Date Noted    Portal hypertensive gastropathy ([facility] [K76.6, K31.89] 08/23/2021    Moderate malnutrition ([address] [E44.0] 08/23/2021    Dietary folate deficiency anemia [D52.0] 08/22/2021    Alcohol dependence with unspecified alcohol-induced disorder ([address] [F10.29]     GIB (gastrointestinal bleeding) [K92.2] 08/21/2021    Alcohol abuse [F10.10] 08/21/2021    Hepatic steatosis [K76.0] 08/21/2021    Elevated LFTs [R79.89] 08/21/2021    Macrocytic anemia [D53.9]     Noncompliance [Z91.19] 10/02/2015    Hypokalemia [E87.6]     COPD without exacerbation ([address] [J44.9]     Tobacco abuse [Z72.0]      Past Medical History:   Diagnosis Date    Alcohol withdrawal ([address]     Alcohol withdrawal seizure with complication ([facility] 34/1/8226    Alcohol-induced chronic pancreatitis ([facility] 26/0/9673    Alcoholic hepatitis 39/9/9478    Cellulitis of right hip 12/18/2016    COPD (chronic obstructive pulmonary disease) ([facility] 10/2/2015    Diabetes mellitus ([facility]     \"borderline\"    DM type 2 (diabetes mellitus, type 2) ([facility] 10/2/2015    Dysphagia    

## 2021-08-25 NOTE — DISCHARGE SUMMARY
[facility]  Office: [address], DO, Elvin Mirza, DO, Wyatt Myles, DO, Marcus Joel, DO, Carla Cummings MD, Arun Sanchez MD, Wesley Garcia MD, Jan Taylor MD, Nano Dejesus MD, Sergio Collins MD, Preston Mckay MD, Ondina Crandall, DO, Julieth Torres MD, Bella Neri DO, Obie Gamble MD,  Jak Mao DO, Juan Manuel Osuna MD, Bennett King MD, Nataliia Vazquez MD, Shivani Blanco MD, Sky Perez MD, Meet Coleman MD, Mindy Stubbs MD, Moni Rachel, [facility], [facility], CNP, Cindy Craven, CNP, Faith Bucio, [facility], Manan Vázquez, CNP, Dayana Beaulieu, CNP, Fernie Up, CNP, Hayder Pratt, CNP, Leonardo Figueroa, CNP, Martha Welch PA-C, Treva Mcduffie, [facility], Naomi Burkett, CNP, Andrade Jovel, CNP, Bridger Palacios, CNP, Holden Barnett, CNP, José Miguel Peters, CNP, Lavern Goncalves, CNP, Bob Dickerson, [facility], Tori Parra, [facility]    Discharge Summary     Patient ID: Kandy Horne  :  1960   MRN: [id number]     ACCOUNT:  [de-identified]   Patient's PCP: Dayami Tobias PA-C  Admit Date: 2021   Discharge Date: 2021     Length of Stay: 7  Code Status:  Full Code  Admitting Physician: Lucila Santos DO  Discharge Physician: Juan Manuel Joel DO     Active Discharge Diagnoses:     Hospital Problem Lists:  Principal Problem:    GIB (gastrointestinal bleeding)  Active Problems:    Hypokalemia    Moderate malnutrition ([facility]    Tobacco abuse    COPD without exacerbation ([facility]    Noncompliance    Macrocytic anemia    Alcohol abuse    Hepatic steatosis    Elevated LFTs    Dietary folate deficiency anemia    Alcohol dependence with unspecified alcohol-induced disorder ([facility]    Portal hypertensive gastropathy ([facility]    Multifocal pneumonia  Resolved Problems:    * No resolved hospital problems.  *      Admission Condition:  poor     Discharged Condition: stable    Hospital Stay:     Hospital Course:  Sandra Rider Jose Roberto Tony is a 63 y.o. female who was admitted for the management of  GIB (gastrointestinal bleeding) , presented to ER with Emesis and Diarrhea    This 63 yof presents with weakness, vomiting and diarrhea. She had a hgb<6. Transfused prbc and hgb stabilized. Seen by GI and had egd on 8/23 which showed portal hypertensive gastropathy without active bleeding. Was counseled on alcohol cessation. Was stable for discharge but precert took several days. While awaiting precert she devloped sob and cough-found to have new infiltrates on cxr and wheezing. Treated with antibiotics and steroids and improved rapidly, will continue these at [facility]      Significant therapeutic interventions: see above    Significant Diagnostic Studies:   Labs / Micro:  CBC:   Lab Results   Component Value Date    WBC 7.6 08/28/2021    RBC 2.48 08/28/2021    HGB 7.8 08/28/2021    HCT 24.6 08/28/2021    MCV 99.2 08/28/2021    MCH 31.5 08/28/2021    MCHC 31.7 08/28/2021    RDW 19.3 08/28/2021     08/28/2021     CMP:    Lab Results   Component Value Date    GLUCOSE 79 08/28/2021     08/28/2021    K 3.7 08/28/2021     08/28/2021    CO2 21 08/28/2021    BUN 5 08/28/2021    CREATININE <0.40 08/28/2021    ANIONGAP 6 08/28/2021    ALKPHOS 254 08/27/2021    ALT 39 08/27/2021     08/27/2021    BILITOT 3.93 08/27/2021    LABALBU 2.1 08/27/2021    ALBUMIN NOT REPORTED 08/27/2021    LABGLOM CANNOT BE CALCULATED 08/28/2021    GFRAA CANNOT BE CALCULATED 08/28/2021    GFR      08/28/2021    GFR NOT REPORTED 08/28/2021    PROT 4.4 08/27/2021    CALCIUM 7.2 08/28/2021        Radiology:  XR CHEST PORTABLE    Result Date: 8/21/2021  Probable bibasilar atelectasis; no consolidation or sizable pleural effusion. No pneumothorax. Possible tiny nodular density right upper lung.  RECOMMENDATION: Depending upon the patient's history, consider follow-up two-view chest x-ray in 3-4 months versus CT chest (if smoking history or other risk factors). CTA ABDOMEN PELVIS W CONTRAST    Result Date: 8/21/2021  1. No convincing evidence for bowel ischemia. The aorta is normal in caliber with scattered atheromatous plaque. 2.  Mild apparent wall thickening of the ascending and transverse colon, for which underlying colitis may be present. This finding may be accentuated by underdistention. 3.  Marked hepatic steatosis. 4.  Mild central airway congestion. Consultations:    Consults:     Final Specialist Recommendations/Findings:   IP CONSULT TO PRIMARY CARE PROVIDER  IP CONSULT TO GI  IP CONSULT TO SOCIAL WORK      The patient was seen and examined on day of discharge and this discharge summary is in conjunction with any daily progress note from day of discharge.     Discharge plan:     Disposition: [facility]    Physician Follow Up:     Santana Garibay PA-C  [address]  [phone]    Schedule an appointment as soon as possible for a visit in 4 weeks  for hospital follow-up    Darien Begum MD  [address] [facility] [address]  [phone]    Schedule an appointment as soon as possible for a visit in 4 weeks  for hospital follow-up       Requiring Further Evaluation/Follow Up POST HOSPITALIZATION/Incidental Findings: alcoholism, anemia    Diet: regular diet    Activity: As tolerated    Instructions to Patient: take medications as prescribed  Alcohol cessation    Discharge Medications:      Medication List      START taking these medications    azithromycin 250 MG tablet  Commonly known as: ZITHROMAX  Take 1 tablet by mouth daily for 3 days  Start taking on: August 29, 2021     cefdinir 300 MG capsule  Commonly known as: OMNICEF  Take 1 capsule by mouth 2 times daily for 11 doses     folic acid 1 MG tablet  Commonly known as: FOLVITE  Take 1 tablet by mouth daily     magnesium oxide 400 (241.3 Mg) MG Tabs tablet  Commonly known as: MAG-OX  Take 1 tablet by mouth 2 times daily     pantoprazole 40 MG tablet  Commonly known as: PROTONIX  Take 1 tablet by mouth every morning (before breakfast)     predniSONE 20 MG tablet  Commonly known as: DELTASONE  Take 1 tablet by mouth daily for 3 days  Start taking on: August 29, 2021     propranolol 10 MG tablet  Commonly known as: INDERAL  Take 1 tablet by mouth 3 times daily     thiamine 100 MG tablet  Take 1 tablet by mouth daily        CONTINUE taking these medications    * albuterol (2.5 MG/3ML) 0.083% nebulizer solution  Commonly known as: PROVENTIL  Take 3 mLs by nebulization every 6 hours as needed for Wheezing     * albuterol sulfate  (90 Base) MCG/ACT inhaler  Commonly known as: Proventil HFA  Inhale 2 puffs into the lungs every 6 hours as needed for Wheezing or Shortness of Breath     calcium carbonate 600 MG Tabs tablet  Commonly known as: Calcium 600  Take 1 tablet by mouth daily Do not take with iron     Calcium-Vitamin D 600-200 MG-UNIT Tabs     diphenhydrAMINE 25 MG capsule  Commonly known as: BENADRYL  Take 1 capsule by mouth every 6 hours as needed for Itching     Serevent Diskus 50 MCG/DOSE diskus inhaler  Generic drug: salmeterol         * This list has 2 medication(s) that are the same as other medications prescribed for you. Read the directions carefully, and ask your doctor or other care provider to review them with you. Where to Get Your Medications      Information about where to get these medications is not yet available    Ask your nurse or doctor about these medications  azithromycin 250 MG tablet  cefdinir 779 MG capsule  folic acid 1 MG tablet  magnesium oxide 400 (241.3 Mg) MG Tabs tablet  pantoprazole 40 MG tablet  predniSONE 20 MG tablet  propranolol 10 MG tablet  thiamine 100 MG tablet         No discharge procedures on file.     Time Spent on discharge is  32 mins in patient examination, evaluation, counseling as well as medication reconciliation, prescriptions for required medications, discharge plan and follow up.    Electronically signed by   Bubba Cord Blood,   8/28/2021  9:16 AM      Thank you Dr. Lashonda Madden PA-C for the opportunity to be involved in this patient's care.

## 2021-08-25 NOTE — PROGRESS NOTES
Physical Therapy  Facility/Department: [facility]  Daily Treatment Note  NAME: Dany Sullivan  : 1960  MRN: [id number]    Date of Service: 2021    Discharge Recommendations:    Pt currently functioning below baseline. Would suggest additional therapy at time of discharge to maximize long term outcomes and prevent re-admission. Please refer to AM-PAC score for current level of function. Assessment   Body structures, Functions, Activity limitations: Decreased functional mobility ; Decreased safe awareness;Decreased balance;Decreased cognition;Decreased endurance;Decreased strength  Assessment: Pt continues to be confused, however today is more alert with more energy. Able to get pt. to chair to eat breakfast/ chair alarm and RN aware. Specific instructions for Next Treatment: attempt up to chair if approp. Prognosis: Good  Decision Making: High Complexity  PT Education: Goals;PT Role;Plan of Care;General Safety  Patient Education: impt. of OOB, walker safety- will need review  Barriers to Learning: currently confused- unsure of baseline  REQUIRES PT FOLLOW UP: Yes  Activity Tolerance  Activity Tolerance: Treatment limited secondary to medical complications (free text)     Patient Diagnosis(es): The primary encounter diagnosis was Gastrointestinal hemorrhage, unspecified gastrointestinal hemorrhage type. Diagnoses of Anemia, unspecified type, Hypokalemia, and Alcohol dependence with unspecified alcohol-induced disorder [facility]) were also pertinent to this visit.      has a past medical history of Alcohol withdrawal ([address] Alcohol withdrawal seizure with complication ([address] Alcohol-induced chronic pancreatitis ([address] Alcoholic hepatitis, Cellulitis of right hip, COPD (chronic obstructive pulmonary disease) ([address] Diabetes mellitus ([address] DM type 2 (diabetes mellitus, type 2) ([address] Dysphagia, Eczema, Elevated liver enzymes, FTT (failure to thrive) in adult, Hoarseness, Hypertension, Hypomagnesemia, Hyponatremia, Intertrochanteric fracture of right femur ([address] Iron deficiency anemia, Lesion of hard palate, Moderate malnutrition ([address] New onset seizure ([facility] Poor historian, and Smoker. has a past surgical history that includes Tonsillectomy; Hip fracture surgery (Left); laryngoscopy (10/28/2016); Femur fracture surgery (Right, 12/07/2016); and Upper gastrointestinal endoscopy (N/A, 8/23/2021). Restrictions  Restrictions/Precautions  Restrictions/Precautions: General Precautions, Fall Risk  Position Activity Restriction  Other position/activity restrictions: seizure precautions, ETOH/ drug assessment  Subjective              Orientation  Orientation  Overall Orientation Status: Impaired  Orientation Level: Disoriented to place; Disoriented to time;Disoriented to situation  Cognition   Cognition  Overall Cognitive Status: Exceptions  Following Commands: Follows one step commands with increased time; Follows one step commands with repetition  Memory: Decreased recall of recent events;Decreased recall of biographical Information;Decreased short term memory  Safety Judgement: Decreased awareness of need for assistance;Decreased awareness of need for safety  Problem Solving: Assistance required to generate solutions;Assistance required to implement solutions;Assistance required to identify errors made;Assistance required to correct errors made  Insights: Decreased awareness of deficits  Initiation: Requires cues for some  Sequencing: Requires cues for some  Objective   Bed mobility  Supine to Sit: Contact guard assistance  Comment: Took increased time with HOB fully raised; use of bedrails  Transfers  Sit to Stand: Minimal Assistance; Moderate Assistance  Stand to sit: Minimal Assistance  Comment: Pt. with flexed fwd. posture and unwilling to attempt to stand tall. Ambulation  Ambulation?: Yes  Ambulation 1  Surface: level tile  Device: Rolling Walker  Assistance:  Moderate assistance  Quality of Gait: Pt. only agreed to transfer to chair with RW/ would not walk in room. Kyphotic posture and would not attempt to correct. Pushing RW too far ahead and would not allow PT to correct. Pt. took steps to chair with poor form/ unsteady and unwilling to accept education. Distance: 5ft  Comments: up to chair with chair alarm            AM-PAC Score  AM-PAC Inpatient Mobility Raw Score : 12 (08/25/21 1322)  AM-PAC Inpatient T-Scale Score : 35.33 (08/25/21 1322)  Mobility Inpatient CMS 0-100% Score: 68.66 (08/25/21 1322)  Mobility Inpatient CMS G-Code Modifier : CL (08/25/21 1322)          Goals  Short term goals  Time Frame for Short term goals: 12 visits:  Short term goal 1: Pt. to be indep with bed mob. Short term goal 2: Pt. to be SBA for sit to stand with approp. AD  Short term goal 3: Pt. to be SBA for gait with approp. AD, 75ft. Short term goal 4: Pt. to tolerate 25+ min. of PT daily for ther ex/ gait/ balance/ functional mob. training  Short term goal 5: Pt. may need to practice stairs based on location of discharge. Will continue to assess. ([facility]  Patient Goals   Patient goals : did not state    Plan    Plan  Times per week: 1-2x/day; 5-6days/wk  Specific instructions for Next Treatment: attempt up to chair if approp. Current Treatment Recommendations: Strengthening, Transfer Training, ROM, Balance Training, Functional Mobility Training, Gait Training, Safety Education & Training, Home Exercise Program, Patient/Caregiver Education & Training  Safety Devices  Type of devices:  All fall risk precautions in place, Gait belt, Patient at risk for falls, Left in bed, Nurse notified, Call light within reach     Therapy Time   Individual Concurrent Group Co-treatment   Time In 1018         Time Out 1034         Minutes 16                 Bryan THOMSON 53, PT

## 2021-08-25 NOTE — PROGRESS NOTES
Patient attempting to get out of bed unassisted. Unable to tell me where she is,reoriented per staff. Medicated with IV ativan as ordered per CIWA scale.

## 2021-08-25 NOTE — PLAN OF CARE
Continued on fall &seizure precautions as ordered. CIWA scale with assessment. Purewick cath in place for periods of incontinence.

## 2021-08-26 NOTE — PROGRESS NOTES
GI Progress notes    8/26/2021   9:35 AM    Name:  Altagracia Albert  MRN:    [id number]     Acct:     [de-identified]   Room:  [address] [place] [address] Day: 5     Admit Date: 8/21/2021 12:30 PM  PCP: Manisha Rao PA-C    Subjective:     C/C:   Chief Complaint   Patient presents with    Emesis    Diarrhea       Interval History: Status: not changed. Patient seen and examined. No acute events overnight. Patient alert and oriented  Hgb low but stable  No overt GI bleeding  LFTs stable  Tolerating PO intake but has poor appetite. ROS:  Constitutional: negative for chills, fevers and sweats  Gastrointestinal: negative for abdominal pain, constipation, diarrhea, nausea and vomiting      Medications: Allergies:    Allergies   Allergen Reactions    Ibuprofen Nausea And Vomiting       Current Meds: propranolol (INDERAL) tablet 10 mg, TID  magnesium oxide (MAG-OX) tablet 400 mg, BID  albuterol (PROVENTIL) nebulizer solution 2.5 mg, 4x daily  thiamine mononitrate tablet 100 mg, Daily  acetaminophen (TYLENOL) tablet 650 mg, Q4H PRN  pantoprazole (PROTONIX) tablet 40 mg, QAM AC  nicotine (NICODERM CQ) 21 MG/24HR 1 patch, Daily  folic acid (FOLVITE) tablet 1 mg, Daily  albuterol (PROVENTIL) nebulizer solution 2.5 mg, Q2H PRN  0.9 % sodium chloride infusion, PRN  Arformoterol Tartrate (BROVANA) nebulizer solution 15 mcg, BID  sodium chloride flush 0.9 % injection 5-40 mL, 2 times per day  sodium chloride flush 0.9 % injection 5-40 mL, PRN  0.9 % sodium chloride infusion, PRN  ondansetron (ZOFRAN-ODT) disintegrating tablet 4 mg, Q8H PRN   Or  ondansetron (ZOFRAN) injection 4 mg, Q6H PRN  pantoprazole (PROTONIX) 80 mg in sodium chloride 0.9 % 50 mL bolus, Once  sodium chloride (PF) 0.9 % injection 10 mL, Daily  potassium chloride (KLOR-CON M) extended release tablet 40 mEq, PRN   Or  potassium bicarb-citric acid (EFFER-K) effervescent tablet 40 mEq, PRN   Or  potassium chloride 10 mEq/100 mL IVPB (Peripheral Line), PRN  magnesium sulfate 1000 mg in dextrose 5% 100 mL IVPB, PRN  LORazepam (ATIVAN) tablet 1 mg, Q3H PRN   Or  LORazepam (ATIVAN) injection 1 mg, Q3H PRN   Or  LORazepam (ATIVAN) tablet 2 mg, Q3H PRN   Or  LORazepam (ATIVAN) injection 2 mg, Q3H PRN        Data:     Code Status:  Full Code    Family History   Problem Relation Age of Onset    Heart Disease Mother     Diabetes Father     Asthma Sister     Asthma Brother        Social History     Socioeconomic History    Marital status: Single     Spouse name: Not on file    Number of children: Not on file    Years of education: Not on file    Highest education level: Not on file   Occupational History    Not on file   Tobacco Use    Smoking status: Current Every Day Smoker     Packs/day: 1.00     Years: 35.00     Pack years: 35.00     Types: Cigarettes    Smokeless tobacco: Never Used   Substance and Sexual Activity    Alcohol use: Yes     Comment: daily    Drug use: No    Sexual activity: Not on file   Other Topics Concern    Not on file   Social History Narrative    Not on file     Social Determinants of Health     Financial Resource Strain:     Difficulty of Paying Living Expenses:    Food Insecurity:     Worried About Running Out of Food in the Last Year:     Ran Out of Food in the Last Year:    Transportation Needs:     Lack of Transportation (Medical):      Lack of Transportation (Non-Medical):    Physical Activity:     Days of Exercise per Week:     Minutes of Exercise per Session:    Stress:     Feeling of Stress :    Social Connections:     Frequency of Communication with Friends and Family:     Frequency of Social Gatherings with Friends and Family:     Attends [employer] Services:     Active Member of Clubs or Organizations:     Attends Club or Organization Meetings:     Marital Status:    Intimate Partner Violence:     Fear of Current or Ex-Partner:     Emotionally Abused:     Physically Abused:     Sexually Abused: Vitals:  /61   Pulse 122   Temp 99.5 °F (37.5 °C) (Oral)   Resp 16   Ht 5' 4\" (1.626 m)   Wt 103 lb 9.9 oz (47 kg)   SpO2 91%   BMI 17.79 kg/m²   Temp (24hrs), Av.3 °F (37.4 °C), Min:98.6 °F (37 °C), Max:99.9 °F (37.7 °C)    Recent Labs     21  1057   POCGLU 115*       I/O (24Hr):     Intake/Output Summary (Last 24 hours) at 2021 0935  Last data filed at 2021 0908  Gross per 24 hour   Intake 4240 ml   Output 200 ml   Net 4040 ml       Labs:      CBC:   Lab Results   Component Value Date    WBC 7.0 2021    RBC 2.57 2021    HGB 8.2 2021    HCT 25.8 2021    .4 2021    MCH 31.9 2021    MCHC 31.8 2021    RDW 18.5 2021     2021    MPV 10.7 2021     CBC with Differential:    Lab Results   Component Value Date    WBC 7.0 2021    RBC 2.57 2021    HGB 8.2 2021    HCT 25.8 2021     2021    .4 2021    MCH 31.9 2021    MCHC 31.8 2021    RDW 18.5 2021    METASPCT 1 10/05/2015    LYMPHOPCT 11 2021    LYMPHOPCT 24 07/10/2013    MONOPCT 10 2021    MONOPCT 5 07/10/2013    EOSPCT 4 07/10/2013    BASOPCT 1 2021    BASOPCT 1 07/10/2013    MONOSABS 0.42 2021    MONOSABS 0.46 07/10/2013    LYMPHSABS 0.46 2021    LYMPHSABS 2.19 07/10/2013    EOSABS 0.21 2021    EOSABS 0.36 07/10/2013    BASOSABS 0.04 2021    DIFFTYPE NOT REPORTED 2021     Hemoglobin/Hematocrit:    Lab Results   Component Value Date    HGB 8.2 2021    HCT 25.8 2021     CMP:    Lab Results   Component Value Date     2021    K 3.5 2021     2021    CO2 22 2021    BUN 2 2021    CREATININE <0.40 2021    GFRAA CANNOT BE CALCULATED 2021    LABGLOM CANNOT BE CALCULATED 2021    GLUCOSE 79 2021    PROT 4.8 2021    LABALBU 2.1 2021    CALCIUM 6.6 2021    BILITOT 4.38 08/26/2021    ALKPHOS 273 08/26/2021     08/26/2021    ALT 50 08/26/2021     BMP:    Lab Results   Component Value Date     08/26/2021    K 3.5 08/26/2021     08/26/2021    CO2 22 08/26/2021    BUN 2 08/26/2021    LABALBU 2.1 08/26/2021    CREATININE <0.40 08/26/2021    CALCIUM 6.6 08/26/2021    GFRAA CANNOT BE CALCULATED 08/26/2021    LABGLOM CANNOT BE CALCULATED 08/26/2021    GLUCOSE 79 08/26/2021     PT/INR:    Lab Results   Component Value Date    PROTIME 14.0 08/22/2021    INR 1.1 08/22/2021     PTT:    Lab Results   Component Value Date    APTT 28.0 08/22/2021   [APTT}    Physical Examination:        General appearance: alert, cooperative and no distress  Mental Status: oriented to person, place and time and normal affect  Abdomen: soft, nontender, nondistended, bowel sounds present     Assessment:        Primary Problem  GIB (gastrointestinal bleeding)     Active Hospital Problems    Diagnosis Date Noted    Portal hypertensive gastropathy ([facility] [K76.6, K31.89] 08/23/2021    Moderate malnutrition ([address] [E44.0] 08/23/2021    Dietary folate deficiency anemia [D52.0] 08/22/2021    Alcohol dependence with unspecified alcohol-induced disorder ([address] [F10.29]     GIB (gastrointestinal bleeding) [K92.2] 08/21/2021    Alcohol abuse [F10.10] 08/21/2021    Hepatic steatosis [K76.0] 08/21/2021    Elevated LFTs [R79.89] 08/21/2021    Macrocytic anemia [D53.9]     Noncompliance [Z91.19] 10/02/2015    Hypokalemia [E87.6]     COPD without exacerbation ([address] [J44.9]     Tobacco abuse [Z72.0]      Past Medical History:   Diagnosis Date    Alcohol withdrawal ([address]     Alcohol withdrawal seizure with complication ([address] 40/9/7855    Alcohol-induced chronic pancreatitis ([address] 53/7/6207    Alcoholic hepatitis 07/6/8150    Cellulitis of right hip 12/18/2016    COPD (chronic obstructive pulmonary disease) ([place] [address] 10/2/2015    Diabetes mellitus ([place] [address]     \"borderline\"    DM type 2 (diabetes mellitus, type 2) ([facility] 10/2/2015    Dysphagia     Eczema     Elevated liver enzymes 10/2/2015    FTT (failure to thrive) in adult 10/2/2015    Hoarseness     Hypertension     denies,used to take bp meds    Hypomagnesemia     Hyponatremia 12/7/2016    Intertrochanteric fracture of right femur ([facility] 12/7/2016    Iron deficiency anemia 12/18/2016    Lesion of hard palate     rt side,dx with laryngoscopy 8/11/16    Moderate malnutrition ([facility] 10/2/2015    New onset seizure ([facility]     states last one was oct 2015, but states told she had an \"alcohol seizure\" this past month    Poor historian     Smoker 10/2/2015        Plan:        1. Anemia, ? Melena, EtOH abuse  1. EGD revealed portal hypertensive gastropathy  2. Recommend nonselective beta blocker  3. PO PPI  4. Soft diet as tolerated  5. Thiamine, folic acid, multivitamin  6. EtOH rehab  7. Outpatient colonoscopy  8. May d/c per GI  9.  Plans for SNF    Explained to the patient and d/W Nursing Staff  Will F/U with you  Please call or Page for any issues or change in status  Thanks    Electronically signed by RONI Moulton NP on 8/26/2021 at 9:35 AM

## 2021-08-26 NOTE — PROGRESS NOTES
[facility]  Office: [address], DO, Sharon Angelo, DO, Yobany Leblanc, DO, Denia Reddy Blood, DO, Romeo Rios MD, Lakisha Briscoe MD, Janak Cameron MD, Amna Wilson MD, Walt Avila MD, Aylin Collins MD, Dano Leblanc MD, Margoth Anna, DO, Franciso Schaumann, MD, Yecenia Jackson DO, Ollie Barrow MD,  Celina Castro DO, Janeth Mancini MD, Denman Najjar, MD, Dontae Fine MD, Pawan Templeton MD, Radha Apodaca MD, Theresa Gray MD, Bethany Rayo MD, Ignacio Meadows, [facility], [facility], CNP, Kika Pham, CNP, Silvia Alvarado, CNS, Michael Avalos, CNP, Cristobal Johnson, CNP, Charisse Freire, CNP, Lima Hewitt, CNP, Andrzej Rasheed CNP, Rocky Dillard PA-C, Sukh Sharma, [facility], Valerie Mclean, CNP, Alverto Mcneil, CNP, Marycarmen Henderson, CNP, Bhaskar Fermin CNP, Thom Blake CNP, Abiodun Ayala CNP, Cecy Jefferson, CNP, Geeta Gonzalez[facility]    Progress Note    8/26/2021    9:07 AM    Name:   Sidney Webster  MRN:     [id number]     Acct:      [de-identified]   Room:   [address] [place] [address] Day:  5  Admit Date:  8/21/2021 12:30 PM    PCP:   Celine Mckinley PA-C  Code Status:  Full Code    Subjective:     C/C:   Chief Complaint   Patient presents with    Emesis    Diarrhea     Interval History Status: improved. Overall feels better  Had egd which showed portal hypertensive gastropathy    Denies cp/sob/n/v    No further bleeding noted    Brief History:     Per my partner: \"This is a 53-year-old female who presents with progressive weakness associated with vomiting and diarrhea over several days. Alessandra Frazier is found to have significant anemia with a hemoglobin less than 6 with concern for gastrointestinal bleeding.  She denied any melena, hematemesis or hematochezia.  She is admitted for Protonix drip, octreotide drip and GI consultation. Alessandra Frazier is been placed on CIWA scale due to her history of alcoholism. \"    Review of Systems: Constitutional:  negative for chills, fevers, sweats  Respiratory:  negative for cough, dyspnea on exertion, shortness of breath, wheezing  Cardiovascular:  negative for chest pain, chest pressure/discomfort, lower extremity edema, palpitations  Gastrointestinal:  negative for abdominal pain, constipation, diarrhea, nausea, vomiting  Neurological:  negative for dizziness, headache    Medications: Allergies:     Allergies   Allergen Reactions    Ibuprofen Nausea And Vomiting       Current Meds:   Scheduled Meds:    albuterol  2.5 mg Nebulization 4x daily    thiamine mononitrate  100 mg Oral Daily    pantoprazole  40 mg Oral QAM AC    nicotine  1 patch Transdermal Daily    folic acid  1 mg Oral Daily    Arformoterol Tartrate  15 mcg Nebulization BID    sodium chloride flush  5-40 mL IntraVENous 2 times per day    pantoprazole (PROTONIX) bolus  80 mg IntraVENous Once    sodium chloride (PF)  10 mL IntraVENous Daily     Continuous Infusions:    sodium chloride      sodium chloride       PRN Meds: acetaminophen, albuterol, sodium chloride, sodium chloride flush, sodium chloride, ondansetron **OR** ondansetron, potassium chloride **OR** potassium alternative oral replacement **OR** potassium chloride, magnesium sulfate, LORazepam **OR** LORazepam **OR** LORazepam **OR** LORazepam    Data:     Past Medical History:   has a past medical history of Alcohol withdrawal ([facility] Alcohol withdrawal seizure with complication ([facility] Alcohol-induced chronic pancreatitis ([facility] Alcoholic hepatitis, Cellulitis of right hip, COPD (chronic obstructive pulmonary disease) ([facility] Diabetes mellitus ([facility] DM type 2 (diabetes mellitus, type 2) ([facility] Dysphagia, Eczema, Elevated liver enzymes, FTT (failure to thrive) in adult, Hoarseness, Hypertension, Hypomagnesemia, Hyponatremia, Intertrochanteric fracture of right femur ([facility] Iron deficiency anemia, Lesion of hard palate, Moderate malnutrition ([facility] New onset seizure [facility]), Poor historian, and Smoker. Social History:   reports that she has been smoking cigarettes. She has a 35.00 pack-year smoking history. She has never used smokeless tobacco. She reports current alcohol use. She reports that she does not use drugs. Family History:   Family History   Problem Relation Age of Onset    Heart Disease Mother     Diabetes Father     Asthma Sister     Asthma Brother        Vitals:  /61   Pulse 122   Temp 99.5 °F (37.5 °C) (Oral)   Resp 16   Ht 5' 4\" (1.626 m)   Wt 103 lb 9.9 oz (47 kg)   SpO2 91%   BMI 17.79 kg/m²   Temp (24hrs), Av.3 °F (37.4 °C), Min:98.6 °F (37 °C), Max:99.9 °F (37.7 °C)    Recent Labs     21  1057   POCGLU 115*       I/O (24Hr): Intake/Output Summary (Last 24 hours) at 2021 0907  Last data filed at 2021 1551  Gross per 24 hour   Intake 4240 ml   Output --   Net 4240 ml       Labs:  Hematology:  Recent Labs     21  1634 21  1634 21  0441 21  1253 21  0504   WBC  --   --  7.2  --  7.0   RBC  --   --  2.42*  --  2.57*   HGB 8.5*   < > 7.6* 8.2* 8.2*   HCT 25.7*  --  22.4*  --  25.8*   MCV  --   --  92.6  --  100.4   MCH  --   --  31.4  --  31.9   MCHC  --   --  33.9  --  31.8   RDW  --   --  16.0*  --  18.5*   PLT  --   --  148  --  179   MPV  --   --  10.4  --  10.7    < > = values in this interval not displayed.      Chemistry:  Recent Labs     21  0441 21  0504   * 132*   K 4.0 3.5*   CL 98 101   CO2 22 22   GLUCOSE 111* 79   BUN <2* 2*   CREATININE <0.40* <0.40*   MG  --  0.9*   ANIONGAP 9 9   LABGLOM CANNOT BE CALCULATED CANNOT BE CALCULATED   GFRAA CANNOT BE CALCULATED CANNOT BE CALCULATED   CALCIUM 6.5* 6.6*     Recent Labs     21  1057 21  0433 21  0441 21  0504   PROT  --  5.1* 4.3* 4.8*   LABALBU  --  2.4* 2.0* 2.1*   AST  --  301* 248* 236*   ALT  --  65* 52* 50*   ALKPHOS  --  299* 263* 273*   BILITOT  --  4.15* 3.97* 4.38*   BILIDIR  -- 3.42* 3.55* 3.32*   POCGLU 115*  --   --   --      ABG:No results found for: POCPH, PHART, PH, POCPCO2, VPT1UYA, PCO2, POCPO2, PO2ART, PO2, POCHCO3, VXB9AMG, HCO3, NBEA, PBEA, BEART, BE, THGBART, THB, PNJ7UUM, WRUQ6XIP, H0RKLECL, O2SAT, FIO2  Lab Results   Component Value Date/Time    SPECIAL NOT REPORTED 08/21/2021 01:45 PM    SPECIAL NOT REPORTED 08/21/2021 01:45 PM     Lab Results   Component Value Date/Time    CULTURE NO GROWTH 4 DAYS 08/21/2021 01:45 PM    CULTURE NO GROWTH 4 DAYS 08/21/2021 01:45 PM       Radiology:  XR CHEST PORTABLE    Result Date: 8/21/2021  Probable bibasilar atelectasis; no consolidation or sizable pleural effusion. No pneumothorax. Possible tiny nodular density right upper lung. RECOMMENDATION: Depending upon the patient's history, consider follow-up two-view chest x-ray in 3-4 months versus CT chest (if smoking history or other risk factors). CTA ABDOMEN PELVIS W CONTRAST    Result Date: 8/21/2021  1. No convincing evidence for bowel ischemia. The aorta is normal in caliber with scattered atheromatous plaque. 2.  Mild apparent wall thickening of the ascending and transverse colon, for which underlying colitis may be present. This finding may be accentuated by underdistention. 3.  Marked hepatic steatosis. 4.  Mild central airway congestion.        Physical Examination:        General appearance:  alert, cooperative and no distress  Mental Status:  oriented to person, place and time and normal affect  Lungs:  clear to auscultation bilaterally, normal effort  Heart: tachy, regular rhythm, no murmur  Abdomen:  soft, nontender, nondistended, normal bowel sounds, no masses, hepatomegaly, splenomegaly  Extremities:  no edema, redness, tenderness in the calves  Skin:  no gross lesions, rashes, induration    Assessment:        Hospital Problems         Last Modified POA    * (Principal) GIB (gastrointestinal bleeding) 8/21/2021 Yes    Hypokalemia 8/21/2021 Yes    Moderate malnutrition ([facility] 8/23/2021 Yes    Tobacco abuse (Chronic) 8/21/2021 Yes    COPD without exacerbation ([facility] 8/21/2021 Yes    Noncompliance (Chronic) 8/21/2021 Yes    Macrocytic anemia 8/21/2021 Yes    Alcohol abuse 8/21/2021 Yes    Hepatic steatosis (Chronic) 8/21/2021 Yes    Elevated LFTs 8/21/2021 Yes    Dietary folate deficiency anemia 8/22/2021 Yes    Alcohol dependence with unspecified alcohol-induced disorder ([facility] 8/22/2021 Yes    Portal hypertensive gastropathy ([facility] 8/23/2021 Yes          Plan:        Replace k and mg, recheck mg  Add po magnesium also  Add low dose propanolol as bp tolerates  Dc to snf  delmi and med rec updated    Jakob Joel DO  8/26/2021  9:07 AM

## 2021-08-26 NOTE — PLAN OF CARE
Problem: Skin Integrity:  Goal: Will show no infection signs and symptoms  Description: Will show no infection signs and symptoms  8/25/2021 2356 by Deedee Thacker RN  Outcome: Ongoing  8/25/2021 1553 by Bia Ballesteros RN  Outcome: Ongoing  Goal: Absence of new skin breakdown  Description: Absence of new skin breakdown  8/25/2021 2356 by Deedee Thacker RN  Outcome: Ongoing  8/25/2021 1553 by Bia Ballesteros RN  Outcome: Ongoing     Problem: Falls - Risk of:  Goal: Will remain free from falls  Description: Will remain free from falls  8/25/2021 2356 by Deedee Thacker RN  Outcome: Ongoing  8/25/2021 1553 by Bia Ballesteros RN  Outcome: Ongoing  Goal: Absence of physical injury  Description: Absence of physical injury  8/25/2021 2356 by Deedee Thacker RN  Outcome: Ongoing  8/25/2021 1553 by Bia Ballesteros RN  Outcome: Ongoing     Problem: Bleeding:  Goal: Will show no signs and symptoms of excessive bleeding  Description: Will show no signs and symptoms of excessive bleeding  8/25/2021 2356 by Deedee Thacker RN  Outcome: Ongoing  8/25/2021 1553 by Bia Ballesteros RN  Outcome: Ongoing     Problem: Nutrition  Goal: Optimal nutrition therapy  Outcome: Ongoing     Problem: Discharge Planning:  Goal: Discharged to appropriate level of care  Description: Discharged to appropriate level of care  Outcome: Ongoing     Problem: Nutrition Deficit:  Goal: Ability to achieve adequate nutritional intake will improve  Description: Ability to achieve adequate nutritional intake will improve  Outcome: Ongoing     Problem: Sleep Pattern Disturbance:  Goal: Appears well-rested  Description: Appears well-rested  Outcome: Ongoing     Problem: IP BALANCE  Goal: LTG - Patient will maintain balance to allow for safe/functional mobility  Outcome: Ongoing

## 2021-08-26 NOTE — PROGRESS NOTES
Occupational Therapy  Facility/Department: [facility] PROGRESSIVE CARE  Daily Treatment Note  NAME: Lisa Quiñones  : 1960  MRN: [id number]    Date of Service: 2021    Discharge Recommendations:  Patient would benefit from continued therapy after discharge       Assessment   Performance deficits / Impairments: Decreased functional mobility ; Decreased ADL status; Decreased strength;Decreased safe awareness;Decreased endurance;Decreased balance;Decreased cognition;Decreased posture  Assessment: Pt required skilled OT services to increase IND and safety with self-care and functional tasks to return to OF. Prognosis: Good  OT Education: OT Role;Plan of Care;Precautions;Transfer Training;Family Education  Patient Education: Importance of OOB activity, hospital psychosis, SNF rec  REQUIRES OT FOLLOW UP: Yes  Activity Tolerance  Activity Tolerance: Patient limited by fatigue  Safety Devices  Safety Devices in place: Yes  Type of devices: Call light within reach;Nurse notified;Gait belt;Patient at risk for falls; All fall risk precautions in place; Bed alarm in place; Left in bed         Patient Diagnosis(es): The primary encounter diagnosis was Gastrointestinal hemorrhage, unspecified gastrointestinal hemorrhage type. Diagnoses of Anemia, unspecified type, Hypokalemia, and Alcohol dependence with unspecified alcohol-induced disorder [facility]) were also pertinent to this visit.       has a past medical history of Alcohol withdrawal ([facility] [address] Alcohol withdrawal seizure with complication ([facility] [address] Alcohol-induced chronic pancreatitis ([facility] [address] Alcoholic hepatitis, Cellulitis of right hip, COPD (chronic obstructive pulmonary disease) ([facility] [address] Diabetes mellitus ([facility] [address] DM type 2 (diabetes mellitus, type 2) ([facility] [address] Dysphagia, Eczema, Elevated liver enzymes, FTT (failure to thrive) in adult, Hoarseness, Hypertension, Hypomagnesemia, Hyponatremia, Intertrochanteric fracture of right femur ([address] Iron deficiency anemia, Lesion of hard palate, Moderate malnutrition ([facility] [address] New onset seizure ([facility] [address] Poor historian, and Smoker. has a past surgical history that includes Tonsillectomy; Hip fracture surgery (Left); laryngoscopy (10/28/2016); Femur fracture surgery (Right, 12/07/2016); and Upper gastrointestinal endoscopy (N/A, 8/23/2021). Restrictions  Restrictions/Precautions  Restrictions/Precautions: General Precautions, Fall Risk  Position Activity Restriction  Other position/activity restrictions: Up with assist, seizure precautions, IV  Subjective   General  Chart Reviewed: Yes  Patient assessed for rehabilitation services?: Yes  Response to previous treatment: Patient with no complaints from previous session  Family / Caregiver Present: Yes (Daughter)  General Comment  Comments: Pt requiring MAX encouragement to participate. Pt stating she is too tired and \"I just want to sleep\". RN reports pt has been sleeping most of AM.      Orientation     Objective    ADL  Grooming: Moderate assistance (Combing hair while seated EOB. MaxA with back due to fatigue)        Balance  Sitting Balance: Stand by assistance  Standing Balance: Contact guard assistance  Functional Mobility  Functional - Mobility Device: Rolling Walker  Assist Level: Moderate assistance  Functional Mobility Comments: In room with mod/maxA with RW management at times to keep close to SUKH. VC throughout for safety, posture, and RW placement. Poor carryover noted  Bed mobility  Supine to Sit: Maximum assistance;2 Person assistance  Sit to Supine: Minimal assistance  Comment: Pt requiring MA  Transfers  Sit to stand: Minimal assistance  Stand to sit: Minimal assistance  Transfer Comments: MAX VC/tactile cues for hand placement and square self/AD to surface                       Cognition  Overall Cognitive Status: Exceptions  Following Commands: Follows one step commands with increased time; Follows one step commands with repetition  Attention Span: Difficulty attending to directions  Memory: Decreased recall of recent events;Decreased recall of biographical Information;Decreased short term memory  Safety Judgement: Decreased awareness of need for assistance;Decreased awareness of need for safety  Problem Solving: Assistance required to generate solutions;Assistance required to implement solutions;Assistance required to identify errors made;Assistance required to correct errors made;Decreased awareness of errors  Insights: Decreased awareness of deficits  Initiation: Requires cues for some  Sequencing: Requires cues for some              Plan   Plan  Times per week: 4-5x/week, 1-2x/day  Current Treatment Recommendations: Strengthening, Balance Training, Functional Mobility Training, Endurance Training, Safety Education & Training, Self-Care / ADL, Patient/Caregiver Education & Training, Equipment Evaluation, Education, & procurement, Positioning, Cognitive/Perceptual Training    AM-PAC Score        AM-PAC Inpatient Daily Activity Raw Score: 16 (08/26/21 1218)  AM-PAC Inpatient ADL T-Scale Score : 35.96 (08/26/21 1218)  ADL Inpatient CMS 0-100% Score: 53.32 (08/26/21 1218)  ADL Inpatient CMS G-Code Modifier : CK (08/26/21 1218)    Goals  Short term goals  Time Frame for Short term goals: by discharge, pt will  Short term goal 1: demo CGA with ADL transfers with AD/DME and good safety  Short term goal 2: demo CGA with functional mob in room distances with good safety/pacing for ADL completion  Short term goal 3: demo CGA with toileting routine with good safety  Short term goal 4: demo SBA with UB ADLs and CGA with UB ADLs with DME as needed and good safety/pacing  Short term goal 5: demo and verb good understanding of fall prevention techs, EC/WS techs, equip needs, B UE HEP, and d/c recommendations  Patient Goals   Patient goals : to go home       Therapy Time   Individual Concurrent Group Co-treatment   Time In [phone]   Time Out 1059     1124   Minutes 9     25        Upon writer exit, call light within reach, pt retired to bed. All lines intact and patient positioned comfortably. All patient needs addressed prior to ending therapy session. Chart reviewed prior to treatment and patient is agreeable for therapy. RN reports patient is medically stable for therapy treatment this date. Co-treatment with PT warranted secondary to decreased safety and independence requiring 2 skilled therapy professionals to address individual discipline's goals. OT addressing preparation for ADL transfer, sitting balance for increased ADL performance, sitting/activity tolerance, functional reaching, environmental safety/scanning, fall prevention, functional mobility for ADL transfers, ability to sequence and follow directions and functional UE strength.       Leona Jefferson YESSI

## 2021-08-26 NOTE — PROGRESS NOTES
Physical Therapy  Facility/Department: [facility] PROGRESSIVE CARE  Daily Treatment Note  NAME: Toby Villarreal  : 1960  MRN: [id number]    Date of Service: 2021    Discharge Recommendations:        Pt currently functioning below baseline. Would suggest additional therapy at time of discharge to maximize long term outcomes and prevent re-admission. Please refer to AM-PAC score for current level of function. Assessment   Body structures, Functions, Activity limitations: Decreased functional mobility ; Decreased safe awareness;Decreased balance;Decreased cognition;Decreased endurance;Decreased strength  Assessment: Patient with global deconditioning and requires increased time for task management and planning skills. Patient with decreased safety awareness and flexed forward standing posture increasing fall risk. Specific instructions for Next Treatment: attempt up to chair if approp. Prognosis: Good  PT Education: Goals;PT Role;Plan of Care;General Safety  Patient Education: impt. of OOB, walker safety- will need review  Barriers to Learning: currently confused- unsure of baseline  REQUIRES PT FOLLOW UP: Yes  Activity Tolerance  Activity Tolerance: Treatment limited secondary to medical complications (free text)     Patient Diagnosis(es): The primary encounter diagnosis was Gastrointestinal hemorrhage, unspecified gastrointestinal hemorrhage type. Diagnoses of Anemia, unspecified type, Hypokalemia, and Alcohol dependence with unspecified alcohol-induced disorder [facility]) were also pertinent to this visit.      has a past medical history of Alcohol withdrawal ([address] Alcohol withdrawal seizure with complication ([address] Alcohol-induced chronic pancreatitis ([address] Alcoholic hepatitis, Cellulitis of right hip, COPD (chronic obstructive pulmonary disease) ([address] Diabetes mellitus ([address] DM type 2 (diabetes mellitus, type 2) ([address] Dysphagia, Eczema, Elevated liver enzymes, FTT (failure to thrive) in adult, Hoarseness, Hypertension, Hypomagnesemia, Hyponatremia, Intertrochanteric fracture of right femur (HCC), Iron deficiency anemia, Lesion of hard palate, Moderate malnutrition ([address] New onset seizure ([address] Poor historian, and Smoker. has a past surgical history that includes Tonsillectomy; Hip fracture surgery (Left); laryngoscopy (10/28/2016); Femur fracture surgery (Right, 12/07/2016); and Upper gastrointestinal endoscopy (N/A, 8/23/2021). Restrictions  Restrictions/Precautions  Restrictions/Precautions: General Precautions, Fall Risk  Position Activity Restriction  Other position/activity restrictions: seizure precautions, ETOH/ drug assessment  Subjective   General  Chart Reviewed: Yes  Family / Caregiver Present: No  Subjective  Subjective: Patient initially difficult to rouse. But upon awakening is agreeable to PT treatment  General Comment  Comments: ADONIS Collazo patient is appropriate for PT treatment. Pain Screening  Patient Currently in Pain: Denies  Vital Signs  Patient Currently in Pain: Denies       Orientation  Orientation  Overall Orientation Status: Impaired  Orientation Level: Disoriented to place; Disoriented to time;Disoriented to situation  Cognition      Objective   Bed mobility  Bridging: Maximum assistance  Rolling to Left: Maximum assistance  Rolling to Right: Maximum assistance  Supine to Sit: Maximum assistance;2 Person assistance  Sit to Supine: Minimal assistance  Comment: Patient required Max x2 assist for initial supine to sit patient states \"she needed to wake up more to be able to move\". Patient requires max verbal and tactile cues for hand placement and progression techniques. Patient with little carry over and decreased ability to follow one step commands due to aggitation.   Transfers  Sit to Stand: Minimal Assistance  Stand to sit: Minimal Assistance  Bed to Chair: Unable to assess  Car Transfer: Minimal Assistance  Comment: Patient with a flexed forward posture and with Max 75ft.  Short term goal 4: Pt. to tolerate 25+ min. of PT daily for ther ex/ gait/ balance/ functional mob. training  Short term goal 5: Pt. may need to practice stairs based on location of discharge. Will continue to assess. (At time of eval, pt. living in hotel)  Patient Goals   Patient goals : did not state    Plan    Plan  Times per week: 1-2x/day; 5-6days/wk  Specific instructions for Next Treatment: attempt up to chair if approp. Current Treatment Recommendations: Strengthening, Transfer Training, ROM, Balance Training, Functional Mobility Training, Gait Training, Safety Education & Training, Home Exercise Program, Patient/Caregiver Education & Training  Safety Devices  Type of devices:  All fall risk precautions in place, Gait belt, Patient at risk for falls, Left in bed, Nurse notified, Call light within reach     Therapy Time   Individual Concurrent Group Co-treatment   Time In 1059         Time Out 1126         Minutes [place], [place]

## 2021-08-26 NOTE — PLAN OF CARE
Problem: Skin Integrity:  Goal: Will show no infection signs and symptoms  Description: Will show no infection signs and symptoms  Outcome: Ongoing  Goal: Absence of new skin breakdown  Description: Absence of new skin breakdown  Outcome: Ongoing     Problem: Falls - Risk of:  Goal: Will remain free from falls  Description: Will remain free from falls  Outcome: Ongoing  Goal: Absence of physical injury  Description: Absence of physical injury  Outcome: Ongoing     Problem: Bleeding:  Goal: Will show no signs and symptoms of excessive bleeding  Description: Will show no signs and symptoms of excessive bleeding  Outcome: Ongoing     Problem: Nutrition  Goal: Optimal nutrition therapy  Outcome: Ongoing     Problem: Discharge Planning:  Goal: Discharged to appropriate level of care  Description: Discharged to appropriate level of care  Outcome: Ongoing     Problem: Nutrition Deficit:  Goal: Ability to achieve adequate nutritional intake will improve  Description: Ability to achieve adequate nutritional intake will improve  Outcome: Ongoing     Problem: Sleep Pattern Disturbance:  Goal: Appears well-rested  Description: Appears well-rested  Outcome: Ongoing     Problem: IP BALANCE  Goal: LTG - Patient will maintain balance to allow for safe/functional mobility  Outcome: Ongoing

## 2021-08-27 PROBLEM — J18.9 MULTIFOCAL PNEUMONIA: Status: ACTIVE | Noted: 2021-01-01

## 2021-08-27 NOTE — PLAN OF CARE
Problem: Skin Integrity:  Goal: Will show no infection signs and symptoms  Description: Will show no infection signs and symptoms  Outcome: Ongoing     Problem: Skin Integrity:  Goal: Absence of new skin breakdown  Description: Absence of new skin breakdown  Outcome: Ongoing     Problem: Falls - Risk of:  Goal: Will remain free from falls  Description: Will remain free from falls  Outcome: Ongoing    Problem: Falls - Risk of:  Goal: Absence of physical injury  Description: Absence of physical injury  Outcome: Ongoing     Problem: Bleeding:  Goal: Will show no signs and symptoms of excessive bleeding  Description: Will show no signs and symptoms of excessive bleeding  Outcome: Ongoing     Problem: Nutrition  Goal: Optimal nutrition therapy  Outcome: Ongoing

## 2021-08-27 NOTE — PROGRESS NOTES
Physical Therapy  Facility/Department: [facility] PROGRESSIVE CARE  Daily Treatment Note  NAME: Denita Ramirez  : 1960  MRN: [id number]    Date of Service: 2021    Discharge Recommendations:  Patient would benefit from continued therapy after discharge      Assessment   Body structures, Functions, Activity limitations: Decreased functional mobility ; Decreased safe awareness;Decreased balance;Decreased cognition;Decreased endurance;Decreased strength    Assessment: Pt with improved function this date but continues with severe nausea and moderate dizziness. RN informed of nausea. Pt primary deficits w/ mobility is decreased aerobic capacity; dyspnea requring rest breaks at the end of each gait. Will progress as able. Specific instructions for Next Treatment: gait w/o device if able; monitor O2 sat levels  PT Education: Goals;PT Role;Plan of Care;General Safety  Patient Education: impt. of OOB, walker safety- reviewed  Barriers to Learning: pt following commands, joking with staff; cognition appears improved. Would suggest speech cog eval if stil concerned. REQUIRES PT FOLLOW UP: Yes     Patient Diagnosis(es): The primary encounter diagnosis was Gastrointestinal hemorrhage, unspecified gastrointestinal hemorrhage type. Diagnoses of Anemia, unspecified type, Hypokalemia, Alcohol dependence with unspecified alcohol-induced disorder ([address] and Multifocal pneumonia were also pertinent to this visit.      has a past medical history of Alcohol withdrawal ([address] Alcohol withdrawal seizure with complication ([address] Alcohol-induced chronic pancreatitis ([address] Alcoholic hepatitis, Cellulitis of right hip, COPD (chronic obstructive pulmonary disease) ([address] Diabetes mellitus ([address] DM type 2 (diabetes mellitus, type 2) ([address] Dysphagia, Eczema, Elevated liver enzymes, FTT (failure to thrive) in adult, Hoarseness, Hypertension, Hypomagnesemia, Hyponatremia, Intertrochanteric fracture of right femur ([address] Iron deficiency anemia, Lesion of hard palate, Moderate malnutrition ([address] New onset seizure ([facility] [address] Poor historian, and Smoker. has a past surgical history that includes Tonsillectomy; Hip fracture surgery (Left); laryngoscopy (10/28/2016); Femur fracture surgery (Right, 12/07/2016); and Upper gastrointestinal endoscopy (N/A, 8/23/2021). Restrictions  Restrictions/Precautions  Restrictions/Precautions: General Precautions, Fall Risk  Position Activity Restriction  Other position/activity restrictions: Up with assist, seizure precautions, IV;  Subjective   General  Chart Reviewed: Yes  Family / Caregiver Present: Yes Jarod Starr - daughter)  Subjective  Subjective: Pt reports     Objective   Bed mobility  Bridging: Independent  Rolling to Left: Independent  Rolling to Right: Independent  Supine to Sit: Independent  Sit to Supine: Independent  Scooting: Independent  Transfers  Sit to Stand: Stand by assistance  Stand to sit: Stand by assistance  Bed to Chair: Stand by assistance  Ambulation  Ambulation?: Yes  Ambulation 1  Surface: level tile  Device: Rolling Walker  Assistance: Contact guard assistance  Quality of Gait: Pt with improved safety this date; pt continues to be significantly nauseated and dizzy with OOB/ gait. Gait Deviations: Slow Lizzy;Decreased step height;Decreased step length  Distance: 35 ft x 2; 2 ft x 1  Comments: pt daughter supportive and encouraging patient to mobilize. Pt left up in chair with alarm in place with foot rest.  Daughter sitting with her. Exercises  Comments: Pt education on hospital acquired secondary complication risks; pt education / instruction on body alignment importance for reduction of risks. Pt able to tolerate up to chair ~ 8 minutes while educated. Pt in chair at time therapy was done - respiratory breathing treatment.       AM-PAC Score  18/24  Co-treatment with OT warranted secondary to recent decreased safety and independence requiring 2 skilled therapy professionals to address individual discipline's goals. PT addressing transfer training/gait training. Goals  Short term goals  Time Frame for Short term goals: 12 visits:  Short term goal 1: Pt. to be indep with bed mob. - met  Short term goal 2: Pt. to be SBA for sit to stand with approp. AD - met  Short term goal 3: Pt. to be SBA for gait with approp. AD, 75ft. Short term goal 4: Pt. to tolerate 25+ min. of PT daily for ther ex/ gait/ balance/ functional mob. training  Short term goal 5: Pt. may need to practice stairs based on location of discharge. Will continue to assess. (At time of eval, pt. living in hotel)  Patient Goals   Patient goals : pt goal is to get out of the hospital and feel better    Plan    Plan  Times per week: 1-2x/day; 5-6days/wk  Specific instructions for Next Treatment: gait w/o device if able; monitor O2 sat levels  Current Treatment Recommendations: Strengthening, Transfer Training, ROM, Balance Training, Functional Mobility Training, Gait Training, Safety Education & Training, Home Exercise Program, Patient/Caregiver Education & Training  Safety Devices  Type of devices:  All fall risk precautions in place, Gait belt, Patient at risk for falls, Nurse notified, Call light within reach, Chair alarm in place, Left in chair     Therapy Time   Individual Concurrent Group Co-treatment   Time In 1049         Time Out 1117         Minutes 28            MARCIE HURTADO, PT

## 2021-08-27 NOTE — PROGRESS NOTES
Unable to secure transportation from [place], [place], or [facility]. Patient's daughter also unable to transport. Patient still accepted through weekend.

## 2021-08-27 NOTE — PROGRESS NOTES
Occupational Therapy  Facility/Department: [facility] PROGRESSIVE CARE  Daily Treatment Note  NAME: Sophia Le  : 1960  MRN: [id number]    Date of Service: 2021    Discharge Recommendations:  Patient would benefit from continued therapy after discharge   d/t recent hospitalization and medical condition, pt would benefit from additional therapy at time of discharge to ensure safety. Please refer to AM-PAC score for current functional status. Assessment   Performance deficits / Impairments: Decreased functional mobility ; Decreased ADL status; Decreased strength;Decreased safe awareness;Decreased endurance;Decreased balance;Decreased cognition;Decreased posture  Assessment: Pt required skilled OT services to increase IND and safety with self-care and functional tasks to return to OF. Prognosis: Good  OT Education: OT Role;Plan of Care;Transfer Training;Family Education; Energy Conservation  Patient Education: Importance of OOB activity, safety in function, energy conservation, pursed lip breathing  REQUIRES OT FOLLOW UP: Yes  Activity Tolerance  Activity Tolerance: Patient limited by fatigue  Safety Devices  Safety Devices in place: Yes  Type of devices: Call light within reach;Nurse notified; Patient at risk for falls; All fall risk precautions in place; Left in chair;Chair alarm in place         Patient Diagnosis(es): The primary encounter diagnosis was Gastrointestinal hemorrhage, unspecified gastrointestinal hemorrhage type. Diagnoses of Anemia, unspecified type, Hypokalemia, Alcohol dependence with unspecified alcohol-induced disorder ([facility] and Multifocal pneumonia were also pertinent to this visit.       has a past medical history of Alcohol withdrawal ([address] Alcohol withdrawal seizure with complication ([facility] Alcohol-induced chronic pancreatitis ([facility] Alcoholic hepatitis, Cellulitis of right hip, COPD (chronic obstructive pulmonary disease) ([facility] Diabetes mellitus ([facility] DM type 2 (diabetes mellitus, type 2) ([address] Dysphagia, Eczema, Elevated liver enzymes, FTT (failure to thrive) in adult, Hoarseness, Hypertension, Hypomagnesemia, Hyponatremia, Intertrochanteric fracture of right femur (HCC), Iron deficiency anemia, Lesion of hard palate, Moderate malnutrition ([address] New onset seizure ([address] Poor historian, and Smoker. has a past surgical history that includes Tonsillectomy; Hip fracture surgery (Left); laryngoscopy (10/28/2016); Femur fracture surgery (Right, 12/07/2016); and Upper gastrointestinal endoscopy (N/A, 8/23/2021). Restrictions  Restrictions/Precautions  Restrictions/Precautions: General Precautions, Fall Risk  Position Activity Restriction  Other position/activity restrictions: Up with assist, seizure precautions, IV;  Subjective   General  Chart Reviewed: Yes  Patient assessed for rehabilitation services?: Yes  Response to previous treatment: Patient with no complaints from previous session  Family / Caregiver Present: Yes (daughter)  General Comment  Comments: Pt requiring MAX encouragement to participate. Pt stating she is too tired/nauseous and \"I just want to sleep\". Objective    ADL  Additional Comments: Pt declined ADLS. With MAX encouragement and education of benefits of being OOB, pt agreed to functional mob in room and to sit in a chair. Balance  Sitting Balance: Stand by assistance  Standing Balance: Contact guard assistance  Standing Balance  Time: ~1-2 min act tolerance  Activity: functional mob to window and chair  Functional Mobility  Functional - Mobility Device: Rolling Walker  Activity: Other  Assist Level: Contact guard assistance  Functional Mobility Comments: Pt required VCs for assistance/awareness of lines, pursed lip breathing, and RW mgmt/safety. Pt required seated rest break as pt approached window. Pt sat on window iram before a chair could be provided. OT/PT SBA for safety.  Pt c/o of nausea throughout session and pt was provided with medication for nausea by RN prior to tx. Bed mobility  Bridging: Independent  Rolling to Left: Independent  Rolling to Right: Independent  Supine to Sit: Independent  Scooting: Independent  Transfers  Sit to stand: Stand by assistance  Stand to sit: Stand by assistance  Transfer Comments: Min VC/tactile cues for hand placement and square self/AD to surface                       Cognition  Overall Cognitive Status: Exceptions  Following Commands: Follows one step commands with increased time; Follows one step commands with repetition  Attention Span: Difficulty attending to directions  Memory: Decreased recall of recent events;Decreased recall of biographical Information;Decreased short term memory  Safety Judgement: Decreased awareness of need for assistance;Decreased awareness of need for safety  Problem Solving: Assistance required to generate solutions;Assistance required to implement solutions;Assistance required to identify errors made;Assistance required to correct errors made;Decreased awareness of errors  Insights: Decreased awareness of deficits  Initiation: Requires cues for some  Sequencing: Requires cues for some                                         Plan   Plan  Times per week: 4-5x/week, 1-2x/day  Current Treatment Recommendations: Strengthening, Balance Training, Functional Mobility Training, Endurance Training, Safety Education & Training, Self-Care / ADL, Patient/Caregiver Education & Training, Equipment Evaluation, Education, & procurement, Positioning, Cognitive/Perceptual Training    AM-PAC Score        AM-PAC Inpatient Daily Activity Raw Score: 16 (08/27/21 1216)  AM-PAC Inpatient ADL T-Scale Score : 35.96 (08/27/21 1216)  ADL Inpatient CMS 0-100% Score: 53.32 (08/27/21 1216)  ADL Inpatient CMS G-Code Modifier : CK (08/27/21 1216)    Goals  Short term goals  Time Frame for Short term goals: by discharge, pt will  Short term goal 1: demo CGA with ADL transfers with AD/DME and good safety  Short

## 2021-08-27 NOTE — PROGRESS NOTES
Nutrition Assessment     Type and Reason for Visit: Reassess    Nutrition Recommendations/Plan:   1. Continue current Easy to [employer]  2. Modify ONS order: discontinue Ensure Enlive and continue Ensure Clear 2x/d  3. Monitor PO intakes, ONS acceptance, diet tolerance, weights and labs    Nutrition Assessment:  Patient seen today for follow-up date. Her placement is pending. She was seen laying in bed- nausea reported leading to poor oral intake today. Per dietary- \"she did not eat any breakfast\". She reports not drinking the Ensure Enlive due to \"not able to have milk products\". Encouraged Ensure Clear intake- she said she will try (has three bottles unopened in room). She did report having \"bites\" when she is able to eat. Will continue current diet and modify ONS order. Will monitor PO intakes, ONS acceptance, weights and labs. Malnutrition Assessment:  Malnutrition Status: Moderate malnutrition    Estimated Daily Nutrient Needs:  Energy (kcal): 8122-5224 kcal (30-33 kcal/kg); Weight Used for Energy Requirements:  Current     Protein (g): 62-71 gm of protein (1.3-1.5 gm/kg); Weight Used for Protein Requirements:  Current       Nutrition Related Findings: EGD: portal hypertensive gastropathy related to cirrhosis. May require colonoscopy. Nausea. Poor appetite.  Med Hx: Alcohol abuse and heavy smoker      Current Nutrition Therapies:    ADULT DIET; Easy to Chew  Adult Oral Nutrition Supplement; Clear Liquid Oral Supplement    Anthropometric Measures:  · Height: 5' 4\" (162.6 cm)  · Current Body Wt: 110 lb 14.3 oz (50.3 kg)   · BMI: 19    Nutrition Diagnosis:   · In context of social or environmental circumstances, Moderate malnutrition related to inadequate protein-energy intake as evidenced by intake 0-25%, poor intake prior to admission, BMI, mild muscle loss, mild loss of subcutaneous fat (underweight)    Nutrition Interventions:   Food and/or Nutrient Delivery:  Continue Current Diet, Modify Oral Nutrition Supplement  Nutrition Education/Counseling:  Education not indicated   Coordination of Nutrition Care:  Continue to monitor while inpatient    Goals:  PO intakes are greater than 50% at meals       Nutrition Monitoring and Evaluation:   Behavioral-Environmental Outcomes:  None Identified   Food/Nutrient Intake Outcomes:  Supplement Intake, Food and Nutrient Intake  Physical Signs/Symptoms Outcomes:  Biochemical Data, Nausea or Vomiting, Skin, Weight     Discharge Planning:    Continue current diet, Continue Oral Nutrition Supplement     Cecy Gaona, [address]  Office Number: [phone]

## 2021-08-27 NOTE — PLAN OF CARE
[facility]  Office: [address], DO, Fatemeh Berger, DO, Galen Shaikh, DO, Cuauhtemoc Ruelas Blood, DO, Reggie Hewitt MD, David Garces MD, Gil Roman MD, Natalie Swift MD, Alverto Liao MD, Latisha Parnell MD, Isa Cates MD, Hemalatha Li, DO, Josselin Shearer MD, Alma Delia Rodriguez, DO, Linnette Watson MD,  Mynor Winn, DO, Arelis Benítez MD, Leon Bran MD, Melvern Essex, MD, Gilbert Garcia MD, Irma Lazcano MD, Lainey Jones MD, Garfield Llanes MD, Lauryn Tracy, CNP, [facility], CNP, Binh Deras, CNP, Marcos Craven, [facility], Annabelle Guerrero, Veronica Valenzuela, CNP, Ludin Villanueva, CNP, Ramya Sarmiento, CNP, Sheyla Simental, CNP, Ayana White PA-C, Alessandro Bella, [facility], Marisa Oreilly, CNP, Austin Ac, CNP, Kaleigh Huggins, CNP, Erin Tamayo, CNP, [place], CNP, Sary Monte, CNP, Jo-Ann Armstrong, CNP, Dannie Delgadillo, [facility]    Second Visit Note  For more detailed information please refer to the progress note of the day      8/27/2021    5:15 PM    Name:   Gibson Moy  MRN:     [id number]     [place]:      [de-identified]   Room:   [address] [place] [address] Day:  6  Admit Date:  8/21/2021 12:30 PM    PCP:   Kareem Eldridge PA-C  Code Status:  Full Code      Pt vitals were reviewed   New labs were reviewed   Patient was seen    Updated plan :     1. Sob much better  2. Not requiring o2  3.  Dc to snf today on antibiotics        Anthony OBRIEN Blood, DO  8/27/2021  5:15 PM

## 2021-08-27 NOTE — PLAN OF CARE
Problem: Skin Integrity:  Goal: Will show no infection signs and symptoms  Description: Will show no infection signs and symptoms  8/26/2021 2154 by Alon Dunlap  Outcome: Ongoing  8/26/2021 1607 by Dena Cornejo RN  Outcome: Ongoing   Monitor for signs of sepsis.

## 2021-08-27 NOTE — PROGRESS NOTES
[facility]  Office: [address], DO, Schaefer Jose Franciscosar, DO, Steven Narrow, DO, Rodney Mcduffie Blood, DO, Evangelista Castaneda MD, Gage Fleming MD, Mehdi Hilliard MD, Karoline Mann MD, Anthony Hernandez MD, Andres Fuller MD, Tressa Simon MD, Colleen Mata, DO, Eugenio Damon MD, Pancho Ramos, DO, Homero Bernard MD,  Ganga Montelongo, DO, Love Yanez MD, Silvia Spence MD, Patel Woodward MD, Jenae Boston MD, Melissa Rios MD, Rahul Chaparro MD, Katherine Hicks MD, Arvind Aguilar, [facility], [facility], CNP, Andrey Mulligan, CNP, Laura Councilman, CNS, Yoselyn Bull, CNP, Karen Damico, CNP, Perla Park, CNP, Dari Yates, CNP, Yoseph Rucker, CNP, Isiah Hernandez PA-C, Aayush Francis, [facility], Todd Silva, CNP, Amanda Child, CNP, Trinda Starch, CNP, Lia Preeit, CNP, Conchis Kuyefri, CNP, Marya Copper, CNP, David Acosta, CNP, Jono Carrillo, David Griffin    Progress Note    8/27/2021    7:27 AM    Name:   Sanaz Lopez  MRN:     [id number]     Acct:      [de-identified]   Room:   [address] [place] [address] Day:  6  Admit Date:  8/21/2021 12:30 PM    PCP:   Judythe Paget, PA-C  Code Status:  Full Code    Subjective:     C/C:   Chief Complaint   Patient presents with   [employer] Emesis    Diarrhea     Interval History Status: not changed. Overall feels better but some sob and congestion today  Had egd which showed portal hypertensive gastropathy    Denies cp/n/v    No further bleeding noted    Brief History:     Per my partner:   \"This is a 61-year-old female who presents with progressive weakness associated with vomiting and diarrhea over several days. Mace  is found to have significant anemia with a hemoglobin less than 6 with concern for gastrointestinal bleeding.  She denied any melena, hematemesis or hematochezia.  She is admitted for Protonix drip, octreotide drip and GI consultation. Carlos  is been placed on CIWA scale due to her history of alcoholism. \"    Review of Systems:     Constitutional:  negative for chills, fevers, sweats  Respiratory:  negative for wheezing  Cardiovascular:  negative for chest pain, chest pressure/discomfort, lower extremity edema, palpitations  Gastrointestinal:  negative for abdominal pain, constipation, diarrhea, nausea, vomiting  Neurological:  negative for dizziness, headache    Medications: Allergies:     Allergies   Allergen Reactions    Ibuprofen Nausea And Vomiting       Current Meds:   Scheduled Meds:    propranolol  10 mg Oral TID    magnesium oxide  400 mg Oral BID    albuterol  2.5 mg Nebulization 4x daily    thiamine mononitrate  100 mg Oral Daily    pantoprazole  40 mg Oral QAM AC    nicotine  1 patch Transdermal Daily    folic acid  1 mg Oral Daily    Arformoterol Tartrate  15 mcg Nebulization BID    sodium chloride flush  5-40 mL IntraVENous 2 times per day    pantoprazole (PROTONIX) bolus  80 mg IntraVENous Once    sodium chloride (PF)  10 mL IntraVENous Daily     Continuous Infusions:    sodium chloride      sodium chloride       PRN Meds: acetaminophen, albuterol, sodium chloride, sodium chloride flush, sodium chloride, ondansetron **OR** ondansetron, potassium chloride **OR** potassium alternative oral replacement **OR** potassium chloride, magnesium sulfate, LORazepam **OR** LORazepam **OR** LORazepam **OR** LORazepam    Data:     Past Medical History:   has a past medical history of Alcohol withdrawal ([facility] Alcohol withdrawal seizure with complication ([facility] Alcohol-induced chronic pancreatitis ([facility] Alcoholic hepatitis, Cellulitis of right hip, COPD (chronic obstructive pulmonary disease) ([facility] Diabetes mellitus ([facility] DM type 2 (diabetes mellitus, type 2) ([facility] Dysphagia, Eczema, Elevated liver enzymes, FTT (failure to thrive) in adult, Hoarseness, Hypertension, Hypomagnesemia, Hyponatremia, Intertrochanteric fracture of right femur ([facility] Iron deficiency anemia, Lesion of hard palate, Moderate malnutrition ([address] New onset seizure ([facility] Poor historian, and Smoker. Social History:   reports that she has been smoking cigarettes. She has a 35.00 pack-year smoking history. She has never used smokeless tobacco. She reports current alcohol use. She reports that she does not use drugs. Family History:   Family History   Problem Relation Age of Onset    Heart Disease Mother     Diabetes Father     Asthma Sister     Asthma Brother        Vitals:  /86   Pulse 83   Temp 99 °F (37.2 °C) (Axillary)   Resp 16   Ht 5' 4\" (1.626 m)   Wt 110 lb 14.3 oz (50.3 kg)   SpO2 97%   BMI 19.03 kg/m²   Temp (24hrs), Av.1 °F (37.3 °C), Min:98.2 °F (36.8 °C), Max:99.7 °F (37.6 °C)    No results for input(s): POCGLU in the last 72 hours. I/O (24Hr): Intake/Output Summary (Last 24 hours) at 2021 0727  Last data filed at 2021 0600  Gross per 24 hour   Intake 120 ml   Output 200 ml   Net -80 ml       Labs:  Hematology:  Recent Labs     21  04421  1253 21  0504 21  0514   WBC 7.2  --   --  7.0 6.3   RBC 2.42*  --   --  2.57* 2.34*   HGB 7.6*   < > 8.2* 8.2* 7.5*   HCT 22.4*  --   --  25.8* 23.1*   MCV 92.6  --   --  100.4 98.7   MCH 31.4  --   --  31.9 32.1   MCHC 33.9  --   --  31.8 32.5   RDW 16.0*  --   --  18.5* 19.0*     --   --  179 200   MPV 10.4  --   --  10.7 10.6    < > = values in this interval not displayed.      Chemistry:  Recent Labs     21  0504 21  1203 21  0514   * 132*  --  131*   K 4.0 3.5*  --  3.4*   CL 98 101  --  102   CO2 22   --     GLUCOSE 111* 79  --  79   BUN <2* 2*  --  4*   CREATININE <0.40* <0.40*  --  <0.40*   MG  --  0.9* 2.2 1.6   ANIONGAP 9 9  --  8*   LABGLOM CANNOT BE CALCULATED CANNOT BE CALCULATED  --  CANNOT BE CALCULATED   GFRAA CANNOT BE CALCULATED CANNOT BE CALCULATED  --  CANNOT BE CALCULATED   CALCIUM 6.5* 6.6*  --  6.7*     Recent Labs Problems         Last Modified POA    * (Principal) GIB (gastrointestinal bleeding) 8/21/2021 Yes    Hypokalemia 8/21/2021 Yes    Moderate malnutrition ([address] 8/23/2021 Yes    Tobacco abuse (Chronic) 8/21/2021 Yes    COPD without exacerbation ([address] 8/21/2021 Yes    Noncompliance (Chronic) 8/21/2021 Yes    Macrocytic anemia 8/21/2021 Yes    Alcohol abuse 8/21/2021 Yes    Hepatic steatosis (Chronic) 8/21/2021 Yes    Elevated LFTs 8/21/2021 Yes    Dietary folate deficiency anemia 8/22/2021 Yes    Alcohol dependence with unspecified alcohol-induced disorder ([facility] 8/22/2021 Yes    Portal hypertensive gastropathy ([facility] 8/23/2021 Yes          Plan:        Replace k  Added po magnesium also  Added low dose propanolol as bp tolerates  Check cxr today and start short steroid burst  Can stop daily lft  Dc to snf  delmi and med rec updated    Will George Blood, DO  8/27/2021  7:27 AM

## 2021-08-27 NOTE — PLAN OF CARE
Nutrition Problem #1: In context of social or environmental circumstances, Moderate malnutrition  Intervention: Food and/or Nutrient Delivery: Continue Current Diet, Modify Oral Nutrition Supplement  Nutritional Goals: PO intakes are greater than 50% at meals

## 2021-08-28 NOTE — PLAN OF CARE
Problem: Falls - Risk of:  Goal: Absence of physical injury  Description: Absence of physical injury  Outcome: Ongoing     Problem: Falls - Risk of:  Goal: Will remain free from falls  Description: Will remain free from falls  Outcome: Ongoing     Problem:  Activity:  Goal: Risk for activity intolerance will decrease  Description: Risk for activity intolerance will decrease  Outcome: Ongoing     Problem: Metabolic:  Goal: Ability to maintain appropriate glucose levels will improve  Description: Ability to maintain appropriate glucose levels will improve  Outcome: Ongoing     Problem: Nutritional:  Goal: Progress toward achieving an optimal weight will improve  Description: Progress toward achieving an optimal weight will improve  Outcome: Ongoing     Problem: Gas Exchange - Impaired:  Goal: Levels of oxygenation will improve  Description: Levels of oxygenation will improve  Outcome: Ongoing     Problem: Pain:  Goal: Pain level will decrease  Description: Pain level will decrease  Outcome: Ongoing

## 2021-08-28 NOTE — PROGRESS NOTES
Report called to Jez Santiago RN at [facility]. SEAN signed and completed. Patient's daughter notified of discharge plan.

## 2021-08-28 NOTE — PROGRESS NOTES
[facility]  Office: [address], DO, Joseflinda Baengas, DO, Danelle Gabriel, DO, Drew Daniels Blood, DO, Nikki Rothman MD, Arsenio Mi MD, Zakiya Carlson MD, Raman Claire MD, Angelina Maxwell MD, Donald Real MD, Blair Walton MD, Marielena Vasquez, DO, Perez Douglas MD, Ching Soler DO, Génesis Russ MD,  Geoff Amaro, DO, Vivian Torrez MD, Maxx Hagan MD, Makayla Cano MD, Philip Tejeda MD, Elliott Le MD, Carmita Patel MD, Melissa Boucher MD, Avelino Winter, [facility], [facility], [facility], Shankar Chiuippo, CNP, Boni Owusu, CNS, Steven Kilpatrick, CNP, Apurva Cedillo, CNP, Vito Garcia, CNP, Sheila Aquino, CNP, Ace Baez, CNP, Luiz Busch PA-C, Khadijah Moreno, [facility], Mariel Figueroa, CNP, Lieutenant Chamberlain, CNP, Michaela Avalos, CNP, Mone Santillan, CNP, Eldon Capps, CNP, Kristian Wilkerson, CNP, Naeem Baltazar, [facility], [facility]    Progress Note    8/28/2021    8:14 AM    Name:   Jonah Mckeon  MRN:     [id number]     Acct:      [de-identified]   Room:   [address] [place] [address] Day:  7  Admit Date:  8/21/2021 12:30 PM    PCP:   Maggie Fernández PA-C  Code Status:  Full Code    Subjective:     C/C:   Chief Complaint   Patient presents with    Emesis    Diarrhea     Interval History Status: improved. Overall feels better but some sob and congestion today  Had egd which showed portal hypertensive gastropathy    Denies cp/n/v    No further bleeding noted    Brief History:     Per my partner:   \"This is a 57-year-old female who presents with progressive weakness associated with vomiting and diarrhea over several days. Jayda Mack is found to have significant anemia with a hemoglobin less than 6 with concern for gastrointestinal bleeding.  She denied any melena, hematemesis or hematochezia.  She is admitted for Protonix drip, octreotide drip and GI consultation. Jayda Mack is been placed on CIWA scale due to her history of Hypertension, Hypomagnesemia, Hyponatremia, Intertrochanteric fracture of right femur ([address] Iron deficiency anemia, Lesion of hard palate, Moderate malnutrition ([address] New onset seizure ([facility] Poor historian, and Smoker. Social History:   reports that she has been smoking cigarettes. She has a 35.00 pack-year smoking history. She has never used smokeless tobacco. She reports current alcohol use. She reports that she does not use drugs. Family History:   Family History   Problem Relation Age of Onset    Heart Disease Mother     Diabetes Father     Asthma Sister     Asthma Brother        Vitals:  /70   Pulse 83   Temp 98.4 °F (36.9 °C) (Oral)   Resp 16   Ht 5' 4\" (1.626 m)   Wt 105 lb 1 oz (47.7 kg)   SpO2 96%   BMI 18.03 kg/m²   Temp (24hrs), Av.3 °F (36.8 °C), Min:98.2 °F (36.8 °C), Max:98.4 °F (36.9 °C)    No results for input(s): POCGLU in the last 72 hours. I/O (24Hr):     Intake/Output Summary (Last 24 hours) at 2021 0814  Last data filed at 2021 1704  Gross per 24 hour   Intake 240 ml   Output 400 ml   Net -160 ml       Labs:  Hematology:  Recent Labs     21  0504 21  0514 21  0506   WBC 7.0 6.3 7.6   RBC 2.57* 2.34* 2.48*   HGB 8.2* 7.5* 7.8*   HCT 25.8* 23.1* 24.6*   .4 98.7 99.2   MCH 31.9 32.1 31.5   MCHC 31.8 32.5 31.7   RDW 18.5* 19.0* 19.3*    200 302   MPV 10.7 10.6 11.7     Chemistry:  Recent Labs     21  0504 21  1203 21  0514 21  0506   *  --  131* 130*   K 3.5*  --  3.4* 3.7     --  102 103   CO2   --     GLUCOSE 79  --  79 79   BUN 2*  --  4* 5*   CREATININE <0.40*  --  <0.40* <0.40*   MG 0.9* 2.2 1.6  --    ANIONGAP 9  --  8* 6*   LABGLOM CANNOT BE CALCULATED  --  CANNOT BE CALCULATED CANNOT BE CALCULATED   GFRAA CANNOT BE CALCULATED  --  CANNOT BE CALCULATED CANNOT BE CALCULATED   CALCIUM 6.6*  --  6.7* 7.2*     Recent Labs     21  0504 21  0514   PROT 4.8* 4.4* LABALBU 2.1* 2.1*   * 152*   ALT 50* 39*   ALKPHOS 273* 254*   BILITOT 4.38* 3.93*   BILIDIR 3.32* 3.38*     ABG:No results found for: POCPH, PHART, PH, POCPCO2, FHH4EMG, PCO2, POCPO2, PO2ART, PO2, POCHCO3, HQN1KSU, HCO3, NBEA, PBEA, BEART, BE, THGBART, THB, ZQZ9VPU, MFDY4DEO, Z5TFKNPV, O2SAT, FIO2  Lab Results   Component Value Date/Time    SPECIAL NOT REPORTED 08/21/2021 01:45 PM    SPECIAL NOT REPORTED 08/21/2021 01:45 PM     Lab Results   Component Value Date/Time    CULTURE NO GROWTH 6 DAYS 08/21/2021 01:45 PM    CULTURE NO GROWTH 6 DAYS 08/21/2021 01:45 PM       Radiology:  XR CHEST PORTABLE    Result Date: 8/21/2021  Probable bibasilar atelectasis; no consolidation or sizable pleural effusion. No pneumothorax. Possible tiny nodular density right upper lung. RECOMMENDATION: Depending upon the patient's history, consider follow-up two-view chest x-ray in 3-4 months versus CT chest (if smoking history or other risk factors). CTA ABDOMEN PELVIS W CONTRAST    Result Date: 8/21/2021  1. No convincing evidence for bowel ischemia. The aorta is normal in caliber with scattered atheromatous plaque. 2.  Mild apparent wall thickening of the ascending and transverse colon, for which underlying colitis may be present. This finding may be accentuated by underdistention. 3.  Marked hepatic steatosis. 4.  Mild central airway congestion.        Physical Examination:        General appearance:  alert, cooperative and no distress  Mental Status:  oriented to person, place and time and normal affect  Lungs:clear bilaterally, normal effort  Heart:  regular rhythm, no murmur  Abdomen:  soft, nontender, nondistended, normal bowel sounds, no masses, hepatomegaly, splenomegaly  Extremities:  no edema, redness, tenderness in the calves  Skin:  no gross lesions, rashes, induration    Assessment:        Hospital Problems         Last Modified POA    * (Principal) GIB (gastrointestinal bleeding) 8/21/2021 Yes    Hypokalemia 8/21/2021 Yes    Moderate malnutrition ([address] 8/23/2021 Yes    Tobacco abuse (Chronic) 8/21/2021 Yes    COPD without exacerbation ([address] 8/21/2021 Yes    Noncompliance (Chronic) 8/21/2021 Yes    Macrocytic anemia 8/21/2021 Yes    Alcohol abuse 8/21/2021 Yes    Hepatic steatosis (Chronic) 8/21/2021 Yes    Elevated LFTs 8/21/2021 Yes    Dietary folate deficiency anemia 8/22/2021 Yes    Alcohol dependence with unspecified alcohol-induced disorder ([address] 8/22/2021 Yes    Portal hypertensive gastropathy ([address] 8/23/2021 Yes    Multifocal pneumonia 8/27/2021 No          Plan:        Added po magnesium  Added low dose propanolol as bp tolerates  Started antibiotics for pneumonia  Dc to snf  delmi and med rec updated    Val Joel DO  8/28/2021  8:14 AM

## 2022-01-01 ENCOUNTER — APPOINTMENT (OUTPATIENT)
Dept: GENERAL RADIOLOGY | Age: 62
DRG: 207 | End: 2022-01-01
Payer: MEDICARE

## 2022-01-01 ENCOUNTER — APPOINTMENT (OUTPATIENT)
Dept: CT IMAGING | Age: 62
DRG: 207 | End: 2022-01-01
Payer: MEDICARE

## 2022-01-01 ENCOUNTER — APPOINTMENT (OUTPATIENT)
Dept: MRI IMAGING | Age: 62
DRG: 207 | End: 2022-01-01
Payer: MEDICARE

## 2022-01-01 ENCOUNTER — HOSPITAL ENCOUNTER (INPATIENT)
Age: 62
LOS: 7 days | DRG: 207 | End: 2022-03-03
Attending: EMERGENCY MEDICINE | Admitting: INTERNAL MEDICINE
Payer: MEDICARE

## 2022-01-01 VITALS
WEIGHT: 97 LBS | DIASTOLIC BLOOD PRESSURE: 75 MMHG | OXYGEN SATURATION: 96 % | HEIGHT: 64 IN | RESPIRATION RATE: 18 BRPM | TEMPERATURE: 98.1 F | HEART RATE: 90 BPM | BODY MASS INDEX: 16.56 KG/M2 | SYSTOLIC BLOOD PRESSURE: 117 MMHG

## 2022-01-01 DIAGNOSIS — I46.9 CARDIAC ARREST (HCC): Primary | ICD-10-CM

## 2022-01-01 LAB
-: ABNORMAL
-: NORMAL
ABSOLUTE EOS #: 0 K/UL (ref 0–0.4)
ABSOLUTE EOS #: 0 K/UL (ref 0–0.44)
ABSOLUTE EOS #: 1.41 K/UL (ref 0–0.4)
ABSOLUTE IMMATURE GRANULOCYTE: 0 K/UL (ref 0–0.3)
ABSOLUTE IMMATURE GRANULOCYTE: 0.26 K/UL (ref 0–0.3)
ABSOLUTE IMMATURE GRANULOCYTE: 0.33 K/UL (ref 0–0.3)
ABSOLUTE IMMATURE GRANULOCYTE: 0.33 K/UL (ref 0–0.3)
ABSOLUTE IMMATURE GRANULOCYTE: 0.37 K/UL (ref 0–0.3)
ABSOLUTE IMMATURE GRANULOCYTE: 0.42 K/UL (ref 0–0.3)
ABSOLUTE LYMPH #: 0.33 K/UL (ref 1–4.8)
ABSOLUTE LYMPH #: 0.37 K/UL (ref 1.1–3.7)
ABSOLUTE LYMPH #: 0.39 K/UL (ref 1–4.8)
ABSOLUTE LYMPH #: 0.42 K/UL (ref 1–4.8)
ABSOLUTE LYMPH #: 0.55 K/UL (ref 1–4.8)
ABSOLUTE LYMPH #: 3.58 K/UL (ref 1–4.8)
ABSOLUTE MONO #: 0.37 K/UL (ref 0.1–1.2)
ABSOLUTE MONO #: 0.44 K/UL (ref 0.2–0.8)
ABSOLUTE MONO #: 0.52 K/UL (ref 0.2–0.8)
ABSOLUTE MONO #: 0.54 K/UL (ref 0.2–0.8)
ABSOLUTE MONO #: 0.64 K/UL (ref 0.2–0.8)
ABSOLUTE MONO #: 0.76 K/UL (ref 0.2–0.8)
ALBUMIN SERPL-MCNC: 2.7 G/DL (ref 3.5–5.2)
ALBUMIN SERPL-MCNC: 2.8 G/DL (ref 3.5–5.2)
ALBUMIN SERPL-MCNC: 2.9 G/DL (ref 3.5–5.2)
ALBUMIN SERPL-MCNC: 3 G/DL (ref 3.5–5.2)
ALBUMIN SERPL-MCNC: 3.2 G/DL (ref 3.5–5.2)
ALBUMIN SERPL-MCNC: 3.7 G/DL (ref 3.5–5.2)
ALP BLD-CCNC: 101 U/L (ref 35–104)
ALP BLD-CCNC: 109 U/L (ref 35–104)
ALP BLD-CCNC: 134 U/L (ref 35–104)
ALP BLD-CCNC: 89 U/L (ref 35–104)
ALP BLD-CCNC: 91 U/L (ref 35–104)
ALP BLD-CCNC: 92 U/L (ref 35–104)
ALP BLD-CCNC: 94 U/L (ref 35–104)
ALP BLD-CCNC: 98 U/L (ref 35–104)
ALP BLD-CCNC: 99 U/L (ref 35–104)
ALP BLD-CCNC: 99 U/L (ref 35–104)
ALT SERPL-CCNC: 40 U/L (ref 5–33)
ALT SERPL-CCNC: 42 U/L (ref 5–33)
ALT SERPL-CCNC: 50 U/L (ref 5–33)
ALT SERPL-CCNC: 54 U/L (ref 5–33)
ALT SERPL-CCNC: 58 U/L (ref 5–33)
ALT SERPL-CCNC: 63 U/L (ref 5–33)
ALT SERPL-CCNC: 65 U/L (ref 5–33)
ALT SERPL-CCNC: 67 U/L (ref 5–33)
ALT SERPL-CCNC: 72 U/L (ref 5–33)
ALT SERPL-CCNC: 74 U/L (ref 5–33)
AMPHETAMINE SCREEN URINE: NEGATIVE
AMYLASE: 102 U/L (ref 28–100)
ANION GAP SERPL CALCULATED.3IONS-SCNC: 10 MMOL/L (ref 9–17)
ANION GAP SERPL CALCULATED.3IONS-SCNC: 11 MMOL/L (ref 9–17)
ANION GAP SERPL CALCULATED.3IONS-SCNC: 11 MMOL/L (ref 9–17)
ANION GAP SERPL CALCULATED.3IONS-SCNC: 12 MMOL/L (ref 9–17)
ANION GAP SERPL CALCULATED.3IONS-SCNC: 13 MMOL/L (ref 9–17)
ANION GAP SERPL CALCULATED.3IONS-SCNC: 13 MMOL/L (ref 9–17)
ANION GAP SERPL CALCULATED.3IONS-SCNC: 14 MMOL/L (ref 9–17)
ANION GAP SERPL CALCULATED.3IONS-SCNC: 15 MMOL/L (ref 9–17)
ANION GAP SERPL CALCULATED.3IONS-SCNC: 19 MMOL/L (ref 9–17)
ANION GAP SERPL CALCULATED.3IONS-SCNC: 5 MMOL/L (ref 9–17)
ANION GAP SERPL CALCULATED.3IONS-SCNC: 6 MMOL/L (ref 9–17)
ANION GAP SERPL CALCULATED.3IONS-SCNC: 8 MMOL/L (ref 9–17)
ANION GAP SERPL CALCULATED.3IONS-SCNC: 8 MMOL/L (ref 9–17)
ANION GAP SERPL CALCULATED.3IONS-SCNC: 9 MMOL/L (ref 9–17)
ANTI-XA UNFRAC HEPARIN: 0.25 IU/L (ref 0.3–0.7)
ANTI-XA UNFRAC HEPARIN: 0.28 IU/L (ref 0.3–0.7)
ANTI-XA UNFRAC HEPARIN: 0.3 IU/L (ref 0.3–0.7)
ANTI-XA UNFRAC HEPARIN: 0.36 IU/L (ref 0.3–0.7)
ANTI-XA UNFRAC HEPARIN: 0.36 IU/L (ref 0.3–0.7)
ANTI-XA UNFRAC HEPARIN: 0.45 IU/L (ref 0.3–0.7)
ANTI-XA UNFRAC HEPARIN: 0.57 IU/L (ref 0.3–0.7)
ANTI-XA UNFRAC HEPARIN: <0.1 IU/L (ref 0.3–0.7)
AST SERPL-CCNC: 100 U/L
AST SERPL-CCNC: 110 U/L
AST SERPL-CCNC: 125 U/L
AST SERPL-CCNC: 58 U/L
AST SERPL-CCNC: 71 U/L
AST SERPL-CCNC: 76 U/L
AST SERPL-CCNC: 81 U/L
AST SERPL-CCNC: 88 U/L
AST SERPL-CCNC: 91 U/L
AST SERPL-CCNC: 95 U/L
BARBITURATE SCREEN URINE: NEGATIVE
BASOPHILS # BLD: 0 %
BASOPHILS # BLD: 0 % (ref 0–2)
BASOPHILS # BLD: 1 %
BASOPHILS ABSOLUTE: 0 K/UL (ref 0–0.2)
BASOPHILS ABSOLUTE: 0.13 K/UL (ref 0–0.2)
BENZODIAZEPINE SCREEN, URINE: NEGATIVE
BILIRUB SERPL-MCNC: 0.14 MG/DL (ref 0.3–1.2)
BILIRUB SERPL-MCNC: 0.16 MG/DL (ref 0.3–1.2)
BILIRUB SERPL-MCNC: 0.16 MG/DL (ref 0.3–1.2)
BILIRUB SERPL-MCNC: 0.17 MG/DL (ref 0.3–1.2)
BILIRUB SERPL-MCNC: 0.18 MG/DL (ref 0.3–1.2)
BILIRUB SERPL-MCNC: 0.2 MG/DL (ref 0.3–1.2)
BILIRUB SERPL-MCNC: 0.22 MG/DL (ref 0.3–1.2)
BILIRUB SERPL-MCNC: 0.23 MG/DL (ref 0.3–1.2)
BILIRUB SERPL-MCNC: 0.23 MG/DL (ref 0.3–1.2)
BILIRUB SERPL-MCNC: 0.36 MG/DL (ref 0.3–1.2)
BILIRUBIN DIRECT: 0.09 MG/DL
BILIRUBIN DIRECT: 0.1 MG/DL
BILIRUBIN DIRECT: 0.11 MG/DL
BILIRUBIN DIRECT: 0.12 MG/DL
BILIRUBIN DIRECT: 0.13 MG/DL
BILIRUBIN DIRECT: <0.08 MG/DL
BILIRUBIN URINE: NEGATIVE
BILIRUBIN, INDIRECT: 0.11 MG/DL (ref 0–1)
BUN BLDV-MCNC: 12 MG/DL (ref 6–20)
BUN BLDV-MCNC: 16 MG/DL (ref 6–20)
BUN BLDV-MCNC: 17 MG/DL (ref 6–20)
BUN BLDV-MCNC: 18 MG/DL (ref 6–20)
BUN BLDV-MCNC: 18 MG/DL (ref 6–20)
BUN BLDV-MCNC: 4 MG/DL (ref 6–20)
BUN BLDV-MCNC: 5 MG/DL (ref 6–20)
BUN BLDV-MCNC: 6 MG/DL (ref 6–20)
BUN BLDV-MCNC: 7 MG/DL (ref 6–20)
BUN BLDV-MCNC: 9 MG/DL (ref 6–20)
BUN/CREAT BLD: 14 (ref 9–20)
BUN/CREAT BLD: 14 (ref 9–20)
BUN/CREAT BLD: 19 (ref 9–20)
BUN/CREAT BLD: 24 (ref 9–20)
BUN/CREAT BLD: 34 (ref 9–20)
BUN/CREAT BLD: 37 (ref 9–20)
BUN/CREAT BLD: 38 (ref 9–20)
BUN/CREAT BLD: 40 (ref 9–20)
BUN/CREAT BLD: 40 (ref 9–20)
BUN/CREAT BLD: 41 (ref 9–20)
BUN/CREAT BLD: 7 (ref 9–20)
BUN/CREAT BLD: ABNORMAL (ref 9–20)
CALCIUM IONIZED: 0.96 MMOL/L (ref 1.13–1.33)
CALCIUM IONIZED: 1.11 MMOL/L (ref 1.13–1.33)
CALCIUM IONIZED: 1.13 MMOL/L (ref 1.13–1.33)
CALCIUM IONIZED: 1.15 MMOL/L (ref 1.13–1.33)
CALCIUM IONIZED: 1.16 MMOL/L (ref 1.13–1.33)
CALCIUM IONIZED: 1.18 MMOL/L (ref 1.13–1.33)
CALCIUM IONIZED: 1.23 MMOL/L (ref 1.13–1.33)
CALCIUM IONIZED: 1.33 MMOL/L (ref 1.13–1.33)
CALCIUM SERPL-MCNC: 7.7 MG/DL (ref 8.6–10.4)
CALCIUM SERPL-MCNC: 8 MG/DL (ref 8.6–10.4)
CALCIUM SERPL-MCNC: 8.1 MG/DL (ref 8.6–10.4)
CALCIUM SERPL-MCNC: 8.2 MG/DL (ref 8.6–10.4)
CALCIUM SERPL-MCNC: 8.2 MG/DL (ref 8.6–10.4)
CALCIUM SERPL-MCNC: 8.4 MG/DL (ref 8.6–10.4)
CALCIUM SERPL-MCNC: 8.5 MG/DL (ref 8.6–10.4)
CALCIUM SERPL-MCNC: 8.8 MG/DL (ref 8.6–10.4)
CALCIUM SERPL-MCNC: 8.9 MG/DL (ref 8.6–10.4)
CALCIUM SERPL-MCNC: 9.2 MG/DL (ref 8.6–10.4)
CALCIUM SERPL-MCNC: 9.5 MG/DL (ref 8.6–10.4)
CANNABINOID SCREEN URINE: NEGATIVE
CASTS UA: NORMAL /LPF
CHLORIDE BLD-SCNC: 100 MMOL/L (ref 98–107)
CHLORIDE BLD-SCNC: 101 MMOL/L (ref 98–107)
CHLORIDE BLD-SCNC: 102 MMOL/L (ref 98–107)
CHLORIDE BLD-SCNC: 103 MMOL/L (ref 98–107)
CHLORIDE BLD-SCNC: 104 MMOL/L (ref 98–107)
CHLORIDE BLD-SCNC: 105 MMOL/L (ref 98–107)
CHLORIDE BLD-SCNC: 97 MMOL/L (ref 98–107)
CHLORIDE BLD-SCNC: 99 MMOL/L (ref 98–107)
CO2: 16 MMOL/L (ref 20–31)
CO2: 20 MMOL/L (ref 20–31)
CO2: 21 MMOL/L (ref 20–31)
CO2: 21 MMOL/L (ref 20–31)
CO2: 22 MMOL/L (ref 20–31)
CO2: 22 MMOL/L (ref 20–31)
CO2: 23 MMOL/L (ref 20–31)
CO2: 24 MMOL/L (ref 20–31)
CO2: 25 MMOL/L (ref 20–31)
CO2: 26 MMOL/L (ref 20–31)
CO2: 27 MMOL/L (ref 20–31)
CO2: 27 MMOL/L (ref 20–31)
CO2: 28 MMOL/L (ref 20–31)
CO2: 28 MMOL/L (ref 20–31)
CO2: 29 MMOL/L (ref 20–31)
CO2: 31 MMOL/L (ref 20–31)
COCAINE METABOLITE, URINE: NEGATIVE
COLOR: YELLOW
COMMENT UA: ABNORMAL
CREAT SERPL-MCNC: 0.41 MG/DL (ref 0.5–0.9)
CREAT SERPL-MCNC: 0.42 MG/DL (ref 0.5–0.9)
CREAT SERPL-MCNC: 0.45 MG/DL (ref 0.5–0.9)
CREAT SERPL-MCNC: 0.45 MG/DL (ref 0.5–0.9)
CREAT SERPL-MCNC: 0.46 MG/DL (ref 0.5–0.9)
CREAT SERPL-MCNC: 0.5 MG/DL (ref 0.5–0.9)
CREAT SERPL-MCNC: 0.54 MG/DL (ref 0.5–0.9)
CREAT SERPL-MCNC: 0.66 MG/DL (ref 0.5–0.9)
CREAT SERPL-MCNC: 0.72 MG/DL (ref 0.5–0.9)
CREAT SERPL-MCNC: 0.83 MG/DL (ref 0.5–0.9)
CREAT SERPL-MCNC: 0.87 MG/DL (ref 0.5–0.9)
CREAT SERPL-MCNC: <0.4 MG/DL (ref 0.5–0.9)
CREATININE URINE: 138.5 MG/DL (ref 28–217)
CULTURE: ABNORMAL
CULTURE: NORMAL
EKG ATRIAL RATE: 100 BPM
EKG ATRIAL RATE: 42 BPM
EKG ATRIAL RATE: 59 BPM
EKG P AXIS: 37 DEGREES
EKG P AXIS: 79 DEGREES
EKG P AXIS: 86 DEGREES
EKG P-R INTERVAL: 130 MS
EKG P-R INTERVAL: 178 MS
EKG P-R INTERVAL: 202 MS
EKG Q-T INTERVAL: 378 MS
EKG Q-T INTERVAL: 530 MS
EKG Q-T INTERVAL: 592 MS
EKG QRS DURATION: 74 MS
EKG QRS DURATION: 78 MS
EKG QRS DURATION: 80 MS
EKG QTC CALCULATION (BAZETT): 487 MS
EKG QTC CALCULATION (BAZETT): 494 MS
EKG QTC CALCULATION (BAZETT): 524 MS
EKG R AXIS: 40 DEGREES
EKG R AXIS: 52 DEGREES
EKG R AXIS: 67 DEGREES
EKG T AXIS: 67 DEGREES
EKG T AXIS: 76 DEGREES
EKG T AXIS: 80 DEGREES
EKG VENTRICULAR RATE: 100 BPM
EKG VENTRICULAR RATE: 42 BPM
EKG VENTRICULAR RATE: 59 BPM
EOSINOPHIL,URINE: NORMAL
EOSINOPHILS RELATIVE PERCENT: 0 % (ref 1–4)
EOSINOPHILS RELATIVE PERCENT: 11 % (ref 1–4)
EPITHELIAL CELLS UA: ABNORMAL /HPF (ref 0–5)
EPITHELIAL CELLS UA: NORMAL /HPF (ref 0–5)
ETHANOL PERCENT: <0.01 %
ETHANOL: <10 MG/DL
FIO2: 100
FIO2: 30
FIO2: 40
FIO2: 50
FIO2: 80
FIO2: 80
GFR AFRICAN AMERICAN: >60 ML/MIN
GFR AFRICAN AMERICAN: ABNORMAL ML/MIN
GFR NON-AFRICAN AMERICAN: >60 ML/MIN
GFR NON-AFRICAN AMERICAN: ABNORMAL ML/MIN
GFR SERPL CREATININE-BSD FRML MDRD: ABNORMAL ML/MIN/{1.73_M2}
GLUCOSE BLD-MCNC: 100 MG/DL (ref 65–105)
GLUCOSE BLD-MCNC: 101 MG/DL (ref 65–105)
GLUCOSE BLD-MCNC: 106 MG/DL (ref 65–105)
GLUCOSE BLD-MCNC: 107 MG/DL (ref 65–105)
GLUCOSE BLD-MCNC: 108 MG/DL (ref 65–105)
GLUCOSE BLD-MCNC: 109 MG/DL (ref 65–105)
GLUCOSE BLD-MCNC: 113 MG/DL (ref 65–105)
GLUCOSE BLD-MCNC: 116 MG/DL (ref 65–105)
GLUCOSE BLD-MCNC: 118 MG/DL (ref 65–105)
GLUCOSE BLD-MCNC: 121 MG/DL (ref 65–105)
GLUCOSE BLD-MCNC: 122 MG/DL (ref 65–105)
GLUCOSE BLD-MCNC: 125 MG/DL (ref 65–105)
GLUCOSE BLD-MCNC: 125 MG/DL (ref 65–105)
GLUCOSE BLD-MCNC: 127 MG/DL (ref 65–105)
GLUCOSE BLD-MCNC: 128 MG/DL (ref 70–99)
GLUCOSE BLD-MCNC: 129 MG/DL (ref 65–105)
GLUCOSE BLD-MCNC: 130 MG/DL (ref 65–105)
GLUCOSE BLD-MCNC: 131 MG/DL (ref 65–105)
GLUCOSE BLD-MCNC: 132 MG/DL (ref 70–99)
GLUCOSE BLD-MCNC: 133 MG/DL (ref 65–105)
GLUCOSE BLD-MCNC: 134 MG/DL (ref 65–105)
GLUCOSE BLD-MCNC: 135 MG/DL (ref 70–99)
GLUCOSE BLD-MCNC: 136 MG/DL (ref 65–105)
GLUCOSE BLD-MCNC: 140 MG/DL (ref 65–105)
GLUCOSE BLD-MCNC: 140 MG/DL (ref 65–105)
GLUCOSE BLD-MCNC: 141 MG/DL (ref 70–99)
GLUCOSE BLD-MCNC: 143 MG/DL (ref 65–105)
GLUCOSE BLD-MCNC: 147 MG/DL (ref 70–99)
GLUCOSE BLD-MCNC: 150 MG/DL (ref 70–99)
GLUCOSE BLD-MCNC: 150 MG/DL (ref 70–99)
GLUCOSE BLD-MCNC: 157 MG/DL (ref 65–105)
GLUCOSE BLD-MCNC: 157 MG/DL (ref 70–99)
GLUCOSE BLD-MCNC: 158 MG/DL (ref 65–105)
GLUCOSE BLD-MCNC: 158 MG/DL (ref 70–99)
GLUCOSE BLD-MCNC: 159 MG/DL (ref 65–105)
GLUCOSE BLD-MCNC: 159 MG/DL (ref 65–105)
GLUCOSE BLD-MCNC: 160 MG/DL (ref 70–99)
GLUCOSE BLD-MCNC: 161 MG/DL (ref 70–99)
GLUCOSE BLD-MCNC: 163 MG/DL (ref 65–105)
GLUCOSE BLD-MCNC: 163 MG/DL (ref 65–105)
GLUCOSE BLD-MCNC: 164 MG/DL (ref 65–105)
GLUCOSE BLD-MCNC: 164 MG/DL (ref 70–99)
GLUCOSE BLD-MCNC: 166 MG/DL (ref 65–105)
GLUCOSE BLD-MCNC: 166 MG/DL (ref 74–100)
GLUCOSE BLD-MCNC: 168 MG/DL (ref 65–105)
GLUCOSE BLD-MCNC: 169 MG/DL (ref 70–99)
GLUCOSE BLD-MCNC: 171 MG/DL (ref 65–105)
GLUCOSE BLD-MCNC: 177 MG/DL (ref 65–105)
GLUCOSE BLD-MCNC: 180 MG/DL (ref 65–105)
GLUCOSE BLD-MCNC: 186 MG/DL (ref 65–105)
GLUCOSE BLD-MCNC: 188 MG/DL (ref 70–99)
GLUCOSE BLD-MCNC: 192 MG/DL (ref 65–105)
GLUCOSE BLD-MCNC: 194 MG/DL (ref 70–99)
GLUCOSE BLD-MCNC: 197 MG/DL (ref 65–105)
GLUCOSE BLD-MCNC: 198 MG/DL (ref 65–105)
GLUCOSE BLD-MCNC: 199 MG/DL (ref 65–105)
GLUCOSE BLD-MCNC: 205 MG/DL (ref 70–99)
GLUCOSE BLD-MCNC: 209 MG/DL (ref 65–105)
GLUCOSE BLD-MCNC: 216 MG/DL (ref 65–105)
GLUCOSE BLD-MCNC: 217 MG/DL (ref 74–100)
GLUCOSE BLD-MCNC: 219 MG/DL (ref 65–105)
GLUCOSE BLD-MCNC: 220 MG/DL (ref 65–105)
GLUCOSE BLD-MCNC: 220 MG/DL (ref 70–99)
GLUCOSE BLD-MCNC: 224 MG/DL (ref 65–105)
GLUCOSE BLD-MCNC: 231 MG/DL (ref 74–100)
GLUCOSE BLD-MCNC: 235 MG/DL (ref 65–105)
GLUCOSE BLD-MCNC: 248 MG/DL (ref 65–105)
GLUCOSE BLD-MCNC: 259 MG/DL (ref 65–105)
GLUCOSE BLD-MCNC: 260 MG/DL (ref 65–105)
GLUCOSE BLD-MCNC: 260 MG/DL (ref 65–105)
GLUCOSE BLD-MCNC: 264 MG/DL (ref 65–105)
GLUCOSE BLD-MCNC: 282 MG/DL (ref 70–99)
GLUCOSE BLD-MCNC: 314 MG/DL (ref 65–105)
GLUCOSE BLD-MCNC: 315 MG/DL (ref 65–105)
GLUCOSE BLD-MCNC: 70 MG/DL (ref 65–105)
GLUCOSE BLD-MCNC: 86 MG/DL (ref 65–105)
GLUCOSE BLD-MCNC: 92 MG/DL (ref 65–105)
GLUCOSE URINE: ABNORMAL
GLUCOSE URINE: NEGATIVE
GLUCOSE URINE: NEGATIVE
HCT VFR BLD CALC: 26.3 % (ref 36.3–47.1)
HCT VFR BLD CALC: 26.8 % (ref 36.3–47.1)
HCT VFR BLD CALC: 26.9 % (ref 36.3–47.1)
HCT VFR BLD CALC: 27.1 % (ref 36.3–47.1)
HCT VFR BLD CALC: 27.8 % (ref 36.3–47.1)
HCT VFR BLD CALC: 27.9 % (ref 36.3–47.1)
HCT VFR BLD CALC: 28.5 % (ref 36.3–47.1)
HCT VFR BLD CALC: 28.9 % (ref 36.3–47.1)
HCT VFR BLD CALC: 29.1 % (ref 36.3–47.1)
HCT VFR BLD CALC: 29.4 % (ref 36.3–47.1)
HCT VFR BLD CALC: 30 % (ref 36.3–47.1)
HCT VFR BLD CALC: 30.5 % (ref 36.3–47.1)
HCT VFR BLD CALC: 33.1 % (ref 36.3–47.1)
HCT VFR BLD CALC: 33.3 % (ref 36.3–47.1)
HCT VFR BLD CALC: 33.4 % (ref 36.3–47.1)
HCT VFR BLD CALC: 34.5 % (ref 36.3–47.1)
HCT VFR BLD CALC: 38.9 % (ref 36.3–47.1)
HEMOGLOBIN: 10.3 G/DL (ref 11.9–15.1)
HEMOGLOBIN: 11.4 G/DL (ref 11.9–15.1)
HEMOGLOBIN: 11.5 G/DL (ref 11.9–15.1)
HEMOGLOBIN: 11.6 G/DL (ref 11.9–15.1)
HEMOGLOBIN: 11.9 G/DL (ref 11.9–15.1)
HEMOGLOBIN: 12.4 G/DL (ref 11.9–15.1)
HEMOGLOBIN: 8.8 G/DL (ref 11.9–15.1)
HEMOGLOBIN: 8.9 G/DL (ref 11.9–15.1)
HEMOGLOBIN: 9 G/DL (ref 11.9–15.1)
HEMOGLOBIN: 9 G/DL (ref 11.9–15.1)
HEMOGLOBIN: 9.2 G/DL (ref 11.9–15.1)
HEMOGLOBIN: 9.3 G/DL (ref 11.9–15.1)
HEMOGLOBIN: 9.4 G/DL (ref 11.9–15.1)
HEMOGLOBIN: 9.5 G/DL (ref 11.9–15.1)
HEMOGLOBIN: 9.9 G/DL (ref 11.9–15.1)
IMMATURE GRANULOCYTES: 0 %
IMMATURE GRANULOCYTES: 2 %
IMMATURE GRANULOCYTES: 5 %
IMMATURE GRANULOCYTES: 6 %
IMMATURE GRANULOCYTES: 6 %
IMMATURE GRANULOCYTES: 7 %
INR BLD: 1
INR BLD: 1.1
KETONES, URINE: ABNORMAL
KETONES, URINE: ABNORMAL
KETONES, URINE: NEGATIVE
LACTIC ACID, SEPSIS: 1 MMOL/L (ref 0.5–1.9)
LACTIC ACID, SEPSIS: 1.2 MMOL/L (ref 0.5–1.9)
LACTIC ACID, SEPSIS: 1.7 MMOL/L (ref 0.5–1.9)
LACTIC ACID, SEPSIS: 1.8 MMOL/L (ref 0.5–1.9)
LACTIC ACID: 1.1 MMOL/L (ref 0.5–2.2)
LACTIC ACID: 2.4 MMOL/L (ref 0.5–2.2)
LACTIC ACID: 3.2 MMOL/L (ref 0.5–2.2)
LEUKOCYTE ESTERASE, URINE: NEGATIVE
LIPASE: 37 U/L (ref 13–60)
LV EF: 60 %
LVEF MODALITY: NORMAL
LYMPHOCYTES # BLD: 28 % (ref 24–44)
LYMPHOCYTES # BLD: 6 % (ref 24–43)
LYMPHOCYTES # BLD: 6 % (ref 24–44)
LYMPHOCYTES # BLD: 6 % (ref 24–44)
LYMPHOCYTES # BLD: 7 % (ref 24–44)
LYMPHOCYTES # BLD: 8 % (ref 24–44)
Lab: ABNORMAL
Lab: NORMAL
MAGNESIUM: 1.5 MG/DL (ref 1.6–2.6)
MAGNESIUM: 1.6 MG/DL (ref 1.6–2.6)
MAGNESIUM: 1.7 MG/DL (ref 1.6–2.6)
MAGNESIUM: 1.8 MG/DL (ref 1.6–2.6)
MAGNESIUM: 1.9 MG/DL (ref 1.6–2.6)
MAGNESIUM: 2 MG/DL (ref 1.6–2.6)
MAGNESIUM: 2.1 MG/DL (ref 1.6–2.6)
MAGNESIUM: 2.3 MG/DL (ref 1.6–2.6)
MAGNESIUM: 2.3 MG/DL (ref 1.6–2.6)
MCH RBC QN AUTO: 33.2 PG (ref 25.2–33.5)
MCH RBC QN AUTO: 33.9 PG (ref 25.2–33.5)
MCH RBC QN AUTO: 33.9 PG (ref 25.2–33.5)
MCH RBC QN AUTO: 34.1 PG (ref 25.2–33.5)
MCH RBC QN AUTO: 34.2 PG (ref 25.2–33.5)
MCH RBC QN AUTO: 34.3 PG (ref 25.2–33.5)
MCH RBC QN AUTO: 34.4 PG (ref 25.2–33.5)
MCH RBC QN AUTO: 34.8 PG (ref 25.2–33.5)
MCHC RBC AUTO-ENTMCNC: 31.9 G/DL (ref 28.4–34.8)
MCHC RBC AUTO-ENTMCNC: 32.5 G/DL (ref 28.4–34.8)
MCHC RBC AUTO-ENTMCNC: 33 G/DL (ref 28.4–34.8)
MCHC RBC AUTO-ENTMCNC: 33 G/DL (ref 28.4–34.8)
MCHC RBC AUTO-ENTMCNC: 33.2 G/DL (ref 28.4–34.8)
MCHC RBC AUTO-ENTMCNC: 33.2 G/DL (ref 28.4–34.8)
MCHC RBC AUTO-ENTMCNC: 33.3 G/DL (ref 28.4–34.8)
MCHC RBC AUTO-ENTMCNC: 33.5 G/DL (ref 28.4–34.8)
MCHC RBC AUTO-ENTMCNC: 33.7 G/DL (ref 28.4–34.8)
MCHC RBC AUTO-ENTMCNC: 33.8 G/DL (ref 28.4–34.8)
MCHC RBC AUTO-ENTMCNC: 34 G/DL (ref 28.4–34.8)
MCHC RBC AUTO-ENTMCNC: 34.2 G/DL (ref 28.4–34.8)
MCHC RBC AUTO-ENTMCNC: 34.4 G/DL (ref 28.4–34.8)
MCHC RBC AUTO-ENTMCNC: 34.5 G/DL (ref 28.4–34.8)
MCHC RBC AUTO-ENTMCNC: 35 G/DL (ref 28.4–34.8)
MCV RBC AUTO: 100.6 FL (ref 82.6–102.9)
MCV RBC AUTO: 100.7 FL (ref 82.6–102.9)
MCV RBC AUTO: 101.7 FL (ref 82.6–102.9)
MCV RBC AUTO: 102 FL (ref 82.6–102.9)
MCV RBC AUTO: 102.1 FL (ref 82.6–102.9)
MCV RBC AUTO: 102.2 FL (ref 82.6–102.9)
MCV RBC AUTO: 102.3 FL (ref 82.6–102.9)
MCV RBC AUTO: 102.5 FL (ref 82.6–102.9)
MCV RBC AUTO: 102.7 FL (ref 82.6–102.9)
MCV RBC AUTO: 103 FL (ref 82.6–102.9)
MCV RBC AUTO: 103 FL (ref 82.6–102.9)
MCV RBC AUTO: 107.2 FL (ref 82.6–102.9)
MCV RBC AUTO: 99.4 FL (ref 82.6–102.9)
MCV RBC AUTO: 99.7 FL (ref 82.6–102.9)
MCV RBC AUTO: 99.7 FL (ref 82.6–102.9)
METHADONE SCREEN, URINE: NEGATIVE
MICROALBUMIN/CREAT 24H UR: <12 MG/L
MICROALBUMIN/CREAT UR-RTO: NORMAL MCG/MG CREAT
MODE: ABNORMAL
MODE: NORMAL
MONOCYTES # BLD: 11 % (ref 1–7)
MONOCYTES # BLD: 5 % (ref 1–7)
MONOCYTES # BLD: 6 % (ref 3–12)
MONOCYTES # BLD: 8 % (ref 1–7)
MONOCYTES # BLD: 8 % (ref 1–7)
MONOCYTES # BLD: 9 % (ref 1–7)
NEGATIVE BASE EXCESS, ART: 12 (ref 0–2)
NEGATIVE BASE EXCESS, ART: 2 (ref 0–2)
NEGATIVE BASE EXCESS, ART: 4 (ref 0–2)
NEGATIVE BASE EXCESS, ART: 5 (ref 0–2)
NEGATIVE BASE EXCESS, ART: 6 (ref 0–2)
NITRITE, URINE: NEGATIVE
NRBC AUTOMATED: 0 PER 100 WBC
NRBC AUTOMATED: 0.2 PER 100 WBC
NUCLEATED RED BLOOD CELLS: 1 PER 100 WBC
O2 DEVICE/FLOW/%: ABNORMAL
O2 DEVICE/FLOW/%: NORMAL
OPIATES, URINE: NEGATIVE
OXYCODONE SCREEN URINE: NEGATIVE
PARTIAL THROMBOPLASTIN TIME: 23.3 SEC (ref 23.9–33.8)
PARTIAL THROMBOPLASTIN TIME: 24.4 SEC (ref 23.9–33.8)
PARTIAL THROMBOPLASTIN TIME: 24.5 SEC (ref 23.9–33.8)
PARTIAL THROMBOPLASTIN TIME: 24.6 SEC (ref 23.9–33.8)
PARTIAL THROMBOPLASTIN TIME: 24.6 SEC (ref 23.9–33.8)
PARTIAL THROMBOPLASTIN TIME: 24.7 SEC (ref 23.9–33.8)
PARTIAL THROMBOPLASTIN TIME: 24.8 SEC (ref 23.9–33.8)
PARTIAL THROMBOPLASTIN TIME: 25.1 SEC (ref 23.9–33.8)
PARTIAL THROMBOPLASTIN TIME: 32.9 SEC (ref 23.9–33.8)
PARTIAL THROMBOPLASTIN TIME: 35.5 SEC (ref 23.9–33.8)
PATIENT TEMP: 32.9
PATIENT TEMP: 33
PATIENT TEMP: 35.5
PATIENT TEMP: 37
PDW BLD-RTO: 11.4 % (ref 11.8–14.4)
PDW BLD-RTO: 11.5 % (ref 11.8–14.4)
PDW BLD-RTO: 11.6 % (ref 11.8–14.4)
PDW BLD-RTO: 11.8 % (ref 11.8–14.4)
PDW BLD-RTO: 11.8 % (ref 11.8–14.4)
PDW BLD-RTO: 11.9 % (ref 11.8–14.4)
PDW BLD-RTO: 12 % (ref 11.8–14.4)
PH UA: 5.5 (ref 5–8)
PH UA: 6 (ref 5–8)
PH UA: 6 (ref 5–8)
PHENCYCLIDINE, URINE: NEGATIVE
PHOSPHORUS: 2.4 MG/DL (ref 2.6–4.5)
PHOSPHORUS: 2.6 MG/DL (ref 2.6–4.5)
PHOSPHORUS: 2.6 MG/DL (ref 2.6–4.5)
PHOSPHORUS: 2.8 MG/DL (ref 2.6–4.5)
PHOSPHORUS: 2.9 MG/DL (ref 2.6–4.5)
PHOSPHORUS: 3 MG/DL (ref 2.6–4.5)
PHOSPHORUS: 3.3 MG/DL (ref 2.6–4.5)
PHOSPHORUS: 3.4 MG/DL (ref 2.6–4.5)
PHOSPHORUS: 3.5 MG/DL (ref 2.6–4.5)
PHOSPHORUS: 3.6 MG/DL (ref 2.6–4.5)
PHOSPHORUS: 4 MG/DL (ref 2.6–4.5)
PHOSPHORUS: 4.1 MG/DL (ref 2.6–4.5)
PHOSPHORUS: 4.2 MG/DL (ref 2.6–4.5)
PHOSPHORUS: 4.4 MG/DL (ref 2.6–4.5)
PHOSPHORUS: 4.6 MG/DL (ref 2.6–4.5)
PHOSPHORUS: 4.8 MG/DL (ref 2.6–4.5)
PHOSPHORUS: 4.9 MG/DL (ref 2.6–4.5)
PHOSPHORUS: 5 MG/DL (ref 2.6–4.5)
PHOSPHORUS: 5.1 MG/DL (ref 2.6–4.5)
PHOSPHORUS: 5.2 MG/DL (ref 2.6–4.5)
PHOSPHORUS: 5.3 MG/DL (ref 2.6–4.5)
PHOSPHORUS: 5.4 MG/DL (ref 2.6–4.5)
PHOSPHORUS: 5.4 MG/DL (ref 2.6–4.5)
PHOSPHORUS: 5.7 MG/DL (ref 2.6–4.5)
PHOSPHORUS: 5.7 MG/DL (ref 2.6–4.5)
PHOSPHORUS: 5.8 MG/DL (ref 2.6–4.5)
PHOSPHORUS: 5.8 MG/DL (ref 2.6–4.5)
PLATELET # BLD: 102 K/UL (ref 138–453)
PLATELET # BLD: 106 K/UL (ref 138–453)
PLATELET # BLD: 106 K/UL (ref 138–453)
PLATELET # BLD: 114 K/UL (ref 138–453)
PLATELET # BLD: 118 K/UL (ref 138–453)
PLATELET # BLD: 118 K/UL (ref 138–453)
PLATELET # BLD: 119 K/UL (ref 138–453)
PLATELET # BLD: 126 K/UL (ref 138–453)
PLATELET # BLD: 128 K/UL (ref 138–453)
PLATELET # BLD: 141 K/UL (ref 138–453)
PLATELET # BLD: 141 K/UL (ref 138–453)
PLATELET # BLD: 142 K/UL (ref 138–453)
PLATELET # BLD: 203 K/UL (ref 138–453)
PLATELET # BLD: 206 K/UL (ref 138–453)
PLATELET # BLD: 216 K/UL (ref 138–453)
PLATELET # BLD: 240 K/UL (ref 138–453)
PLATELET # BLD: 306 K/UL (ref 138–453)
PMV BLD AUTO: 10 FL (ref 8.1–13.5)
PMV BLD AUTO: 10.1 FL (ref 8.1–13.5)
PMV BLD AUTO: 10.2 FL (ref 8.1–13.5)
PMV BLD AUTO: 10.3 FL (ref 8.1–13.5)
PMV BLD AUTO: 10.4 FL (ref 8.1–13.5)
PMV BLD AUTO: 10.5 FL (ref 8.1–13.5)
PMV BLD AUTO: 10.6 FL (ref 8.1–13.5)
PMV BLD AUTO: 10.7 FL (ref 8.1–13.5)
PMV BLD AUTO: 10.9 FL (ref 8.1–13.5)
PMV BLD AUTO: 11 FL (ref 8.1–13.5)
PMV BLD AUTO: 11.1 FL (ref 8.1–13.5)
PMV BLD AUTO: 9.9 FL (ref 8.1–13.5)
PMV BLD AUTO: 9.9 FL (ref 8.1–13.5)
POC HCO3: 15 MMOL/L (ref 21–28)
POC HCO3: 21.2 MMOL/L (ref 21–28)
POC HCO3: 21.6 MMOL/L (ref 21–28)
POC HCO3: 21.7 MMOL/L (ref 21–28)
POC HCO3: 22.3 MMOL/L (ref 21–28)
POC HCO3: 22.5 MMOL/L (ref 21–28)
POC HCO3: 23.1 MMOL/L (ref 21–28)
POC HCO3: 23.8 MMOL/L (ref 21–28)
POC HCO3: 24.1 MMOL/L (ref 21–28)
POC HCO3: 24.3 MMOL/L (ref 21–28)
POC HCO3: 25.2 MMOL/L (ref 21–28)
POC HCO3: 27.1 MMOL/L (ref 21–28)
POC HCO3: 27.8 MMOL/L (ref 21–28)
POC HCO3: 28.5 MMOL/L (ref 21–28)
POC HCO3: 28.5 MMOL/L (ref 21–28)
POC HCO3: 29.1 MMOL/L (ref 21–28)
POC HCO3: 29.3 MMOL/L (ref 21–28)
POC HCO3: 29.9 MMOL/L (ref 21–28)
POC HCO3: 30 MMOL/L (ref 21–28)
POC HCO3: 30.2 MMOL/L (ref 21–28)
POC IONIZED CALCIUM: 1.08 MMOL/L (ref 1.15–1.33)
POC IONIZED CALCIUM: 1.13 MMOL/L (ref 1.15–1.33)
POC IONIZED CALCIUM: 1.13 MMOL/L (ref 1.15–1.33)
POC IONIZED CALCIUM: 1.15 MMOL/L (ref 1.15–1.33)
POC IONIZED CALCIUM: 1.21 MMOL/L (ref 1.15–1.33)
POC IONIZED CALCIUM: 1.21 MMOL/L (ref 1.15–1.33)
POC IONIZED CALCIUM: 1.22 MMOL/L (ref 1.15–1.33)
POC IONIZED CALCIUM: 1.25 MMOL/L (ref 1.15–1.33)
POC O2 SATURATION: 100 % (ref 94–98)
POC O2 SATURATION: 91 % (ref 94–98)
POC O2 SATURATION: 95 % (ref 94–98)
POC O2 SATURATION: 96 % (ref 94–98)
POC O2 SATURATION: 97 % (ref 94–98)
POC O2 SATURATION: 97 % (ref 94–98)
POC O2 SATURATION: 98 % (ref 94–98)
POC PCO2 TEMP: 31 MM HG
POC PCO2 TEMP: 34 MM HG
POC PCO2 TEMP: 38 MM HG
POC PCO2 TEMP: 39 MM HG
POC PCO2 TEMP: 41 MM HG
POC PCO2 TEMP: 42 MM HG
POC PCO2: 31.7 MM HG (ref 35–48)
POC PCO2: 32.1 MM HG (ref 35–48)
POC PCO2: 32.2 MM HG (ref 35–48)
POC PCO2: 33.7 MM HG (ref 35–48)
POC PCO2: 33.8 MM HG (ref 35–48)
POC PCO2: 34.3 MM HG (ref 35–48)
POC PCO2: 34.4 MM HG (ref 35–48)
POC PCO2: 35.1 MM HG (ref 35–48)
POC PCO2: 35.4 MM HG (ref 35–48)
POC PCO2: 36.2 MM HG (ref 35–48)
POC PCO2: 36.4 MM HG (ref 35–48)
POC PCO2: 37.9 MM HG (ref 35–48)
POC PCO2: 40.1 MM HG (ref 35–48)
POC PCO2: 41.5 MM HG (ref 35–48)
POC PCO2: 43.3 MM HG (ref 35–48)
POC PCO2: 45.5 MM HG (ref 35–48)
POC PCO2: 46.5 MM HG (ref 35–48)
POC PCO2: 48.9 MM HG (ref 35–48)
POC PCO2: 50.3 MM HG (ref 35–48)
POC PCO2: 51.3 MM HG (ref 35–48)
POC PH TEMP: 7.28
POC PH TEMP: 7.31
POC PH TEMP: 7.32
POC PH TEMP: 7.35
POC PH TEMP: 7.35
POC PH TEMP: 7.39
POC PH: 7.22 (ref 7.35–7.45)
POC PH: 7.25 (ref 7.35–7.45)
POC PH: 7.26 (ref 7.35–7.45)
POC PH: 7.27 (ref 7.35–7.45)
POC PH: 7.3 (ref 7.35–7.45)
POC PH: 7.33 (ref 7.35–7.45)
POC PH: 7.37 (ref 7.35–7.45)
POC PH: 7.45 (ref 7.35–7.45)
POC PH: 7.46 (ref 7.35–7.45)
POC PH: 7.5 (ref 7.35–7.45)
POC PH: 7.5 (ref 7.35–7.45)
POC PH: 7.52 (ref 7.35–7.45)
POC PH: 7.54 (ref 7.35–7.45)
POC PH: 7.56 (ref 7.35–7.45)
POC PO2 TEMP: 170 MM HG
POC PO2 TEMP: 205 MM HG
POC PO2 TEMP: 230 MM HG
POC PO2 TEMP: 404 MM HG
POC PO2 TEMP: 81 MM HG
POC PO2 TEMP: 88 MM HG
POC PO2: 102.7 MM HG (ref 83–108)
POC PO2: 105.5 MM HG (ref 83–108)
POC PO2: 110.1 MM HG (ref 83–108)
POC PO2: 190.2 MM HG (ref 83–108)
POC PO2: 223.9 MM HG (ref 83–108)
POC PO2: 248.4 MM HG (ref 83–108)
POC PO2: 412 MM HG (ref 83–108)
POC PO2: 464.5 MM HG (ref 83–108)
POC PO2: 68.7 MM HG (ref 83–108)
POC PO2: 70.2 MM HG (ref 83–108)
POC PO2: 70.5 MM HG (ref 83–108)
POC PO2: 70.6 MM HG (ref 83–108)
POC PO2: 72 MM HG (ref 83–108)
POC PO2: 72.5 MM HG (ref 83–108)
POC PO2: 73.3 MM HG (ref 83–108)
POC PO2: 74.1 MM HG (ref 83–108)
POC PO2: 76.2 MM HG (ref 83–108)
POC PO2: 78.5 MM HG (ref 83–108)
POC PO2: 86 MM HG (ref 83–108)
POC PO2: 86.6 MM HG (ref 83–108)
POC TCO2: 25 MMOL/L (ref 22–30)
POC TCO2: 27 MMOL/L (ref 22–30)
POC TCO2: 28 MMOL/L (ref 22–30)
POC TCO2: 29 MMOL/L (ref 22–30)
POC TCO2: 29 MMOL/L (ref 22–30)
POSITIVE BASE EXCESS, ART: 0 (ref 0–3)
POSITIVE BASE EXCESS, ART: 4 (ref 0–3)
POSITIVE BASE EXCESS, ART: 5 (ref 0–3)
POSITIVE BASE EXCESS, ART: 5 (ref 0–3)
POSITIVE BASE EXCESS, ART: 6 (ref 0–3)
POSITIVE BASE EXCESS, ART: 7 (ref 0–3)
POSITIVE BASE EXCESS, ART: 7 (ref 0–3)
POTASSIUM SERPL-SCNC: 2.9 MMOL/L (ref 3.7–5.3)
POTASSIUM SERPL-SCNC: 2.9 MMOL/L (ref 3.7–5.3)
POTASSIUM SERPL-SCNC: 3 MMOL/L (ref 3.7–5.3)
POTASSIUM SERPL-SCNC: 3.2 MMOL/L (ref 3.7–5.3)
POTASSIUM SERPL-SCNC: 3.3 MMOL/L (ref 3.7–5.3)
POTASSIUM SERPL-SCNC: 3.4 MMOL/L (ref 3.7–5.3)
POTASSIUM SERPL-SCNC: 3.4 MMOL/L (ref 3.7–5.3)
POTASSIUM SERPL-SCNC: 3.5 MMOL/L (ref 3.7–5.3)
POTASSIUM SERPL-SCNC: 3.5 MMOL/L (ref 3.7–5.3)
POTASSIUM SERPL-SCNC: 3.6 MMOL/L (ref 3.7–5.3)
POTASSIUM SERPL-SCNC: 3.7 MMOL/L (ref 3.7–5.3)
POTASSIUM SERPL-SCNC: 3.9 MMOL/L (ref 3.7–5.3)
POTASSIUM SERPL-SCNC: 4 MMOL/L (ref 3.7–5.3)
POTASSIUM SERPL-SCNC: 4 MMOL/L (ref 3.7–5.3)
POTASSIUM SERPL-SCNC: 4.1 MMOL/L (ref 3.7–5.3)
POTASSIUM SERPL-SCNC: 4.1 MMOL/L (ref 3.7–5.3)
POTASSIUM SERPL-SCNC: 4.2 MMOL/L (ref 3.7–5.3)
POTASSIUM SERPL-SCNC: 4.3 MMOL/L (ref 3.7–5.3)
POTASSIUM SERPL-SCNC: 4.4 MMOL/L (ref 3.7–5.3)
POTASSIUM SERPL-SCNC: 4.6 MMOL/L (ref 3.7–5.3)
POTASSIUM SERPL-SCNC: 4.6 MMOL/L (ref 3.7–5.3)
POTASSIUM SERPL-SCNC: 4.7 MMOL/L (ref 3.7–5.3)
POTASSIUM SERPL-SCNC: 4.8 MMOL/L (ref 3.7–5.3)
POTASSIUM SERPL-SCNC: 4.9 MMOL/L (ref 3.7–5.3)
POTASSIUM SERPL-SCNC: 5.1 MMOL/L (ref 3.7–5.3)
POTASSIUM SERPL-SCNC: 5.4 MMOL/L (ref 3.7–5.3)
POTASSIUM SERPL-SCNC: 5.6 MMOL/L (ref 3.7–5.3)
POTASSIUM SERPL-SCNC: 5.6 MMOL/L (ref 3.7–5.3)
POTASSIUM SERPL-SCNC: 5.8 MMOL/L (ref 3.7–5.3)
POTASSIUM SERPL-SCNC: 5.9 MMOL/L (ref 3.7–5.3)
POTASSIUM SERPL-SCNC: 5.9 MMOL/L (ref 3.7–5.3)
POTASSIUM SERPL-SCNC: 6.4 MMOL/L (ref 3.7–5.3)
PRO-BNP: 221 PG/ML
PROCALCITONIN: 0.53 NG/ML
PROTEIN UA: ABNORMAL
PROTEIN UA: NEGATIVE
PROTEIN UA: NEGATIVE
PROTHROMBIN TIME: 12.6 SEC (ref 11.5–14.2)
PROTHROMBIN TIME: 12.7 SEC (ref 11.5–14.2)
PROTHROMBIN TIME: 12.8 SEC (ref 11.5–14.2)
PROTHROMBIN TIME: 12.9 SEC (ref 11.5–14.2)
PROTHROMBIN TIME: 13 SEC (ref 11.5–14.2)
PROTHROMBIN TIME: 13.2 SEC (ref 11.5–14.2)
PROTHROMBIN TIME: 13.2 SEC (ref 11.5–14.2)
PROTHROMBIN TIME: 13.5 SEC (ref 11.5–14.2)
PROTHROMBIN TIME: 14.1 SEC (ref 11.5–14.2)
RBC # BLD: 2.56 M/UL (ref 3.95–5.11)
RBC # BLD: 2.61 M/UL (ref 3.95–5.11)
RBC # BLD: 2.63 M/UL (ref 3.95–5.11)
RBC # BLD: 2.63 M/UL (ref 3.95–5.11)
RBC # BLD: 2.71 M/UL (ref 3.95–5.11)
RBC # BLD: 2.72 M/UL (ref 3.95–5.11)
RBC # BLD: 2.78 M/UL (ref 3.95–5.11)
RBC # BLD: 2.83 M/UL (ref 3.95–5.11)
RBC # BLD: 2.89 M/UL (ref 3.95–5.11)
RBC # BLD: 2.89 M/UL (ref 3.95–5.11)
RBC # BLD: 2.92 M/UL (ref 3.95–5.11)
RBC # BLD: 2.99 M/UL (ref 3.95–5.11)
RBC # BLD: 3.31 M/UL (ref 3.95–5.11)
RBC # BLD: 3.33 M/UL (ref 3.95–5.11)
RBC # BLD: 3.35 M/UL (ref 3.95–5.11)
RBC # BLD: 3.46 M/UL (ref 3.95–5.11)
RBC # BLD: 3.63 M/UL (ref 3.95–5.11)
RBC UA: ABNORMAL /HPF (ref 0–2)
RBC UA: NORMAL /HPF (ref 0–2)
SAMPLE SITE: ABNORMAL
SAMPLE SITE: NORMAL
SARS-COV-2, RAPID: NOT DETECTED
SEG NEUTROPHILS: 53 % (ref 36–66)
SEG NEUTROPHILS: 77 % (ref 36–66)
SEG NEUTROPHILS: 80 % (ref 36–66)
SEG NEUTROPHILS: 81 % (ref 36–66)
SEG NEUTROPHILS: 81 % (ref 36–66)
SEG NEUTROPHILS: 82 % (ref 36–65)
SEGMENTED NEUTROPHILS ABSOLUTE COUNT: 4.4 K/UL (ref 1.8–7.7)
SEGMENTED NEUTROPHILS ABSOLUTE COUNT: 4.62 K/UL (ref 1.8–7.7)
SEGMENTED NEUTROPHILS ABSOLUTE COUNT: 4.99 K/UL (ref 1.5–8.1)
SEGMENTED NEUTROPHILS ABSOLUTE COUNT: 5.26 K/UL (ref 1.8–7.7)
SEGMENTED NEUTROPHILS ABSOLUTE COUNT: 5.59 K/UL (ref 1.8–7.7)
SEGMENTED NEUTROPHILS ABSOLUTE COUNT: 6.78 K/UL (ref 1.8–7.7)
SODIUM BLD-SCNC: 130 MMOL/L (ref 135–144)
SODIUM BLD-SCNC: 132 MMOL/L (ref 135–144)
SODIUM BLD-SCNC: 132 MMOL/L (ref 135–144)
SODIUM BLD-SCNC: 136 MMOL/L (ref 135–144)
SODIUM BLD-SCNC: 137 MMOL/L (ref 135–144)
SODIUM BLD-SCNC: 138 MMOL/L (ref 135–144)
SODIUM BLD-SCNC: 138 MMOL/L (ref 135–144)
SODIUM BLD-SCNC: 139 MMOL/L (ref 135–144)
SODIUM BLD-SCNC: 140 MMOL/L (ref 135–144)
SODIUM BLD-SCNC: 142 MMOL/L (ref 135–144)
SODIUM,UR: 20 MMOL/L
SPECIFIC GRAVITY UA: 1.02 (ref 1–1.03)
SPECIMEN DESCRIPTION: ABNORMAL
SPECIMEN DESCRIPTION: NORMAL
SPECIMEN DESCRIPTION: NORMAL
TEST INFORMATION: NORMAL
TOTAL PROTEIN: 5 G/DL (ref 6.4–8.3)
TOTAL PROTEIN: 5 G/DL (ref 6.4–8.3)
TOTAL PROTEIN: 5.2 G/DL (ref 6.4–8.3)
TOTAL PROTEIN: 5.3 G/DL (ref 6.4–8.3)
TOTAL PROTEIN: 5.3 G/DL (ref 6.4–8.3)
TOTAL PROTEIN: 5.4 G/DL (ref 6.4–8.3)
TOTAL PROTEIN: 5.9 G/DL (ref 6.4–8.3)
TOTAL PROTEIN: 6.8 G/DL (ref 6.4–8.3)
TROPONIN, HIGH SENSITIVITY: 24 NG/L (ref 0–14)
TROPONIN, HIGH SENSITIVITY: 43 NG/L (ref 0–14)
TROPONIN, HIGH SENSITIVITY: 7 NG/L (ref 0–14)
TURBIDITY: ABNORMAL
TURBIDITY: CLEAR
TURBIDITY: CLEAR
URINE HGB: ABNORMAL
URINE HGB: NEGATIVE
URINE HGB: NEGATIVE
UROBILINOGEN, URINE: NORMAL
VANCOMYCIN RANDOM: 19 UG/ML
WBC # BLD: 10.7 K/UL (ref 3.5–11.3)
WBC # BLD: 11.4 K/UL (ref 3.5–11.3)
WBC # BLD: 11.7 K/UL (ref 3.5–11.3)
WBC # BLD: 11.7 K/UL (ref 3.5–11.3)
WBC # BLD: 12.7 K/UL (ref 3.5–11.3)
WBC # BLD: 12.8 K/UL (ref 3.5–11.3)
WBC # BLD: 14.4 K/UL (ref 3.5–11.3)
WBC # BLD: 19.3 K/UL (ref 3.5–11.3)
WBC # BLD: 20.7 K/UL (ref 3.5–11.3)
WBC # BLD: 5.5 K/UL (ref 3.5–11.3)
WBC # BLD: 6 K/UL (ref 3.5–11.3)
WBC # BLD: 6.1 K/UL (ref 3.5–11.3)
WBC # BLD: 6.5 K/UL (ref 3.5–11.3)
WBC # BLD: 6.9 K/UL (ref 3.5–11.3)
WBC # BLD: 7.2 K/UL (ref 3.5–11.3)
WBC # BLD: 9.2 K/UL (ref 3.5–11.3)
WBC # BLD: 9.8 K/UL (ref 3.5–11.3)
WBC UA: ABNORMAL /HPF (ref 0–5)
WBC UA: NORMAL /HPF (ref 0–5)
YEAST: ABNORMAL

## 2022-01-01 PROCEDURE — 88305 TISSUE EXAM BY PATHOLOGIST: CPT

## 2022-01-01 PROCEDURE — 82330 ASSAY OF CALCIUM: CPT

## 2022-01-01 PROCEDURE — 82947 ASSAY GLUCOSE BLOOD QUANT: CPT

## 2022-01-01 PROCEDURE — 71045 X-RAY EXAM CHEST 1 VIEW: CPT

## 2022-01-01 PROCEDURE — 6360000002 HC RX W HCPCS

## 2022-01-01 PROCEDURE — 84100 ASSAY OF PHOSPHORUS: CPT

## 2022-01-01 PROCEDURE — 99233 SBSQ HOSP IP/OBS HIGH 50: CPT | Performed by: PSYCHIATRY & NEUROLOGY

## 2022-01-01 PROCEDURE — 2580000003 HC RX 258: Performed by: NURSE PRACTITIONER

## 2022-01-01 PROCEDURE — 83605 ASSAY OF LACTIC ACID: CPT

## 2022-01-01 PROCEDURE — 2580000003 HC RX 258: Performed by: PSYCHIATRY & NEUROLOGY

## 2022-01-01 PROCEDURE — 82803 BLOOD GASES ANY COMBINATION: CPT

## 2022-01-01 PROCEDURE — 6370000000 HC RX 637 (ALT 250 FOR IP): Performed by: INTERNAL MEDICINE

## 2022-01-01 PROCEDURE — 6360000002 HC RX W HCPCS: Performed by: INTERNAL MEDICINE

## 2022-01-01 PROCEDURE — 2700000000 HC OXYGEN THERAPY PER DAY

## 2022-01-01 PROCEDURE — 2580000003 HC RX 258: Performed by: INTERNAL MEDICINE

## 2022-01-01 PROCEDURE — 99232 SBSQ HOSP IP/OBS MODERATE 35: CPT | Performed by: INTERNAL MEDICINE

## 2022-01-01 PROCEDURE — 2500000003 HC RX 250 WO HCPCS: Performed by: INTERNAL MEDICINE

## 2022-01-01 PROCEDURE — 6360000002 HC RX W HCPCS: Performed by: NURSE PRACTITIONER

## 2022-01-01 PROCEDURE — 6360000002 HC RX W HCPCS: Performed by: EMERGENCY MEDICINE

## 2022-01-01 PROCEDURE — 83735 ASSAY OF MAGNESIUM: CPT

## 2022-01-01 PROCEDURE — 36620 INSERTION CATHETER ARTERY: CPT

## 2022-01-01 PROCEDURE — 70551 MRI BRAIN STEM W/O DYE: CPT

## 2022-01-01 PROCEDURE — 99285 EMERGENCY DEPT VISIT HI MDM: CPT

## 2022-01-01 PROCEDURE — 2500000003 HC RX 250 WO HCPCS: Performed by: PSYCHIATRY & NEUROLOGY

## 2022-01-01 PROCEDURE — 87205 SMEAR GRAM STAIN: CPT

## 2022-01-01 PROCEDURE — 94003 VENT MGMT INPAT SUBQ DAY: CPT

## 2022-01-01 PROCEDURE — 94761 N-INVAS EAR/PLS OXIMETRY MLT: CPT

## 2022-01-01 PROCEDURE — 85025 COMPLETE CBC W/AUTO DIFF WBC: CPT

## 2022-01-01 PROCEDURE — 51702 INSERT TEMP BLADDER CATH: CPT

## 2022-01-01 PROCEDURE — 82374 ASSAY BLOOD CARBON DIOXIDE: CPT

## 2022-01-01 PROCEDURE — 93306 TTE W/DOPPLER COMPLETE: CPT

## 2022-01-01 PROCEDURE — 82570 ASSAY OF URINE CREATININE: CPT

## 2022-01-01 PROCEDURE — 87150 DNA/RNA AMPLIFIED PROBE: CPT

## 2022-01-01 PROCEDURE — 93005 ELECTROCARDIOGRAM TRACING: CPT | Performed by: EMERGENCY MEDICINE

## 2022-01-01 PROCEDURE — 99222 1ST HOSP IP/OBS MODERATE 55: CPT | Performed by: INTERNAL MEDICINE

## 2022-01-01 PROCEDURE — 85730 THROMBOPLASTIN TIME PARTIAL: CPT

## 2022-01-01 PROCEDURE — 81001 URINALYSIS AUTO W/SCOPE: CPT

## 2022-01-01 PROCEDURE — 80202 ASSAY OF VANCOMYCIN: CPT

## 2022-01-01 PROCEDURE — 80048 BASIC METABOLIC PNL TOTAL CA: CPT

## 2022-01-01 PROCEDURE — 84132 ASSAY OF SERUM POTASSIUM: CPT

## 2022-01-01 PROCEDURE — 83880 ASSAY OF NATRIURETIC PEPTIDE: CPT

## 2022-01-01 PROCEDURE — 74177 CT ABD & PELVIS W/CONTRAST: CPT

## 2022-01-01 PROCEDURE — 94002 VENT MGMT INPAT INIT DAY: CPT

## 2022-01-01 PROCEDURE — 80076 HEPATIC FUNCTION PANEL: CPT

## 2022-01-01 PROCEDURE — 83690 ASSAY OF LIPASE: CPT

## 2022-01-01 PROCEDURE — 82150 ASSAY OF AMYLASE: CPT

## 2022-01-01 PROCEDURE — 6370000000 HC RX 637 (ALT 250 FOR IP)

## 2022-01-01 PROCEDURE — 2000000000 HC ICU R&B

## 2022-01-01 PROCEDURE — 94640 AIRWAY INHALATION TREATMENT: CPT

## 2022-01-01 PROCEDURE — C9113 INJ PANTOPRAZOLE SODIUM, VIA: HCPCS | Performed by: INTERNAL MEDICINE

## 2022-01-01 PROCEDURE — 37799 UNLISTED PX VASCULAR SURGERY: CPT

## 2022-01-01 PROCEDURE — 36556 INSERT NON-TUNNEL CV CATH: CPT

## 2022-01-01 PROCEDURE — 2580000003 HC RX 258: Performed by: EMERGENCY MEDICINE

## 2022-01-01 PROCEDURE — 85027 COMPLETE CBC AUTOMATED: CPT

## 2022-01-01 PROCEDURE — G0480 DRUG TEST DEF 1-7 CLASSES: HCPCS

## 2022-01-01 PROCEDURE — 99232 SBSQ HOSP IP/OBS MODERATE 35: CPT | Performed by: PSYCHIATRY & NEUROLOGY

## 2022-01-01 PROCEDURE — 93010 ELECTROCARDIOGRAM REPORT: CPT | Performed by: INTERNAL MEDICINE

## 2022-01-01 PROCEDURE — 82248 BILIRUBIN DIRECT: CPT

## 2022-01-01 PROCEDURE — 85520 HEPARIN ASSAY: CPT

## 2022-01-01 PROCEDURE — 85610 PROTHROMBIN TIME: CPT

## 2022-01-01 PROCEDURE — 74176 CT ABD & PELVIS W/O CONTRAST: CPT

## 2022-01-01 PROCEDURE — 84300 ASSAY OF URINE SODIUM: CPT

## 2022-01-01 PROCEDURE — 99233 SBSQ HOSP IP/OBS HIGH 50: CPT | Performed by: INTERNAL MEDICINE

## 2022-01-01 PROCEDURE — 06HY33Z INSERTION OF INFUSION DEVICE INTO LOWER VEIN, PERCUTANEOUS APPROACH: ICD-10-PCS | Performed by: INTERNAL MEDICINE

## 2022-01-01 PROCEDURE — 36600 WITHDRAWAL OF ARTERIAL BLOOD: CPT

## 2022-01-01 PROCEDURE — 88331 PATH CONSLTJ SURG 1 BLK 1SPC: CPT

## 2022-01-01 PROCEDURE — 87635 SARS-COV-2 COVID-19 AMP PRB: CPT

## 2022-01-01 PROCEDURE — 80053 COMPREHEN METABOLIC PANEL: CPT

## 2022-01-01 PROCEDURE — 93005 ELECTROCARDIOGRAM TRACING: CPT | Performed by: INTERNAL MEDICINE

## 2022-01-01 PROCEDURE — 6360000004 HC RX CONTRAST MEDICATION: Performed by: EMERGENCY MEDICINE

## 2022-01-01 PROCEDURE — 99222 1ST HOSP IP/OBS MODERATE 55: CPT | Performed by: PSYCHIATRY & NEUROLOGY

## 2022-01-01 PROCEDURE — 81003 URINALYSIS AUTO W/O SCOPE: CPT

## 2022-01-01 PROCEDURE — 82043 UR ALBUMIN QUANTITATIVE: CPT

## 2022-01-01 PROCEDURE — 84145 PROCALCITONIN (PCT): CPT

## 2022-01-01 PROCEDURE — 31500 INSERT EMERGENCY AIRWAY: CPT

## 2022-01-01 PROCEDURE — 70450 CT HEAD/BRAIN W/O DYE: CPT

## 2022-01-01 PROCEDURE — 3600000016 HC SURGERY LEVEL 6 ADDTL 15MIN

## 2022-01-01 PROCEDURE — 71260 CT THORAX DX C+: CPT

## 2022-01-01 PROCEDURE — 5A1955Z RESPIRATORY VENTILATION, GREATER THAN 96 CONSECUTIVE HOURS: ICD-10-PCS | Performed by: EMERGENCY MEDICINE

## 2022-01-01 PROCEDURE — 80307 DRUG TEST PRSMV CHEM ANLYZR: CPT

## 2022-01-01 PROCEDURE — 74018 RADEX ABDOMEN 1 VIEW: CPT

## 2022-01-01 PROCEDURE — 36592 COLLECT BLOOD FROM PICC: CPT

## 2022-01-01 PROCEDURE — 0BH17EZ INSERTION OF ENDOTRACHEAL AIRWAY INTO TRACHEA, VIA NATURAL OR ARTIFICIAL OPENING: ICD-10-PCS | Performed by: EMERGENCY MEDICINE

## 2022-01-01 PROCEDURE — 84484 ASSAY OF TROPONIN QUANT: CPT

## 2022-01-01 PROCEDURE — 89220 SPUTUM SPECIMEN COLLECTION: CPT

## 2022-01-01 PROCEDURE — 36415 COLL VENOUS BLD VENIPUNCTURE: CPT

## 2022-01-01 PROCEDURE — 3600000006 HC SURGERY LEVEL 6 BASE

## 2022-01-01 PROCEDURE — 2500000003 HC RX 250 WO HCPCS: Performed by: EMERGENCY MEDICINE

## 2022-01-01 PROCEDURE — 87040 BLOOD CULTURE FOR BACTERIA: CPT

## 2022-01-01 RX ORDER — SODIUM CHLORIDE 9 MG/ML
25 INJECTION, SOLUTION INTRAVENOUS PRN
Status: DISCONTINUED | OUTPATIENT
Start: 2022-01-01 | End: 2022-01-01

## 2022-01-01 RX ORDER — PROPOFOL 10 MG/ML
5-50 INJECTION, EMULSION INTRAVENOUS CONTINUOUS
Status: DISCONTINUED | OUTPATIENT
Start: 2022-01-01 | End: 2022-01-01

## 2022-01-01 RX ORDER — SODIUM CHLORIDE 9 MG/ML
INJECTION, SOLUTION INTRAVENOUS CONTINUOUS
Status: DISCONTINUED | OUTPATIENT
Start: 2022-01-01 | End: 2022-01-01 | Stop reason: HOSPADM

## 2022-01-01 RX ORDER — MAGNESIUM SULFATE IN WATER 40 MG/ML
2000 INJECTION, SOLUTION INTRAVENOUS ONCE
Status: COMPLETED | OUTPATIENT
Start: 2022-01-01 | End: 2022-01-01

## 2022-01-01 RX ORDER — CHLORHEXIDINE GLUCONATE 0.12 MG/ML
15 RINSE ORAL 2 TIMES DAILY
Status: DISCONTINUED | OUTPATIENT
Start: 2022-01-01 | End: 2022-01-01

## 2022-01-01 RX ORDER — MAGNESIUM SULFATE 1 G/100ML
1000 INJECTION INTRAVENOUS ONCE
Status: COMPLETED | OUTPATIENT
Start: 2022-01-01 | End: 2022-01-01

## 2022-01-01 RX ORDER — PROPOFOL 10 MG/ML
INJECTION, EMULSION INTRAVENOUS
Status: COMPLETED
Start: 2022-01-01 | End: 2022-01-01

## 2022-01-01 RX ORDER — LORAZEPAM 2 MG/ML
2 INJECTION INTRAMUSCULAR ONCE
Status: COMPLETED | OUTPATIENT
Start: 2022-01-01 | End: 2022-01-01

## 2022-01-01 RX ORDER — 0.9 % SODIUM CHLORIDE 0.9 %
1000 INTRAVENOUS SOLUTION INTRAVENOUS ONCE
Status: COMPLETED | OUTPATIENT
Start: 2022-01-01 | End: 2022-01-01

## 2022-01-01 RX ORDER — CALCIUM CHLORIDE 100 MG/ML
1000 INJECTION INTRAVENOUS; INTRAVENTRICULAR ONCE
Status: COMPLETED | OUTPATIENT
Start: 2022-01-01 | End: 2022-01-01

## 2022-01-01 RX ORDER — SODIUM POLYSTYRENE SULFONATE 15 G/60ML
45 SUSPENSION ORAL; RECTAL ONCE
Status: COMPLETED | OUTPATIENT
Start: 2022-01-01 | End: 2022-01-01

## 2022-01-01 RX ORDER — ONDANSETRON 2 MG/ML
4 INJECTION INTRAMUSCULAR; INTRAVENOUS EVERY 6 HOURS PRN
Status: DISCONTINUED | OUTPATIENT
Start: 2022-01-01 | End: 2022-01-01

## 2022-01-01 RX ORDER — SODIUM CHLORIDE 0.9 % (FLUSH) 0.9 %
10 SYRINGE (ML) INJECTION PRN
Status: DISCONTINUED | OUTPATIENT
Start: 2022-01-01 | End: 2022-01-01

## 2022-01-01 RX ORDER — SODIUM CHLORIDE 0.9 % (FLUSH) 0.9 %
5-40 SYRINGE (ML) INJECTION EVERY 12 HOURS SCHEDULED
Status: DISCONTINUED | OUTPATIENT
Start: 2022-01-01 | End: 2022-01-01 | Stop reason: HOSPADM

## 2022-01-01 RX ORDER — POLYETHYLENE GLYCOL 3350 17 G/17G
17 POWDER, FOR SOLUTION ORAL DAILY PRN
Status: DISCONTINUED | OUTPATIENT
Start: 2022-01-01 | End: 2022-01-01

## 2022-01-01 RX ORDER — GLYCOPYRROLATE 1 MG/5 ML
0.3 SYRINGE (ML) INTRAVENOUS EVERY 4 HOURS PRN
Status: DISCONTINUED | OUTPATIENT
Start: 2022-01-01 | End: 2022-01-01 | Stop reason: HOSPADM

## 2022-01-01 RX ORDER — MINERAL OIL AND WHITE PETROLATUM 150; 830 MG/G; MG/G
OINTMENT OPHTHALMIC EVERY 4 HOURS
Status: DISCONTINUED | OUTPATIENT
Start: 2022-01-01 | End: 2022-01-01 | Stop reason: SDUPTHER

## 2022-01-01 RX ORDER — HEPARIN SODIUM 1000 [USP'U]/ML
30 INJECTION, SOLUTION INTRAVENOUS; SUBCUTANEOUS PRN
Status: DISCONTINUED | OUTPATIENT
Start: 2022-01-01 | End: 2022-01-01

## 2022-01-01 RX ORDER — POTASSIUM CHLORIDE 7.45 MG/ML
10 INJECTION INTRAVENOUS
Status: COMPLETED | OUTPATIENT
Start: 2022-01-01 | End: 2022-01-01

## 2022-01-01 RX ORDER — POLYVINYL ALCOHOL 14 MG/ML
1 SOLUTION/ DROPS OPHTHALMIC EVERY 4 HOURS
Status: DISCONTINUED | OUTPATIENT
Start: 2022-01-01 | End: 2022-01-01

## 2022-01-01 RX ORDER — HEPARIN SODIUM 1000 [USP'U]/ML
60 INJECTION, SOLUTION INTRAVENOUS; SUBCUTANEOUS PRN
Status: DISCONTINUED | OUTPATIENT
Start: 2022-01-01 | End: 2022-01-01

## 2022-01-01 RX ORDER — PROPOFOL 10 MG/ML
5-50 INJECTION, EMULSION INTRAVENOUS CONTINUOUS
Status: DISCONTINUED | OUTPATIENT
Start: 2022-01-01 | End: 2022-01-01 | Stop reason: SDUPTHER

## 2022-01-01 RX ORDER — PANTOPRAZOLE SODIUM 40 MG/10ML
40 INJECTION, POWDER, LYOPHILIZED, FOR SOLUTION INTRAVENOUS DAILY
Status: DISCONTINUED | OUTPATIENT
Start: 2022-01-01 | End: 2022-01-01

## 2022-01-01 RX ORDER — LORAZEPAM 2 MG/ML
1 INJECTION INTRAMUSCULAR
Status: DISCONTINUED | OUTPATIENT
Start: 2022-01-01 | End: 2022-01-01 | Stop reason: HOSPADM

## 2022-01-01 RX ORDER — SUCCINYLCHOLINE CHLORIDE 20 MG/ML
INJECTION INTRAMUSCULAR; INTRAVENOUS DAILY PRN
Status: COMPLETED | OUTPATIENT
Start: 2022-01-01 | End: 2022-01-01

## 2022-01-01 RX ORDER — DEXTROSE MONOHYDRATE 25 G/50ML
25 INJECTION, SOLUTION INTRAVENOUS PRN
Status: DISCONTINUED | OUTPATIENT
Start: 2022-01-01 | End: 2022-01-01

## 2022-01-01 RX ORDER — ONDANSETRON 2 MG/ML
4 INJECTION INTRAMUSCULAR; INTRAVENOUS EVERY 6 HOURS PRN
Status: DISCONTINUED | OUTPATIENT
Start: 2022-01-01 | End: 2022-01-01 | Stop reason: SDUPTHER

## 2022-01-01 RX ORDER — MORPHINE SULFATE 4 MG/ML
4 INJECTION, SOLUTION INTRAMUSCULAR; INTRAVENOUS
Status: DISCONTINUED | OUTPATIENT
Start: 2022-01-01 | End: 2022-01-01 | Stop reason: HOSPADM

## 2022-01-01 RX ORDER — METOPROLOL TARTRATE 50 MG/1
50 TABLET, FILM COATED ORAL 2 TIMES DAILY
Status: DISCONTINUED | OUTPATIENT
Start: 2022-01-01 | End: 2022-01-01

## 2022-01-01 RX ORDER — POTASSIUM CHLORIDE 7.45 MG/ML
10 INJECTION INTRAVENOUS PRN
Status: DISCONTINUED | OUTPATIENT
Start: 2022-01-01 | End: 2022-01-01

## 2022-01-01 RX ORDER — SODIUM POLYSTYRENE SULFONATE 15 G/60ML
15 SUSPENSION ORAL; RECTAL ONCE
Status: COMPLETED | OUTPATIENT
Start: 2022-01-01 | End: 2022-01-01

## 2022-01-01 RX ORDER — PANTOPRAZOLE SODIUM 40 MG/10ML
40 INJECTION, POWDER, LYOPHILIZED, FOR SOLUTION INTRAVENOUS DAILY
Status: CANCELLED | OUTPATIENT
Start: 2022-01-01

## 2022-01-01 RX ORDER — DEXTROSE MONOHYDRATE 25 G/50ML
12.5 INJECTION, SOLUTION INTRAVENOUS PRN
Status: DISCONTINUED | OUTPATIENT
Start: 2022-01-01 | End: 2022-01-01 | Stop reason: ALTCHOICE

## 2022-01-01 RX ORDER — HEPARIN SODIUM 1000 [USP'U]/ML
30000 INJECTION, SOLUTION INTRAVENOUS; SUBCUTANEOUS ONCE
Status: COMPLETED | OUTPATIENT
Start: 2022-01-01 | End: 2022-01-01

## 2022-01-01 RX ORDER — ACETAMINOPHEN 650 MG/1
650 SUPPOSITORY RECTAL EVERY 6 HOURS PRN
Status: DISCONTINUED | OUTPATIENT
Start: 2022-01-01 | End: 2022-01-01

## 2022-01-01 RX ORDER — NICOTINE POLACRILEX 4 MG
15 LOZENGE BUCCAL PRN
Status: DISCONTINUED | OUTPATIENT
Start: 2022-01-01 | End: 2022-01-01

## 2022-01-01 RX ORDER — PROPOFOL 10 MG/ML
5-50 INJECTION, EMULSION INTRAVENOUS CONTINUOUS
Status: DISCONTINUED | OUTPATIENT
Start: 2022-01-01 | End: 2022-01-01 | Stop reason: HOSPADM

## 2022-01-01 RX ORDER — FUROSEMIDE 10 MG/ML
40 INJECTION INTRAMUSCULAR; INTRAVENOUS ONCE
Status: COMPLETED | OUTPATIENT
Start: 2022-01-01 | End: 2022-01-01

## 2022-01-01 RX ORDER — DOPAMINE HYDROCHLORIDE 160 MG/100ML
1-20 INJECTION, SOLUTION INTRAVENOUS PRN
Status: DISCONTINUED | OUTPATIENT
Start: 2022-01-01 | End: 2022-01-01

## 2022-01-01 RX ORDER — HYDRALAZINE HYDROCHLORIDE 20 MG/ML
10 INJECTION INTRAMUSCULAR; INTRAVENOUS EVERY 6 HOURS PRN
Status: DISCONTINUED | OUTPATIENT
Start: 2022-01-01 | End: 2022-01-01

## 2022-01-01 RX ORDER — DEXTROSE MONOHYDRATE 25 G/50ML
25 INJECTION, SOLUTION INTRAVENOUS ONCE
Status: DISCONTINUED | OUTPATIENT
Start: 2022-01-01 | End: 2022-01-01

## 2022-01-01 RX ORDER — LORAZEPAM 1 MG/1
1 TABLET ORAL
Status: DISCONTINUED | OUTPATIENT
Start: 2022-01-01 | End: 2022-01-01

## 2022-01-01 RX ORDER — METHYLPREDNISOLONE SODIUM SUCCINATE 125 MG/2ML
60 INJECTION, POWDER, LYOPHILIZED, FOR SOLUTION INTRAMUSCULAR; INTRAVENOUS EVERY 8 HOURS
Status: DISCONTINUED | OUTPATIENT
Start: 2022-01-01 | End: 2022-01-01

## 2022-01-01 RX ORDER — DEXTROSE MONOHYDRATE 50 MG/ML
100 INJECTION, SOLUTION INTRAVENOUS PRN
Status: DISCONTINUED | OUTPATIENT
Start: 2022-01-01 | End: 2022-01-01

## 2022-01-01 RX ORDER — HEPARIN SODIUM 10000 [USP'U]/100ML
5-30 INJECTION, SOLUTION INTRAVENOUS CONTINUOUS
Status: DISCONTINUED | OUTPATIENT
Start: 2022-01-01 | End: 2022-01-01

## 2022-01-01 RX ORDER — ETOMIDATE 2 MG/ML
INJECTION INTRAVENOUS DAILY PRN
Status: COMPLETED | OUTPATIENT
Start: 2022-01-01 | End: 2022-01-01

## 2022-01-01 RX ORDER — BUDESONIDE 0.5 MG/2ML
0.5 INHALANT ORAL 2 TIMES DAILY
Status: DISCONTINUED | OUTPATIENT
Start: 2022-01-01 | End: 2022-01-01

## 2022-01-01 RX ORDER — 0.9 % SODIUM CHLORIDE 0.9 %
80 INTRAVENOUS SOLUTION INTRAVENOUS ONCE
Status: COMPLETED | OUTPATIENT
Start: 2022-01-01 | End: 2022-01-01

## 2022-01-01 RX ORDER — HEPARIN SODIUM 1000 [USP'U]/ML
60 INJECTION, SOLUTION INTRAVENOUS; SUBCUTANEOUS ONCE
Status: COMPLETED | OUTPATIENT
Start: 2022-01-01 | End: 2022-01-01

## 2022-01-01 RX ORDER — IPRATROPIUM BROMIDE AND ALBUTEROL SULFATE 2.5; .5 MG/3ML; MG/3ML
1 SOLUTION RESPIRATORY (INHALATION) 4 TIMES DAILY
Status: DISCONTINUED | OUTPATIENT
Start: 2022-01-01 | End: 2022-01-01

## 2022-01-01 RX ORDER — ONDANSETRON 4 MG/1
4 TABLET, ORALLY DISINTEGRATING ORAL EVERY 8 HOURS PRN
Status: DISCONTINUED | OUTPATIENT
Start: 2022-01-01 | End: 2022-01-01

## 2022-01-01 RX ORDER — DOPAMINE HYDROCHLORIDE 160 MG/100ML
1-20 INJECTION, SOLUTION INTRAVENOUS CONTINUOUS
Status: DISCONTINUED | OUTPATIENT
Start: 2022-01-01 | End: 2022-01-01

## 2022-01-01 RX ORDER — POTASSIUM CHLORIDE 29.8 MG/ML
20 INJECTION INTRAVENOUS PRN
Status: DISCONTINUED | OUTPATIENT
Start: 2022-01-01 | End: 2022-01-01

## 2022-01-01 RX ORDER — PROPOFOL 10 MG/ML
15 INJECTION, EMULSION INTRAVENOUS ONCE
Status: DISCONTINUED | OUTPATIENT
Start: 2022-01-01 | End: 2022-01-01 | Stop reason: HOSPADM

## 2022-01-01 RX ORDER — ACETAMINOPHEN 325 MG/1
650 TABLET ORAL EVERY 6 HOURS PRN
Status: DISCONTINUED | OUTPATIENT
Start: 2022-01-01 | End: 2022-01-01

## 2022-01-01 RX ORDER — SODIUM CHLORIDE 0.9 % (FLUSH) 0.9 %
10 SYRINGE (ML) INJECTION PRN
Status: DISCONTINUED | OUTPATIENT
Start: 2022-01-01 | End: 2022-01-01 | Stop reason: SDUPTHER

## 2022-01-01 RX ADMIN — CHLORHEXIDINE GLUCONATE 15 ML: 1.2 RINSE ORAL at 20:56

## 2022-01-01 RX ADMIN — METHYLPREDNISOLONE SODIUM SUCCINATE 60 MG: 125 INJECTION, POWDER, FOR SOLUTION INTRAMUSCULAR; INTRAVENOUS at 16:21

## 2022-01-01 RX ADMIN — POLYVINYL ALCOHOL 1 DROP: 14 SOLUTION/ DROPS OPHTHALMIC at 04:07

## 2022-01-01 RX ADMIN — SODIUM CHLORIDE, PRESERVATIVE FREE 10 ML: 5 INJECTION INTRAVENOUS at 11:16

## 2022-01-01 RX ADMIN — Medication 7 MG/HR: at 10:46

## 2022-01-01 RX ADMIN — PROPOFOL 40 MCG/KG/MIN: 10 INJECTION, EMULSION INTRAVENOUS at 23:42

## 2022-01-01 RX ADMIN — PROPOFOL 45 MCG/KG/MIN: 10 INJECTION, EMULSION INTRAVENOUS at 08:02

## 2022-01-01 RX ADMIN — POLYVINYL ALCOHOL 1 DROP: 14 SOLUTION/ DROPS OPHTHALMIC at 07:59

## 2022-01-01 RX ADMIN — POLYVINYL ALCOHOL 1 DROP: 14 SOLUTION/ DROPS OPHTHALMIC at 07:57

## 2022-01-01 RX ADMIN — PROPOFOL 45 MCG/KG/MIN: 10 INJECTION, EMULSION INTRAVENOUS at 00:43

## 2022-01-01 RX ADMIN — POLYVINYL ALCOHOL 1 DROP: 14 SOLUTION/ DROPS OPHTHALMIC at 11:32

## 2022-01-01 RX ADMIN — BUDESONIDE 500 MCG: 0.5 SUSPENSION RESPIRATORY (INHALATION) at 19:05

## 2022-01-01 RX ADMIN — CALCIUM CHLORIDE INJECTION 1000 MG: 100 INJECTION, SOLUTION INTRAVENOUS at 07:08

## 2022-01-01 RX ADMIN — CEFTRIAXONE SODIUM 1000 MG: 1 INJECTION, POWDER, FOR SOLUTION INTRAMUSCULAR; INTRAVENOUS at 07:56

## 2022-01-01 RX ADMIN — THIAMINE HYDROCHLORIDE: 100 INJECTION, SOLUTION INTRAMUSCULAR; INTRAVENOUS at 10:15

## 2022-01-01 RX ADMIN — HYDRALAZINE HYDROCHLORIDE 10 MG: 20 INJECTION INTRAMUSCULAR; INTRAVENOUS at 07:51

## 2022-01-01 RX ADMIN — POLYVINYL ALCOHOL 1 DROP: 14 SOLUTION/ DROPS OPHTHALMIC at 04:02

## 2022-01-01 RX ADMIN — IPRATROPIUM BROMIDE AND ALBUTEROL SULFATE 1 AMPULE: 2.5; .5 SOLUTION RESPIRATORY (INHALATION) at 15:46

## 2022-01-01 RX ADMIN — POLYVINYL ALCOHOL 1 DROP: 14 SOLUTION/ DROPS OPHTHALMIC at 20:00

## 2022-01-01 RX ADMIN — CALCIUM GLUCONATE 1000 MG: 98 INJECTION, SOLUTION INTRAVENOUS at 17:44

## 2022-01-01 RX ADMIN — IPRATROPIUM BROMIDE AND ALBUTEROL SULFATE 1 AMPULE: 2.5; .5 SOLUTION RESPIRATORY (INHALATION) at 16:24

## 2022-01-01 RX ADMIN — SODIUM CHLORIDE, PRESERVATIVE FREE 10 ML: 5 INJECTION INTRAVENOUS at 21:13

## 2022-01-01 RX ADMIN — PROPOFOL 45 MCG/KG/MIN: 10 INJECTION, EMULSION INTRAVENOUS at 08:18

## 2022-01-01 RX ADMIN — POLYVINYL ALCOHOL 1 DROP: 14 SOLUTION/ DROPS OPHTHALMIC at 00:18

## 2022-01-01 RX ADMIN — POLYVINYL ALCOHOL 1 DROP: 14 SOLUTION/ DROPS OPHTHALMIC at 11:53

## 2022-01-01 RX ADMIN — METHYLPREDNISOLONE SODIUM SUCCINATE 60 MG: 125 INJECTION, POWDER, FOR SOLUTION INTRAMUSCULAR; INTRAVENOUS at 23:49

## 2022-01-01 RX ADMIN — VALPROATE SODIUM 750 MG: 100 INJECTION, SOLUTION INTRAVENOUS at 21:11

## 2022-01-01 RX ADMIN — POTASSIUM CHLORIDE 20 MEQ: 29.8 INJECTION, SOLUTION INTRAVENOUS at 13:28

## 2022-01-01 RX ADMIN — METOPROLOL TARTRATE 50 MG: 50 TABLET, FILM COATED ORAL at 11:31

## 2022-01-01 RX ADMIN — CHLORHEXIDINE GLUCONATE 15 ML: 1.2 RINSE ORAL at 08:01

## 2022-01-01 RX ADMIN — AZITHROMYCIN MONOHYDRATE 500 MG: 500 INJECTION, POWDER, LYOPHILIZED, FOR SOLUTION INTRAVENOUS at 11:27

## 2022-01-01 RX ADMIN — POLYVINYL ALCOHOL 1 DROP: 14 SOLUTION/ DROPS OPHTHALMIC at 07:46

## 2022-01-01 RX ADMIN — SODIUM CHLORIDE, PRESERVATIVE FREE 10 ML: 5 INJECTION INTRAVENOUS at 20:43

## 2022-01-01 RX ADMIN — METHYLPREDNISOLONE SODIUM SUCCINATE 60 MG: 125 INJECTION, POWDER, FOR SOLUTION INTRAMUSCULAR; INTRAVENOUS at 16:45

## 2022-01-01 RX ADMIN — Medication 5 MG/HR: at 06:19

## 2022-01-01 RX ADMIN — POLYVINYL ALCOHOL 1 DROP: 14 SOLUTION/ DROPS OPHTHALMIC at 20:27

## 2022-01-01 RX ADMIN — INSULIN LISPRO 1 UNITS: 100 INJECTION, SOLUTION INTRAVENOUS; SUBCUTANEOUS at 01:51

## 2022-01-01 RX ADMIN — BUDESONIDE 500 MCG: 0.5 SUSPENSION RESPIRATORY (INHALATION) at 19:24

## 2022-01-01 RX ADMIN — PROPOFOL 20 MCG/KG/MIN: 10 INJECTION, EMULSION INTRAVENOUS at 04:35

## 2022-01-01 RX ADMIN — IPRATROPIUM BROMIDE AND ALBUTEROL SULFATE 1 AMPULE: 2.5; .5 SOLUTION RESPIRATORY (INHALATION) at 11:13

## 2022-01-01 RX ADMIN — IPRATROPIUM BROMIDE AND ALBUTEROL SULFATE 1 AMPULE: 2.5; .5 SOLUTION RESPIRATORY (INHALATION) at 19:23

## 2022-01-01 RX ADMIN — POLYVINYL ALCOHOL 1 DROP: 14 SOLUTION/ DROPS OPHTHALMIC at 03:48

## 2022-01-01 RX ADMIN — PROPOFOL 45 MCG/KG/MIN: 10 INJECTION, EMULSION INTRAVENOUS at 15:06

## 2022-01-01 RX ADMIN — POLYVINYL ALCOHOL 1 DROP: 14 SOLUTION/ DROPS OPHTHALMIC at 05:16

## 2022-01-01 RX ADMIN — CEFTRIAXONE SODIUM 1000 MG: 1 INJECTION, POWDER, FOR SOLUTION INTRAMUSCULAR; INTRAVENOUS at 08:08

## 2022-01-01 RX ADMIN — POLYVINYL ALCOHOL 1 DROP: 14 SOLUTION/ DROPS OPHTHALMIC at 23:49

## 2022-01-01 RX ADMIN — SODIUM CHLORIDE 500 MG: 9 INJECTION, SOLUTION INTRAVENOUS at 07:38

## 2022-01-01 RX ADMIN — PROPOFOL 50 MCG/KG/MIN: 10 INJECTION, EMULSION INTRAVENOUS at 11:59

## 2022-01-01 RX ADMIN — SODIUM CHLORIDE, PRESERVATIVE FREE 10 ML: 5 INJECTION INTRAVENOUS at 20:40

## 2022-01-01 RX ADMIN — POLYVINYL ALCOHOL 1 DROP: 14 SOLUTION/ DROPS OPHTHALMIC at 11:37

## 2022-01-01 RX ADMIN — POLYVINYL ALCOHOL 1 DROP: 14 SOLUTION/ DROPS OPHTHALMIC at 00:22

## 2022-01-01 RX ADMIN — LORAZEPAM 1 MG: 2 INJECTION INTRAMUSCULAR; INTRAVENOUS at 14:58

## 2022-01-01 RX ADMIN — METHYLPREDNISOLONE SODIUM SUCCINATE 60 MG: 125 INJECTION, POWDER, FOR SOLUTION INTRAMUSCULAR; INTRAVENOUS at 07:47

## 2022-01-01 RX ADMIN — INSULIN LISPRO 1 UNITS: 100 INJECTION, SOLUTION INTRAVENOUS; SUBCUTANEOUS at 05:34

## 2022-01-01 RX ADMIN — HEPARIN SODIUM 14 UNITS/KG/HR: 10000 INJECTION, SOLUTION INTRAVENOUS; SUBCUTANEOUS at 05:18

## 2022-01-01 RX ADMIN — HYDRALAZINE HYDROCHLORIDE 10 MG: 20 INJECTION INTRAMUSCULAR; INTRAVENOUS at 05:12

## 2022-01-01 RX ADMIN — THIAMINE HYDROCHLORIDE: 100 INJECTION, SOLUTION INTRAMUSCULAR; INTRAVENOUS at 10:44

## 2022-01-01 RX ADMIN — POLYVINYL ALCOHOL 1 DROP: 14 SOLUTION/ DROPS OPHTHALMIC at 16:20

## 2022-01-01 RX ADMIN — HYDRALAZINE HYDROCHLORIDE 10 MG: 20 INJECTION INTRAMUSCULAR; INTRAVENOUS at 12:09

## 2022-01-01 RX ADMIN — CALCIUM GLUCONATE 1000 MG: 98 INJECTION, SOLUTION INTRAVENOUS at 12:58

## 2022-01-01 RX ADMIN — CHLORHEXIDINE GLUCONATE 15 ML: 1.2 RINSE ORAL at 08:08

## 2022-01-01 RX ADMIN — INSULIN LISPRO 1 UNITS: 100 INJECTION, SOLUTION INTRAVENOUS; SUBCUTANEOUS at 05:45

## 2022-01-01 RX ADMIN — VALPROATE SODIUM 750 MG: 100 INJECTION, SOLUTION INTRAVENOUS at 09:12

## 2022-01-01 RX ADMIN — MAGNESIUM SULFATE HEPTAHYDRATE 1000 MG: 1 INJECTION, SOLUTION INTRAVENOUS at 13:06

## 2022-01-01 RX ADMIN — INSULIN LISPRO 2 UNITS: 100 INJECTION, SOLUTION INTRAVENOUS; SUBCUTANEOUS at 13:16

## 2022-01-01 RX ADMIN — SODIUM CHLORIDE, PRESERVATIVE FREE 10 ML: 5 INJECTION INTRAVENOUS at 08:03

## 2022-01-01 RX ADMIN — CEFTRIAXONE SODIUM 1000 MG: 1 INJECTION, POWDER, FOR SOLUTION INTRAMUSCULAR; INTRAVENOUS at 08:56

## 2022-01-01 RX ADMIN — SODIUM CHLORIDE, PRESERVATIVE FREE 10 ML: 5 INJECTION INTRAVENOUS at 09:10

## 2022-01-01 RX ADMIN — IPRATROPIUM BROMIDE AND ALBUTEROL SULFATE 1 AMPULE: 2.5; .5 SOLUTION RESPIRATORY (INHALATION) at 14:37

## 2022-01-01 RX ADMIN — METHYLPREDNISOLONE SODIUM SUCCINATE 60 MG: 125 INJECTION, POWDER, FOR SOLUTION INTRAMUSCULAR; INTRAVENOUS at 16:20

## 2022-01-01 RX ADMIN — THIAMINE HYDROCHLORIDE: 100 INJECTION, SOLUTION INTRAMUSCULAR; INTRAVENOUS at 08:30

## 2022-01-01 RX ADMIN — INSULIN LISPRO 4 UNITS: 100 INJECTION, SOLUTION INTRAVENOUS; SUBCUTANEOUS at 17:21

## 2022-01-01 RX ADMIN — INSULIN LISPRO 1 UNITS: 100 INJECTION, SOLUTION INTRAVENOUS; SUBCUTANEOUS at 01:20

## 2022-01-01 RX ADMIN — VALPROATE SODIUM 750 MG: 100 INJECTION, SOLUTION INTRAVENOUS at 20:45

## 2022-01-01 RX ADMIN — CEFTRIAXONE SODIUM 1000 MG: 1 INJECTION, POWDER, FOR SOLUTION INTRAMUSCULAR; INTRAVENOUS at 10:17

## 2022-01-01 RX ADMIN — MORPHINE SULFATE 4 MG: 4 INJECTION, SOLUTION INTRAMUSCULAR; INTRAVENOUS at 15:30

## 2022-01-01 RX ADMIN — INSULIN LISPRO 1 UNITS: 100 INJECTION, SOLUTION INTRAVENOUS; SUBCUTANEOUS at 12:02

## 2022-01-01 RX ADMIN — DEXTROSE MONOHYDRATE 250 ML: 100 INJECTION, SOLUTION INTRAVENOUS at 07:16

## 2022-01-01 RX ADMIN — SODIUM CHLORIDE 500 MG: 9 INJECTION, SOLUTION INTRAVENOUS at 18:17

## 2022-01-01 RX ADMIN — AZITHROMYCIN MONOHYDRATE 500 MG: 500 INJECTION, POWDER, LYOPHILIZED, FOR SOLUTION INTRAVENOUS at 10:45

## 2022-01-01 RX ADMIN — CHLORHEXIDINE GLUCONATE 15 ML: 1.2 RINSE ORAL at 21:13

## 2022-01-01 RX ADMIN — IPRATROPIUM BROMIDE AND ALBUTEROL SULFATE 1 AMPULE: 2.5; .5 SOLUTION RESPIRATORY (INHALATION) at 08:08

## 2022-01-01 RX ADMIN — IPRATROPIUM BROMIDE AND ALBUTEROL SULFATE 1 AMPULE: 2.5; .5 SOLUTION RESPIRATORY (INHALATION) at 07:22

## 2022-01-01 RX ADMIN — PROPOFOL 45 MCG/KG/MIN: 10 INJECTION, EMULSION INTRAVENOUS at 00:16

## 2022-01-01 RX ADMIN — CHLORHEXIDINE GLUCONATE 15 ML: 1.2 RINSE ORAL at 07:57

## 2022-01-01 RX ADMIN — CHLORHEXIDINE GLUCONATE 15 ML: 1.2 RINSE ORAL at 08:15

## 2022-01-01 RX ADMIN — SODIUM CHLORIDE 500 MG: 9 INJECTION, SOLUTION INTRAVENOUS at 00:50

## 2022-01-01 RX ADMIN — INSULIN LISPRO 1 UNITS: 100 INJECTION, SOLUTION INTRAVENOUS; SUBCUTANEOUS at 22:05

## 2022-01-01 RX ADMIN — POLYVINYL ALCOHOL 1 DROP: 14 SOLUTION/ DROPS OPHTHALMIC at 20:06

## 2022-01-01 RX ADMIN — METHYLPREDNISOLONE SODIUM SUCCINATE 60 MG: 125 INJECTION, POWDER, FOR SOLUTION INTRAMUSCULAR; INTRAVENOUS at 08:01

## 2022-01-01 RX ADMIN — HYDRALAZINE HYDROCHLORIDE 10 MG: 20 INJECTION INTRAMUSCULAR; INTRAVENOUS at 03:27

## 2022-01-01 RX ADMIN — SUCCINYLCHOLINE CHLORIDE 150 MG: 20 INJECTION, SOLUTION INTRAMUSCULAR; INTRAVENOUS at 03:56

## 2022-01-01 RX ADMIN — IPRATROPIUM BROMIDE AND ALBUTEROL SULFATE 1 AMPULE: 2.5; .5 SOLUTION RESPIRATORY (INHALATION) at 06:22

## 2022-01-01 RX ADMIN — PANTOPRAZOLE SODIUM 40 MG: 40 INJECTION, POWDER, FOR SOLUTION INTRAVENOUS at 08:19

## 2022-01-01 RX ADMIN — LORAZEPAM 2 MG: 2 INJECTION INTRAMUSCULAR; INTRAVENOUS at 14:30

## 2022-01-01 RX ADMIN — PROPOFOL 40 MCG/KG/MIN: 10 INJECTION, EMULSION INTRAVENOUS at 08:49

## 2022-01-01 RX ADMIN — POLYVINYL ALCOHOL 1 DROP: 14 SOLUTION/ DROPS OPHTHALMIC at 20:39

## 2022-01-01 RX ADMIN — POLYVINYL ALCOHOL 1 DROP: 14 SOLUTION/ DROPS OPHTHALMIC at 00:30

## 2022-01-01 RX ADMIN — SODIUM BICARBONATE 50 MEQ: 84 INJECTION INTRAVENOUS at 03:31

## 2022-01-01 RX ADMIN — BUDESONIDE 500 MCG: 0.5 SUSPENSION RESPIRATORY (INHALATION) at 20:11

## 2022-01-01 RX ADMIN — IPRATROPIUM BROMIDE AND ALBUTEROL SULFATE 1 AMPULE: 2.5; .5 SOLUTION RESPIRATORY (INHALATION) at 07:56

## 2022-01-01 RX ADMIN — INSULIN HUMAN 10 UNITS: 100 INJECTION, SOLUTION PARENTERAL at 07:08

## 2022-01-01 RX ADMIN — THIAMINE HYDROCHLORIDE: 100 INJECTION, SOLUTION INTRAMUSCULAR; INTRAVENOUS at 08:35

## 2022-01-01 RX ADMIN — POTASSIUM CHLORIDE 10 MEQ: 7.46 INJECTION, SOLUTION INTRAVENOUS at 13:40

## 2022-01-01 RX ADMIN — POTASSIUM CHLORIDE 20 MEQ: 29.8 INJECTION, SOLUTION INTRAVENOUS at 02:43

## 2022-01-01 RX ADMIN — SODIUM CHLORIDE, PRESERVATIVE FREE 10 ML: 5 INJECTION INTRAVENOUS at 20:29

## 2022-01-01 RX ADMIN — PANTOPRAZOLE SODIUM 40 MG: 40 INJECTION, POWDER, FOR SOLUTION INTRAVENOUS at 08:49

## 2022-01-01 RX ADMIN — IPRATROPIUM BROMIDE AND ALBUTEROL SULFATE 1 AMPULE: 2.5; .5 SOLUTION RESPIRATORY (INHALATION) at 07:29

## 2022-01-01 RX ADMIN — POTASSIUM CHLORIDE 20 MEQ: 29.8 INJECTION, SOLUTION INTRAVENOUS at 23:26

## 2022-01-01 RX ADMIN — METHYLPREDNISOLONE SODIUM SUCCINATE 60 MG: 125 INJECTION, POWDER, FOR SOLUTION INTRAMUSCULAR; INTRAVENOUS at 00:44

## 2022-01-01 RX ADMIN — SODIUM CHLORIDE, PRESERVATIVE FREE 10 ML: 5 INJECTION INTRAVENOUS at 08:25

## 2022-01-01 RX ADMIN — LORAZEPAM 1 MG: 2 INJECTION INTRAMUSCULAR; INTRAVENOUS at 15:15

## 2022-01-01 RX ADMIN — Medication 7 MG/HR: at 22:48

## 2022-01-01 RX ADMIN — PROPOFOL 50 MCG/KG/MIN: 10 INJECTION, EMULSION INTRAVENOUS at 06:21

## 2022-01-01 RX ADMIN — POLYVINYL ALCOHOL 1 DROP: 14 SOLUTION/ DROPS OPHTHALMIC at 19:18

## 2022-01-01 RX ADMIN — POLYVINYL ALCOHOL 1 DROP: 14 SOLUTION/ DROPS OPHTHALMIC at 03:28

## 2022-01-01 RX ADMIN — PROPOFOL 50 MCG/KG/MIN: 10 INJECTION, EMULSION INTRAVENOUS at 13:47

## 2022-01-01 RX ADMIN — IPRATROPIUM BROMIDE AND ALBUTEROL SULFATE 1 AMPULE: 2.5; .5 SOLUTION RESPIRATORY (INHALATION) at 11:07

## 2022-01-01 RX ADMIN — SODIUM BICARBONATE: 84 INJECTION, SOLUTION INTRAVENOUS at 03:01

## 2022-01-01 RX ADMIN — METHYLPREDNISOLONE SODIUM SUCCINATE 60 MG: 125 INJECTION, POWDER, FOR SOLUTION INTRAMUSCULAR; INTRAVENOUS at 07:57

## 2022-01-01 RX ADMIN — PROPOFOL 40 MCG/KG/MIN: 10 INJECTION, EMULSION INTRAVENOUS at 11:46

## 2022-01-01 RX ADMIN — AZITHROMYCIN MONOHYDRATE 500 MG: 500 INJECTION, POWDER, LYOPHILIZED, FOR SOLUTION INTRAVENOUS at 09:35

## 2022-01-01 RX ADMIN — CHLORHEXIDINE GLUCONATE 15 ML: 1.2 RINSE ORAL at 08:16

## 2022-01-01 RX ADMIN — PROPOFOL 15 MG: 10 INJECTION, EMULSION INTRAVENOUS at 09:00

## 2022-01-01 RX ADMIN — POTASSIUM CHLORIDE 20 MEQ: 29.8 INJECTION, SOLUTION INTRAVENOUS at 00:18

## 2022-01-01 RX ADMIN — ETOMIDATE 20 MG: 2 INJECTION, SOLUTION INTRAVENOUS at 03:56

## 2022-01-01 RX ADMIN — POLYVINYL ALCOHOL 1 DROP: 14 SOLUTION/ DROPS OPHTHALMIC at 03:35

## 2022-01-01 RX ADMIN — PROPOFOL 45 MCG/KG/MIN: 10 INJECTION, EMULSION INTRAVENOUS at 13:44

## 2022-01-01 RX ADMIN — POLYVINYL ALCOHOL 1 DROP: 14 SOLUTION/ DROPS OPHTHALMIC at 20:50

## 2022-01-01 RX ADMIN — CEFTRIAXONE SODIUM 1000 MG: 1 INJECTION, POWDER, FOR SOLUTION INTRAMUSCULAR; INTRAVENOUS at 07:52

## 2022-01-01 RX ADMIN — PANTOPRAZOLE SODIUM 40 MG: 40 INJECTION, POWDER, FOR SOLUTION INTRAVENOUS at 08:08

## 2022-01-01 RX ADMIN — POLYVINYL ALCOHOL 1 DROP: 14 SOLUTION/ DROPS OPHTHALMIC at 17:32

## 2022-01-01 RX ADMIN — PROPOFOL 40 MCG/KG/MIN: 10 INJECTION, EMULSION INTRAVENOUS at 16:50

## 2022-01-01 RX ADMIN — POLYVINYL ALCOHOL 1 DROP: 14 SOLUTION/ DROPS OPHTHALMIC at 07:38

## 2022-01-01 RX ADMIN — Medication 5 MCG/MIN: at 12:33

## 2022-01-01 RX ADMIN — SODIUM CHLORIDE, PRESERVATIVE FREE 10 ML: 5 INJECTION INTRAVENOUS at 20:57

## 2022-01-01 RX ADMIN — SODIUM CHLORIDE 500 MG: 9 INJECTION, SOLUTION INTRAVENOUS at 00:26

## 2022-01-01 RX ADMIN — Medication 50 MCG/HR: at 00:21

## 2022-01-01 RX ADMIN — IPRATROPIUM BROMIDE AND ALBUTEROL SULFATE 1 AMPULE: 2.5; .5 SOLUTION RESPIRATORY (INHALATION) at 19:05

## 2022-01-01 RX ADMIN — HYDRALAZINE HYDROCHLORIDE 10 MG: 20 INJECTION INTRAMUSCULAR; INTRAVENOUS at 08:18

## 2022-01-01 RX ADMIN — HEPARIN SODIUM 12 UNITS/KG/HR: 10000 INJECTION, SOLUTION INTRAVENOUS; SUBCUTANEOUS at 11:01

## 2022-01-01 RX ADMIN — METHYLPREDNISOLONE SODIUM SUCCINATE 60 MG: 125 INJECTION, POWDER, FOR SOLUTION INTRAMUSCULAR; INTRAVENOUS at 08:08

## 2022-01-01 RX ADMIN — INSULIN LISPRO 1 UNITS: 100 INJECTION, SOLUTION INTRAVENOUS; SUBCUTANEOUS at 18:44

## 2022-01-01 RX ADMIN — MORPHINE SULFATE 4 MG: 4 INJECTION, SOLUTION INTRAMUSCULAR; INTRAVENOUS at 14:47

## 2022-01-01 RX ADMIN — IPRATROPIUM BROMIDE AND ALBUTEROL SULFATE 1 AMPULE: 2.5; .5 SOLUTION RESPIRATORY (INHALATION) at 15:09

## 2022-01-01 RX ADMIN — SODIUM CHLORIDE, PRESERVATIVE FREE 10 ML: 5 INJECTION INTRAVENOUS at 08:58

## 2022-01-01 RX ADMIN — BUDESONIDE 500 MCG: 0.5 SUSPENSION RESPIRATORY (INHALATION) at 07:56

## 2022-01-01 RX ADMIN — VALPROATE SODIUM 750 MG: 100 INJECTION, SOLUTION INTRAVENOUS at 09:13

## 2022-01-01 RX ADMIN — IPRATROPIUM BROMIDE AND ALBUTEROL SULFATE 1 AMPULE: 2.5; .5 SOLUTION RESPIRATORY (INHALATION) at 11:04

## 2022-01-01 RX ADMIN — SODIUM BICARBONATE: 84 INJECTION, SOLUTION INTRAVENOUS at 07:08

## 2022-01-01 RX ADMIN — ALBUTEROL SULFATE 5 MG: 5 SOLUTION RESPIRATORY (INHALATION) at 05:13

## 2022-01-01 RX ADMIN — SODIUM CHLORIDE, PRESERVATIVE FREE 10 ML: 5 INJECTION INTRAVENOUS at 05:58

## 2022-01-01 RX ADMIN — CALCIUM CHLORIDE INJECTION 1000 MG: 100 INJECTION, SOLUTION INTRAVENOUS at 22:12

## 2022-01-01 RX ADMIN — HEPARIN SODIUM 30000 UNITS: 1000 INJECTION INTRAVENOUS; SUBCUTANEOUS at 14:38

## 2022-01-01 RX ADMIN — AZITHROMYCIN MONOHYDRATE 500 MG: 500 INJECTION, POWDER, LYOPHILIZED, FOR SOLUTION INTRAVENOUS at 11:22

## 2022-01-01 RX ADMIN — CHLORHEXIDINE GLUCONATE 15 ML: 1.2 RINSE ORAL at 08:04

## 2022-01-01 RX ADMIN — Medication 50 MCG/HR: at 10:42

## 2022-01-01 RX ADMIN — POTASSIUM CHLORIDE 20 MEQ: 29.8 INJECTION, SOLUTION INTRAVENOUS at 22:13

## 2022-01-01 RX ADMIN — IPRATROPIUM BROMIDE AND ALBUTEROL SULFATE 1 AMPULE: 2.5; .5 SOLUTION RESPIRATORY (INHALATION) at 07:35

## 2022-01-01 RX ADMIN — POTASSIUM CHLORIDE 10 MEQ: 7.46 INJECTION, SOLUTION INTRAVENOUS at 12:38

## 2022-01-01 RX ADMIN — INSULIN HUMAN 10 UNITS: 100 INJECTION, SOLUTION PARENTERAL at 22:13

## 2022-01-01 RX ADMIN — VALPROATE SODIUM 750 MG: 100 INJECTION, SOLUTION INTRAVENOUS at 09:19

## 2022-01-01 RX ADMIN — CHLORHEXIDINE GLUCONATE 15 ML: 1.2 RINSE ORAL at 20:51

## 2022-01-01 RX ADMIN — SODIUM CHLORIDE, PRESERVATIVE FREE 10 ML: 5 INJECTION INTRAVENOUS at 08:19

## 2022-01-01 RX ADMIN — BUDESONIDE 500 MCG: 0.5 SUSPENSION RESPIRATORY (INHALATION) at 07:29

## 2022-01-01 RX ADMIN — SODIUM CHLORIDE, PRESERVATIVE FREE 10 ML: 5 INJECTION INTRAVENOUS at 08:09

## 2022-01-01 RX ADMIN — VALPROATE SODIUM 750 MG: 100 INJECTION, SOLUTION INTRAVENOUS at 08:22

## 2022-01-01 RX ADMIN — CHLORHEXIDINE GLUCONATE 15 ML: 1.2 RINSE ORAL at 11:36

## 2022-01-01 RX ADMIN — DOPAMINE HYDROCHLORIDE 5 MCG/KG/MIN: 160 INJECTION, SOLUTION INTRAVENOUS at 06:03

## 2022-01-01 RX ADMIN — VANCOMYCIN HYDROCHLORIDE 1250 MG: 5 INJECTION, POWDER, LYOPHILIZED, FOR SOLUTION INTRAVENOUS at 13:03

## 2022-01-01 RX ADMIN — BUDESONIDE 500 MCG: 0.5 SUSPENSION RESPIRATORY (INHALATION) at 08:08

## 2022-01-01 RX ADMIN — AZITHROMYCIN MONOHYDRATE 500 MG: 500 INJECTION, POWDER, LYOPHILIZED, FOR SOLUTION INTRAVENOUS at 10:27

## 2022-01-01 RX ADMIN — IPRATROPIUM BROMIDE AND ALBUTEROL SULFATE 1 AMPULE: 2.5; .5 SOLUTION RESPIRATORY (INHALATION) at 11:14

## 2022-01-01 RX ADMIN — PROPOFOL 35 MCG/KG/MIN: 10 INJECTION, EMULSION INTRAVENOUS at 02:06

## 2022-01-01 RX ADMIN — IPRATROPIUM BROMIDE AND ALBUTEROL SULFATE 1 AMPULE: 2.5; .5 SOLUTION RESPIRATORY (INHALATION) at 20:11

## 2022-01-01 RX ADMIN — CHLORHEXIDINE GLUCONATE 15 ML: 1.2 RINSE ORAL at 08:57

## 2022-01-01 RX ADMIN — VALPROATE SODIUM 750 MG: 100 INJECTION, SOLUTION INTRAVENOUS at 20:37

## 2022-01-01 RX ADMIN — METHYLPREDNISOLONE SODIUM SUCCINATE 60 MG: 125 INJECTION, POWDER, FOR SOLUTION INTRAMUSCULAR; INTRAVENOUS at 00:22

## 2022-01-01 RX ADMIN — POLYVINYL ALCOHOL 1 DROP: 14 SOLUTION/ DROPS OPHTHALMIC at 19:48

## 2022-01-01 RX ADMIN — HEPARIN SODIUM 1330 UNITS: 1000 INJECTION INTRAVENOUS; SUBCUTANEOUS at 05:34

## 2022-01-01 RX ADMIN — PROPOFOL 40 MCG/KG/MIN: 10 INJECTION, EMULSION INTRAVENOUS at 06:19

## 2022-01-01 RX ADMIN — POTASSIUM CHLORIDE 20 MEQ: 29.8 INJECTION, SOLUTION INTRAVENOUS at 18:33

## 2022-01-01 RX ADMIN — SODIUM CHLORIDE, PRESERVATIVE FREE 10 ML: 5 INJECTION INTRAVENOUS at 08:00

## 2022-01-01 RX ADMIN — INSULIN LISPRO 1 UNITS: 100 INJECTION, SOLUTION INTRAVENOUS; SUBCUTANEOUS at 06:19

## 2022-01-01 RX ADMIN — PANTOPRAZOLE SODIUM 40 MG: 40 INJECTION, POWDER, FOR SOLUTION INTRAVENOUS at 09:04

## 2022-01-01 RX ADMIN — CEFTRIAXONE SODIUM 1000 MG: 1 INJECTION, POWDER, FOR SOLUTION INTRAMUSCULAR; INTRAVENOUS at 08:14

## 2022-01-01 RX ADMIN — SODIUM POLYSTYRENE SULFONATE 45 G: 15 SUSPENSION ORAL; RECTAL at 08:38

## 2022-01-01 RX ADMIN — THIAMINE HYDROCHLORIDE: 100 INJECTION, SOLUTION INTRAMUSCULAR; INTRAVENOUS at 09:19

## 2022-01-01 RX ADMIN — THIAMINE HYDROCHLORIDE: 100 INJECTION, SOLUTION INTRAMUSCULAR; INTRAVENOUS at 08:32

## 2022-01-01 RX ADMIN — SODIUM CHLORIDE 500 MG: 9 INJECTION, SOLUTION INTRAVENOUS at 12:39

## 2022-01-01 RX ADMIN — INSULIN LISPRO 1 UNITS: 100 INJECTION, SOLUTION INTRAVENOUS; SUBCUTANEOUS at 10:29

## 2022-01-01 RX ADMIN — IPRATROPIUM BROMIDE AND ALBUTEROL SULFATE 1 AMPULE: 2.5; .5 SOLUTION RESPIRATORY (INHALATION) at 11:49

## 2022-01-01 RX ADMIN — PANTOPRAZOLE SODIUM 40 MG: 40 INJECTION, POWDER, FOR SOLUTION INTRAVENOUS at 08:00

## 2022-01-01 RX ADMIN — IPRATROPIUM BROMIDE AND ALBUTEROL SULFATE 1 AMPULE: 2.5; .5 SOLUTION RESPIRATORY (INHALATION) at 11:58

## 2022-01-01 RX ADMIN — INSULIN LISPRO 1 UNITS: 100 INJECTION, SOLUTION INTRAVENOUS; SUBCUTANEOUS at 16:46

## 2022-01-01 RX ADMIN — POTASSIUM CHLORIDE 20 MEQ: 29.8 INJECTION, SOLUTION INTRAVENOUS at 17:31

## 2022-01-01 RX ADMIN — IPRATROPIUM BROMIDE AND ALBUTEROL SULFATE 1 AMPULE: 2.5; .5 SOLUTION RESPIRATORY (INHALATION) at 11:32

## 2022-01-01 RX ADMIN — IPRATROPIUM BROMIDE AND ALBUTEROL SULFATE 1 AMPULE: 2.5; .5 SOLUTION RESPIRATORY (INHALATION) at 07:42

## 2022-01-01 RX ADMIN — BUDESONIDE 500 MCG: 0.5 SUSPENSION RESPIRATORY (INHALATION) at 07:35

## 2022-01-01 RX ADMIN — BUDESONIDE 500 MCG: 0.5 SUSPENSION RESPIRATORY (INHALATION) at 11:52

## 2022-01-01 RX ADMIN — POLYVINYL ALCOHOL 1 DROP: 14 SOLUTION/ DROPS OPHTHALMIC at 23:31

## 2022-01-01 RX ADMIN — SODIUM CHLORIDE 80 ML: 0.9 INJECTION, SOLUTION INTRAVENOUS at 05:58

## 2022-01-01 RX ADMIN — METHYLPREDNISOLONE SODIUM SUCCINATE 60 MG: 125 INJECTION, POWDER, FOR SOLUTION INTRAMUSCULAR; INTRAVENOUS at 09:02

## 2022-01-01 RX ADMIN — CHLORHEXIDINE GLUCONATE 15 ML: 1.2 RINSE ORAL at 20:29

## 2022-01-01 RX ADMIN — POTASSIUM CHLORIDE 20 MEQ: 29.8 INJECTION, SOLUTION INTRAVENOUS at 19:36

## 2022-01-01 RX ADMIN — SODIUM POLYSTYRENE SULFONATE 15 G: 15 SUSPENSION ORAL; RECTAL at 22:42

## 2022-01-01 RX ADMIN — IPRATROPIUM BROMIDE AND ALBUTEROL SULFATE 1 AMPULE: 2.5; .5 SOLUTION RESPIRATORY (INHALATION) at 19:24

## 2022-01-01 RX ADMIN — CHLORHEXIDINE GLUCONATE 15 ML: 1.2 RINSE ORAL at 20:43

## 2022-01-01 RX ADMIN — CEFTRIAXONE SODIUM 1000 MG: 1 INJECTION, POWDER, FOR SOLUTION INTRAMUSCULAR; INTRAVENOUS at 08:02

## 2022-01-01 RX ADMIN — IPRATROPIUM BROMIDE AND ALBUTEROL SULFATE 1 AMPULE: 2.5; .5 SOLUTION RESPIRATORY (INHALATION) at 11:33

## 2022-01-01 RX ADMIN — PROPOFOL 40 MCG/KG/MIN: 10 INJECTION, EMULSION INTRAVENOUS at 19:09

## 2022-01-01 RX ADMIN — POLYVINYL ALCOHOL 1 DROP: 14 SOLUTION/ DROPS OPHTHALMIC at 08:08

## 2022-01-01 RX ADMIN — PANTOPRAZOLE SODIUM 40 MG: 40 INJECTION, POWDER, FOR SOLUTION INTRAVENOUS at 10:20

## 2022-01-01 RX ADMIN — SODIUM CHLORIDE, PRESERVATIVE FREE 10 ML: 5 INJECTION INTRAVENOUS at 22:11

## 2022-01-01 RX ADMIN — MAGNESIUM SULFATE HEPTAHYDRATE 2000 MG: 40 INJECTION, SOLUTION INTRAVENOUS at 09:45

## 2022-01-01 RX ADMIN — PANTOPRAZOLE SODIUM 40 MG: 40 INJECTION, POWDER, FOR SOLUTION INTRAVENOUS at 08:25

## 2022-01-01 RX ADMIN — POTASSIUM CHLORIDE 20 MEQ: 29.8 INJECTION, SOLUTION INTRAVENOUS at 14:39

## 2022-01-01 RX ADMIN — POLYVINYL ALCOHOL 1 DROP: 14 SOLUTION/ DROPS OPHTHALMIC at 12:21

## 2022-01-01 RX ADMIN — BUDESONIDE 500 MCG: 0.5 SUSPENSION RESPIRATORY (INHALATION) at 06:23

## 2022-01-01 RX ADMIN — SODIUM CHLORIDE 1000 ML: 9 INJECTION, SOLUTION INTRAVENOUS at 04:38

## 2022-01-01 RX ADMIN — CISATRACURIUM BESYLATE 2 MCG/KG/MIN: 200 INJECTION INTRAVENOUS at 21:34

## 2022-01-01 RX ADMIN — BUDESONIDE 500 MCG: 0.5 SUSPENSION RESPIRATORY (INHALATION) at 18:23

## 2022-01-01 RX ADMIN — PROPOFOL 40 MCG/KG/MIN: 10 INJECTION, EMULSION INTRAVENOUS at 02:33

## 2022-01-01 RX ADMIN — AZITHROMYCIN MONOHYDRATE 500 MG: 500 INJECTION, POWDER, LYOPHILIZED, FOR SOLUTION INTRAVENOUS at 11:29

## 2022-01-01 RX ADMIN — POLYVINYL ALCOHOL 1 DROP: 14 SOLUTION/ DROPS OPHTHALMIC at 00:45

## 2022-01-01 RX ADMIN — SODIUM CHLORIDE 6.12 UNITS/HR: 9 INJECTION, SOLUTION INTRAVENOUS at 00:39

## 2022-01-01 RX ADMIN — POLYVINYL ALCOHOL 1 DROP: 14 SOLUTION/ DROPS OPHTHALMIC at 15:36

## 2022-01-01 RX ADMIN — PROPOFOL 45 MCG/KG/MIN: 10 INJECTION, EMULSION INTRAVENOUS at 15:10

## 2022-01-01 RX ADMIN — VALPROATE SODIUM 750 MG: 100 INJECTION, SOLUTION INTRAVENOUS at 09:03

## 2022-01-01 RX ADMIN — LORAZEPAM 1 MG: 2 INJECTION INTRAMUSCULAR; INTRAVENOUS at 14:43

## 2022-01-01 RX ADMIN — INSULIN LISPRO 2 UNITS: 100 INJECTION, SOLUTION INTRAVENOUS; SUBCUTANEOUS at 10:31

## 2022-01-01 RX ADMIN — POLYVINYL ALCOHOL 1 DROP: 14 SOLUTION/ DROPS OPHTHALMIC at 12:09

## 2022-01-01 RX ADMIN — PANTOPRAZOLE SODIUM 40 MG: 40 INJECTION, POWDER, FOR SOLUTION INTRAVENOUS at 08:02

## 2022-01-01 RX ADMIN — BUDESONIDE 500 MCG: 0.5 SUSPENSION RESPIRATORY (INHALATION) at 19:23

## 2022-01-01 RX ADMIN — Medication 50 MCG/HR: at 04:42

## 2022-01-01 RX ADMIN — POLYVINYL ALCOHOL 1 DROP: 14 SOLUTION/ DROPS OPHTHALMIC at 15:01

## 2022-01-01 RX ADMIN — VALPROATE SODIUM 750 MG: 100 INJECTION, SOLUTION INTRAVENOUS at 20:46

## 2022-01-01 RX ADMIN — HEPARIN SODIUM 1330 UNITS: 1000 INJECTION INTRAVENOUS; SUBCUTANEOUS at 06:35

## 2022-01-01 RX ADMIN — VANCOMYCIN HYDROCHLORIDE 1000 MG: 1 INJECTION, POWDER, LYOPHILIZED, FOR SOLUTION INTRAVENOUS at 01:05

## 2022-01-01 RX ADMIN — HYDRALAZINE HYDROCHLORIDE 10 MG: 20 INJECTION INTRAMUSCULAR; INTRAVENOUS at 20:29

## 2022-01-01 RX ADMIN — SODIUM CHLORIDE: 9 INJECTION, SOLUTION INTRAVENOUS at 16:49

## 2022-01-01 RX ADMIN — VALPROATE SODIUM 750 MG: 100 INJECTION, SOLUTION INTRAVENOUS at 20:47

## 2022-01-01 RX ADMIN — POLYVINYL ALCOHOL 1 DROP: 14 SOLUTION/ DROPS OPHTHALMIC at 16:50

## 2022-01-01 RX ADMIN — CHLORHEXIDINE GLUCONATE 15 ML: 1.2 RINSE ORAL at 21:00

## 2022-01-01 RX ADMIN — POTASSIUM CHLORIDE 20 MEQ: 29.8 INJECTION, SOLUTION INTRAVENOUS at 15:41

## 2022-01-01 RX ADMIN — PROPOFOL 20 MCG/KG/MIN: 10 INJECTION, EMULSION INTRAVENOUS at 14:53

## 2022-01-01 RX ADMIN — DEXTROSE MONOHYDRATE 250 ML: 100 INJECTION, SOLUTION INTRAVENOUS at 22:39

## 2022-01-01 RX ADMIN — SODIUM CHLORIDE: 9 INJECTION, SOLUTION INTRAVENOUS at 21:43

## 2022-01-01 RX ADMIN — THIAMINE HYDROCHLORIDE: 100 INJECTION, SOLUTION INTRAMUSCULAR; INTRAVENOUS at 09:29

## 2022-01-01 RX ADMIN — IOPAMIDOL 75 ML: 755 INJECTION, SOLUTION INTRAVENOUS at 05:57

## 2022-01-01 RX ADMIN — CEFTRIAXONE SODIUM 1000 MG: 1 INJECTION, POWDER, FOR SOLUTION INTRAMUSCULAR; INTRAVENOUS at 08:22

## 2022-01-01 RX ADMIN — SODIUM CHLORIDE 500 MG: 9 INJECTION, SOLUTION INTRAVENOUS at 12:10

## 2022-01-01 RX ADMIN — AZITHROMYCIN MONOHYDRATE 500 MG: 500 INJECTION, POWDER, LYOPHILIZED, FOR SOLUTION INTRAVENOUS at 10:12

## 2022-01-01 RX ADMIN — SODIUM CHLORIDE 500 MG: 9 INJECTION, SOLUTION INTRAVENOUS at 06:18

## 2022-01-01 RX ADMIN — POLYVINYL ALCOHOL 1 DROP: 14 SOLUTION/ DROPS OPHTHALMIC at 16:22

## 2022-01-01 RX ADMIN — METHYLPREDNISOLONE SODIUM SUCCINATE 60 MG: 125 INJECTION, POWDER, FOR SOLUTION INTRAMUSCULAR; INTRAVENOUS at 11:12

## 2022-01-01 RX ADMIN — POLYVINYL ALCOHOL 1 DROP: 14 SOLUTION/ DROPS OPHTHALMIC at 12:02

## 2022-01-01 RX ADMIN — POLYVINYL ALCOHOL 1 DROP: 14 SOLUTION/ DROPS OPHTHALMIC at 12:33

## 2022-01-01 RX ADMIN — METHYLPREDNISOLONE SODIUM SUCCINATE 60 MG: 125 INJECTION, POWDER, FOR SOLUTION INTRAMUSCULAR; INTRAVENOUS at 15:36

## 2022-01-01 RX ADMIN — IPRATROPIUM BROMIDE AND ALBUTEROL SULFATE 1 AMPULE: 2.5; .5 SOLUTION RESPIRATORY (INHALATION) at 14:41

## 2022-01-01 RX ADMIN — POLYVINYL ALCOHOL 1 DROP: 14 SOLUTION/ DROPS OPHTHALMIC at 12:00

## 2022-01-01 RX ADMIN — POLYVINYL ALCOHOL 1 DROP: 14 SOLUTION/ DROPS OPHTHALMIC at 16:45

## 2022-01-01 RX ADMIN — Medication 7 MG/HR: at 06:16

## 2022-01-01 RX ADMIN — POLYVINYL ALCOHOL 1 DROP: 14 SOLUTION/ DROPS OPHTHALMIC at 08:13

## 2022-01-01 RX ADMIN — CHLORHEXIDINE GLUCONATE 15 ML: 1.2 RINSE ORAL at 20:45

## 2022-01-01 RX ADMIN — SODIUM CHLORIDE, PRESERVATIVE FREE 10 ML: 5 INJECTION INTRAVENOUS at 20:50

## 2022-01-01 RX ADMIN — Medication 5 MG/HR: at 16:31

## 2022-01-01 RX ADMIN — SODIUM PHOSPHATE, MONOBASIC, MONOHYDRATE AND SODIUM PHOSPHATE, DIBASIC, ANHYDROUS 10 MMOL: 276; 142 INJECTION, SOLUTION INTRAVENOUS at 12:41

## 2022-01-01 RX ADMIN — IPRATROPIUM BROMIDE AND ALBUTEROL SULFATE 1 AMPULE: 2.5; .5 SOLUTION RESPIRATORY (INHALATION) at 19:10

## 2022-01-01 RX ADMIN — METHYLPREDNISOLONE SODIUM SUCCINATE 60 MG: 125 INJECTION, POWDER, FOR SOLUTION INTRAMUSCULAR; INTRAVENOUS at 00:18

## 2022-01-01 RX ADMIN — POLYVINYL ALCOHOL 1 DROP: 14 SOLUTION/ DROPS OPHTHALMIC at 00:48

## 2022-01-01 RX ADMIN — IPRATROPIUM BROMIDE AND ALBUTEROL SULFATE 1 AMPULE: 2.5; .5 SOLUTION RESPIRATORY (INHALATION) at 15:54

## 2022-01-01 RX ADMIN — Medication 7 MG/HR: at 07:58

## 2022-01-01 RX ADMIN — HEPARIN SODIUM 2660 UNITS: 1000 INJECTION INTRAVENOUS; SUBCUTANEOUS at 11:08

## 2022-01-01 RX ADMIN — IPRATROPIUM BROMIDE AND ALBUTEROL SULFATE 1 AMPULE: 2.5; .5 SOLUTION RESPIRATORY (INHALATION) at 18:23

## 2022-01-01 RX ADMIN — MAGNESIUM SULFATE HEPTAHYDRATE 1000 MG: 1 INJECTION, SOLUTION INTRAVENOUS at 23:03

## 2022-01-01 RX ADMIN — VALPROATE SODIUM 1000 MG: 100 INJECTION, SOLUTION INTRAVENOUS at 14:38

## 2022-01-01 RX ADMIN — METHYLPREDNISOLONE SODIUM SUCCINATE 60 MG: 125 INJECTION, POWDER, FOR SOLUTION INTRAMUSCULAR; INTRAVENOUS at 16:51

## 2022-01-01 RX ADMIN — PROPOFOL 45 MCG/KG/MIN: 10 INJECTION, EMULSION INTRAVENOUS at 19:48

## 2022-01-01 RX ADMIN — IPRATROPIUM BROMIDE AND ALBUTEROL SULFATE 1 AMPULE: 2.5; .5 SOLUTION RESPIRATORY (INHALATION) at 19:06

## 2022-01-01 RX ADMIN — CALCIUM GLUCONATE 1000 MG: 98 INJECTION, SOLUTION INTRAVENOUS at 09:55

## 2022-01-01 RX ADMIN — POTASSIUM BICARBONATE 40 MEQ: 782 TABLET, EFFERVESCENT ORAL at 15:00

## 2022-01-01 RX ADMIN — SODIUM BICARBONATE 50 MEQ: 84 INJECTION INTRAVENOUS at 07:07

## 2022-01-01 RX ADMIN — FUROSEMIDE 40 MG: 10 INJECTION, SOLUTION INTRAMUSCULAR; INTRAVENOUS at 22:12

## 2022-01-01 RX ADMIN — POLYVINYL ALCOHOL 1 DROP: 14 SOLUTION/ DROPS OPHTHALMIC at 08:57

## 2022-01-01 RX ADMIN — Medication 7 MG/HR: at 16:41

## 2022-01-01 RX ADMIN — HYDRALAZINE HYDROCHLORIDE 10 MG: 20 INJECTION INTRAMUSCULAR; INTRAVENOUS at 02:46

## 2022-01-01 RX ADMIN — METHYLPREDNISOLONE SODIUM SUCCINATE 60 MG: 125 INJECTION, POWDER, FOR SOLUTION INTRAMUSCULAR; INTRAVENOUS at 17:31

## 2022-01-01 RX ADMIN — Medication 5 MG/HR: at 10:34

## 2022-01-01 RX ADMIN — POLYVINYL ALCOHOL 1 DROP: 14 SOLUTION/ DROPS OPHTHALMIC at 04:15

## 2022-01-01 RX ADMIN — AZITHROMYCIN MONOHYDRATE 500 MG: 500 INJECTION, POWDER, LYOPHILIZED, FOR SOLUTION INTRAVENOUS at 09:40

## 2022-01-01 RX ADMIN — IPRATROPIUM BROMIDE AND ALBUTEROL SULFATE 1 AMPULE: 2.5; .5 SOLUTION RESPIRATORY (INHALATION) at 14:28

## 2022-01-01 ASSESSMENT — PULMONARY FUNCTION TESTS
PIF_VALUE: 16
PIF_VALUE: 22
PIF_VALUE: 36
PIF_VALUE: 19
PIF_VALUE: 27
PIF_VALUE: 31
PIF_VALUE: 24
PIF_VALUE: 21
PIF_VALUE: 25
PIF_VALUE: 25
PIF_VALUE: 27
PIF_VALUE: 27
PIF_VALUE: 25
PIF_VALUE: 25
PIF_VALUE: 41
PIF_VALUE: 32
PIF_VALUE: 23
PIF_VALUE: 24
PIF_VALUE: 24
PIF_VALUE: 28
PIF_VALUE: 29
PIF_VALUE: 26
PIF_VALUE: 43
PIF_VALUE: 33
PIF_VALUE: 18
PIF_VALUE: 28
PIF_VALUE: 36
PIF_VALUE: 26
PIF_VALUE: 42
PIF_VALUE: 32
PIF_VALUE: 35
PIF_VALUE: 33
PIF_VALUE: 24
PIF_VALUE: 30
PIF_VALUE: 24
PIF_VALUE: 29
PIF_VALUE: 15
PIF_VALUE: 22
PIF_VALUE: 31
PIF_VALUE: 27
PIF_VALUE: 27
PIF_VALUE: 30
PIF_VALUE: 24
PIF_VALUE: 23
PIF_VALUE: 27
PIF_VALUE: 28
PIF_VALUE: 25
PIF_VALUE: 29
PIF_VALUE: 25
PIF_VALUE: 24
PIF_VALUE: 25
PIF_VALUE: 21
PIF_VALUE: 26
PIF_VALUE: 28
PIF_VALUE: 31
PIF_VALUE: 18
PIF_VALUE: 24
PIF_VALUE: 29
PIF_VALUE: 21
PIF_VALUE: 29
PIF_VALUE: 24
PIF_VALUE: 25
PIF_VALUE: 27
PIF_VALUE: 12
PIF_VALUE: 25
PIF_VALUE: 29
PIF_VALUE: 25
PIF_VALUE: 20
PIF_VALUE: 28
PIF_VALUE: 28
PIF_VALUE: 29
PIF_VALUE: 24
PIF_VALUE: 30
PIF_VALUE: 16
PIF_VALUE: 26
PIF_VALUE: 23
PIF_VALUE: 31
PIF_VALUE: 25
PIF_VALUE: 21
PIF_VALUE: 24
PIF_VALUE: 23
PIF_VALUE: 27
PIF_VALUE: 24
PIF_VALUE: 22
PIF_VALUE: 33
PIF_VALUE: 32
PIF_VALUE: 28
PIF_VALUE: 22
PIF_VALUE: 25
PIF_VALUE: 26
PIF_VALUE: 24
PIF_VALUE: 27
PIF_VALUE: 27
PIF_VALUE: 28
PIF_VALUE: 28
PIF_VALUE: 42
PIF_VALUE: 23
PIF_VALUE: 43
PIF_VALUE: 24
PIF_VALUE: 29
PIF_VALUE: 31
PIF_VALUE: 23
PIF_VALUE: 25
PIF_VALUE: 22
PIF_VALUE: 36
PIF_VALUE: 25
PIF_VALUE: 39
PIF_VALUE: 27
PIF_VALUE: 24
PIF_VALUE: 18
PIF_VALUE: 32
PIF_VALUE: 42
PIF_VALUE: 30
PIF_VALUE: 43
PIF_VALUE: 40
PIF_VALUE: 29
PIF_VALUE: 25
PIF_VALUE: 32
PIF_VALUE: 31
PIF_VALUE: 32
PIF_VALUE: 20
PIF_VALUE: 27
PIF_VALUE: 25
PIF_VALUE: 31
PIF_VALUE: 25
PIF_VALUE: 20
PIF_VALUE: 26
PIF_VALUE: 28
PIF_VALUE: 23
PIF_VALUE: 25
PIF_VALUE: 28
PIF_VALUE: 17
PIF_VALUE: 25
PIF_VALUE: 30
PIF_VALUE: 23
PIF_VALUE: 28
PIF_VALUE: 32
PIF_VALUE: 20
PIF_VALUE: 29
PIF_VALUE: 24
PIF_VALUE: 38
PIF_VALUE: 37
PIF_VALUE: 23
PIF_VALUE: 31
PIF_VALUE: 17
PIF_VALUE: 28
PIF_VALUE: 25
PIF_VALUE: 37
PIF_VALUE: 30
PIF_VALUE: 34
PIF_VALUE: 24
PIF_VALUE: 25
PIF_VALUE: 25
PIF_VALUE: 23
PIF_VALUE: 52
PIF_VALUE: 30
PIF_VALUE: 31
PIF_VALUE: 25
PIF_VALUE: 30
PIF_VALUE: 22
PIF_VALUE: 30
PIF_VALUE: 28
PIF_VALUE: 29
PIF_VALUE: 35

## 2022-01-01 ASSESSMENT — PAIN SCALES - WONG BAKER

## 2022-02-24 PROBLEM — K76.6 PORTAL HYPERTENSION (HCC): Status: ACTIVE | Noted: 2022-01-01

## 2022-02-24 PROBLEM — I46.9 CARDIAC ARREST (HCC): Status: ACTIVE | Noted: 2022-01-01

## 2022-02-24 NOTE — ED NOTES
Pt arrived to ED via EMS. EMS reports pt  woke up to find pt foaming at the mouth. Pt was unresponsive, EMS performed CPR upon arrival and got ROSC.  Pt had KARINA CPR machine on upon arrival.      Akosua Crouch RN  02/24/22 5016

## 2022-02-24 NOTE — CONSULTS
Pulmonary Medicine and Critical Care Consult    Patient - Rosita De La Torre   MRN -  [id number]   Acct # - [de-identified]   - 1960      Date of Admission -  2022  3:48 AM  Date of evaluation -  2022  Room - STA BLANK/BLANK   Hospital Day - 0  Consulting - Yong Joel DO Primary Care Physician - Jer Brown PA-C     Reason for Consult      Post cardiac arrest ICU management  Assessment   · Acute hypoxic respiratory failure  · Status post cardiac arrest ?  V. tach arrest? related to respiratory arrest versus seizures  · COPD exacerbation   · Lung nodules  · Rib fracture related to CPR  · Hypothermia/sepsis  ·  metabolic acidosis  · Elevated liver function/history of alcohol abuse  · Hyponatremia  · smoker    Recommendations   · Assist-control ventilation   · sedation with propofol  · Start ice protocol when normothermic  · DuoNeb by nebulizer  · Solu-Medrol 60 IV every 8 hours  · Arterial and central line insertion   · Troponins  · Blood cultures x2  · Sputum culture  · Rocephin Zithromax  · Bicarb drip/follow-up ABG  · Lactic acid  · Neurology evaluation  · Cardiology evaluation  · monitor blood sugars  · Follow up CT chest oupatient   · Called  and discussed the case . Patient is a full Code  .  He has Lymphoma   · Peptic ulcer disease prophylaxis   · DVT prophylaxis    Problem List      Patient Active Problem List   Diagnosis    Hypertension    Alcohol withdrawal seizure with complication ([address]    Alcohol withdrawal ([address]    Hypokalemia    Alcohol-induced chronic pancreatitis (HCC)    Alcoholic hepatitis    Elevated liver enzymes    FTT (failure to thrive) in adult    Moderate malnutrition ([address]    Tobacco abuse    COPD without exacerbation ([address]    Noncompliance    Intertrochanteric fracture of right femur (HCC)    Hyponatremia    Macrocytic anemia    Cellulitis of right hip    Iron deficiency anemia    Type 2 diabetes mellitus without complication, without long-term current use of insulin (HCC)    GIB (gastrointestinal bleeding)    Alcohol abuse    Hepatic steatosis    Elevated LFTs    Dietary folate deficiency anemia    Alcohol dependence with unspecified alcohol-induced disorder (HCC)    Portal hypertensive gastropathy (HCC)    Multifocal pneumonia    Cardiac arrest ([facility]       CARLOS Kruger is 63 y.o.,  female, previous medical history of alcohol abuse, alcohol induced pancreatitis and hepatitis, COPD, diabetes, hypertension, iron deficiency anemia, seizures. She was found down by her  foaming at the mouth this morning. EMS was called. He did CPR before EMS arrived. She was found to be in cardiac arrest and had CPR for 2 minutes who received 1 dose of epinephrine and LMA was placed.  reported she using Albuterol nebs for 1 hour straight the day before and was short of breath and had cough  . She was unresponsive and was  intubated in the emergency room. I was contacted by emergency room physician discussed the case. Patient was also advised bicarb drip. Advised to order CT brain abdomen pelvis and chest.  I came in to evaluate. Patient is unresponsive on low-dose propofol. She is hypothermic temperature 34. Blood pressure is slightly increased. She was smoking and was drinking beer daily . EKG was done .   PMHx   Past Medical History      Diagnosis Date    Alcohol withdrawal ([facility]     Alcohol withdrawal seizure with complication ([facility] 70/2/2152    Alcohol-induced chronic pancreatitis ([facility] 00/4/8138    Alcoholic hepatitis 40/1/9808    Cellulitis of right hip 12/18/2016    COPD (chronic obstructive pulmonary disease) ([facility] 10/2/2015    Diabetes mellitus ([facility]     \"borderline\"    DM type 2 (diabetes mellitus, type 2) ([facility] 10/2/2015    Dysphagia     Eczema     Elevated liver enzymes 10/2/2015    FTT (failure to thrive) in adult 10/2/2015    Hoarseness     Hypertension     denies,used to take bp meds    Hypomagnesemia     Hyponatremia 12/7/2016    Intertrochanteric fracture of right femur ([facility] 12/7/2016    Iron deficiency anemia 12/18/2016    Lesion of hard palate     rt side,dx with laryngoscopy 8/11/16    Moderate malnutrition ([facility] 10/2/2015    New onset seizure ([facility]     states last one was oct 2015, but states told she had an \"alcohol seizure\" this past month    Poor historian     Smoker 10/2/2015      Past Surgical History        Procedure Laterality Date    FEMUR FRACTURE SURGERY Right 12/07/2016    HIP FRACTURE SURGERY Left     LARYNGOSCOPY  10/28/2016    biopsie base of tongue    TONSILLECTOMY      UPPER GASTROINTESTINAL ENDOSCOPY N/A 8/23/2021    EGD BIOPSY performed by Shiloh Rojas MD at [address] [place] [address]    Current Medications    cefTRIAXone (ROCEPHIN) IV  1,000 mg IntraVENous Once     sodium chloride flush  IV Drips/Infusions   propofol 25 mcg/kg/min (02/24/22 0710)    sodium bicarbonate infusion 50 mL/hr at 02/24/22 0708     Home Medications  Not in a hospital admission.     Allergies    Ibuprofen  Social History     Social History     Socioeconomic History    Marital status: Single     Spouse name: Not on file    Number of children: Not on file    Years of education: Not on file    Highest education level: Not on file   Occupational History    Not on file   Tobacco Use    Smoking status: Current Every Day Smoker     Packs/day: 1.00     Years: 35.00     Pack years: 35.00     Types: Cigarettes    Smokeless tobacco: Never Used   Substance and Sexual Activity    Alcohol use: Yes     Comment: daily    Drug use: No    Sexual activity: Not on file   Other Topics Concern    Not on file   Social History Narrative    Not on file     Social Determinants of Health     Financial Resource Strain:     Difficulty of Paying Living Expenses: Not on file   Food Insecurity:     Worried About Running Out of Food in the Last Year: Not on file    [place] of Food in the Last Year: Not on breath sounds no crackles or wheezing  Cardiovascular - Heart sounds are normal.  Regular rhythm normal rate without murmur, gallop or rub.   Abdomen - Soft, nontender, nondistended, no masses or organomegaly  Neurologic -sedated unresponsive on the ventilator  Skin - No bruising or bleeding   Extremities - No cyanosis, clubbing or edema    Labs  - Old records and notes have been reviewed in [facility]   CBC     Lab Results   Component Value Date    WBC 12.8 02/24/2022    RBC 3.63 02/24/2022    HGB 12.4 02/24/2022    HCT 38.9 02/24/2022     02/24/2022    .2 02/24/2022    MCH 34.2 02/24/2022    MCHC 31.9 02/24/2022    RDW 11.4 02/24/2022    NRBC 1 02/24/2022    METASPCT 1 10/05/2015    LYMPHOPCT 28 02/24/2022    LYMPHOPCT 24 07/10/2013    MONOPCT 5 02/24/2022    MONOPCT 5 07/10/2013    EOSPCT 4 07/10/2013    BASOPCT 1 02/24/2022    BASOPCT 1 07/10/2013    MONOSABS 0.64 02/24/2022    MONOSABS 0.46 07/10/2013    LYMPHSABS 3.58 02/24/2022    LYMPHSABS 2.19 07/10/2013    EOSABS 1.41 02/24/2022    EOSABS 0.36 07/10/2013    BASOSABS 0.13 02/24/2022    DIFFTYPE NOT REPORTED 08/23/2021     BMP   Lab Results   Component Value Date     02/24/2022    K 4.6 02/24/2022    CL 97 02/24/2022    CO2 16 02/24/2022    BUN 4 02/24/2022    CREATININE 0.54 02/24/2022    GLUCOSE 282 02/24/2022    CALCIUM 8.9 02/24/2022    MG 1.6 08/27/2021     LFTS  Lab Results   Component Value Date    ALKPHOS 134 02/24/2022    ALT 42 02/24/2022    AST 95 02/24/2022    PROT 6.8 02/24/2022    BILITOT 0.36 02/24/2022    BILIDIR 3.38 08/27/2021    IBILI 0.55 08/27/2021    LABALBU 3.7 02/24/2022       Lab Results   Component Value Date    APTT 35.5 (H) 02/24/2022     INR   Lab Results   Component Value Date    INR 1.1 02/24/2022    INR 1.1 08/22/2021    INR 1.2 08/21/2021    PROTIME 14.1 02/24/2022    PROTIME 14.0 08/22/2021    PROTIME 15.4 (H) 08/21/2021       Radiology    CXR  Endotracheal tube in position no opacity    CT  brain   Similar appearing CT scan of the head without acute intracranial abnormality.       Mild cortical atrophy with chronic microvascular ischemic changes           CTA chest  Multiple pulmonary nodules. Most severe: 8 mm right solid pulmonary nodule   within the upper lobe. Recommend a non-contrast Chest CT at 3-6 months, then   another non-contrast Chest CT at 18-24 months. These guidelines do not apply to immunocompromised patients and patients with   cancer          CT abdomen pelvis  No acute abnormality evident.       Mild emphysema.       Status post ORIF right hip fracture         (See actual reports for details)    \"Thank you for asking us to see this patient\"    Case discussed with nurse and patient/family. Questions and concerns addressed. Electronically signed by     Rakel Savage MD on 2/24/2022 at 7:23 AM   cc90 min.

## 2022-02-24 NOTE — PROGRESS NOTES
Pulmonary Critical Care Progress Note  Michell Xavier MD     Patient seen for the follow up of acute hypoxic respiratory failure, COPD exacerbation,  Cardiac arrest ([facility]     Subjective:  She is sedated with 50 mics of propofol on ventilator however still is having increased work of breathing with respiratory rate in the 30s and appears uncomfortable. Nursing has just started hypothermia protocol. Examination:  Vitals: BP (!) 167/98   Pulse 65   Temp (!) 91.4 °F (33 °C)   Resp 27   Ht 5' 4\" (1.626 m)   Wt 97 lb 9.6 oz (44.3 kg)   SpO2 100%   BMI 16.75 kg/m²   General appearance: Sedated, intubated on ventilator  Neck: No JVD  Lungs: Decreased air exchange  Heart: regular rate and rhythm, S1, S2 normal, no gallop  Abdomen: Soft, non tender, + BS  Extremities: no cyanosis or clubbing. No significant edema    LABs:  CBC:   Recent Labs     02/24/22 0352   WBC 12.8*   HGB 12.4   HCT 38.9        BMP:   Recent Labs     02/24/22 0352   *   K 4.6   CO2 16*   BUN 4*   CREATININE 0.54   LABGLOM >60   GLUCOSE 282*     PT/INR:   Recent Labs     02/24/22 0352   PROTIME 14.1   INR 1.1     APTT:  Recent Labs     02/24/22 0352   APTT 35.5*     LIVER PROFILE:  Recent Labs     02/24/22 0352   AST 95*   ALT 42*   LABALBU 3.7     ABG:  Lab Results   Component Value Date    FIO2 80.0 02/24/2022       Lab Results   Component Value Date    POCPH 7.223 02/24/2022    POCPCO2 36.4 02/24/2022    POCPO2 223.9 02/24/2022    POCHCO3 15.0 02/24/2022    NBEA 12 02/24/2022    WLCJ1MJK 100 02/24/2022    FIO2 80.0 02/24/2022     Radiology:  X-ray chest 2/24/2022      CTA of the chest 2/24/2022    Impression:  · Acute hypoxic respiratory failure  · Status post cardiac arrest?  V. tach,?   Related to respiratory arrest versus seizure  · COPD with acute exacerbation  · Metabolic acidosis  · Hypothermia/?  Sepsis  · Rib fractures secondary to CPR  · Elevated LFTs with history of EtOH abuse and portal hypertension  · Hyponatremia  · Active smoking  · Lung nodules, largest measuring 8 mm right upper lobe    Recommendations:  · Continue vent support, currently on 50% FiO2/PEEP 8  · Repeat blood gas  · Hypothermia protocol  · Sedation with propofol  · Start Versed drip  · Start Nimbex, if shivering starts  · fentanyl drip at 50 mics per hour  · Continue bicarb drip, repeat ABGs now to reevaluate the need for bicarb drip  · Monitor blood pressure  · Levophed if necessary to keep MAP 65 to 75 mmHg  · DuoNeb via nebulizer  · Start budesonide via nebulizer twice daily  · IV Solu-Medrol 60 mg every 8 hours  · Continue Rocephin and Zithromax  · Monitor blood cultures  · Neurology on consult  · Cardiology on consult  · DVT prophylaxis, Lovenox, switch to heparin drip secondary to ice protocol  · GI prophylaxis, Protonix  · Follow-up CT chest as an outpatient  · Discussed with RN  · We will follow with you  Electronically signed by     Adri Otero MD on 2/24/2022 at 11:51 AM  Pulmonary Critical Care and Sleep Medicine,  [facility]  Cell: [phone]  Office: [phone]  CC: 35 minutes

## 2022-02-24 NOTE — H&P
[facility]  Office: [address], DO, Carmina Pineda, DO, Amanda Palomo, DO, Marco Joel, DO, Yeni Najera MD, Susie Gill MD, Neris Webb MD, Rebekah Marx MD, Marck Jennings MD, Luis Eduardo Valdivia MD, Savana Mata MD, Kaia Kenney, DO, Briana Lawtell, DO, Oscar Goode MD,  Alisa Goodrich DO, Claudio Rebolledo MD, Nimesh Fonseca MD, Santi Simental MD, Angela Jerez MD, Giancarlo Stuart MD, Lita Montgomery, [facility], [facility], CNP, Kishan Mann, CNP, Kandy Montano, CNS, Bobbi Ling, CNP, Bhumika Sanabria, [facility], Janis Jackson, CNP, Lita Jacobs, CNP, Jem Arreola, CNP, Amina Rasheed PA-C, Humberto Cochran, DNP, Twyla Siegel, [facility], Humaira Campos, CNP, Doni Kessler, CNP, Pasquale Martin, CNP, Luis Harrington, CNP, Perla Russ, CNP, Isaac Echols, [facility]    HISTORY AND PHYSICAL EXAMINATION            Date:   2/24/2022  Patient name:  Tonny Casiano  Date of admission:  2/24/2022  3:48 AM  MRN:   [id number]  Account:  [de-identified]  YOB: 1960  PCP:    Jackelyn Barajas PA-C  Room:   2029/2029-01  Code Status:    Full Code    Chief Complaint:     Chief Complaint   Patient presents with    Cardiac Arrest       History Obtained From:     electronic medical record, reason patient could not give history:  on ventilator    History of Present Illness:     Tonny Casiano is a 63 y.o. Non- / non  female who presents with Cardiac Arrest   and is admitted to the hospital for the management of Cardiac arrest [facility]). This 63 yof apparently was found by  last night unresponsive and foaming at the mouth. She had been sob and using aerosols all day. When EMS arrived she was in asystole, received cpr, epi and had rosc after ~20 minutes.     Past Medical History:     Past Medical History:   Diagnosis Date    Alcohol withdrawal ([facility]     Alcohol withdrawal seizure with complication ([facility] 10/1/2015    Alcohol-induced chronic pancreatitis ([facility] 98/9/8035    Alcoholic hepatitis 43/4/4332    Cellulitis of right hip 12/18/2016    COPD (chronic obstructive pulmonary disease) ([facility] 10/2/2015    Diabetes mellitus ([facility]     \"borderline\"    DM type 2 (diabetes mellitus, type 2) ([facility] 10/2/2015    Dysphagia     Eczema     Elevated liver enzymes 10/2/2015    FTT (failure to thrive) in adult 10/2/2015    Hoarseness     Hypertension     denies,used to take bp meds    Hypomagnesemia     Hyponatremia 12/7/2016    Intertrochanteric fracture of right femur ([facility] 12/7/2016    Iron deficiency anemia 12/18/2016    Lesion of hard palate     rt side,dx with laryngoscopy 8/11/16    Moderate malnutrition ([address] 10/2/2015    New onset seizure ([facility]     states last one was oct 2015, but states told she had an \"alcohol seizure\" this past month    Poor historian     Smoker 10/2/2015        Past Surgical History:     Past Surgical History:   Procedure Laterality Date    FEMUR FRACTURE SURGERY Right 12/07/2016    HIP FRACTURE SURGERY Left     LARYNGOSCOPY  10/28/2016    biopsie base of tongue    TONSILLECTOMY      UPPER GASTROINTESTINAL ENDOSCOPY N/A 8/23/2021    EGD BIOPSY performed by Amparo Jacinto MD at [address] [place] [address]        Medications Prior to Admission:     Prior to Admission medications    Medication Sig Start Date End Date Taking?  Authorizing Provider   propranolol (INDERAL) 10 MG tablet Take 1 tablet by mouth 3 times daily 8/26/21   Raad Casper P Blood, DO   magnesium oxide (MAG-OX) 400 (241.3 Mg) MG TABS tablet Take 1 tablet by mouth 2 times daily 8/26/21   Raad Casper P Blood, DO   folic acid (FOLVITE) 1 MG tablet Take 1 tablet by mouth daily 8/25/21   Raad Casper P Blood, DO   pantoprazole (PROTONIX) 40 MG tablet Take 1 tablet by mouth every morning (before breakfast) 8/25/21   Nilsa Alexander Blood, DO   thiamine 100 MG tablet Take 1 tablet by mouth daily 8/26/21   Raad Casper P Blood, DO   salmeterol (SEREVENT DISKUS) 50 MCG/DOSE diskus inhaler Inhale 1 puff into the lungs 2 times daily    Historical Provider, MD   albuterol sulfate HFA (PROVENTIL HFA) 108 (90 Base) MCG/ACT inhaler Inhale 2 puffs into the lungs every 6 hours as needed for Wheezing or Shortness of Breath 21   Andres Jean APRN - CNP   diphenhydrAMINE (BENADRYL) 25 MG capsule Take 1 capsule by mouth every 6 hours as needed for Itching 20   Tracie Shanks,    Calcium-Vitamin D 600-200 MG-UNIT TABS Take 1 tablet by mouth 2 times daily    Historical Provider, MD   albuterol (PROVENTIL) (2.5 MG/3ML) 0.083% nebulizer solution Take 3 mLs by nebulization every 6 hours as needed for Wheezing 17   Aaron Dwyer DO   calcium carbonate (CALCIUM 600) 600 MG TABS tablet Take 1 tablet by mouth daily Do not take with iron 17   Dio wDyer,         Allergies:     Ibuprofen    Social History:     Tobacco:    reports that she has been smoking cigarettes. She has a 35.00 pack-year smoking history. She has never used smokeless tobacco.  Alcohol:      reports current alcohol use. Drug Use:  reports no history of drug use. Family History:     Family History   Problem Relation Age of Onset    Heart Disease Mother     Diabetes Father     Asthma Sister     Asthma Brother        Review of Systems:     unobtainable      Physical Exam:   BP (!) 172/106   Pulse 90   Temp (!) 91.4 °F (33 °C)   Resp 25   Ht 5' 4\" (1.626 m)   Wt 97 lb 9.6 oz (44.3 kg)   SpO2 100%   BMI 16.75 kg/m²   Temp (24hrs), Av.4 °F (33 °C), Min:91.4 °F (33 °C), Max:91.4 °F (33 °C)    No results for input(s): POCGLU in the last 72 hours.   No intake or output data in the 24 hours ending 22 0847    General Appearance: in no acute distress  Mental status: on vent, sedated  Head:  normocephalic, atraumatic  Eye: no icterus, redness, pupils equal and reactive, conjunctiva clear  Ear: normal external ear, no discharge  Nose:  no drainage noted  Mouth: mucous membranes moist  Neck: supple, no carotid bruits, thyroid not palpable  Lungs: Bilateral equal air entry, clear to auscultation, no wheezing, rales or rhonchi, normal effort on vent  Cardiovascular: normal rate, regular rhythm, no murmur, gallop, rub.   Abdomen: Soft, nontender, nondistended, normal bowel sounds, no hepatomegaly or splenomegaly  Neurologic: sedated on vent  Skin: No gross lesions, rashes, bruising or bleeding on exposed skin area  Extremities:  peripheral pulses palpable, no pedal edema or calf pain with palpation; cachectic  Psych: normal affect     Investigations:      Laboratory Testing:  Recent Results (from the past 24 hour(s))   CBC with Auto Differential    Collection Time: 02/24/22  3:52 AM   Result Value Ref Range    WBC 12.8 (H) 3.5 - 11.3 k/uL    RBC 3.63 (L) 3.95 - 5.11 m/uL    Hemoglobin 12.4 11.9 - 15.1 g/dL    Hematocrit 38.9 36.3 - 47.1 %    .2 (H) 82.6 - 102.9 fL    MCH 34.2 (H) 25.2 - 33.5 pg    MCHC 31.9 28.4 - 34.8 g/dL    RDW 11.4 (L) 11.8 - 14.4 %    Platelets 973 806 - 085 k/uL    MPV 10.4 8.1 - 13.5 fL    NRBC Automated 0.2 (H) 0.0 per 100 WBC    Seg Neutrophils 53 36 - 66 %    Lymphocytes 28 24 - 44 %    Monocytes 5 1 - 7 %    Eosinophils % 11 (H) 1 - 4 %    Basophils 1 %    nRBC 1 (H) 0 per 100 WBC    Immature Granulocytes 2 (H) 0 %    Segs Absolute 6.78 1.8 - 7.7 k/uL    Absolute Lymph # 3.58 1.0 - 4.8 k/uL    Absolute Mono # 0.64 0.2 - 0.8 k/uL    Absolute Eos # 1.41 (H) 0.0 - 0.4 k/uL    Basophils Absolute 0.13 0.0 - 0.2 k/uL    Absolute Immature Granulocyte 0.26 0.00 - 0.30 k/uL   Comprehensive Metabolic Panel w/ Reflex to MG    Collection Time: 02/24/22  3:52 AM   Result Value Ref Range    Glucose 282 (H) 70 - 99 mg/dL    BUN 4 (L) 6 - 20 mg/dL    CREATININE 0.54 0.50 - 0.90 mg/dL    Bun/Cre Ratio 7 (L) 9 - 20    Calcium 8.9 8.6 - 10.4 mg/dL    Sodium 132 (L) 135 - 144 mmol/L    Potassium 4.6 3.7 - 5.3 mmol/L    Chloride 97 (L) 98 - 107 mmol/L    CO2 16 (L) 20 - 31 mmol/L    Anion Gap 19 (H) 9 - 17 mmol/L    Alkaline Phosphatase 134 (H) 35 - 104 U/L    ALT 42 (H) 5 - 33 U/L    AST 95 (H) <32 U/L    Total Bilirubin 0.36 0.3 - 1.2 mg/dL    Total Protein 6.8 6.4 - 8.3 g/dL    Albumin 3.7 3.5 - 5.2 g/dL    GFR Non-African American >60 >60 mL/min    GFR African American >60 >60 mL/min    GFR Comment         Brain Natriuretic Peptide    Collection Time: 02/24/22  3:52 AM   Result Value Ref Range    Pro- <300 pg/mL   Troponin    Collection Time: 02/24/22  3:52 AM   Result Value Ref Range    Troponin, High Sensitivity 7 0 - 14 ng/L   Protime-INR    Collection Time: 02/24/22  3:52 AM   Result Value Ref Range    Protime 14.1 11.5 - 14.2 sec    INR 1.1    APTT    Collection Time: 02/24/22  3:52 AM   Result Value Ref Range    PTT 35.5 (H) 23.9 - 33.8 sec   Ethanol    Collection Time: 02/24/22  3:52 AM   Result Value Ref Range    Ethanol <10 <10 mg/dL    Ethanol percent <0.010 <0.010 %   COVID-19, Rapid    Collection Time: 02/24/22  4:08 AM    Specimen: Nasopharyngeal Swab   Result Value Ref Range    Specimen Description . NASOPHARYNGEAL SWAB     SARS-CoV-2, Rapid Not Detected Not Detected   EKG 12 Lead    Collection Time: 02/24/22  4:16 AM   Result Value Ref Range    Ventricular Rate 100 BPM    Atrial Rate 100 BPM    P-R Interval 178 ms    QRS Duration 78 ms    Q-T Interval 378 ms    QTc Calculation (Bazett) 487 ms    P Axis 86 degrees    R Axis 40 degrees    T Axis 80 degrees   Arterial Blood Gas, POC    Collection Time: 02/24/22  4:52 AM   Result Value Ref Range    POC pH 7.223 (L) 7.350 - 7.450    POC pCO2 36.4 35.0 - 48.0 mm Hg    POC PO2 223.9 (H) 83.0 - 108.0 mm Hg    POC HCO3 15.0 (L) 21.0 - 28.0 mmol/L    Negative Base Excess, Art 12 (H) 0.0 - 2.0    POC O2  (H) 94.0 - 98.0 %    Sample Site Right Radial Artery     Mode PRVC     FIO2 80.0     Pt Temp 33.0     POC pH Temp 7.28     POC pCO2 Temp 31 mm Hg    POC pO2 Temp 205 mm Hg   Urinalysis with Reflex to Culture Collection Time: 02/24/22  5:19 AM    Specimen: Urine   Result Value Ref Range    Color, UA Yellow Yellow    Turbidity UA Clear Clear    Glucose, Ur 2+ (A) NEGATIVE    Bilirubin Urine NEGATIVE NEGATIVE    Ketones, Urine NEGATIVE NEGATIVE    Specific Gravity, UA 1.020 1.005 - 1.030    Urine Hgb 2+ (A) NEGATIVE    pH, UA 5.5 5.0 - 8.0    Protein, UA TRACE (A) NEGATIVE    Urobilinogen, Urine Normal Normal    Nitrite, Urine NEGATIVE NEGATIVE    Leukocyte Esterase, Urine NEGATIVE NEGATIVE   Microscopic Urinalysis    Collection Time: 02/24/22  5:19 AM   Result Value Ref Range    -          WBC, UA 2 TO 5 0 - 5 /HPF    RBC, UA 10 TO 20 0 - 2 /HPF    Casts UA 2 TO 5 /LPF    Casts UA HYALINE /LPF    Casts UA 0 TO 2 /LPF    Casts UA FINE GRANULAR /LPF    Epithelial Cells UA 2 TO 5 0 - 5 /HPF   Drug Scr, Abuse, Ur    Collection Time: 02/24/22  5:19 AM   Result Value Ref Range    Amphetamine Screen, Ur NEGATIVE NEGATIVE    Barbiturate Screen, Ur NEGATIVE NEGATIVE    Benzodiazepine Screen, Urine NEGATIVE NEGATIVE    Cocaine Metabolite, Urine NEGATIVE NEGATIVE    Methadone Screen, Urine NEGATIVE NEGATIVE    Opiates, Urine NEGATIVE NEGATIVE    Phencyclidine, Urine NEGATIVE NEGATIVE    Cannabinoid Scrn, Ur NEGATIVE NEGATIVE    Oxycodone Screen, Ur NEGATIVE NEGATIVE    Test Information       Assay provides medical screening only. The absence of expected drug(s) and/or metabolite(s) may indicate diluted or adulterated urine, limitations of testing or timing of collection. Troponin    Collection Time: 02/24/22  6:41 AM   Result Value Ref Range    Troponin, High Sensitivity 24 (H) 0 - 14 ng/L   Lactate, Sepsis    Collection Time: 02/24/22  8:23 AM   Result Value Ref Range    Lactic Acid, Sepsis 1.7 0.5 - 1.9 mmol/L       Imaging/Diagnostics:  CT HEAD WO CONTRAST    Result Date: 2/24/2022  Similar appearing CT scan of the head without acute intracranial abnormality. Mild cortical atrophy with chronic microvascular ischemic changes. CT ABDOMEN PELVIS W IV CONTRAST Additional Contrast? None    Result Date: 2/24/2022  No acute abnormality evident. Mild emphysema. Status post ORIF right hip fracture. XR CHEST PORTABLE    Result Date: 2/24/2022  Endotracheal tube appears in satisfactory position. Negative chest.     CT CHEST PULMONARY EMBOLISM W CONTRAST    Result Date: 2/24/2022  No pulmonary emboli are identified. Right anterior 3rd, 4th, 5th and 7th rib fractures are seen. Left anterior 7th rib fracture, and questionable 4th and 5th anterolateral rib fractures. Mild bronchial thickening, with atelectatic changes. Focal clustered nodule seen in the right upper lobe, the largest of which measuring just under 8 mm in size. An inflammatory process is favored. Recommendations as below for follow-up. No spiculated lung mass, consolidation or pneumothorax. RECOMMENDATIONS: Multiple pulmonary nodules. Most severe: 8 mm right solid pulmonary nodule within the upper lobe. Recommend a non-contrast Chest CT at 3-6 months, then another non-contrast Chest CT at 18-24 months. These guidelines do not apply to immunocompromised patients and patients with cancer. Follow up in patients with significant comorbidities as clinically warranted. For lung cancer screening, adhere to Lung-RADS guidelines. Reference: Radiology. 2017; 284(1):228-43.        Assessment :      Hospital Problems           Last Modified POA    * (Principal) Cardiac arrest ([address] 2/24/2022 Yes    Primary hypertension 2/24/2022 Yes    Tobacco abuse (Chronic) 2/24/2022 Yes    COPD without exacerbation ([address] 2/24/2022 Yes    Type 2 diabetes mellitus without complication, without long-term current use of insulin (HCC) (Chronic) 2/24/2022 Yes    Alcohol abuse 2/24/2022 Yes    Portal hypertension ([address] 2/24/2022 Yes          Plan:     Patient status inpatient in the Cardiovascular ICU    Cardiology consult  pulm consult-d/w Dr Cheryle Plough drip  Alcohol and smoking cessation  Treat htn: start lopressor  Will possibly do TTR  Iv antibiotics per pulmonary  Steroids per pulm    Consultations:   IP CONSULT TO CRITICAL CARE  IP CONSULT TO INTERNAL MEDICINE  IP CONSULT TO CARDIOLOGY  IP CONSULT TO NEUROLOGY     Patient is admitted as inpatient status because of co-morbidities listed above, severity of signs and symptoms as outlined, requirement for current medical therapies and most importantly because of direct risk to patient if care not provided in a hospital setting. Expected length of stay > 48 hours.     Nina Joel, DO  2/24/2022  8:47 AM    Copy sent to Dr. Yusuf Barbour PA-C

## 2022-02-24 NOTE — PROGRESS NOTES
Patient arrived on unit from [facility] via stretcher, intubated with propofol infusion at 25 mcg/kg/hr and sodium bicarb drip with 50meq bicarb at 50 ml/hr. Transferred to bed safely, eyes open with stimuli, but no commands followed.

## 2022-02-24 NOTE — PROCEDURES
Zoran Villafana is a 63 y.o. female patient. 1. Cardiac arrest [facility])      Past Medical History:   Diagnosis Date    Alcohol withdrawal ([facility]     Alcohol withdrawal seizure with complication ([address] 89/0/8577    Alcohol-induced chronic pancreatitis ([address] 56/7/0977    Alcoholic hepatitis 16/5/0703    Cellulitis of right hip 12/18/2016    COPD (chronic obstructive pulmonary disease) ([address] 10/2/2015    Diabetes mellitus ([address]     \"borderline\"    DM type 2 (diabetes mellitus, type 2) ([address] 10/2/2015    Dysphagia     Eczema     Elevated liver enzymes 10/2/2015    FTT (failure to thrive) in adult 10/2/2015    Hoarseness     Hypertension     denies,used to take bp meds    Hypomagnesemia     Hyponatremia 12/7/2016    Intertrochanteric fracture of right femur ([address] 12/7/2016    Iron deficiency anemia 12/18/2016    Lesion of hard palate     rt side,dx with laryngoscopy 8/11/16    Moderate malnutrition ([address] 10/2/2015    New onset seizure ([address]     states last one was oct 2015, but states told she had an \"alcohol seizure\" this past month    Poor historian     Smoker 10/2/2015     Blood pressure (!) 172/106, pulse 90, temperature (!) 91.4 °F (33 °C), resp. rate 25, height 5' 4\" (1.626 m), weight 97 lb 9.6 oz (44.3 kg), SpO2 100 %, not currently breastfeeding. US guided   Insert Arterial Line    Date/Time: 2/24/2022 8:35 AM  Performed by: Khurram Rajan MD  Authorized by: Khurram Rajan MD   Consent: The procedure was performed in an emergent situation.   Indications: multiple ABGs, respiratory failure and hemodynamic monitoring  Location: right radial  Nathan's test normal: yes  Needle gauge: 18  Number of attempts: 1  Post-procedure: line sutured  Post-procedure CMS: normal  Patient tolerance: patient tolerated the procedure well with no immediate complications          Khurram Rajan MD  2/24/2022

## 2022-02-24 NOTE — CONSULTS
[facility] Cardiology   Consult Note             Date:   2/24/2022  Patient name: Elva Collado  Date of admission:  2/24/2022  3:48 AM  MRN:   [id number]  YOB: 1960    Reason for Admission:  Cardiac arrest.      HISTORY OF PRESENT ILLNESS:      Patient is a 68-year-old female with PMH of alcoholic hepatitis, COPD, and non-insulin-dependent diabetes, who is brought in by EMS in cardiac arrest.  Per report,  woke up this evening and found her unresponsive and foaming at the mouth.  called 46. No CPR was initiated at that time.  reported to EMS that patient was using breathing treatments all day long secondary to wheezing and shortness of breath. EMS reports initial rhythm of asystole. They performed CPR for 20 minutes, epi x1, and obtained ROSC. Cardiology was consulted to help manage the patient. Past Medical History:   has a past medical history of Alcohol withdrawal ([address] Alcohol withdrawal seizure with complication ([address] Alcohol-induced chronic pancreatitis ([address] Alcoholic hepatitis, Cellulitis of right hip, COPD (chronic obstructive pulmonary disease) ([address] Diabetes mellitus ([address] DM type 2 (diabetes mellitus, type 2) ([address] Dysphagia, Eczema, Elevated liver enzymes, FTT (failure to thrive) in adult, Hoarseness, Hypertension, Hypomagnesemia, Hyponatremia, Intertrochanteric fracture of right femur ([address] Iron deficiency anemia, Lesion of hard palate, Moderate malnutrition ([address] New onset seizure ([address] Poor historian, and Smoker. Past Surgical History:   has a past surgical history that includes Tonsillectomy; Hip fracture surgery (Left); laryngoscopy (10/28/2016); Femur fracture surgery (Right, 12/07/2016); and Upper gastrointestinal endoscopy (N/A, 8/23/2021). Home Medications:    Prior to Admission medications    Medication Sig Start Date End Date Taking?  Authorizing Provider   propranolol (INDERAL) 10 MG tablet Take 1 tablet by mouth 3 times daily 8/26/21   Taurus Lingo P Blood, DO   magnesium oxide (MAG-OX) 400 (241.3 Mg) MG TABS tablet Take 1 tablet by mouth 2 times daily 8/26/21   [place] P Blood, DO   folic acid (FOLVITE) 1 MG tablet Take 1 tablet by mouth daily 8/25/21   Taurus Lingo P Blood, DO   pantoprazole (PROTONIX) 40 MG tablet Take 1 tablet by mouth every morning (before breakfast) 8/25/21   Teresa Plater Blood, DO   thiamine 100 MG tablet Take 1 tablet by mouth daily 8/26/21   [facility] Blood, DO   salmeterol (SEREVENT DISKUS) 50 MCG/DOSE diskus inhaler Inhale 1 puff into the lungs 2 times daily    Historical Provider, MD   albuterol sulfate HFA (PROVENTIL HFA) 108 (90 Base) MCG/ACT inhaler Inhale 2 puffs into the lungs every 6 hours as needed for Wheezing or Shortness of Breath 6/29/21   RONI Reinoso CNP   diphenhydrAMINE (BENADRYL) 25 MG capsule Take 1 capsule by mouth every 6 hours as needed for Itching 9/17/20   Dwaine Ariza, DO   Calcium-Vitamin D 600-200 MG-UNIT TABS Take 1 tablet by mouth 2 times daily    Historical Provider, MD   albuterol (PROVENTIL) (2.5 MG/3ML) 0.083% nebulizer solution Take 3 mLs by nebulization every 6 hours as needed for Wheezing 2/8/17   Aaron Dwyer,    calcium carbonate (CALCIUM 600) 600 MG TABS tablet Take 1 tablet by mouth daily Do not take with iron 2/8/17   Winifred Ga Dwyer DO       Allergies:  Ibuprofen    Social History:   reports that she has been smoking cigarettes. She has a 35.00 pack-year smoking history. She has never used smokeless tobacco. She reports current alcohol use. She reports that she does not use drugs. Family History: family history includes Asthma in her brother and sister; Diabetes in her father; Heart Disease in her mother. REVIEW OF SYSTEMS:    Unable to do because patient is intubated and sedated.   PHYSICAL EXAM:    Physical Examination:    /77   Pulse 117   Temp (!) 91.4 °F (33 °C) (Rectal)   Resp 20   Ht 5' 4\" (1.626 m)   Wt 97 lb 9.6 oz (44.3 kg)   SpO2 100%   BMI 16.75 kg/m²    Constitutional and General Appearance:intubated and sedated. · Normal excursion and expansion without use of accessory muscles  · Resp Auscultation: Good respiratory effort. No for increased work of breathing. On auscultation: clear to auscultation bilaterally  Cardiovascular:  · The apical impulse is not displaced  · Heart tones are crisp and normal. regular S1 and S2.  · Jugular venous pulsation Normal  · The carotid upstroke is normal in amplitude and contour without delay or bruit  · Peripheral pulses are symmetrical and full   Abdomen:  · No masses or tenderness  · Bowel sounds present  Extremities:  ·  No Cyanosis or Clubbing  ·  Lower extremity edema: No  ·  Skin: Warm and dry  DATA:    Diagnostics:      EKG: normal sinus rhythm  Echo. Pending. Labs:     CBC:   Recent Labs     02/24/22 0352   WBC 12.8*   HGB 12.4   HCT 38.9        BMP:   Recent Labs     02/24/22 0352 02/24/22  0823   * 137   K 4.6 4.2   CO2 16* 20   BUN 4* 7   CREATININE 0.54 <0.40*   LABGLOM >60 Can not be calculated   GLUCOSE 282* 188*     BNP: No results for input(s): BNP in the last 72 hours. PT/INR:   Recent Labs     02/24/22 0352   PROTIME 14.1   INR 1.1     APTT:  Recent Labs     02/24/22 0352 02/24/22  0819   APTT 35.5* 32.9     CARDIAC ENZYMES:No results for input(s): CKTOTAL, CKMB, CKMBINDEX, TROPONINI in the last 72 hours.   FASTING LIPID PANEL:No results found for: HDL, LDLDIRECT, LDLCALC, TRIG  LIVER PROFILE:  Recent Labs     02/24/22 0352   AST 95*   ALT 42*   LABALBU 3.7         IMPRESSION:    Patient Active Problem List   Diagnosis    Primary hypertension    Alcohol withdrawal seizure with complication (HCC)    Alcohol withdrawal (HCC)    Hypokalemia    Alcohol-induced chronic pancreatitis (HCC)    Alcoholic hepatitis    Elevated liver enzymes    FTT (failure to thrive) in adult    Moderate malnutrition (HCC)    Tobacco abuse    COPD without exacerbation [facility]    Noncompliance    Intertrochanteric fracture of right femur (HCC)    Hyponatremia    Macrocytic anemia    Cellulitis of right hip    Iron deficiency anemia    Type 2 diabetes mellitus without complication, without long-term current use of insulin (HCC)    GIB (gastrointestinal bleeding)    Alcohol abuse    Hepatic steatosis    Elevated LFTs    Dietary folate deficiency anemia    Alcohol dependence with unspecified alcohol-induced disorder [facility]    Portal hypertensive gastropathy ([facility]    Multifocal pneumonia    Cardiac arrest ([facility]    Portal hypertension ([facility]       RECOMMENDATIONS:  · Acute hypoxic respiratory failure  · Status post cardiac arrest ?  V. tach arrest? related to respiratory arrest versus seizures  · COPD exacerbation   · Lung nodules  · Rib fracture related to CPR  · Hypothermia/sepsis  ·  metabolic acidosis  · Elevated liver function/history of alcohol abuse  · Hyponatremia  · Smoker      Continue supportive therapy  Wean and extubate as tolerated  Hypothermia protocol  Discussed with daughter and nursing.     Faizan Yen MD, MD  [facility] \[facility]\"" cardiology

## 2022-02-24 NOTE — CONSULTS
[facility] Neurology   IN-PATIENT SERVICE      NEUROLOGY CONSULT  NOTE            Date:   2/24/2022  Patient name:  Malissa Garnica  Date of admission:  2/24/2022  YOB: 1960      Chief Complaint:     Chief Complaint   Patient presents with    Cardiac Arrest       Reason for Consult:      Cardiac arrest    History of Present Illness: The patient is a 63 y.o. female who presents with Cardiac Arrest  . The patient was seen and examined and the chart was reviewed. Patient was noted to be foaming at the mouth by her  and was pulseless. EMS arrived, CPR initiated and patient was down for reportedly approximately about 20 minutes followed by return of spontaneous circulation. Patient was intubated and noted to have limited neurologic exam.  Decision was made to place patient in hypothermia protocol. Reportedly all this occurred around 4 AM and patient's temperature was already hypothermic at that time 34 Celsius. Official hypothermic protocol started around 9 AM and patient reached temp at 10:30 AM.  Reportedly prior to starting hypothermia this morning and when sedation was briefly held she was able to open eyes to voice although unclear if it was purposeful or not. CT head abdomen done initially showed no evidence of acute intracranial abnormalities. There is significant diffuse cerebral atrophy more prominent than expected for patient's age. She does have a history of chronic heavy alcohol daily usage. No family currently at bedside.     Past Medical History:     Past Medical History:   Diagnosis Date    Alcohol withdrawal ([address]     Alcohol withdrawal seizure with complication ([facility] 07/5/9366    Alcohol-induced chronic pancreatitis ([address] 26/6/2528    Alcoholic hepatitis 11/4/1295    Cellulitis of right hip 12/18/2016    COPD (chronic obstructive pulmonary disease) ([address] 10/2/2015    Diabetes mellitus ([facility]     \"borderline\"    DM type 2 (diabetes mellitus, type 2) ([address] 10/2/2015    Dysphagia     Eczema     Elevated liver enzymes 10/2/2015    FTT (failure to thrive) in adult 10/2/2015    Hoarseness     Hypertension     denies,used to take bp meds    Hypomagnesemia     Hyponatremia 12/7/2016    Intertrochanteric fracture of right femur ([facility] 12/7/2016    Iron deficiency anemia 12/18/2016    Lesion of hard palate     rt side,dx with laryngoscopy 8/11/16    Moderate malnutrition ([address] 10/2/2015    New onset seizure ([facility]     states last one was oct 2015, but states told she had an \"alcohol seizure\" this past month    Poor historian     Smoker 10/2/2015        Past Surgical History:     Past Surgical History:   Procedure Laterality Date    FEMUR FRACTURE SURGERY Right 12/07/2016    HIP FRACTURE SURGERY Left     LARYNGOSCOPY  10/28/2016    biopsie base of tongue    TONSILLECTOMY      UPPER GASTROINTESTINAL ENDOSCOPY N/A 8/23/2021    EGD BIOPSY performed by Danna Hercules MD at [facility]        Medications Prior to Admission:     Prior to Admission medications    Medication Sig Start Date End Date Taking?  Authorizing Provider   propranolol (INDERAL) 10 MG tablet Take 1 tablet by mouth 3 times daily 8/26/21   Loyce Ripplemead P Blood, DO   magnesium oxide (MAG-OX) 400 (241.3 Mg) MG TABS tablet Take 1 tablet by mouth 2 times daily 8/26/21   Loyce Ripplemead P Blood, DO   folic acid (FOLVITE) 1 MG tablet Take 1 tablet by mouth daily 8/25/21   Loyce Ripplemead P Blood, DO   pantoprazole (PROTONIX) 40 MG tablet Take 1 tablet by mouth every morning (before breakfast) 8/25/21   Loyce Ripplemead P Blood, DO   thiamine 100 MG tablet Take 1 tablet by mouth daily 8/26/21   Loyce Ripplemead P Blood, DO   salmeterol (SEREVENT DISKUS) 50 MCG/DOSE diskus inhaler Inhale 1 puff into the lungs 2 times daily    Historical Provider, MD   albuterol sulfate HFA (PROVENTIL HFA) 108 (90 Base) MCG/ACT inhaler Inhale 2 puffs into the lungs every 6 hours as needed for Wheezing or Shortness of Breath 6/29/21   Thai Robbins, APRN - CNP diphenhydrAMINE (BENADRYL) 25 MG capsule Take 1 capsule by mouth every 6 hours as needed for Itching 20   Jesusita Mendes, DO   Calcium-Vitamin D 600-200 MG-UNIT TABS Take 1 tablet by mouth 2 times daily    Historical Provider, MD   albuterol (PROVENTIL) (2.5 MG/3ML) 0.083% nebulizer solution Take 3 mLs by nebulization every 6 hours as needed for Wheezing 17   Aaron Dwyer DO   calcium carbonate (CALCIUM 600) 600 MG TABS tablet Take 1 tablet by mouth daily Do not take with iron 17   Joselin Dwyer, DO        Allergies:     Ibuprofen    Social History:     Tobacco:    reports that she has been smoking cigarettes. She has a 35.00 pack-year smoking history. She has never used smokeless tobacco.  Alcohol:      reports current alcohol use. Drug Use:  reports no history of drug use. Family History:     Family History   Problem Relation Age of Onset    Heart Disease Mother     Diabetes Father     Asthma Sister     Asthma Brother        Review of Systems:     Intubated, cooled and sedated    Physical Exam:   BP (!) 167/98   Pulse 65   Temp (!) 91.4 °F (33 °C)   Resp 27   Ht 5' 4\" (1.626 m)   Wt 97 lb 9.6 oz (44.3 kg)   SpO2 100%   BMI 16.75 kg/m²   Temp (24hrs), Av.4 °F (33 °C), Min:91.4 °F (33 °C), Max:91.4 °F (33 °C)        General examination:      General Appearance: Intubated, sedated and cooled  HEENT: Normocephalic, atraumatic, ET tube in place  Neck: supple, no carotid bruits, (-) nuchal rigidity  Lungs:  Respirations unlabored, chest wall no deformity, BS coarse bilaterally  Cardiovascular: normal rate, regular rhythm  Abdomen: Soft, nontender, nondistended, normal bowel sounds  Skin: No gross lesions, rashes, bruising or bleeding on exposed skin area  Extremities:  peripheral pulses palpable, no cyanosis, clubbing or edema        Neurological examination:    Mental status   Patient is intubated. On sedation. Comatose.  No spontaneous eye opening to voice or painful stimulation. Not following commands. Cranial nerves   Pupil response:            Pinpoint, sluggish   Oculocephalic reflex:   Absent  Corneal Reflex:            Absent  Facial grimace to pin:  Absent  Gag/Cough reflex:       Absent  Breathing above vent:  Absent     Motor and sensory function  No spontaneous limb movements  No posturing    Absent withdrawal to pin, deep nail bed pressure in all extremities  Tone: Flaccid       DTR Intact symmetric 0/4 throughout  Plantar response: Downgoing  (-) Mukherjee's sign bilaterally    Gait  Not tested          Diagnostics:      Laboratory Testing:  CBC:   Recent Labs     02/24/22  0352   WBC 12.8*   HGB 12.4        BMP:    Recent Labs     02/24/22  0352 02/24/22  0823   * 137   K 4.6 4.2   CL 97* 102   CO2 16* 20   BUN 4* 7   CREATININE 0.54 <0.40*   GLUCOSE 282* 188*         Lab Results   Component Value Date    ALT 42 (H) 02/24/2022    AST 95 (H) 02/24/2022    TSH 7.05 (H) 08/21/2021    INR 1.1 02/24/2022    LABA1C 5.2 12/07/2016    [id number] 1060 08/21/2021       Imaging/Diagnostics:      Results for orders placed during the hospital encounter of 02/24/22    CT HEAD WO CONTRAST    Narrative  EXAMINATION:  CT OF THE HEAD WITHOUT CONTRAST  2/24/2022 5:27 am    TECHNIQUE:  CT of the head was performed without the administration of intravenous  contrast. Dose modulation, iterative reconstruction, and/or weight based  adjustment of the mA/kV was utilized to reduce the radiation dose to as low  as reasonably achievable.     COMPARISON:  02/12/2019    HISTORY:  ORDERING SYSTEM PROVIDED HISTORY: cardiac arrest  TECHNOLOGIST PROVIDED HISTORY:  cardiac arrest    Decision Support Exception - unselect if not a suspected or confirmed  emergency medical condition->Emergency Medical Condition (MA)  Reason for Exam: Cardiac arrest.  Per report,  woke up this evening  and found her unresponsive and foaming at the mouth and that patient was  using breathing treatments all day long secondary to wheezing and shortness  of breath. CPR was performed. Relevant Medical/Surgical History: Per pt chart, hx alcoholic hepatitis,  COPD, and non-insulin-dependent diabetes, COPD, seizure    FINDINGS:  BRAIN/VENTRICLES: There is no acute intracranial hemorrhage, mass effect or  midline shift. No abnormal extra-axial fluid collection. The gray-white  differentiation is maintained without evidence of an acute infarct. There is  no evidence of hydrocephalus. No wedge-shaped area of acute ischemia is  identified. No basilar cistern or sulcal effacement. Mild cortical atrophy. Patchy white matter low attenuation is identified. ORBITS: The visualized portion of the orbits demonstrate no acute abnormality. SINUSES: The visualized paranasal sinuses and mastoid air cells demonstrate  no acute abnormality. SOFT TISSUES/SKULL:  No acute abnormality of the visualized skull or soft  tissues. Impression  Similar appearing CT scan of the head without acute intracranial abnormality. Mild cortical atrophy with chronic microvascular ischemic changes. I personally reviewed all of the above medications, clinical laboratory, imaging and other diagnostic tests. Impression:      Acute toxic metabolic encephalopathy in the setting of hypothermia, sedating medication effect, post cardiac arrest; rule out anoxic brain injury  Chronic alcoholism with diffuse cerebral atrophy    Plan:     Hypothermia protocol; goal to start rewarming at 10:30 AM on 2/25  At this time patient is on sedation, may require paralytic if shivering occurs   We will hold off any further neurodiagnostic testing at this time  Prognosis for functional neurologic recovery is currently guarded  We will continue to monitor neurological examination in the upcoming days  No family at bedside           Thank you for this very interesting consultation.       Electronically signed by Enid Raya DO on 2/24/2022 at [facility], [facility]

## 2022-02-24 NOTE — FLOWSHEET NOTE
Patient is intubated and sleeping (no family members present). Writer provided a silent prayer of healing, comfort, and rest; writer also left a prayer card. Spiritual care will follow up as needed or requested.      02/24/22 1022   Encounter Summary   Services provided to: Patient   Referral/Consult From: Delphine   Continue Visiting   (2/24/2022 Intubated)   Complexity of Encounter Low   Length of Encounter 15 minutes   Routine   Type Initial   Assessment Sleeping   Intervention Prayer;Provided reading materials/devotional materials;Sustaining presence/ Ministry of presence

## 2022-02-24 NOTE — ED PROVIDER NOTES
EMERGENCY DEPARTMENT ENCOUNTER    Pt Name: Poncho Kruger  MRN: [id number]  [place] 1960  Date of evaluation: 2/24/22  CHIEF COMPLAINT       Chief Complaint   Patient presents with    Cardiac Arrest     HISTORY OF PRESENT ILLNESS   Patient is a 16-year-old female with PMH of alcoholic hepatitis, COPD, and non-insulin-dependent diabetes, who is brought in by EMS in cardiac arrest.  Per report,  woke up this evening and found her unresponsive and foaming at the mouth.  called 46. No CPR was initiated at that time.  reported to EMS that patient was using breathing treatments all day long secondary to wheezing and shortness of breath. EMS reports initial rhythm of asystole. They performed CPR for 20 minutes, epi x1, and obtained ROSC. In the ED she has LMA in airway, bilateral breath sounds, heart rate 110, /131, satting 99%. Limited neuro exam - no corneal reflex, nonreactive pupils. I replaced LMA with ET tube, size 7 using Etomidate 20 mg, succinylcholine 150 mg using glide scope.     REVIEW OF SYSTEMS     Review of Systems   Unable to perform ROS: Acuity of condition     PASTMEDICAL HISTORY     Past Medical History:   Diagnosis Date    Alcohol withdrawal ([address]     Alcohol withdrawal seizure with complication ([address] 74/0/5669    Alcohol-induced chronic pancreatitis ([address] 62/1/7174    Alcoholic hepatitis 62/9/9347    Cellulitis of right hip 12/18/2016    COPD (chronic obstructive pulmonary disease) ([address] 10/2/2015    Diabetes mellitus ([address]     \"borderline\"    DM type 2 (diabetes mellitus, type 2) ([address] 10/2/2015    Dysphagia     Eczema     Elevated liver enzymes 10/2/2015    FTT (failure to thrive) in adult 10/2/2015    Hoarseness     Hypertension     denies,used to take bp meds    Hypomagnesemia     Hyponatremia 12/7/2016    Intertrochanteric fracture of right femur ([address] 12/7/2016    Iron deficiency anemia 12/18/2016    Lesion of hard palate     rt side,dx with laryngoscopy 16    Moderate malnutrition ([address] 10/2/2015    New onset seizure ([facility]     states last one was oct 2015, but states told she had an \"alcohol seizure\" this past month    Poor historian     Smoker 10/2/2015     SURGICAL HISTORY       Past Surgical History:   Procedure Laterality Date    FEMUR FRACTURE SURGERY Right 2016    HIP FRACTURE SURGERY Left     LARYNGOSCOPY  10/28/2016    biopsie base of tongue    TONSILLECTOMY      UPPER GASTROINTESTINAL ENDOSCOPY N/A 2021    EGD BIOPSY performed by Bethany Berrios MD at [facility]       Previous Medications    ALBUTEROL (PROVENTIL) (2.5 MG/3ML) 0.083% NEBULIZER SOLUTION    Take 3 mLs by nebulization every 6 hours as needed for Wheezing    ALBUTEROL SULFATE HFA (PROVENTIL HFA) 108 (90 BASE) MCG/ACT INHALER    Inhale 2 puffs into the lungs every 6 hours as needed for Wheezing or Shortness of Breath    CALCIUM CARBONATE (CALCIUM 600) 600 MG TABS TABLET    Take 1 tablet by mouth daily Do not take with iron    CALCIUM-VITAMIN D 600-200 MG-UNIT TABS    Take 1 tablet by mouth 2 times daily    DIPHENHYDRAMINE (BENADRYL) 25 MG CAPSULE    Take 1 capsule by mouth every 6 hours as needed for Itching    FOLIC ACID (FOLVITE) 1 MG TABLET    Take 1 tablet by mouth daily    MAGNESIUM OXIDE (MAG-OX) 400 (241.3 MG) MG TABS TABLET    Take 1 tablet by mouth 2 times daily    PANTOPRAZOLE (PROTONIX) 40 MG TABLET    Take 1 tablet by mouth every morning (before breakfast)    PROPRANOLOL (INDERAL) 10 MG TABLET    Take 1 tablet by mouth 3 times daily    SALMETEROL (SEREVENT DISKUS) 50 MCG/DOSE DISKUS INHALER    Inhale 1 puff into the lungs 2 times daily    THIAMINE 100 MG TABLET    Take 1 tablet by mouth daily     ALLERGIES     is allergic to ibuprofen. FAMILY HISTORY     She indicated that her mother is . She indicated that her father is . She indicated that both of her sisters are alive.  She indicated that both of her brothers are alive. SOCIAL HISTORY       Social History     Tobacco Use    Smoking status: Current Every Day Smoker     Packs/day: 1.00     Years: 35.00     Pack years: 35.00     Types: Cigarettes    Smokeless tobacco: Never Used   Substance Use Topics    Alcohol use: Yes     Comment: daily    Drug use: No     PHYSICAL EXAM     INITIAL VITALS: BP (!) 161/102   Pulse 78   Temp (!) 91.4 °F (33 °C)   Resp 23   Ht 5' 4\" (1.626 m)   Wt 97 lb 9.6 oz (44.3 kg)   SpO2 100%   BMI 16.75 kg/m²    Physical Exam  Constitutional:       General: She is in acute distress. HENT:      Head: Normocephalic. Right Ear: External ear normal.      Left Ear: External ear normal.      Nose: Nose normal.   Eyes:      Conjunctiva/sclera: Conjunctivae normal.   Cardiovascular:      Rate and Rhythm: Normal rate and regular rhythm. Heart sounds: Normal heart sounds. Pulmonary:      Effort: Respiratory distress present. Abdominal:      General: Abdomen is flat. Skin:     General: Skin is dry. Neurological:      Mental Status: She is unresponsive. GCS: GCS eye subscore is 1. GCS verbal subscore is 1. GCS motor subscore is 1. Psychiatric:         Mood and Affect: Mood normal.         Behavior: Behavior normal.         MEDICAL DECISION MAKING:   The patient is unresponsive, ill-appearing, status post cardiac arrest .  Physical exam distended abdomen. Based on history and exam likely respiratory arrest.  ED plan for         CRITICAL CARE:   Due to the high probability of sudden and clinically significant deterioration in the patient's condition, the patient required the highest level of my preparedness to intervene urgently. I provided critical care time including documentation time, medication orders and management, reevaluation, vital sign assessment, ordering and reviewing of lab tests, ordering and reviewing of x-ray studies, and admission orders.   Aggregate critical care time is 60 minutes including only time during which I was engaged in work directly related to patient's care and did not include time spent treating of the patient simultaneously. PROCEDURES:    Intubation    Date/Time: 2/24/2022 6:34 AM  Performed by: Sarah Juares MD  Authorized by: Sarah Juares MD     Consent:     Consent obtained:  Emergent situation  Pre-procedure details:     Patient status:  Unresponsive    Mallampati score:  II    Paralytics:  Succinylcholine  Procedure details:     Preoxygenation:  Bag valve mask    CPR in progress: no      Intubation method:  Oral    Laryngoscope blade: Mac 4    Tube size (mm):  7.0    Tube type:  Cuffed    Number of attempts:  1    Cricoid pressure: no      Tube visualized through cords: yes    Placement assessment:     ETT to lip:  23    Tube secured with:  ETT montoya    Breath sounds:  Equal    Placement verification: chest rise, condensation, CXR verification and ETCO2 detector      CXR findings:  ETT in proper place  Post-procedure details:     Patient tolerance of procedure: Tolerated well, no immediate complications        DIAGNOSTIC RESULTS   EKG:All EKG's are interpreted by the Emergency Department Physician who either signs or Co-signs this chart in the absence of a cardiologist.        RADIOLOGY:All plain film, CT, MRI, and formal ultrasound images (except ED bedside ultrasound) are read by the radiologist, see reports below, unless otherwisenoted in MDM or here. CT HEAD WO CONTRAST   Final Result   Similar appearing CT scan of the head without acute intracranial abnormality. Mild cortical atrophy with chronic microvascular ischemic changes. XR CHEST PORTABLE   Final Result   Endotracheal tube appears in satisfactory position.       Negative chest.         CT CHEST PULMONARY EMBOLISM W CONTRAST    (Results Pending)   CT ABDOMEN PELVIS W IV CONTRAST Additional Contrast? None    (Results Pending)     LABS: All lab results were reviewed by myself, and all abnormals are listed below. Labs Reviewed   CBC WITH AUTO DIFFERENTIAL - Abnormal; Notable for the following components:       Result Value    WBC 12.8 (*)     RBC 3.63 (*)     .2 (*)     MCH 34.2 (*)     RDW 11.4 (*)     NRBC Automated 0.2 (*)     All other components within normal limits   COMPREHENSIVE METABOLIC PANEL W/ REFLEX TO MG FOR LOW K - Abnormal; Notable for the following components:    Glucose 282 (*)     BUN 4 (*)     Bun/Cre Ratio 7 (*)     Sodium 132 (*)     Chloride 97 (*)     CO2 16 (*)     Anion Gap 19 (*)     Alkaline Phosphatase 134 (*)     ALT 42 (*)     AST 95 (*)     All other components within normal limits   URINALYSIS WITH REFLEX TO CULTURE - Abnormal; Notable for the following components:    Glucose, Ur 2+ (*)     Urine Hgb 2+ (*)     Protein, UA TRACE (*)     All other components within normal limits   APTT - Abnormal; Notable for the following components:    PTT 35.5 (*)     All other components within normal limits   ARTERIAL BLOOD GAS, POC - Abnormal; Notable for the following components:    POC pH 7.223 (*)     POC PO2 223.9 (*)     POC HCO3 15.0 (*)     Negative Base Excess, Art 12 (*)     POC O2  (*)     All other components within normal limits   COVID-19, RAPID   BRAIN NATRIURETIC PEPTIDE   TROPONIN   PROTIME-INR   MICROSCOPIC URINALYSIS   TROPONIN       EMERGENCY DEPARTMENTCOURSE:   Patient remained stable but in critical condition in the ED. Labs:  Negative rapid Covid. ABG -pH 7.22, bicarb 15 PCO2 36.4  Potassium 4.6  Creatinine 0.54  Anion gap 19  Glucose 282    Troponin 7  WBC 12.8  Hemoglobin 12.4  INR 1.1  CT imaging still pending. Prelim read on CT head negative for acute bleed. I discussed case with critical care, Dr. Aiyana Linares, who advises if no bleeding present on CT scan of head chest or abdomen patient will be a cooling candidate. Also recommends bicarb drip.     Discussed case with Sis Real, who accepts patient for admission to [facility].      Vitals:    Vitals:    02/24/22 0514 02/24/22 0525 02/24/22 0600 02/24/22 0630   BP:   (!) 143/97 (!) 161/102   Pulse:   78 78   Resp: 18  17 23   Temp:  (!) 91.4 °F (33 °C)     SpO2: 100%  100% 100%   Weight:       Height:           The patient was given the following medications while in the emergency department:  Orders Placed This Encounter   Medications    0.9 % sodium chloride bolus    DISCONTD: propofol injection     Order Specific Question:   Titrate Infusion? Answer:   Yes     Order Specific Question:   Initial Infusion Rate: Answer:   20 mcg/kg/min     Order Specific Question:   Goal of Therapy:     Answer:   RASS of -1 to 0     Order Specific Question:   Contact Provider if:     Answer:   New onset HR less than 50 bpm     Order Specific Question:   Contact Provider if:     Answer:   New onset SBP less than 90 mmHg     Order Specific Question:   Contact Provider if:     Answer:   Patient is receiving maximum dose and is not achieving the goal of therapy     Order Specific Question:   Contact Provider if:     Answer:   Triglycerides greater than 500 mg/dL    propofol 1000 MG/100ML injection     Martin dhaliwal override    etomidate (AMIDATE) injection    propofol injection     Order Specific Question:   Titrate Infusion? Answer:   Yes     Order Specific Question:   Initial Infusion Rate:      Answer:   20 mcg/kg/min     Order Specific Question:   Goal of Therapy:     Answer:   RASS of -1 to 0     Order Specific Question:   Contact Provider if:     Answer:   New onset HR less than 50 bpm     Order Specific Question:   Contact Provider if:     Answer:   New onset SBP less than 90 mmHg     Order Specific Question:   Contact Provider if:     Answer:   Patient is receiving maximum dose and is not achieving the goal of therapy     Order Specific Question:   Contact Provider if:     Answer:   Triglycerides greater than 500 mg/dL    succinylcholine (ANECTINE) injection    albuterol (PROVENTIL) nebulizer solution 5 mg     Order Specific Question:   Initiate RT Bronchodilator Protocol     Answer:   No    0.9 % sodium chloride bolus    sodium chloride flush 0.9 % injection 10 mL    iopamidol (ISOVUE-370) 76 % injection 75 mL    sodium bicarbonate 50 mEq in dextrose 5 % 1,000 mL infusion     CONSULTS:  IP CONSULT TO CRITICAL CARE  IP CONSULT TO INTERNAL MEDICINE    FINAL IMPRESSION      1. Cardiac arrest [facility])          DISPOSITION/PLAN   DISPOSITION Admitted 02/24/2022 06:36:30 AM      PATIENT REFERRED TO:  No follow-up provider specified.   DISCHARGE MEDICATIONS:  New Prescriptions    No medications on file     Elva Barnard MD  Attending Emergency Physician                    Jayce Lyn MD  02/24/22 [id number]

## 2022-02-24 NOTE — CARE COORDINATION
Case Management Initial Discharge Plan  Tonny Casiano,             Met with:family member daughter to discuss discharge plans. Information verified: address, contacts, phone number, , insurance Yes  Insurance Provider: [address] [place] [address]    Emergency Contact/Next of Kin name & number: Cipriano Singh   [phone]  Who are involved in patient's support system? self    PCP: Jackelyn Barajas PA-C  Date of last visit:       Discharge Planning    Living Arrangements:        Home has 1 stories      Patient able to perform ADL's:Independent    Current Services (outpatient & in home) none  DME equipment: nebulizwe  DME provider: unknown    Is patient receiving oral anticoagulation therapy? No    If indicated:   Physician managing anticoagulation treatment:   Where does patient obtain lab work for ATC treatment? Potential Assistance Needed:       Patient agreeable to home care: No  [place] of choice provided:  no    Prior SNF/Rehab Placement and Facility: [facility]? Agreeable to SNF/Rehab: Yes  [place] of choice provided: yes     Evaluation: yes    Expected Discharge date:       Patient expects to be discharged to: If home: is the family and/or caregiver wiling & able to provide support at home? no  Who will be providing this support? self    Follow Up Appointment: Best Day/ Time:      Transportation provider: hsahnaz  Transportation arrangements needed for discharge: probable    Readmission Risk              Risk of Unplanned Readmission:  26             Does patient have a readmission risk score greater than 14?: Yes  If yes, follow-up appointment must be made within 7 days of discharge. Goals of Care:       Educated daughter, Obi Stinson on transitional options, provided freedom of choice and are agreeable with plan      Discharge Plan: Cardiac arrest  Writer spoke with patient's daughter, Obi Stinson, at bedside.   Patient currently lives in a hotel on [address]  with boyfriend. Uses a nebulizer. Patient is currently intubated. Will need to follow for POC. Can contact daughter at anytime.            Electronically signed by Meredith Eddy RN on 2/24/22 at 4:21 PM EST

## 2022-02-24 NOTE — PROGRESS NOTES
Dr. Bennett Drafts paged, via secure message, to alert to low level for ionized calcium and magnesium. Primary RN made aware; awaiting reply guy ALTAMIRANO.    1243:  Reply received to replace electrolytes. Orders placed; Primary RN made aware.

## 2022-02-24 NOTE — PLAN OF CARE
Problem: VIOLENT RESTRAINTS  Goal: Removal from restraints as soon as assessed to be safe  Outcome: Ongoing  Goal: No harm/injury to patient while restraints in use  Outcome: Ongoing  Goal: Patient's dignity will be maintained  Outcome: Ongoing     Problem: Discharge Planning:  Goal: Participates in care planning  Description: Participates in care planning  Outcome: Ongoing  Goal: Discharged to appropriate level of care  Description: Discharged to appropriate level of care  Outcome: Ongoing     Problem: Airway Clearance - Ineffective:  Goal: Ability to maintain a clear airway will improve  Description: Ability to maintain a clear airway will improve  Outcome: Ongoing     Problem: Anxiety/Stress:  Goal: Level of anxiety will decrease  Description: Level of anxiety will decrease  Outcome: Ongoing     Problem: Aspiration:  Goal: Absence of aspiration  Description: Absence of aspiration  Outcome: Ongoing     Problem:  Bowel Function - Altered:  Goal: Bowel elimination is within specified parameters  Description: Bowel elimination is within specified parameters  Outcome: Ongoing     Problem: Cardiac Output - Decreased:  Goal: Hemodynamic stability will improve  Description: Hemodynamic stability will improve  Outcome: Ongoing     Problem: Fluid Volume - Imbalance:  Goal: Absence of imbalanced fluid volume signs and symptoms  Description: Absence of imbalanced fluid volume signs and symptoms  Outcome: Ongoing     Problem: Gas Exchange - Impaired:  Goal: Levels of oxygenation will improve  Description: Levels of oxygenation will improve  Outcome: Ongoing     Problem: Mental Status - Impaired:  Goal: Mental status will be restored to baseline  Description: Mental status will be restored to baseline  Outcome: Ongoing     Problem: Nutrition Deficit:  Goal: Ability to achieve adequate nutritional intake will improve  Description: Ability to achieve adequate nutritional intake will improve  Outcome: Ongoing     Problem: Pain:  Goal: Pain level will decrease  Description: Pain level will decrease  Outcome: Ongoing  Goal: Recognizes and communicates pain  Description: Recognizes and communicates pain  Outcome: Ongoing  Goal: Control of acute pain  Description: Control of acute pain  Outcome: Ongoing  Goal: Control of chronic pain  Description: Control of chronic pain  Outcome: Ongoing     Problem: Serum Glucose Level - Abnormal:  Goal: Ability to maintain appropriate glucose levels will improve to within specified parameters  Description: Ability to maintain appropriate glucose levels will improve to within specified parameters  Outcome: Ongoing     Problem: Skin Integrity - Impaired:  Goal: Will show no infection signs and symptoms  Description: Will show no infection signs and symptoms  Outcome: Ongoing  Goal: Absence of new skin breakdown  Description: Absence of new skin breakdown  Outcome: Ongoing     Problem: Sleep Pattern Disturbance:  Goal: Appears well-rested  Description: Appears well-rested  Outcome: Ongoing     Problem: Tissue Perfusion, Altered:  Goal: Circulatory function within specified parameters  Description: Circulatory function within specified parameters  Outcome: Ongoing     Problem: Tissue Perfusion - Cardiopulmonary, Altered:  Goal: Absence of angina  Description: Absence of angina  Outcome: Ongoing  Goal: Hemodynamic stability will improve  Description: Hemodynamic stability will improve  Outcome: Ongoing

## 2022-02-24 NOTE — PROGRESS NOTES
Writer received call from lab informing of a critical value for potassium which resulted at 2.9. This information was conveyed to Primary RN. Primary RN verbalized that she will be contacting Dr. Tasneem Tellez via secure message. 1715: Dr. Verito Guerrier telephoned and spoke with Primary RN. Orders were received and placed.

## 2022-02-24 NOTE — PROCEDURES
Kang Vyas is a 63 y.o. female patient. 1. Cardiac arrest [facility])      Past Medical History:   Diagnosis Date    Alcohol withdrawal ([facility]     Alcohol withdrawal seizure with complication ([address] 18/5/1212    Alcohol-induced chronic pancreatitis ([address] 15/1/4895    Alcoholic hepatitis 94/1/8760    Cellulitis of right hip 12/18/2016    COPD (chronic obstructive pulmonary disease) ([address] 10/2/2015    Diabetes mellitus ([address]     \"borderline\"    DM type 2 (diabetes mellitus, type 2) ([address] 10/2/2015    Dysphagia     Eczema     Elevated liver enzymes 10/2/2015    FTT (failure to thrive) in adult 10/2/2015    Hoarseness     Hypertension     denies,used to take bp meds    Hypomagnesemia     Hyponatremia 12/7/2016    Intertrochanteric fracture of right femur ([address] 12/7/2016    Iron deficiency anemia 12/18/2016    Lesion of hard palate     rt side,dx with laryngoscopy 8/11/16    Moderate malnutrition ([address] 10/2/2015    New onset seizure ([address]     states last one was oct 2015, but states told she had an \"alcohol seizure\" this past month    Poor historian     Smoker 10/2/2015     Blood pressure (!) 172/106, pulse 90, temperature (!) 91.4 °F (33 °C), resp. rate 25, height 5' 4\" (1.626 m), weight 97 lb 9.6 oz (44.3 kg), SpO2 100 %, not currently breastfeeding. [facility]    Date/Time: 2/24/2022 8:33 AM  Performed by: Reggie Mary MD  Authorized by: Reggie Mary MD   Consent: The procedure was performed in an emergent situation.   Patient identity confirmed: provided demographic data and arm band  Indications: vascular access (ZOLL catheter for cooling)  Preparation: skin prepped with ChloraPrep  Skin prep agent dried: skin prep agent completely dried prior to procedure  Sterile barriers: all five maximum sterile barriers used - cap, mask, sterile gown, sterile gloves, and large sterile sheet  Hand hygiene: hand hygiene performed prior to central venous catheter insertion  Location details: right femoral  Patient position: flat  Catheter type: double lumen  Pre-procedure: landmarks identified  Ultrasound guidance: yes  Sterile ultrasound techniques: sterile gel and sterile probe covers were used  Number of attempts: 1  Successful placement: yes  Post-procedure: line sutured  Assessment: blood return through all ports  Patient tolerance: patient tolerated the procedure well with no immediate complications          Isabella Dumas MD  2/24/2022

## 2022-02-25 NOTE — PLAN OF CARE
Problem: Aspiration:  Goal: Absence of aspiration  Description: Absence of aspiration  2/25/2022 0123 by Edwin Bragg RN  Outcome: Met This Shift         Problem: Discharge Planning:  Goal: Participates in care planning  Description: Participates in care planning  2/25/2022 0123 by Edwin Bragg RN  Outcome: Ongoing    Goal: Discharged to appropriate level of care  Description: Discharged to appropriate level of care  2/25/2022 0123 by Edwin Bragg RN  Outcome: Ongoing       Problem: Airway Clearance - Ineffective:  Goal: Ability to maintain a clear airway will improve  Description: Ability to maintain a clear airway will improve  2/25/2022 0123 by Edwin Bragg RN  Outcome: Ongoing            Problem:  Bowel Function - Altered:  Goal: Bowel elimination is within specified parameters  Description: Bowel elimination is within specified parameters  2/25/2022 0123 by Edwin Bragg RN  Outcome: Ongoing    Problem: Cardiac Output - Decreased:  Goal: Hemodynamic stability will improve  Description: Hemodynamic stability will improve  2/25/2022 0123 by Edwin Bragg RN  Outcome: Ongoing       Problem: Fluid Volume - Imbalance:  Goal: Absence of imbalanced fluid volume signs and symptoms  Description: Absence of imbalanced fluid volume signs and symptoms  2/25/2022 0123 by Edwin Bragg RN  Outcome: Ongoing       Problem: Gas Exchange - Impaired:  Goal: Levels of oxygenation will improve  Description: Levels of oxygenation will improve  2/25/2022 0123 by Edwin Bragg RN  Outcome: Ongoing     Problem: Nutrition Deficit:  Goal: Ability to achieve adequate nutritional intake will improve  Description: Ability to achieve adequate nutritional intake will improve    Outcome: Ongoing     Problem: Pain:  Goal: Pain level will decrease  Description: Pain level will decrease    Outcome: Ongoing  Goal: Recognizes and communicates pain  Description: Recognizes and communicates pain    Outcome: Ongoing  Goal: Control of acute pain  Description: Control of acute pain    Outcome: Ongoing  Goal: Control of chronic pain  Description: Control of chronic pain    Outcome: Ongoing     Problem: Serum Glucose Level - Abnormal:  Goal: Ability to maintain appropriate glucose levels will improve to within specified parameters  Description: Ability to maintain appropriate glucose levels will improve to within specified parameters  2/25/2022 0123 by Julia Calixto RN  Outcome: Ongoing       Problem: Skin Integrity - Impaired:  Goal: Will show no infection signs and symptoms  Description: Will show no infection signs and symptoms  Outcome: Ongoing  Goal: Absence of new skin breakdown  Description: Absence of new skin breakdown  Outcome: Ongoing        Problem: Tissue Perfusion, Altered:  Goal: Circulatory function within specified parameters  Description: Circulatory function within specified parameters  Outcome: Ongoing     Problem: Tissue Perfusion - Cardiopulmonary, Altered:  Goal: Absence of angina  Description: Absence of angina  Outcome: Ongoing  Goal: Hemodynamic stability will improve  Description: Hemodynamic stability will improve  Outcome: Ongoing

## 2022-02-25 NOTE — FLOWSHEET NOTE
Patient is intubated and unresponsive. Patient's daughter is sitting bedside. Writer speaks to daughter who seems approachable but daughter denies any spiritual or emotional concerns. Writer encourages daughter to contact Spiritual Care if any needs arise. Daughter seems to accept the visit. Spiritual care will follow as needed.        02/25/22 1500   Encounter Summary   Services provided to: Patient and family together   Referral/Consult From: [address] [place] [address] Significant other;Children   Continue Visiting   (2/25/22)   Complexity of Encounter Low   Length of Encounter 15 minutes   Routine   Type Follow up   Assessment Approachable   Intervention Active listening;Explored coping resources;Nurtured hope   Outcome Acceptance

## 2022-02-25 NOTE — FLOWSHEET NOTE
02/25/22 0810   Treatment Team Notification   Reason for Communication Review case   Team Member Name Dr Gregorio Birch   Method of Communication Call   Response No new orders     [facility] called unit after MD read CXR. No critical results to report, however MD noted an opacity at the left apex. Dr Gregorio Birch stated that it is unlikely a pneumo, more probable to be a skin fold. Did not want result to go unnoticed and recommends follow-up evaluation/continued monitoring.

## 2022-02-25 NOTE — PROGRESS NOTES
Pulmonary Critical Care Progress Note  Klaus Keenan MD     Patient seen for the follow up of acute hypoxic respiratory failure, COPD exacerbation,  Cardiac arrest ([facility] [address]     Subjective:  She is sedated, intubated on ventilator. Currently undergoing hypothermia protocol, rewarming to start at 1030. She is on propofol and Versed and fentanyl drips for sedation. She is on 2 mics of Levophed. Overnight she had to be started on Nimbex due to twitching, it however had to be shut off secondary to bradycardia and need for dopamine drip. Dopamine drip is now off at this time. Nimbex is back on at 0.5, heart rate is stable. She is also on insulin and heparin drips. Examination:  Vitals: /70   Pulse 115   Temp (!) 91.4 °F (33 °C) (Rectal)   Resp 18   Ht 5' 4\" (1.626 m)   Wt 97 lb 9.6 oz (44.3 kg)   SpO2 95%   BMI 16.75 kg/m²   General appearance: Sedated, intubated on ventilator  Neck: No JVD  Lungs: Decreased air exchange  Heart: regular rate and rhythm, S1, S2 normal, no gallop  Abdomen: Soft, non tender, + BS  Extremities: no cyanosis or clubbing. No significant edema    LABs:  CBC:   Recent Labs     02/24/22 2235 02/25/22  0400   WBC 12.7* 11.7*   HGB 11.9 11.6*   HCT 34.5* 33.1*    203     BMP:   Recent Labs     02/24/22  0823 02/24/22  1630 02/24/22 2235 02/25/22  0400     --   --  139   K 4.2   < > 4.7 3.2*   CO2 20  --   --  22   BUN 7  --   --  5*   CREATININE <0.40*  --   --  <0.40*   LABGLOM Can not be calculated  --   --  Can not be calculated   GLUCOSE 188*  --   --  147*    < > = values in this interval not displayed.      PT/INR:   Recent Labs     02/24/22 0352   PROTIME 14.1   INR 1.1     APTT:  Recent Labs     02/24/22 0352 02/24/22 0819   APTT 35.5* 32.9     LIVER PROFILE:  Recent Labs     02/24/22 0352 02/25/22  0400   AST 95* 58*   ALT 42* 40*   LABALBU 3.7 3.2*     ABG:  Lab Results   Component Value Date    FIO2 30.0 02/25/2022       Lab Results   Component Value Date    POCPH 7.373 02/25/2022    POCPCO2 43.3 02/25/2022    POCPO2 105.5 02/25/2022    POCHCO3 25.2 02/25/2022    NBEA 6 02/25/2022    PBEA 0 02/25/2022    DXPB6AHS 98 02/25/2022    FIO2 30.0 02/25/2022     Radiology:  X-ray chest 2/25/2022      CTA of the chest 2/24/2022    Impression:  Acute hypoxic respiratory failure  Status post cardiac arrest?  V. tach,? Related to respiratory arrest versus seizure  COPD with acute exacerbation  Metabolic acidosis  Hypothermia  Gram positive sepsis  Rib fractures secondary to CPR  Elevated LFTs with history of EtOH abuse and portal hypertension  Hyponatremia  Active smoking  Lung nodules, largest measuring 8 mm right upper lobe    Recommendations:  Continue vent support, currently on 30% FiO2/PEEP 8  Hypothermia protocol  Sedation with propofol and Versed drip  Nimbex  Fentanyl drip at 50 mics per hour  Discontinue bicarb drip  Titrate Levophed to keep MAP 65 to 75 mmHg  Insulin drip, monitor blood sugars  DuoNeb via nebulizer  Budesonide via nebulizer twice daily  IV Solu-Medrol 60 mg every 8 hours  Continue Rocephin and Zithromax. Start Vanco, pharmacy to dose.   Neurology on consult  Cardiology on consult  DVT prophylaxis, heparin drip  GI prophylaxis, Protonix  Follow-up CT chest as an outpatient  Discussed with RN  We will follow with you      Electronically signed by     Anoop Subramanian MD on 2/25/2022 at 1:57 PM  Pulmonary Critical Care and Sleep Medicine,  [facility]  Cell: [phone]  Office: [phone]  CC: 35 minutes

## 2022-02-25 NOTE — PROGRESS NOTES
[facility].,   Section of Cardiology  Progress Note      Date:  2/25/2022  Patient: Bridget Andrade  Admission:  2/24/2022  3:48 AM  Admit DX: Cardiac arrest [facility]) [I46.9]  Age:  63 y.o., 1960                           LOS: 1 day     Reason for evaluation:   Cardiopulmonary arrest.      SUBJECTIVE:     The patient was seen and examined. Notes and labs reviewed. There were complications over night. She had an episode of sinus bradycardia overnight. She responded well to dopamine. Still intubated and sedated and undergoing the cooling protocol. OBJECTIVE:    /70   Pulse 115   Temp (!) 91.4 °F (33 °C) (Rectal)   Resp 18   Ht 5' 4\" (1.626 m)   Wt 97 lb 9.6 oz (44.3 kg)   SpO2 95%   BMI 16.75 kg/m²     Intake/Output Summary (Last 24 hours) at 2/25/2022 0945  Last data filed at 2/25/2022 0600  Gross per 24 hour   Intake 2562.26 ml   Output 1730 ml   Net 832.26 ml       EXAM:   CONSTITUTIONAL:  Intubated and sedated. HEENT: Normal jugular venous pulsations, no carotid bruits. Head is atraumatic, normocephalic. Eyes and oral mucosa are normal.  LUNGS: Good respiratory effort. No for increased work of breathing. On auscultation: clear to auscultation bilaterally  CARDIOVASCULAR:  Normal apical impulse, regular rate and rhythm, normal S1 and S2, no S3 or S4, and no murmur or rub noted. ABDOMEN: Soft, nontender, nondistended. Bowel sounds present. No masses or tenderness. SKIN: Warm and dry. EXTREMITIES: No lower extremity edema. Motor movement grossly intact. No cyanosis or clubbing.     Current Inpatient Medications:   thiamine and folic acid IVPB   IntraVENous Daily    sodium chloride flush  5-40 mL IntraVENous 2 times per day    ipratropium-albuterol  1 ampule Inhalation 4x daily    methylPREDNISolone  60 mg IntraVENous Q8H    cefTRIAXone (ROCEPHIN) IV  1,000 mg IntraVENous Q24H    azithromycin  500 mg IntraVENous Q24H    pantoprazole  40 mg IntraVENous Daily    metoprolol tartrate  50 mg Per NG tube BID    chlorhexidine  15 mL Mouth/Throat BID    polyvinyl alcohol  1 drop Both Eyes Q4H    budesonide  0.5 mg Nebulization BID       IV Infusions (if any):   insulin 0.53 Units/hr (02/25/22 0748)    dextrose      sodium chloride      sodium bicarbonate infusion 50 mL/hr at 02/25/22 0600    phenylephrine (SHANON-SYNEPHRINE) 50mg/250mL infusion      norepinephrine 3 mcg/min (02/25/22 0802)    midazolam (VERSED) 1 mg/mL in D5W infusion 5 mg/hr (02/25/22 0619)    cisatracurium (NIMBEX) infusion 0.5 mcg/kg/min (02/25/22 0702)    fentaNYL 50 mcg/hr (02/25/22 0700)    heparin (PORCINE) Infusion 14 Units/kg/hr (02/25/22 0700)    propofol 40 mcg/kg/min (02/25/22 0849)       Diagnostics:   Telemetry: Sinus rhythm      Labs:   CBC:   Recent Labs     02/24/22 2235 02/25/22  0400   WBC 12.7* 11.7*   HGB 11.9 11.6*   HCT 34.5* 33.1*    203     BMP:   Recent Labs     02/24/22  0823 02/24/22  1630 02/24/22 2235 02/25/22  0400     --   --  139   K 4.2   < > 4.7 3.2*   CO2 20  --   --  22   BUN 7  --   --  5*   CREATININE <0.40*  --   --  <0.40*   LABGLOM Can not be calculated  --   --  Can not be calculated   GLUCOSE 188*  --   --  147*    < > = values in this interval not displayed. BNP: No results for input(s): BNP in the last 72 hours. PT/INR:   Recent Labs     02/24/22 0352   PROTIME 14.1   INR 1.1     APTT:  Recent Labs     02/24/22 0352 02/24/22 0819   APTT 35.5* 32.9     CARDIAC ENZYMES:No results for input(s): CKTOTAL, CKMB, CKMBINDEX, TROPONINI in the last 72 hours.   FASTING LIPID PANEL:No results found for: HDL, LDLDIRECT, LDLCALC, TRIG  LIVER PROFILE:  Recent Labs     02/24/22  0352 02/25/22  0400   AST 95* 58*   ALT 42* 40*   LABALBU 3.7 3.2*       ASSESSMENT:    Patient Active Problem List   Diagnosis    Primary hypertension    Alcohol withdrawal seizure with complication (HCC)    Alcohol withdrawal (HCC)    Hypokalemia    Alcohol-induced chronic pancreatitis (HCC)    Alcoholic hepatitis    Elevated liver enzymes    FTT (failure to thrive) in adult    Moderate malnutrition (HCC)    Tobacco abuse    COPD without exacerbation ([facility] [address]    Noncompliance    Intertrochanteric fracture of right femur (HCC)    Hyponatremia    Macrocytic anemia    Cellulitis of right hip    Iron deficiency anemia    Type 2 diabetes mellitus without complication, without long-term current use of insulin (HCC)    GIB (gastrointestinal bleeding)    Alcohol abuse    Hepatic steatosis    Elevated LFTs    Dietary folate deficiency anemia    Alcohol dependence with unspecified alcohol-induced disorder ([facility] [address]    Portal hypertensive gastropathy ([facility] [address]    Multifocal pneumonia    Cardiac arrest ([facility] [address]    Portal hypertension ([facility] [address]       PLAN:    1. Cardiopulmonary arrest  2. Respiratory faiilure  3. Type 2 diabetes  Continue supportive therapy  Wean and extubate as tolerated. Please see orders. Discussed with nursing.     Bennie Allan MD, MD

## 2022-02-25 NOTE — PROGRESS NOTES
[facility]  Office: [address], DO, Renee Triana, DO, Jhon Mccracken, DO, Ty Bergeron Blood, DO, Lenore Andre MD, Lulu Holliday MD, Leida Woodson MD, Bailee Dominguez MD, Payton Kirby MD, Khadar Sheehan MD, Juaquin Kirkpatrick MD, Christiano Laguerre, DO, Gelacio Aragon, DO, Tadeo Gregorio MD,  Ginny Dillard, DO, Andrew Herrera MD, Harish Gleason MD, Michell Rodriguez MD, Harrison Smiley MD, Bruno Felix MD, Tereso Hair [facility], [facility], CNP, Ortega Armstrong, CNP, Claudio Alex, CNS, Margaret Galeana, CNP, Rosemary Viveros, CNP, Newton Rosenbaum, CNP, Babita Gonzalez, CNP, Mau Wadsworth, CNP, April Schilling PA-C, Macho Castillo, [facility], Hali Kilpatrick, [facility], Laverne Lares, CNP, Graciela Spence, CNP, Patricia Sanchez, CNP, Radha Mcgarry, CNP, Claudette David, CNP, [facility] Center[facility]    Progress Note    2/25/2022    8:21 AM    Name:   Donna Gordillo  MRN:     [id number]     Acct:      [de-identified]   Room:   2029/2029-01   Day:  1  Admit Date:  2/24/2022  3:48 AM    PCP:   Arvin Toussaint PA-C  Code Status:  Full Code    Subjective:     C/C:   Chief Complaint   Patient presents with    Cardiac Arrest     Interval History Status: not changed. Remains sedated on vent, also paralyzed  On TTR protocol    Brief History:     Donna Gordillo is a 63 y.o. Non- / non  female who presents with Cardiac Arrest   and is admitted to the hospital for the management of Cardiac arrest [facility]).    This 63 yof apparently was found by  last night unresponsive and foaming at the mouth. She had been sob and using aerosols all day. When EMS arrived she was in asystole, received cpr, epi and had rosc after ~20 minutes. Review of Systems:     unobtainable      Medications: Allergies:     Allergies   Allergen Reactions    Ibuprofen Nausea And Vomiting       Current Meds:   Scheduled Meds:    sodium chloride flush  5-40 mL IntraVENous 2 times per day    ipratropium-albuterol  1 ampule Inhalation 4x daily    methylPREDNISolone  60 mg IntraVENous Q8H    cefTRIAXone (ROCEPHIN) IV  1,000 mg IntraVENous Q24H    azithromycin  500 mg IntraVENous Q24H    pantoprazole  40 mg IntraVENous Daily    metoprolol tartrate  50 mg Per NG tube BID    chlorhexidine  15 mL Mouth/Throat BID    polyvinyl alcohol  1 drop Both Eyes Q4H    budesonide  0.5 mg Nebulization BID     Continuous Infusions:    insulin 0.53 Units/hr (02/25/22 0745)    dextrose      sodium chloride      sodium bicarbonate infusion 50 mL/hr at 02/25/22 0600    phenylephrine (SHANON-SYNEPHRINE) 50mg/250mL infusion      norepinephrine 3 mcg/min (02/25/22 0802)    midazolam (VERSED) 1 mg/mL in D5W infusion 5 mg/hr (02/25/22 0619)    cisatracurium (NIMBEX) infusion Stopped (02/25/22 0557)    fentaNYL 50 mcg/hr (02/25/22 0700)    heparin (PORCINE) Infusion 14 Units/kg/hr (02/25/22 0700)    propofol 40 mcg/kg/min (02/25/22 0700)     PRN Meds: glucose, glucagon (rDNA), dextrose, dextrose bolus (hypoglycemia) **OR** dextrose bolus (hypoglycemia), sodium chloride flush, sodium chloride, ondansetron **OR** [DISCONTINUED] ondansetron, polyethylene glycol, acetaminophen **OR** acetaminophen, hydrALAZINE, perflutren lipid microspheres, ondansetron, heparin (porcine), heparin (porcine), potassium chloride    Data:     Past Medical History:   has a past medical history of Alcohol withdrawal ([facility] [address] Alcohol withdrawal seizure with complication ([facility] [address] Alcohol-induced chronic pancreatitis ([facility] [address] Alcoholic hepatitis, Cellulitis of right hip, COPD (chronic obstructive pulmonary disease) ([facility] [address] Diabetes mellitus ([facility] [address] DM type 2 (diabetes mellitus, type 2) ([facility] [address] Dysphagia, Eczema, Elevated liver enzymes, FTT (failure to thrive) in adult, Hoarseness, Hypertension, Hypomagnesemia, Hyponatremia, Intertrochanteric fracture of right femur ([facility] Iron deficiency anemia, Lesion of hard palate, Moderate malnutrition ([address] New onset seizure ([facility] [address] Poor historian, and Smoker. Social History:   reports that she has been smoking cigarettes. She has a 35.00 pack-year smoking history. She has never used smokeless tobacco. She reports current alcohol use. She reports that she does not use drugs. Family History:   Family History   Problem Relation Age of Onset    Heart Disease Mother     Diabetes Father     Asthma Sister     Asthma Brother        Vitals:  /70   Pulse 115   Temp (!) 91.4 °F (33 °C) (Rectal)   Resp 18   Ht 5' 4\" (1.626 m)   Wt 97 lb 9.6 oz (44.3 kg)   SpO2 95%   BMI 16.75 kg/m²   Temp (24hrs), Av.1 °F (32.8 °C), Min:90.5 °F (32.5 °C), Max:91.4 °F (33 °C)    Recent Labs     22  0233 22  0406 22  0503 22  0606   POCGLU 235* 140* 106* 92       I/O (24Hr):     Intake/Output Summary (Last 24 hours) at 2022 0821  Last data filed at 2022 0600  Gross per 24 hour   Intake 2562.26 ml   Output 1730 ml   Net 832.26 ml       Labs:  Hematology:  Recent Labs     22  0352 22  2235 22  0400   WBC 12.8* 12.7* 11.7*   RBC 3.63* 3.46* 3.33*   HGB 12.4 11.9 11.6*   HCT 38.9 34.5* 33.1*   .2* 99.7 99.4   MCH 34.2* 34.4* 34.8*   MCHC 31.9 34.5 35.0*   RDW 11.4* 11.4* 11.4*    206 203   MPV 10.4 9.9 9.9   INR 1.1  --   --      Chemistry:  Recent Labs     22  0352 22  0352 22  0641 22  0823 22  0823 22  1003 22  1630 22  2235 22  0400   *  --   --  137  --   --   --   --  139   K 4.6   < >  --  4.2   < >  --  2.9* 4.7 3.2*   CL 97*  --   --  102  --   --   --   --  104   CO2 16*  --   --  20  --   --   --   --  22   GLUCOSE 282*  --   --  188*  --   --   --   --  147*   BUN 4*  --   --  7  --   --   --   --  5*   CREATININE 0.54  --   --  <0.40*  --   --   --   --  <0.40*   MG  --   --   --  1.5*   < >  --  1.9 1.8 1.7   ANIONGAP 19*  --   --  15  --   --   --   --  13 LABGLOM >60  --   --  Can not be calculated  --   --   --   --  Can not be calculated   GFRAA >60  --   --  Can not be calculated  --   --   --   --  Can not be calculated   CALCIUM 8.9  --   --  8.2*  --   --   --   --  8.9   CAION  --   --   --   --   --  0.96*  --   --   --    PHOS  --   --   --  4.2   < >  --  2.8 3.6 3.3   PROBNP 221  --   --   --   --   --   --   --   --    TROPHS 7  --  24* 43*  --   --   --   --   --     < > = values in this interval not displayed. Recent Labs     02/24/22  0352 02/24/22  0823 02/24/22  0856 02/25/22  0030 02/25/22  0135 02/25/22  0233 02/25/22  0400 02/25/22  0406 02/25/22  0503 02/25/22  0606   PROT 6.8  --   --   --   --   --  5.9*  --   --   --    LABALBU 3.7  --   --   --   --   --  3.2*  --   --   --    AST 95*  --   --   --   --   --  58*  --   --   --    ALT 42*  --   --   --   --   --  40*  --   --   --    ALKPHOS 134*  --   --   --   --   --  109*  --   --   --    BILITOT 0.36  --   --   --   --   --  0.23*  --   --   --    BILIDIR  --   --   --   --   --   --  0.12  --   --   --    AMYLASE  --  102*  --   --   --   --   --   --   --   --    LIPASE  --  37  --   --   --   --   --   --   --   --    POCGLU  --   --    < > 264* 248* 235*  --  140* 106* 92    < > = values in this interval not displayed. ABG:  Lab Results   Component Value Date    POCPH 7.255 02/25/2022    POCPCO2 48.9 02/25/2022    POCPO2 102.7 02/25/2022    POCHCO3 21.7 02/25/2022    NBEA 6 02/25/2022    JHLE0JEJ 97 02/25/2022    FIO2 30.0 02/25/2022     Lab Results   Component Value Date/Time    SPECIAL 18ML RT RADIAL 02/24/2022 08:36 AM     Lab Results   Component Value Date/Time    CULTURE NO GROWTH 12 HOURS 02/24/2022 08:36 AM       Radiology:  CT HEAD WO CONTRAST    Result Date: 2/24/2022  Similar appearing CT scan of the head without acute intracranial abnormality. Mild cortical atrophy with chronic microvascular ischemic changes.      CT ABDOMEN PELVIS W IV CONTRAST Additional appearance:  no distress  Mental Status:  sedated  Lungs:  clear to auscultation bilaterally, normal effort on vent  Heart:  regular rate and rhythm, no murmur  Abdomen:  soft, nontender, nondistended, normal bowel sounds, no masses, hepatomegaly, splenomegaly  Extremities:  no edema, redness, tenderness in the calves  Skin:  no gross lesions, rashes, induration    Assessment:        Hospital Problems           Last Modified POA    * (Principal) Cardiac arrest ([facility] [address] 2/24/2022 Yes    Primary hypertension 2/24/2022 Yes    Tobacco abuse (Chronic) 2/24/2022 Yes    COPD without exacerbation ([facility] [address] 2/24/2022 Yes    Type 2 diabetes mellitus without complication, without long-term current use of insulin (HCC) (Chronic) 2/24/2022 Yes    Alcohol abuse 2/24/2022 Yes    Portal hypertension ([facility] [address] 2/24/2022 Yes      hypokalemia    Plan:        On TTR per protocol  Replace k  Monitor mental status-will need sedation vacation once TTR completed and off nimbex  Watch for signs of alcohol withdrawal  Daily thiamine  On insulin drip low dose  Steroids and aerosols for copd  On empiric antibiotics    Anthony Joel DO  2/25/2022  8:21 AM

## 2022-02-25 NOTE — CONSULTS
[address] [facility] Pharmacokinetic Monitoring Service - Vancomycin     Becky Collier is a 63 y.o. female starting on vancomycin therapy for bloodstream infection. Pharmacy consulted by Dr. Elliott Gipson for monitoring and adjustment. Target Concentration: Goal AUC/JOSELO 400-600 mg*hr/L    Additional Antimicrobials: azithromycin, ceftriaxone    Pertinent Laboratory Values: Wt Readings from Last 1 Encounters:   02/24/22 97 lb 9.6 oz (44.3 kg)     Temp Readings from Last 1 Encounters:   02/25/22 (!) 91.4 °F (33 °C) (Rectal)     CrCl cannot be calculated (This lab value cannot be used to calculate CrCl because it is not a number: <0.40). Recent Labs     02/24/22  0352 02/24/22  0823 02/24/22  2235 02/25/22  0400   CREATININE  --  <0.40*  --  <0.40*   WBC   < >  --  12.7* 11.7*    < > = values in this interval not displayed. Procalcitonin: Ordered    Pertinent Cultures:  Culture Date Source Results   2/24/2022 Blood GM+ cocci in clusters   MRSA Nasal Swab: N/A. Non-respiratory infection.     Plan:  Dosing recommendations based on Bayesian software  Start vancomycin 1250 mg initial bolus, followed by 1000 mg q12h  Anticipated AUC of 489 and trough concentration of 13.1 at steady state  Renal labs as indicated   Vancomycin concentration ordered for 2/26/22 @ 0600  Pharmacy will continue to monitor patient and adjust therapy as indicated    Thank you for the consult,  Chelle Greco, [facility]  2/25/2022 10:54 AM

## 2022-02-25 NOTE — FLOWSHEET NOTE
02/25/22 0941   Treatment Team Notification   Reason for Communication Critical results   Type of Critical Result Laboratory   Critical Lab Result Type Culture  (blood culture +)   Team Member Name Dr Sukhwinder [place] Provider   Method of Communication Secure Message   Response See orders     New order for vancomycin, pharmacy to dose

## 2022-02-25 NOTE — FLOWSHEET NOTE
02/25/22 0005   Treatment Team Notification   Reason for Communication Review case   Team Member Name Dr. Stephanie Gonzalez   Treatment Team Role Consulting Provider   Method of Communication Secure Message   Response See orders   Dr. Stephanie Gonzalez notified of patient heart rate sustaining in the 30's. New orders to start dopamine gtt as needed to keep heart rate above 50 bpm. MD also notified of patient blood sugars recently climbing up, the latest being 260. Insulin gtt ordered per MD. Also, notified of current electrolytes and other labs at this time and that nimbex was initiated this shift due to patient sustaining shivers. Will continue to monitor.

## 2022-02-25 NOTE — PLAN OF CARE
Problem: VIOLENT RESTRAINTS  Goal: Removal from restraints as soon as assessed to be safe  Outcome: Met This Shift   No use of restraints this shift  Problem: Discharge Planning:  Goal: Participates in care planning  Description: Participates in care planning  Outcome: Ongoing  Goal: Discharged to appropriate level of care  Description: Discharged to appropriate level of care  Outcome: Ongoing     Problem: Airway Clearance - Ineffective:  Goal: Ability to maintain a clear airway will improve  Description: Ability to maintain a clear airway will improve  Outcome: Ongoing     Problem: Anxiety/Stress:  Goal: Level of anxiety will decrease  Description: Level of anxiety will decrease  Outcome: Ongoing     Problem: Aspiration:  Goal: Absence of aspiration  Description: Absence of aspiration  Outcome: Ongoing     Problem:  Bowel Function - Altered:  Goal: Bowel elimination is within specified parameters  Description: Bowel elimination is within specified parameters  Outcome: Ongoing     Problem: Cardiac Output - Decreased:  Goal: Hemodynamic stability will improve  Description: Hemodynamic stability will improve  Outcome: Ongoing     Problem: Fluid Volume - Imbalance:  Goal: Absence of imbalanced fluid volume signs and symptoms  Description: Absence of imbalanced fluid volume signs and symptoms  Outcome: Ongoing     Problem: Gas Exchange - Impaired:  Goal: Levels of oxygenation will improve  Description: Levels of oxygenation will improve  Outcome: Ongoing     Problem: Mental Status - Impaired:  Goal: Mental status will be restored to baseline  Description: Mental status will be restored to baseline  Outcome: Ongoing     Problem: Nutrition Deficit:  Goal: Ability to achieve adequate nutritional intake will improve  Description: Ability to achieve adequate nutritional intake will improve  Outcome: Ongoing     Problem: Pain:  Goal: Pain level will decrease  Description: Pain level will decrease  Outcome: Ongoing  Goal: Recognizes and communicates pain  Description: Recognizes and communicates pain  Outcome: Ongoing  Goal: Control of acute pain  Description: Control of acute pain  Outcome: Ongoing  Goal: Control of chronic pain  Description: Control of chronic pain  Outcome: Ongoing     Problem: Serum Glucose Level - Abnormal:  Goal: Ability to maintain appropriate glucose levels will improve to within specified parameters  Description: Ability to maintain appropriate glucose levels will improve to within specified parameters  Outcome: Ongoing     Problem: Skin Integrity - Impaired:  Goal: Will show no infection signs and symptoms  Description: Will show no infection signs and symptoms  Outcome: Ongoing  Goal: Absence of new skin breakdown  Description: Absence of new skin breakdown  Outcome: Ongoing     Problem: Sleep Pattern Disturbance:  Goal: Appears well-rested  Description: Appears well-rested  Outcome: Ongoing     Problem: Tissue Perfusion, Altered:  Goal: Circulatory function within specified parameters  Description: Circulatory function within specified parameters  Outcome: Ongoing     Problem: Tissue Perfusion - Cardiopulmonary, Altered:  Goal: Absence of angina  Description: Absence of angina  Outcome: Ongoing  Goal: Hemodynamic stability will improve  Description: Hemodynamic stability will improve  Outcome: Ongoing     Problem: Skin Integrity:  Goal: Will show no infection signs and symptoms  Description: Will show no infection signs and symptoms  Outcome: Ongoing  Goal: Absence of new skin breakdown  Description: Absence of new skin breakdown  Outcome: Ongoing

## 2022-02-25 NOTE — PROGRESS NOTES
Neurology---      Patient is currently being rewarmed. She is on sedation and paralytic at this time. Daughter is at bedside. Explained upcoming process with rewarming and hopefully wean patient off sedation and paralytics. We will know more about her neurologic function next 48 hours or so. Daughter may very well likely be decision-maker for patient as patient is not  and she is her only child. Answered all questions to the best of her at satisfaction at this time. We will continue to follow.       Tigre Jorgensen, [address] [facility]

## 2022-02-26 PROBLEM — E43 SEVERE MALNUTRITION (HCC): Status: ACTIVE | Noted: 2022-01-01

## 2022-02-26 NOTE — PROGRESS NOTES
2137 - Patient's core temp reached 36.0. Nimbex turned off at this time. 2315 - Pt's temp dropped back down to 35.8. Brendan egan applied. 1230 - Temp trending upward. 0200 - Target temp of 37.0 reached. Will continue to monitor closely.

## 2022-02-26 NOTE — SIGNIFICANT EVENT
Rapid response called: had seizure activity of facial muscles. Ativan ordered by primary team.  Neurology wanted depakote and will see her soon.   Wants to see the activity so ativan not given at present    Updated daughter  Guarded prognosis    Scott Silas Blood, DO

## 2022-02-26 NOTE — PROGRESS NOTES
[address] [facility] Pharmacokinetic Monitoring Service - Vancomycin    Consulting Provider: Dr. Claudia Hayes   Indication: bloodstream infection  Target Concentration: Goal AUC/JOSELO 400-600 mg*hr/L  Day of Therapy: 2  Additional Antimicrobials: rocephin, azithromycin    Pertinent Laboratory Values: Wt Readings from Last 1 Encounters:   02/24/22 97 lb 9.6 oz (44.3 kg)     Temp Readings from Last 1 Encounters:   02/26/22 98.8 °F (37.1 °C) (Rectal)     CrCl cannot be calculated (Unknown ideal weight. ). Recent Labs     02/25/22  1805 02/26/22  0600   CREATININE <0.40* 0.66   WBC 19.3* 20.7*     Procalcitonin: 0.53    Pertinent Cultures:  Culture Date Source Results   2/26/22 blood 1/2 coag negative staph - left sticky note for provider   MRSA Nasal Swab: N/A. Non-respiratory infection.     Recent vancomycin administrations                     vancomycin 1000 mg IVPB in 250 mL D5W addavial (mg) 1,000 mg New Bag 02/26/22 0105    vancomycin (VANCOCIN) 1,250 mg in dextrose 5 % 250 mL IVPB (mg) 1,250 mg New Bag 02/25/22 1303                    Assessment:  Date/Time Current Dose Concentration Timing of Concentration (h) AUC   2/26/22 1000mg q12h 19 2/26/22 @ 0830 805   Note: Serum concentrations collected for AUC dosing may appear elevated if collected in close proximity to the dose administered, this is not necessarily an indication of toxicity    Plan:  Current dosing regimen is supra-therapeutic  \"Decrease dose to vanco 1000mg q24h  Repeat vancomycin concentration ordered for 2/28/22 @ 0600   Pharmacy will continue to monitor patient and adjust therapy as indicated    Thank you for the consult,  Lavera Sandifer, [facility]  2/26/2022 9:13 AM

## 2022-02-26 NOTE — PROCEDURES
EEG REPORT    Patient Name: Jammie Mathur  Patient MRN: [id number]    Referring Physician: Hattie Noel DO  Dictating Physician: Hattie Noel DO  Date: 2/27/2022    CLINICAL HISTORY: This EEG was performed on a 63 y.o. female with The encounter diagnosis was Cardiac arrest [facility]). . It is being performed to evaluate for seizure activity. CURRENT ANTI-EPILEPTIC MEDICATIONS: Depakote     DESCRIPTION: The patient was minimally responsive throughout the recording. The most prominent feature of the record was the presence of generalized, frontally-dominant 1-1.5 Hz periodic epileptiform discharges (GPEDs). This periodic activity persisted throughout the record, so assessment of other background activities was not possible. Photic stimulation evoked no significant posterior driving response. Hyperventilation was not performed due to patients clinical state. EEG DIAGNOSIS: Abnormal due to  1) Generalized periodic epileptiform discharges (GPEDs),  2) Background suppression. CLINICAL INTERPRETATION: The GPEDs are a sign of bihemispheric cortical irritability of the type usually seen in the setting of acute bihemispheric pathology such as hypoxic-ischemic encephalopathy, clinical correlation required.     Hattie Noel, [address] [place] [address]  Neurology

## 2022-02-26 NOTE — CONSULTS
Consult Note    Reason for Consult:  Electrolyte abnormalities    Requesting Physician:  Gladis Joel DO    HISTORY OF PRESENT ILLNESS:    The patient is a 63 y.o. female who presents with Cardiac arrest ([address] [I46.9]  She was initially cooled, she has been rewarmed during which she was hyperkalemic. She was treated with calcium gluconate, and Kayexalate. Repeat potassium improved to 4.4. Serum sodium this AM of 130, to have repeat labs at 6pm.   She has had some jerking movement today, ?seizures and is to have an EEG this evening.    She has a history of alcoholic hepatitis, COPD, and non-insulin-dependent diabetes, who is brought in by EMS in cardiac arrest.  Per report, boyfriend woke up and found her unresponsive and foaming at the mouth, called 911.   No CPR was initiated at that time. Ira Marion reported to [facility] patient was using breathing treatments all day long secondary to wheezing and shortness of breath.  EMS reports initial rhythm of asystole.   They performed CPR for 20 minutes, epi x1, and obtained ROSC.     Past Medical History:   has a past medical history of Alcohol withdrawal ([address] Alcohol withdrawal seizure with complication ([address] Alcohol-induced chronic pancreatitis ([address] Alcoholic hepatitis, Cellulitis of right hip, COPD (chronic obstructive pulmonary disease) ([address] Diabetes mellitus ([address] DM type 2 (diabetes mellitus, type 2) ([address] Dysphagia, Eczema, Elevated liver enzymes, FTT (failure to thrive) in adult, Hoarseness, Hypertension, Hypomagnesemia, Hyponatremia, Intertrochanteric fracture of right femur ([address] Iron deficiency anemia, Lesion of hard palate, Moderate malnutrition ([address] New onset seizure ([address] Poor historian, and Smoker    Review Of Systems:   She is vented and unresponsive       Past Medical History:   Diagnosis Date    Alcohol withdrawal ([address]     Alcohol withdrawal seizure with complication ([address] 13/2/8880    Alcohol-induced chronic pancreatitis ([address] 10/2/2015  Alcoholic hepatitis 09/8/2368    Cellulitis of right hip 12/18/2016    COPD (chronic obstructive pulmonary disease) [facility] 10/2/2015    Diabetes mellitus ([facility]     \"borderline\"    DM type 2 (diabetes mellitus, type 2) ([facility] 10/2/2015    Dysphagia     Eczema     Elevated liver enzymes 10/2/2015    FTT (failure to thrive) in adult 10/2/2015    Hoarseness     Hypertension     denies,used to take bp meds    Hypomagnesemia     Hyponatremia 12/7/2016    Intertrochanteric fracture of right femur ([address] 12/7/2016    Iron deficiency anemia 12/18/2016    Lesion of hard palate     rt side,dx with laryngoscopy 8/11/16    Moderate malnutrition ([facility] 10/2/2015    New onset seizure ([facility]     states last one was oct 2015, but states told she had an \"alcohol seizure\" this past month    Poor historian     Smoker 10/2/2015       Past Surgical History:   Procedure Laterality Date    FEMUR FRACTURE SURGERY Right 12/07/2016    HIP FRACTURE SURGERY Left     LARYNGOSCOPY  10/28/2016    biopsie base of tongue    TONSILLECTOMY      UPPER GASTROINTESTINAL ENDOSCOPY N/A 8/23/2021    EGD BIOPSY performed by Sumaya Mendoza MD at [facility]       Prior to Admission medications    Medication Sig Start Date End Date Taking?  Authorizing Provider   propranolol (INDERAL) 10 MG tablet Take 1 tablet by mouth 3 times daily 8/26/21   Ty OBRIEN Blood, DO   magnesium oxide (MAG-OX) 400 (241.3 Mg) MG TABS tablet Take 1 tablet by mouth 2 times daily 8/26/21   Ty Bergeron P Blood, DO   folic acid (FOLVITE) 1 MG tablet Take 1 tablet by mouth daily 8/25/21   Ty Begreron P Blood, DO   pantoprazole (PROTONIX) 40 MG tablet Take 1 tablet by mouth every morning (before breakfast) 8/25/21   Edna Mahajan Blood, DO   thiamine 100 MG tablet Take 1 tablet by mouth daily 8/26/21   Ty Bergeron P Blood, DO   salmeterol (SEREVENT DISKUS) 50 MCG/DOSE diskus inhaler Inhale 1 puff into the lungs 2 times daily    Historical Provider, MD   albuterol sulfate HFA (PROVENTIL HFA) 108 (90 Base) MCG/ACT inhaler Inhale 2 puffs into the lungs every 6 hours as needed for Wheezing or Shortness of Breath 6/29/21   RONI Marsh - CNP   diphenhydrAMINE (BENADRYL) 25 MG capsule Take 1 capsule by mouth every 6 hours as needed for Itching 9/17/20   Marvin Irvin, DO   Calcium-Vitamin D 600-200 MG-UNIT TABS Take 1 tablet by mouth 2 times daily    Historical Provider, MD   albuterol (PROVENTIL) (2.5 MG/3ML) 0.083% nebulizer solution Take 3 mLs by nebulization every 6 hours as needed for Wheezing 2/8/17   Aaron Dwyer,    calcium carbonate (CALCIUM 600) 600 MG TABS tablet Take 1 tablet by mouth daily Do not take with iron 2/8/17   Andrade Desirae Dwyer, DO       Scheduled Meds:   insulin lispro  0-6 Units SubCUTAneous Q6H    thiamine and folic acid IVPB   IntraVENous Daily    sodium chloride flush  5-40 mL IntraVENous 2 times per day    ipratropium-albuterol  1 ampule Inhalation 4x daily    methylPREDNISolone  60 mg IntraVENous Q8H    cefTRIAXone (ROCEPHIN) IV  1,000 mg IntraVENous Q24H    azithromycin  500 mg IntraVENous Q24H    pantoprazole  40 mg IntraVENous Daily    chlorhexidine  15 mL Mouth/Throat BID    polyvinyl alcohol  1 drop Both Eyes Q4H    budesonide  0.5 mg Nebulization BID     Continuous Infusions:   insulin Stopped (02/26/22 0405)    dextrose      sodium chloride      phenylephrine (SHANON-SYNEPHRINE) 50mg/250mL infusion      norepinephrine Stopped (02/26/22 1210)    midazolam (VERSED) 1 mg/mL in D5W infusion Stopped (02/26/22 0800)    cisatracurium (NIMBEX) infusion Stopped (02/25/22 2138)    fentaNYL Stopped (02/26/22 1125)    heparin (PORCINE) Infusion 14 Units/kg/hr (02/26/22 1545)    propofol 20 mcg/kg/min (02/26/22 1545)     PRN Meds:glucose, glucagon (rDNA), dextrose, dextrose bolus (hypoglycemia) **OR** dextrose bolus (hypoglycemia), sodium chloride flush, sodium chloride, ondansetron **OR** [DISCONTINUED] ondansetron, polyethylene glycol, acetaminophen **OR** acetaminophen, hydrALAZINE, perflutren lipid microspheres, ondansetron, heparin (porcine), heparin (porcine), potassium chloride    Allergies   Allergen Reactions    Ibuprofen Nausea And Vomiting       Social History     Socioeconomic History    Marital status: Single     Spouse name: Not on file    Number of children: Not on file    Years of education: Not on file    Highest education level: Not on file   Occupational History    Not on file   Tobacco Use    Smoking status: Current Every Day Smoker     Packs/day: 1.00     Years: 35.00     Pack years: 35.00     Types: Cigarettes    Smokeless tobacco: Never Used   Substance and Sexual Activity    Alcohol use: Yes     Comment: daily    Drug use: No    Sexual activity: Not on file   Other Topics Concern    Not on file   Social History Narrative    Not on file     Social Determinants of Health     Financial Resource Strain:     Difficulty of Paying Living Expenses: Not on file   Food Insecurity:     Worried About Running Out of Food in the Last Year: Not on file    [place] of Food in the Last Year: Not on file   Transportation Needs:     Lack of Transportation (Medical): Not on file    Lack of Transportation (Non-Medical):  Not on file   Physical Activity:     Days of Exercise per Week: Not on file    Minutes of Exercise per Session: Not on file   Stress:     Feeling of Stress : Not on file   Social Connections:     Frequency of Communication with Friends and Family: Not on file    Frequency of Social Gatherings with Friends and Family: Not on file    Attends Jehovah's witness Services: Not on file    Active Member of Clubs or Organizations: Not on file    Attends Club or Organization Meetings: Not on file    Marital Status: Not on file   Intimate Partner Violence:     Fear of Current or Ex-Partner: Not on file    Emotionally Abused: Not on file    Physically Abused: Not on file    Sexually Abused: Not on file   Housing > 21  --   --    BUN 5*   < > 9  --   --    CREATININE <0.40*   < > 0.66  --   --    GLUCOSE 147*   < > 135*  --   --    CALCIUM 8.9   < > 9.5  --   --    PROT 5.9*  --   --   --   --    LABALBU 3.2*  --   --   --   --    BILITOT 0.23*  --   --   --   --    ALKPHOS 109*  --   --   --   --    AST 58*  --   --   --   --    ALT 40*  --   --   --   --     < > = values in this interval not displayed. Hepatic:   Recent Labs     02/24/22  0352 02/25/22  0400   AST 95* 58*   ALT 42* 40*   BILITOT 0.36 0.23*   ALKPHOS 134* 109*     BNP: No results for input(s): BNP in the last 72 hours. INR:   Recent Labs     02/24/22  0352   INR 1.1     PTH: No results for input(s): PTH in the last 72 hours. Phosphorus:   Recent Labs     02/26/22  1415   PHOS 5.7*     Ionized Calcium: No results for input(s): IONCA in the last 72 hours. Magnesium:   Recent Labs     02/26/22  1415   MG 1.8     Albumin:   Recent Labs     02/25/22  0400   LABALBU 3.2*     Last 3 CK, CKMB, Troponin: No results for input(s): CKTOTAL, CKMB, TROPONINI in the last 72 hours.   Lipids: No results found for: CHOL, HDL  Urine:    Lab Results   Component Value Date    LORENZO 20 12/10/2016    PROTEINU TRACE 02/24/2022         Radiology:  Reviewed      Assessment:   Primary hypertension    Alcohol withdrawal seizure with complication (HCC)    Hyperkalemia        Alcohol-induced chronic pancreatitis (HCC)    Alcoholic hepatitis    Elevated liver enzymes    FTT (failure to thrive) in adult    Moderate malnutrition (HCC)    Tobacco abuse    COPD without exacerbation [facility]    Noncompliance    Intertrochanteric fracture of right femur (HCC)    Hyponatremia    Macrocytic anemia    Cellulitis of right hip    Iron deficiency anemia    Type 2 diabetes mellitus without complication, without long-term current use of insulin (HCC)    GIB (gastrointestinal bleeding)    Alcohol abuse    Hepatic steatosis    Elevated LFTs    Dietary folate deficiency anemia    Alcohol dependence with unspecified alcohol-induced disorder [facility]    Portal hypertensive gastropathy ([facility]    Multifocal pneumonia    Cardiac arrest ([facility]    Portal hypertension ([facility]             Plan:  Closely monitor labs  Hyperkalemia improved, likely influenced by rewarming leading intracellular shifting and subsequent hyperkalemia. I/O's  Avoid NSAIDs      Thank you for the consultation. Please do not hesitate to call with questions. We will continue to follow with you.      Patient seen in collaboration with Dr. Roland Hair. Electronically signed by RONI Madrid CNP  on 2/26/2022 at 3:52 PM     [facility]. Ph: [phone]    Reviewed with NP. Agree with above note. Above note was modified. We will continue to follow up with you. Electronically signed by Awilda Amaral MD on 2/26/2022 at [address] [place] [address] [facility].   Ph: [phone]

## 2022-02-26 NOTE — PROGRESS NOTES
Pulmonary Critical Care Progress Note       Patient seen for the follow up of acute hypoxic respiratory failure, COPD exacerbation,  Cardiac arrest ([facility] [address]     Subjective:  She is sedated on low-dose propofol and fentanyl drip,   Unresponsive intubated on ventilator FiO2 30%. She developed hyperkalemia stir day. I was contacted ordered calcium Lasix D50W and insulin IV  And I also ordered Kayexalate. She had persistent hyperkalemia this morning and I ordered again D50 with insulin . She is off hypothermia protocol. She is on 3 mics of Levophed. She is on dopamine drip. Examination:  Vitals: BP 95/73   Pulse 104   Temp 97.9 °F (36.6 °C) (Rectal)   Resp 24   Ht 5' 4\" (1.626 m)   Wt 97 lb 9.6 oz (44.3 kg)   SpO2 99%   BMI 16.75 kg/m²   General appearance: Sedated, intubated on ventilator  Neck: No JVD  Lungs: Decreased  Breath sounds no crackles or wheezing  Heart: regular rate and rhythm, S1, S2 normal, no gallop  Abdomen: Soft, non tender, + BS  Extremities: no cyanosis or clubbing. No significant edema    LABs:  CBC:   Recent Labs     02/25/22  1805 02/26/22  0600   WBC 19.3* 20.7*   HGB 11.5* 11.4*   HCT 33.4* 33.3*    216     BMP:   Recent Labs     02/25/22  1805 02/25/22  1950 02/26/22  0600 02/26/22  0830 02/26/22  1020 02/26/22  1215   *  --  130*  --   --   --    K 5.8*   < > 6.4*   < > 4.9 4.4   CO2 25  --  21  --   --   --    BUN 6  --  9  --   --   --    CREATININE <0.40*  --  0.66  --   --   --    LABGLOM Can not be calculated  --  >60  --   --   --    GLUCOSE 128*  --  135*  --   --   --     < > = values in this interval not displayed.      PT/INR:   Recent Labs     02/24/22  0352   PROTIME 14.1   INR 1.1     APTT:  Recent Labs     02/24/22  0352 02/24/22  0819   APTT 35.5* 32.9     LIVER PROFILE:  Recent Labs     02/24/22  0352 02/25/22  0400   AST 95* 58*   ALT 42* 40*   LABALBU 3.7 3.2*     ABG:  Lab Results   Component Value Date    FIO2 30.0 02/26/2022       Lab Results Component Value Date    POCPH 7.274 02/26/2022    POCPCO2 51.3 02/26/2022    POCPO2 68.7 02/26/2022    POCHCO3 23.8 02/26/2022    NBEA 4 02/26/2022    PBEA 0 02/25/2022    YAHK7SHF 91 02/26/2022    FIO2 30.0 02/26/2022     Radiology:   chest x-ray 2/26  Endotracheal tube now 6.2 cm above the katy.  Remaining support lines and   tubes stable.       Lungs remain clear with redemonstrated probable skin fold versus small   pneumothorax left upper lung.  There do appear to be lung markings beyond   this. CTA of the chest 2/24/2022    Impression:  · Acute hypoxic respiratory failure  · Status post cardiac arrest?  V. tach,? Related to respiratory arrest versus seizure  · COPD with acute exacerbation  · Metabolic acidosis  · Hypothermia  · Gram positive sepsis  · Rib fractures secondary to CPR  · Elevated LFTs with history of EtOH abuse and portal hypertension  · Hyponatremia  · Active smoking  · Lung nodules, largest measuring 8 mm right upper lobe    Recommendations:  · Continue vent support, currently on 30% FiO2/PEEP 8  ·  discontinue fentanyl and propofol/monitor mental status  · DuoNeb via nebulizer  · Budesonide via nebulizer twice daily  ·  finished Hypothermia protocol   ·  nephrology consult/ monitor potassium /check CPK  · Titrate Levophed to keep MAP 65 to 75 mmHg  · Insulin drip, monitor blood sugars  · IV Solu-Medrol 60 mg every 8 hours  · Continue Rocephin and Zithromax. Start Vanco, pharmacy to dose.   · Neurology on consult  · Cardiology on consult  · heparin drip  · Follow-up CT chest as an outpatient  · Discussed with RN  ·  discussed with daughter at bedside  ·  discussed with significant other at bedside  ·  GI prophylaxis with Protonix  ·  DVT prophylaxis      Electronically signed by     Xander Sierra MD on 2/26/2022 at 1:55 PM  Pulmonary Critical Care and Sleep Medicine,  [facility] AT [place]: [phone]  CC: 35 minutes

## 2022-02-26 NOTE — PLAN OF CARE
Nutrition Problem #1: Severe malnutrition  Intervention: Food and/or Nutrient Delivery: Continue NPO,Continue Current Tube Feeding  Nutritional Goals: Enteral nutrition will meet greater than 75% of estimated nutrition needs

## 2022-02-26 NOTE — PROGRESS NOTES
1,000 mg IntraVENous Q24H    azithromycin  500 mg IntraVENous Q24H    pantoprazole  40 mg IntraVENous Daily    metoprolol tartrate  50 mg Per NG tube BID    chlorhexidine  15 mL Mouth/Throat BID    polyvinyl alcohol  1 drop Both Eyes Q4H    budesonide  0.5 mg Nebulization BID       IV Infusions (if any):   insulin Stopped (02/26/22 0405)    dextrose      sodium chloride      phenylephrine (SHANON-SYNEPHRINE) 50mg/250mL infusion      norepinephrine 4 mcg/min (02/26/22 0756)    midazolam (VERSED) 1 mg/mL in D5W infusion 1 mg/hr (02/26/22 0732)    cisatracurium (NIMBEX) infusion Stopped (02/25/22 2138)    fentaNYL 50 mcg/hr (02/26/22 0021)    heparin (PORCINE) Infusion 14 Units/kg/hr (02/26/22 0518)    propofol 30 mcg/kg/min (02/26/22 0741)       Diagnostics:   Telemetry: Sinus rhythm\    Labs:   CBC:   Recent Labs     02/25/22 1805 02/26/22  0600   WBC 19.3* 20.7*   HGB 11.5* 11.4*   HCT 33.4* 33.3*    216     BMP:   Recent Labs     02/25/22 1805 02/25/22  1950 02/26/22  0405 02/26/22  0600   *  --   --  130*   K 5.8*   < > 5.6* 6.4*   CO2 25  --   --  21   BUN 6  --   --  9   CREATININE <0.40*  --   --  0.66   LABGLOM Can not be calculated  --   --  >60   GLUCOSE 128*  --   --  135*    < > = values in this interval not displayed. BNP: No results for input(s): BNP in the last 72 hours. PT/INR:   Recent Labs     02/24/22 0352   PROTIME 14.1   INR 1.1     APTT:  Recent Labs     02/24/22 0352 02/24/22  0819   APTT 35.5* 32.9     CARDIAC ENZYMES:No results for input(s): CKTOTAL, CKMB, CKMBINDEX, TROPONINI in the last 72 hours.   FASTING LIPID PANEL:No results found for: HDL, LDLDIRECT, LDLCALC, TRIG  LIVER PROFILE:  Recent Labs     02/24/22 0352 02/25/22  0400   AST 95* 58*   ALT 42* 40*   LABALBU 3.7 3.2*       ASSESSMENT:    Patient Active Problem List   Diagnosis    Primary hypertension    Alcohol withdrawal seizure with complication (HCC)    [facility]    Hypokalemia    Alcohol-induced chronic pancreatitis (HCC)    Alcoholic hepatitis    Elevated liver enzymes    FTT (failure to thrive) in adult    Moderate malnutrition (HCC)    Tobacco abuse    COPD without exacerbation ([address]    Noncompliance    Intertrochanteric fracture of right femur (HCC)    Hyponatremia    Macrocytic anemia    Cellulitis of right hip    Iron deficiency anemia    Type 2 diabetes mellitus without complication, without long-term current use of insulin (HCC)    GIB (gastrointestinal bleeding)    Alcohol abuse    Hepatic steatosis    Elevated LFTs    Dietary folate deficiency anemia    Alcohol dependence with unspecified alcohol-induced disorder ([address]    Portal hypertensive gastropathy ([address]    Multifocal pneumonia    Cardiac arrest ([address]    Portal hypertension ([address]       PLAN:    1. Cardiopulmonary arrest  2. Respiratory faiilure  3. Type 2 diabetes  Continue supportive therapy  Wean and extubate as tolerated. Prognosis depends on neurological state. Please see orders. Discussed with nursing.     Christy Barnes MD, MD

## 2022-02-26 NOTE — PROGRESS NOTES
At approximately 2 pm active seizure witnessed and sustained until approximately 2:30 pm. A code RRT was called at approximately 2:10 pm. Dr Joel and his nurse practitioner responded as well as respiratory and lab. Dr Joel ordered Ativan 2 mg IVP and this writer spoke to and updated Dr Amadeo Ramsay from Neuro. Dr Amadeo Ramsay ordered Depakote IVPB and stated will be by soon to examine patient. An EEG was also ordered and will be performed sometime later.

## 2022-02-26 NOTE — PROGRESS NOTES
[facility]   IN-PATIENT SERVICE      NEUROLOGY PROGRESS  NOTE            Date:   2/26/2022  Patient name:  Elva Collado  Date of admission:  2/24/2022  YOB: 1960      Interval History:     Patient was rewarmed as of 9:30 PM last night. Around noon today the sedation had been weaned off and patient was noted to have myoclonic jerking/twitching diffusely which was noted after she was being moved in the bed. She then had another episode around 2 PM again after being stimulated. She had been given Ativan at 1 point with some resolution of the symptoms. Depakote was ordered by consultant for further treatment of her myoclonic jerking. EEG has been ordered. Daughter is at bedside. History of Present Illness: The patient is a 63 y.o. female who presents with Cardiac Arrest  . The patient was seen and examined and the chart was reviewed. Patient was noted to be foaming at the mouth by her  and was pulseless. EMS arrived, CPR initiated and patient was down for reportedly approximately about 20 minutes followed by return of spontaneous circulation. Patient was intubated and noted to have limited neurologic exam.  Decision was made to place patient in hypothermia protocol. Reportedly all this occurred around 4 AM and patient's temperature was already hypothermic at that time 34 Celsius. Official hypothermic protocol started around 9 AM and patient reached temp at 10:30 AM.  Reportedly prior to starting hypothermia this morning and when sedation was briefly held she was able to open eyes to voice although unclear if it was purposeful or not. CT head abdomen done initially showed no evidence of acute intracranial abnormalities. There is significant diffuse cerebral atrophy more prominent than expected for patient's age. She does have a history of chronic heavy alcohol daily usage. No family currently at bedside.     Past Medical History:     Past Medical History: Diagnosis Date    Alcohol withdrawal ([facility]     [facility] 54/2/9636    Alcohol-induced chronic pancreatitis ([facility] 21/8/9316    Alcoholic hepatitis 48/3/8518    Cellulitis of right hip 12/18/2016    COPD (chronic obstructive pulmonary disease) ([facility] [address] [facility] 10/2/2015    Diabetes mellitus ([facility]     \"borderline\"    DM type 2 (diabetes mellitus, type 2) ([facility] 10/2/2015    Dysphagia     Eczema     Elevated liver enzymes 10/2/2015    FTT (failure to thrive) in adult 10/2/2015    Hoarseness     Hypertension     denies,used to take bp meds    Hypomagnesemia     Hyponatremia 12/7/2016    Intertrochanteric fracture of right femur ([facility] 12/7/2016    Iron deficiency anemia 12/18/2016    Lesion of hard palate     rt side,dx with laryngoscopy 8/11/16    Moderate malnutrition ([facility] 10/2/2015    New onset seizure ([facility]     states last one was oct 2015, but states told she had an \"alcohol seizure\" this past month    Poor historian     Smoker 10/2/2015        Past Surgical History:     Past Surgical History:   Procedure Laterality Date    FEMUR FRACTURE SURGERY Right 12/07/2016    HIP FRACTURE SURGERY Left     LARYNGOSCOPY  10/28/2016    biopsie base of tongue    TONSILLECTOMY      UPPER GASTROINTESTINAL ENDOSCOPY N/A 8/23/2021    EGD BIOPSY performed by Joe Weaver MD at [facility]        Medications during admission:      insulin lispro  0-6 Units SubCUTAneous Q6H    LORazepam  2 mg IntraVENous Once    valproate sodium (DEPACON) IVPB  1,000 mg IntraVENous Once    thiamine and folic acid IVPB   IntraVENous Daily    sodium chloride flush  5-40 mL IntraVENous 2 times per day    ipratropium-albuterol  1 ampule Inhalation 4x daily    methylPREDNISolone  60 mg IntraVENous Q8H    cefTRIAXone (ROCEPHIN) IV  1,000 mg IntraVENous Q24H    azithromycin  500 mg IntraVENous Q24H    pantoprazole  40 mg IntraVENous Daily    chlorhexidine  15 mL Mouth/Throat BID    polyvinyl alcohol  1 drop Both Eyes Q4H    budesonide  0.5 mg Nebulization BID         Physical Exam:   BP 95/73   Pulse 104   Temp 97.9 °F (36.6 °C) (Rectal)   Resp 24   Ht 5' 4\" (1.626 m)   Wt 97 lb 9.6 oz (44.3 kg)   SpO2 99%   BMI 16.75 kg/m²   Temp (24hrs), Av.3 °F (35.7 °C), Min:93 °F (33.9 °C), Max:98.8 °F (37.1 °C)          Neurological examination:    Mental status   Patient is intubated. Off sedation. Comatose. No spontaneous eye opening to voice or painful stimulation. Not following commands. Cranial nerves   Pupil response:            Pinpoint 1-2 mm but sluggish   Oculocephalic reflex:   Absent  Corneal Reflex:            Absent  Facial grimace to pin:  Absent  Gag/Cough reflex:       Present     Breathing above vent:  Present        Motor and sensory function  Diffuse myoclonic jerks/twitches present in the face, eyelids, jaw, upper extremities and torso upon stimulation  No spontaneous limb movements  No posturing    Minimal withdrawal to deep nail bed pressure in lower extremities  Tone: Normal      DTR  0/4 throughout  Plantar response: Downgoing  (-) Mukherjee's sign bilaterally    Gait  Not tested              Diagnostics:      Laboratory Testing:  CBC:   Recent Labs     22  0400 22  1805 22  0600   WBC 11.7* 19.3* 20.7*   HGB 11.6* 11.5* 11.4*    240 216     BMP:    Recent Labs     22  0400 22  1011 22  1805 22  1950 22  0600 22  0600 22  0830 22  1020 22  1215     --  132*  --  130*  --   --   --   --    K 3.2*   < > 5.8*   < > 6.4*   < > 4.8 4.9 4.4     --  101  --  99  --   --   --   --    CO2 22  --  25  --  21  --   --   --   --    BUN 5*  --  6  --  9  --   --   --   --    CREATININE <0.40*  --  <0.40*  --  0.66  --   --   --   --    GLUCOSE 147*  --  128*  --  135*  --   --   --   --     < > = values in this interval not displayed.          Lab Results   Component Value Date    ALT 40 (H) 02/25/2022    AST 58 (H) 02/25/2022    TSH 7.05 (H) 08/21/2021    INR 1.1 02/24/2022    LABA1C 5.2 12/07/2016    [id number] 1060 08/21/2021         Imaging/Diagnostics:      EEG: Pending      Results for orders placed during the hospital encounter of 02/24/22    CT HEAD WO CONTRAST    Narrative  EXAMINATION:  CT OF THE HEAD WITHOUT CONTRAST  2/24/2022 5:27 am    TECHNIQUE:  CT of the head was performed without the administration of intravenous  contrast. Dose modulation, iterative reconstruction, and/or weight based  adjustment of the mA/kV was utilized to reduce the radiation dose to as low  as reasonably achievable. COMPARISON:  02/12/2019    HISTORY:  ORDERING SYSTEM PROVIDED HISTORY: cardiac arrest  TECHNOLOGIST PROVIDED HISTORY:  cardiac arrest    Decision Support Exception - unselect if not a suspected or confirmed  emergency medical condition->Emergency Medical Condition (MA)  Reason for Exam: Cardiac arrest.  Per report,  woke up this evening  and found her unresponsive and foaming at the mouth and that patient was  using breathing treatments all day long secondary to wheezing and shortness  of breath. CPR was performed. Relevant Medical/Surgical History: Per pt chart, hx alcoholic hepatitis,  COPD, and non-insulin-dependent diabetes, COPD, seizure    FINDINGS:  BRAIN/VENTRICLES: There is no acute intracranial hemorrhage, mass effect or  midline shift. No abnormal extra-axial fluid collection. The gray-white  differentiation is maintained without evidence of an acute infarct. There is  no evidence of hydrocephalus. No wedge-shaped area of acute ischemia is  identified. No basilar cistern or sulcal effacement. Mild cortical atrophy. Patchy white matter low attenuation is identified. ORBITS: The visualized portion of the orbits demonstrate no acute abnormality. SINUSES: The visualized paranasal sinuses and mastoid air cells demonstrate  no acute abnormality.     SOFT TISSUES/SKULL:  No

## 2022-02-26 NOTE — PROGRESS NOTES
[facility]  Office: [address], DO, Tea García, DO, Shantelle Ramos, DO, Sarahalma Joel, DO, Jose R Lerner MD, Jorge A Squires MD, Dona Adam MD, Liliam Lamas MD, Reina Gonzalez MD, Marck Santos MD, Marisa Chirinos MD, Julio Pandey, DO, Jose Osuna DO, Teodoro Mcwilliams MD,  Mary Carmen Irvin, DO, Tiki Masterson MD, Li Mejia MD, Lam Sosa MD, Tushar Nicholas MD, Homero Melton MD, Franca Mendoza [facility], [facility], [facility], Emir Sherman, CNP, Robbin Odom, CNS, Dulce Smith, CNP, Sahil Cooney, CNP, Altagracia Mendoza, CNP, Joni Mccracken, CNP, Sintia Chang, CNP, Milissa Pallas, PA-C, Trudi Soulier, [facility], Mirian Elmore, [facility], Susana Adam, CNP, Panda Cheatham, CNP, Frankey Blush, CNP, Francy Mccary, CNP, Lynne Zimmer, CNP, Morro Ibrahim, [facility]    Progress Note    2/26/2022    9:48 AM    Name:   Aayush Colvin  MRN:     [id number]     Acct:      [de-identified]   Room:   2029/202901   Day:  2  Admit Date:  2/24/2022  3:48 AM    PCP:   Mika Sims PA-C  Code Status:  Full Code    Subjective:     C/C:   Chief Complaint   Patient presents with    Cardiac Arrest     Interval History Status: not changed. Remains sedated on vent  On TTR protocol    Brief History:     Aayush Colvin is a 63 y.o. Non- / non  female who presents with Cardiac Arrest   and is admitted to the hospital for the management of Cardiac arrest [facility]).    This 63 yof apparently was found by  last night unresponsive and foaming at the mouth. She had been sob and using aerosols all day. When EMS arrived she was in asystole, received cpr, epi and had rosc after ~20 minutes. Review of Systems:     unobtainable  On vent    Medications: Allergies:     Allergies   Allergen Reactions    Ibuprofen Nausea And Vomiting       Current Meds:   Scheduled Meds:    [START ON 2/27/2022] vancomycin  1,000 mg IntraVENous Q24H    thiamine and folic acid IVPB   IntraVENous Daily    vancomycin (VANCOCIN) intermittent dosing (placeholder)   Other RX Placeholder    sodium chloride flush  5-40 mL IntraVENous 2 times per day    ipratropium-albuterol  1 ampule Inhalation 4x daily    methylPREDNISolone  60 mg IntraVENous Q8H    cefTRIAXone (ROCEPHIN) IV  1,000 mg IntraVENous Q24H    azithromycin  500 mg IntraVENous Q24H    pantoprazole  40 mg IntraVENous Daily    metoprolol tartrate  50 mg Per NG tube BID    chlorhexidine  15 mL Mouth/Throat BID    polyvinyl alcohol  1 drop Both Eyes Q4H    budesonide  0.5 mg Nebulization BID     Continuous Infusions:    insulin Stopped (02/26/22 0405)    dextrose      sodium chloride      phenylephrine (SHANON-SYNEPHRINE) 50mg/250mL infusion      norepinephrine 6 mcg/min (02/26/22 0909)    midazolam (VERSED) 1 mg/mL in D5W infusion Stopped (02/26/22 0800)    cisatracurium (NIMBEX) infusion Stopped (02/25/22 2138)    fentaNYL 25 mcg/hr (02/26/22 0933)    heparin (PORCINE) Infusion 14 Units/kg/hr (02/26/22 0905)    propofol 20 mcg/kg/min (02/26/22 0905)     PRN Meds: glucose, glucagon (rDNA), dextrose, dextrose bolus (hypoglycemia) **OR** dextrose bolus (hypoglycemia), sodium chloride flush, sodium chloride, ondansetron **OR** [DISCONTINUED] ondansetron, polyethylene glycol, acetaminophen **OR** acetaminophen, hydrALAZINE, perflutren lipid microspheres, ondansetron, heparin (porcine), heparin (porcine), potassium chloride    Data:     Past Medical History:   has a past medical history of Alcohol withdrawal ([facility] [address] Alcohol withdrawal seizure with complication ([facility] [address] Alcohol-induced chronic pancreatitis ([facility] [address] Alcoholic hepatitis, Cellulitis of right hip, COPD (chronic obstructive pulmonary disease) ([facility] [address] Diabetes mellitus ([facility] [address] DM type 2 (diabetes mellitus, type 2) ([facility] [address] Dysphagia, Eczema, Elevated liver enzymes, FTT (failure to thrive) in adult, Hoarseness, Hypertension, Hypomagnesemia, Hyponatremia, Intertrochanteric fracture of right femur ([address] Iron deficiency anemia, Lesion of hard palate, Moderate malnutrition ([address] New onset seizure ([facility] [address] Poor historian, and Smoker. Social History:   reports that she has been smoking cigarettes. She has a 35.00 pack-year smoking history. She has never used smokeless tobacco. She reports current alcohol use. She reports that she does not use drugs. Family History:   Family History   Problem Relation Age of Onset    Heart Disease Mother     Diabetes Father     Asthma Sister     Asthma Brother        Vitals:  /70   Pulse 112   Temp 98.8 °F (37.1 °C) (Rectal)   Resp 24   Ht 5' 4\" (1.626 m)   Wt 97 lb 9.6 oz (44.3 kg)   SpO2 95%   BMI 16.75 kg/m²   Temp (24hrs), Av °F (35 °C), Min:91.4 °F (33 °C), Max:98.8 °F (37.1 °C)    Recent Labs     22  0317 22  0404 22  0501 22  0831   POCGLU 108* 86 118* 192*       I/O (24Hr): Intake/Output Summary (Last 24 hours) at 2022 0948  Last data filed at 2022 0905  Gross per 24 hour   Intake 2487.24 ml   Output 1365 ml   Net 1122.24 ml       Labs:  Hematology:  Recent Labs     22  0352 22  2235 22  0400 22  1805 22  0600   WBC 12.8*   < > 11.7* 19.3* 20.7*   RBC 3.63*   < > 3.33* 3.35* 3.31*   HGB 12.4   < > 11.6* 11.5* 11.4*   HCT 38.9   < > 33.1* 33.4* 33.3*   .2*   < > 99.4 99.7 100.6   MCH 34.2*   < > 34.8* 34.3* 34.4*   MCHC 31.9   < > 35.0* 34.4 34.2   RDW 11.4*   < > 11.4* 11.5* 11.6*      < > 203 240 216   MPV 10.4   < > 9.9 10.0 10.4   INR 1.1  --   --   --   --     < > = values in this interval not displayed.      Chemistry:  Recent Labs       0000 22  0352 22  0352 22  0641 22  0823 22  1003 22  1630 22  0400 22  1011 22  1805 22  1950 22  0405 22  0600 22  0830   NA   < > 132*   < >  --  137  --   --  139  --  132*  -- --  130*  --    K  --  4.6   < >  --  4.2  --    < > 3.2*   < > 5.8*   < > 5.6* 6.4* 4.8   CL   < > 97*   < >  --  102  --   --  104  --  101  --   --  99  --    CO2   < > 16*   < >  --  20  --   --  22  --  25  --   --  21  --    GLUCOSE   < > 282*   < >  --  188*  --   --  147*  --  128*  --   --  135*  --    BUN   < > 4*   < >  --  7  --   --  5*  --  6  --   --  9  --    CREATININE   < > 0.54   < >  --  <0.40*  --   --  <0.40*  --  <0.40*  --   --  0.66  --    MG  --   --   --   --  1.5*  --    < > 1.7   < > 1.5*   < > 1.9 1.7 1.7   ANIONGAP   < > 19*   < >  --  15  --   --  13  --  6*  --   --  10  --    LABGLOM   < > >60   < >  --  Can not be calculated  --   --  Can not be calculated  --  Can not be calculated  --   --  >60  --    GFRAA   < > >60   < >  --  Can not be calculated  --   --  Can not be calculated  --  Can not be calculated  --   --  >60  --    CALCIUM   < > 8.9   < >  --  8.2*  --   --  8.9  --  8.8  --   --  9.5  --    CAION  --   --   --   --   --  0.96*  --   --   --   --   --   --  1.33  --    PHOS  --   --   --   --  4.2  --    < > 3.3   < > 4.1   < > 4.4 4.1 4.8*   PROBNP  --  221  --   --   --   --   --   --   --   --   --   --   --   --    TROPHS  --  7  --  24* 43*  --   --   --   --   --   --   --   --   --     < > = values in this interval not displayed.      Recent Labs     02/24/22 0352 02/24/22  0823 02/24/22  0856 02/25/22  0400 02/25/22  0406 02/26/22  0113 02/26/22  0200 02/26/22  0317 02/26/22  0404 02/26/22  0501 02/26/22  0831   PROT 6.8  --   --  5.9*  --   --   --   --   --   --   --    LABALBU 3.7  --   --  3.2*  --   --   --   --   --   --   --    AST 95*  --   --  58*  --   --   --   --   --   --   --    ALT 42*  --   --  40*  --   --   --   --   --   --   --    ALKPHOS 134*  --   --  109*  --   --   --   --   --   --   --    BILITOT 0.36  --   --  0.23*  --   --   --   --   --   --   --    BILIDIR  --   --   --  0.12  --   --   --   --   --   --   --    AMYLASE  -- 102*  --   --   --   --   --   --   --   --   --    LIPASE  --  37  --   --   --   --   --   --   --   --   --    POCGLU  --   --    < >  --    < > 143* 186* 108* 86 118* 192*    < > = values in this interval not displayed. ABG:  Lab Results   Component Value Date    POCPH 7.274 02/26/2022    POCPCO2 51.3 02/26/2022    POCPO2 68.7 02/26/2022    POCHCO3 23.8 02/26/2022    NBEA 4 02/26/2022    PBEA 0 02/25/2022    QKEB4TIA 91 02/26/2022    FIO2 30.0 02/26/2022     Lab Results   Component Value Date/Time    SPECIAL 18ML RT RADIAL 02/24/2022 08:36 AM     Lab Results   Component Value Date/Time    CULTURE NO GROWTH 1 DAY 02/24/2022 08:36 AM       Radiology:  CT HEAD WO CONTRAST    Result Date: 2/24/2022  Similar appearing CT scan of the head without acute intracranial abnormality. Mild cortical atrophy with chronic microvascular ischemic changes. CT ABDOMEN PELVIS W IV CONTRAST Additional Contrast? None    Result Date: 2/24/2022  No acute abnormality evident. Mild emphysema. Status post ORIF right hip fracture. XR CHEST PORTABLE    Result Date: 2/25/2022  Endotracheal tube tip is 4 cm above the katy. Right femoral central venous catheter tip is at the SVC/RA junction region. Nasogastric tube tip is in the stomach. Probable skin fold projecting over the left apex which can be reassessed on follow-up. XR CHEST PORTABLE    Result Date: 2/24/2022  Endotracheal tube appears in satisfactory position. Negative chest.     CT CHEST PULMONARY EMBOLISM W CONTRAST    Result Date: 2/24/2022  No pulmonary emboli are identified. Right anterior 3rd, 4th, 5th and 7th rib fractures are seen. Left anterior 7th rib fracture, and questionable 4th and 5th anterolateral rib fractures. Mild bronchial thickening, with atelectatic changes. Focal clustered nodule seen in the right upper lobe, the largest of which measuring just under 8 mm in size. An inflammatory process is favored. Recommendations as below for follow-up.  No spiculated lung mass, consolidation or pneumothorax. RECOMMENDATIONS: Multiple pulmonary nodules. Most severe: 8 mm right solid pulmonary nodule within the upper lobe. Recommend a non-contrast Chest CT at 3-6 months, then another non-contrast Chest CT at 18-24 months. These guidelines do not apply to immunocompromised patients and patients with cancer. Follow up in patients with significant comorbidities as clinically warranted. For lung cancer screening, adhere to Lung-RADS guidelines. Reference: Radiology. 2017; 284(1):228-43. XR ABDOMEN FOR NG/OG/NE TUBE PLACEMENT    Result Date: 2/25/2022  Endotracheal tube tip is 4 cm above the katy. Right femoral central venous catheter tip is at the SVC/RA junction region. Nasogastric tube tip is in the stomach. Probable skin fold projecting over the left apex which can be reassessed on follow-up.        Physical Examination:        General appearance:  no distress  Mental Status:  sedated  Lungs:  clear to auscultation bilaterally, normal effort on vent  Heart:  regular rate and rhythm, no murmur  Abdomen:  soft, nontender, nondistended, normal bowel sounds, no masses, hepatomegaly, splenomegaly  Extremities:  no edema, redness, tenderness in the calves  Skin:  no gross lesions, rashes, induration    Assessment:        Hospital Problems           Last Modified POA    * (Principal) Cardiac arrest ([address] 2/24/2022 Yes    Primary hypertension 2/24/2022 Yes    Tobacco abuse (Chronic) 2/24/2022 Yes    COPD without exacerbation ([address] 2/24/2022 Yes    Type 2 diabetes mellitus without complication, without long-term current use of insulin (HCC) (Chronic) 2/24/2022 Yes    Alcohol abuse 2/24/2022 Yes    Portal hypertension ([address] 2/24/2022 Yes      hyperkalemia    Plan:        On TTR per protocol  Treated high k this am and rechecked  Monitor mental status-will work on stopping sedation today  Watch for signs of alcohol withdrawal  Daily thiamine  Off insulin drip-start SSI  Steroids and aerosols for copd  On empiric antibiotics, can stop vanco iv-the gpc isolated in blood culture is cns and is contaminant  D/w daughter    Jimbo Benitez Blood, DO  2/26/2022  9:48 AM

## 2022-02-26 NOTE — PLAN OF CARE
Problem: Anxiety/Stress:  Goal: Level of anxiety will decrease  Description: Level of anxiety will decrease  2/26/2022 0542 by Eladio Suh RN  Outcome: Met This Shift  2/25/2022 1845 by Beba Greene RN  Outcome: Ongoing     Problem: Skin Integrity - Impaired:  Goal: Will show no infection signs and symptoms  Description: Will show no infection signs and symptoms  2/26/2022 0542 by Eladio Suh RN  Outcome: Met This Shift  2/25/2022 1845 by Beba Greene RN  Outcome: Ongoing  Goal: Absence of new skin breakdown  Description: Absence of new skin breakdown  2/26/2022 0542 by Eladio Suh RN  Outcome: Met This Shift  2/25/2022 1845 by Beba Greene RN  Outcome: Ongoing     Problem: Skin Integrity:  Goal: Will show no infection signs and symptoms  Description: Will show no infection signs and symptoms  2/26/2022 0542 by Eladio Suh RN  Outcome: Met This Shift  2/25/2022 1845 by Beba Greene RN  Outcome: Ongoing  Goal: Absence of new skin breakdown  Description: Absence of new skin breakdown  2/26/2022 0542 by Eladio Suh RN  Outcome: Met This Shift  2/25/2022 1845 by Beba Greene RN  Outcome: Ongoing     Problem: Falls - Risk of:  Goal: Will remain free from falls  Description: Will remain free from falls  Outcome: Met This Shift  Goal: Absence of physical injury  Description: Absence of physical injury  Outcome: Met This Shift     Problem: Discharge Planning:  Goal: Participates in care planning  Description: Participates in care planning  2/26/2022 0542 by Eladio Suh RN  Outcome: Ongoing  2/25/2022 1845 by Beba Greene RN  Outcome: Ongoing  Goal: Discharged to appropriate level of care  Description: Discharged to appropriate level of care  2/26/2022 0542 by Eladio Suh RN  Outcome: Ongoing  2/25/2022 1845 by Beba Greene RN  Outcome: Ongoing     Problem: Airway Clearance - Ineffective:  Goal: Ability to maintain a clear airway will improve  Description: Ability to maintain a clear airway will improve  2/26/2022 0542 by Radha Rea RN  Outcome: Ongoing  2/25/2022 1845 by Aicha Claros RN  Outcome: Ongoing     Problem: Aspiration:  Goal: Absence of aspiration  Description: Absence of aspiration  2/26/2022 0542 by Radha Rea RN  Outcome: Ongoing  2/25/2022 1845 by Aicha Claros RN  Outcome: Ongoing     Problem:  Bowel Function - Altered:  Goal: Bowel elimination is within specified parameters  Description: Bowel elimination is within specified parameters  2/26/2022 0542 by Radha Rea RN  Outcome: Ongoing  2/25/2022 1845 by Aicha Claros RN  Outcome: Ongoing     Problem: Cardiac Output - Decreased:  Goal: Hemodynamic stability will improve  Description: Hemodynamic stability will improve  2/26/2022 0542 by Radha Rea RN  Outcome: Ongoing  2/25/2022 1845 by Aicha Claros RN  Outcome: Ongoing     Problem: Fluid Volume - Imbalance:  Goal: Absence of imbalanced fluid volume signs and symptoms  Description: Absence of imbalanced fluid volume signs and symptoms  2/26/2022 0542 by Radha Rea RN  Outcome: Ongoing  2/25/2022 1845 by Aicha Claros RN  Outcome: Ongoing     Problem: Gas Exchange - Impaired:  Goal: Levels of oxygenation will improve  Description: Levels of oxygenation will improve  2/26/2022 0542 by Radha Rea RN  Outcome: Ongoing  2/25/2022 1845 by Aicha Claros RN  Outcome: Ongoing     Problem: Mental Status - Impaired:  Goal: Mental status will be restored to baseline  Description: Mental status will be restored to baseline  2/26/2022 0542 by Radha Rea RN  Outcome: Ongoing  2/25/2022 1845 by Aicha Claros RN  Outcome: Ongoing     Problem: Nutrition Deficit:  Goal: Ability to achieve adequate nutritional intake will improve  Description: Ability to achieve adequate nutritional intake will improve  2/26/2022 0542 by Radha Rea RN  Outcome: Ongoing  2/25/2022 1845 by Aicha Claros RN  Outcome: Ongoing     Problem: Pain:  Goal: Pain level will decrease  Description: Pain level will decrease  2/26/2022 0542 by Marcelo Gonzalez RN  Outcome: Ongoing  2/25/2022 1845 by Hilda Corado RN  Outcome: Ongoing  Goal: Recognizes and communicates pain  Description: Recognizes and communicates pain  2/26/2022 0542 by Marcelo Gonzalez RN  Outcome: Ongoing  2/25/2022 1845 by Hilda Corado RN  Outcome: Ongoing  Goal: Control of acute pain  Description: Control of acute pain  2/26/2022 0542 by Marcelo Gonzalez RN  Outcome: Ongoing  2/25/2022 1845 by Hilda Corado RN  Outcome: Ongoing  Goal: Control of chronic pain  Description: Control of chronic pain  2/26/2022 0542 by Marcelo Gonzalez RN  Outcome: Ongoing  2/25/2022 1845 by Hilda Corado RN  Outcome: Ongoing     Problem: Serum Glucose Level - Abnormal:  Goal: Ability to maintain appropriate glucose levels will improve to within specified parameters  Description: Ability to maintain appropriate glucose levels will improve to within specified parameters  2/26/2022 0542 by Marcelo Gonzalez RN  Outcome: Ongoing  2/25/2022 1845 by Hilda Corado RN  Outcome: Ongoing     Problem: Sleep Pattern Disturbance:  Goal: Appears well-rested  Description: Appears well-rested  2/26/2022 0542 by Marcelo Gonzalez RN  Outcome: Ongoing  2/25/2022 1845 by Hilda Corado RN  Outcome: Ongoing     Problem: Tissue Perfusion, Altered:  Goal: Circulatory function within specified parameters  Description: Circulatory function within specified parameters  2/26/2022 0542 by Marcelo Gonzalez RN  Outcome: Ongoing  2/25/2022 1845 by Hilda Corado RN  Outcome: Ongoing     Problem: Tissue Perfusion - Cardiopulmonary, Altered:  Goal: Absence of angina  Description: Absence of angina  2/26/2022 0542 by Marcelo Gonzalez RN  Outcome: Ongoing  2/25/2022 1845 by Hilda Corado RN  Outcome: Ongoing  Goal: Hemodynamic stability will improve  Description: Hemodynamic stability will improve  2/26/2022 0542 by Marcelo Gonzalez RN  Outcome: Ongoing  2/25/2022 1845 by Hilda Corado RN  Outcome: Ongoing     Problem: VIOLENT

## 2022-02-26 NOTE — FLOWSHEET NOTE
Patient is intubated. Rapid response 2:10 pm called over head . Patient and daughter was present. Rapid response team and Dr. Joel was present discussing with the daughter. Patient had gone into a seizures. Daughter was calm, no major needs or prayers, thinking positive.  shared in presence, listening. Follow up as needed. 02/26/22 8567   Encounter Summary   Services provided to: Patient and family together   Referral/Consult From: Multi-disciplinary team   Support System Children   Continue Visiting   (2-26-22 PT, intubated, spoke to daughter)   Complexity of Encounter Moderate   Length of Encounter 15 minutes   Spiritual Assessment Completed Yes   Routine   Type Follow up   Assessment Calm; Approachable; Anxious   Intervention Active listening;Explored feelings, thoughts, concerns; Discussed belief system/Jew practices/arnoldo;Discussed illness/injury and it's impact;Sustaining presence/ Ministry of presence   Outcome Expressed gratitude;Receptive;Engaged in conversation;Expressed feelings/needs/concerns   Crisis   Type Rapid response

## 2022-02-26 NOTE — PROGRESS NOTES
Comprehensive Nutrition Assessment    Type and Reason for Visit:  Consult (Tube feedings order and management)    Nutrition Recommendations/Plan:   1. Continue NPO diet  2. Recommend Glucerna 1.5 Pavel goal rate 30 mL/hr (720 mL total volume 1080 kcal and 59 gm of protein)  3. Monitor vent status, GI function, labs and nutrition status  4. Start Tube feedings when medically appropriate    Nutrition Assessment:  Patient admission is related to cardiac arrest. Patient's  found her overnight foaming at the mouth and unresponsive. Patient was intubated on admission. Nutrition consult received for tube feeding order and management. Rapid response called this afternoon for seizure. If tube feedings are started today then Glucerna 1.5 Pavel, goal rate 30 mL/hr (720 mL total volume). Monitor nutrition status, labs, vent status and GI function. Malnutrition Assessment:  Malnutrition Status:  Severe malnutrition    Context:  Chronic Illness     Findings of the 6 clinical characteristics of malnutrition:  Energy Intake:  7 - 75% or less estimated energy requirements for 1 month or longer  Weight Loss:  1 - Mild weight loss (specify amount and time period)     Body Fat Loss:  7 - Severe body fat loss Triceps   Muscle Mass Loss:  7 - Severe muscle mass loss Clavicles (pectoralis & deltoids),Temples (temporalis)  Fluid Accumulation:  No significant fluid accumulation Extremities   Strength:  Not Performed    Estimated Daily Nutrient Needs:  Energy (kcal):  1100 kcal (25 kcal/kg); Weight Used for Energy Requirements:  Current     Protein (g):  58-66 gm of protein (1.3-1.5 gm/kg); Weight Used for Protein Requirements:  Current        Fluid (ml/day):   ; Method Used for Fluid Requirements:  1 ml/kcal      Nutrition Related Findings:  No edema. Hypoactive bowel sounds.         Current Nutrition Therapies:    Diet NPO  Current Tube Feeding (TF) Orders:  · Feeding Route:    · Formula:    · Schedule: · Additives/Modulars:    · Water Flushes:    · Current TF & Flush Orders Provides:    · Goal TF & Flush Orders Provides:        Anthropometric Measures:  · Height: 5' 4\" (162.6 cm)  · Current Body Weight: 97 lb (44 kg)   · Admission Body Weight: 97 lb (44 kg)    · Ideal Body Weight: 120 lbs; % Ideal Body Weight 80.8 %   · BMI: 16.6  · BMI Categories: Underweight (BMI less than 18.5)       Nutrition Diagnosis:   · Severe malnutrition related to inadequate protein-energy intake as evidenced by severe muscle loss,severe loss of subcutaneous fat,weight loss      Nutrition Interventions:   Food and/or Nutrient Delivery:  Continue NPO,Continue Current Tube Feeding  Nutrition Education/Counseling:  Education not indicated   Coordination of Nutrition Care:  Continue to monitor while inpatient    Goals:  Enteral nutrition will meet greater than 75% of estimated nutrition needs       Nutrition Monitoring and Evaluation:   Food/Nutrient Intake Outcomes:  Diet Advancement/Tolerance,Enteral Nutrition Intake/Tolerance  Physical Signs/Symptoms Outcomes:  Biochemical Data,Fluid Status or Edema,Skin,Weight     Discharge Planning:     Too soon to determine         Mj Floyd  MFN, RDN, LDN  Lead Clinical Dietitian  RD Office Phone [phone]

## 2022-02-27 NOTE — PROGRESS NOTES
[facility]  Office: [address], DO, Milagro Figeuroa, DO, Aiden House, DO, Marlo Kanner Wisam, DO, Jamshid Xavier MD, Beulah Mehta MD, Leesa Francois MD, Emma Chakraborty MD, Julee Laguerre MD, Deepthi Richter MD, Johnny Mccabe MD, Audrey Mtz, DO, Ora Frankel, DO, Manuelito Crouch MD,  Kristal Amezquita DO, Mirta Greer MD, Angelique Acosta MD, Char Barreto MD, Selene Johnston MD, Angelita Hogan MD, Niels Luque, CNP, Belem Lundberg, CNP, David Devine, CNP, Stephy Abdi, CNS, Froilan Carpenter, CNP, Dat Willard, CNP, Chauncey Cates, CNP, Jordy Lee, CNP, Teddy Cardoza, CNP, Stephanie Calvert PA-C, Iain Sheffield, [facility], Evan Salmon [facility], Paige Lu, CNP, Radhika Boykin, CNP, Puneet Davis, CNP, Akshat Hu, CNP, Donavan Yun, CNP, David Bradford [facility]    Progress Note    2/27/2022    10:01 AM    Name:   Nu Conteh  MRN:     [id number]     Acct:      [de-identified]   Room:   2029/2029-01   Day:  3  Admit Date:  2/24/2022  3:48 AM    PCP:   Wilton Chirinos PA-C  Code Status:  Full Code    Subjective:     C/C:   Chief Complaint   Patient presents with    Cardiac Arrest     Interval History Status: not changed. Remains sedated on vent  completed TTR protocol    Had episode of facial twitching yesterday and apparently before that episode also had leg movement per RT    Brief History:     Nu Conteh is a 63 y.o. Non- / non  female who presents with Cardiac Arrest   and is admitted to the hospital for the management of Cardiac arrest [facility]).    This 63 yof apparently was found by  last night unresponsive and foaming at the mouth. She had been sob and using aerosols all day. When EMS arrived she was in asystole, received cpr, epi and had rosc after ~20 minutes. Review of Systems:     unobtainable  On vent    Medications: Allergies:     Allergies   Allergen Reactions    Ibuprofen Nausea And Vomiting       Current Meds:   Scheduled Meds:    magnesium sulfate  2,000 mg IntraVENous Once    calcium gluconate IVPB  1,000 mg IntraVENous Once    insulin lispro  0-6 Units SubCUTAneous Q6H    valproate sodium (DEPACON) IVPB  750 mg IntraVENous Q12H    thiamine and folic acid IVPB   IntraVENous Daily    sodium chloride flush  5-40 mL IntraVENous 2 times per day    ipratropium-albuterol  1 ampule Inhalation 4x daily    methylPREDNISolone  60 mg IntraVENous Q8H    cefTRIAXone (ROCEPHIN) IV  1,000 mg IntraVENous Q24H    azithromycin  500 mg IntraVENous Q24H    pantoprazole  40 mg IntraVENous Daily    chlorhexidine  15 mL Mouth/Throat BID    polyvinyl alcohol  1 drop Both Eyes Q4H    budesonide  0.5 mg Nebulization BID     Continuous Infusions:    sodium chloride 20 mL/hr at 02/27/22 0118    dextrose      sodium chloride      phenylephrine (SHANON-SYNEPHRINE) 50mg/250mL infusion      norepinephrine Stopped (02/26/22 1210)    midazolam (VERSED) 1 mg/mL in D5W infusion Stopped (02/26/22 0800)    cisatracurium (NIMBEX) infusion Stopped (02/25/22 2138)    fentaNYL Stopped (02/26/22 1125)    propofol 40 mcg/kg/min (02/27/22 0808)     PRN Meds: glucose, glucagon (rDNA), dextrose, dextrose bolus (hypoglycemia) **OR** dextrose bolus (hypoglycemia), sodium chloride flush, sodium chloride, ondansetron **OR** [DISCONTINUED] ondansetron, polyethylene glycol, acetaminophen **OR** acetaminophen, hydrALAZINE, perflutren lipid microspheres, ondansetron, potassium chloride    Data:     Past Medical History:   has a past medical history of Alcohol withdrawal ([facility] withdrawal [facility] complication [facility] Alcohol-induced chronic pancreatitis ([facility] Alcoholic hepatitis, Cellulitis of right hip, COPD (chronic obstructive pulmonary disease) ([facility] Diabetes mellitus ([facility] DM type 2 (diabetes mellitus, type 2) ([facility] Dysphagia, Eczema, Elevated liver enzymes, FTT (failure to thrive) in adult, Hoarseness, Hypertension, Hypomagnesemia, Hyponatremia, Intertrochanteric fracture of right femur (HCC), Iron deficiency anemia, Lesion of hard palate, Moderate malnutrition ([address] New onset seizure ([address] Poor historian, and Smoker. Social History:   reports that she has been smoking cigarettes. She has a 35.00 pack-year smoking history. She has never used smokeless tobacco. She reports current alcohol use. She reports that she does not use drugs. Family History:   Family History   Problem Relation Age of Onset    Heart Disease Mother     Diabetes Father     Asthma Sister     Asthma Brother        Vitals:  /74   Pulse 78   Temp 98.2 °F (36.8 °C) (Bladder)   Resp 20   Ht 5' 4\" (1.626 m)   Wt 97 lb 9.6 oz (44.3 kg)   SpO2 97%   BMI 16.75 kg/m²   Temp (24hrs), Av.2 °F (36.8 °C), Min:97 °F (36.1 °C), Max:98.8 °F (37.1 °C)    Recent Labs     22  2204 22  2304 22  0425 22  0936   POCGLU 125* 122* 129* 130*       I/O (24Hr):     Intake/Output Summary (Last 24 hours) at 2022 1001  Last data filed at 2022 7497  Gross per 24 hour   Intake 909.69 ml   Output 505 ml   Net 404.69 ml       Labs:  Hematology:  Recent Labs     22  0600 22  1805 22  0605   WBC 20.7* 11.7* 11.4*   RBC 3.31* 2.99* 2.89*   HGB 11.4* 10.3* 9.9*   HCT 33.3* 30.5* 29.1*   .6 102.0 100.7   MCH 34.4* 34.4* 34.3*   MCHC 34.2 33.8 34.0   RDW 11.6* 11.9 11.9    141 141   MPV 10.4 10.5 10.3     Chemistry:  Recent Labs     22  1003 22  1630 22  0600 22  0830 22  1805 22  2130 22  0215 22  0425 22  0605   NA  --    < > 130*  --  136  --   --   --  140   K  --    < > 6.4*   < > 4.0   < > 3.9 4.6 4.0   CL  --    < > 99  --  102  --   --   --  104   CO2  --    < > 21  --  23  --   --   --  22   GLUCOSE  --    < > 135*  --  132*  --   --   --  141*   BUN  --    < > 9  --  12  --   --   --  16   CREATININE  --    < > 0.66  --  0.87  --   --   --  0.83   MG  --    < > 1.7   < > 1.6   < > 1.6 1.6 1.6   ANIONGAP  --    < > 10  --  11  --   --   --  14   LABGLOM  --    < > >60  --  >60  --   --   --  >60   GFRAA  --    < > >60  --  >60  --   --   --  >60   CALCIUM  --    < > 9.5  --  9.2  --   --   --  8.5*   CAION 0.96*  --  1.33  --   --   --   --   --   --    PHOS  --    < > 4.1   < > 5.8*   < > 4.9* 5.0* 4.6*    < > = values in this interval not displayed. Recent Labs     02/25/22 0400 02/25/22 0406 02/26/22 2006 02/26/22 2104 02/26/22 2204 02/26/22 2304 02/27/22  0425 02/27/22  0936   PROT 5.9*  --   --   --   --   --   --   --    LABALBU 3.2*  --   --   --   --   --   --   --    AST 58*  --   --   --   --   --   --   --    ALT 40*  --   --   --   --   --   --   --    ALKPHOS 109*  --   --   --   --   --   --   --    BILITOT 0.23*  --   --   --   --   --   --   --    BILIDIR 0.12  --   --   --   --   --   --   --    POCGLU  --    < > 107* 121* 125* 122* 129* 130*    < > = values in this interval not displayed. ABG:  Lab Results   Component Value Date    POCPH 7.465 02/27/2022    POCPCO2 32.1 02/27/2022    POCPO2 70.2 02/27/2022    POCHCO3 23.1 02/27/2022    NBEA 2 02/26/2022    PBEA 0 02/27/2022    PWWB1GED 95 02/27/2022    FIO2 30.0 02/27/2022     Lab Results   Component Value Date/Time    SPECIAL 18ML RT RADIAL 02/24/2022 08:36 AM     Lab Results   Component Value Date/Time    CULTURE NO GROWTH 2 DAYS 02/24/2022 08:36 AM       Radiology:  CT HEAD WO CONTRAST    Result Date: 2/24/2022  Similar appearing CT scan of the head without acute intracranial abnormality. Mild cortical atrophy with chronic microvascular ischemic changes. CT ABDOMEN PELVIS W IV CONTRAST Additional Contrast? None    Result Date: 2/24/2022  No acute abnormality evident. Mild emphysema. Status post ORIF right hip fracture. XR CHEST PORTABLE    Result Date: 2/25/2022  Endotracheal tube tip is 4 cm above the katy.  Right femoral central venous catheter tip is at the SVC/RA junction region. Nasogastric tube tip is in the stomach. Probable skin fold projecting over the left apex which can be reassessed on follow-up. XR CHEST PORTABLE    Result Date: 2/24/2022  Endotracheal tube appears in satisfactory position. Negative chest.     CT CHEST PULMONARY EMBOLISM W CONTRAST    Result Date: 2/24/2022  No pulmonary emboli are identified. Right anterior 3rd, 4th, 5th and 7th rib fractures are seen. Left anterior 7th rib fracture, and questionable 4th and 5th anterolateral rib fractures. Mild bronchial thickening, with atelectatic changes. Focal clustered nodule seen in the right upper lobe, the largest of which measuring just under 8 mm in size. An inflammatory process is favored. Recommendations as below for follow-up. No spiculated lung mass, consolidation or pneumothorax. RECOMMENDATIONS: Multiple pulmonary nodules. Most severe: 8 mm right solid pulmonary nodule within the upper lobe. Recommend a non-contrast Chest CT at 3-6 months, then another non-contrast Chest CT at 18-24 months. These guidelines do not apply to immunocompromised patients and patients with cancer. Follow up in patients with significant comorbidities as clinically warranted. For lung cancer screening, adhere to Lung-RADS guidelines. Reference: Radiology. 2017; 284(1):228-43. XR ABDOMEN FOR NG/OG/NE TUBE PLACEMENT    Result Date: 2/25/2022  Endotracheal tube tip is 4 cm above the katy. Right femoral central venous catheter tip is at the SVC/RA junction region. Nasogastric tube tip is in the stomach. Probable skin fold projecting over the left apex which can be reassessed on follow-up.        Physical Examination:        General appearance:  no distress  Mental Status:  sedated  Lungs:  clear to auscultation bilaterally, normal effort on vent  Heart:  regular rate and rhythm, no murmur  Abdomen:  soft, nontender, nondistended, normal bowel sounds, no masses, hepatomegaly, splenomegaly  Extremities:  no edema, redness, tenderness in the calves  Skin:  no gross lesions, rashes, induration    Assessment:        Hospital Problems           Last Modified POA    * (Principal) Cardiac arrest ([facility] 2/24/2022 Yes    Primary hypertension 2/24/2022 Yes    Tobacco abuse (Chronic) 2/24/2022 Yes    COPD without exacerbation ([address] 2/24/2022 Yes    Type 2 diabetes mellitus without complication, without long-term current use of insulin (HCC) (Chronic) 2/24/2022 Yes    Alcohol abuse 2/24/2022 Yes    Portal hypertension ([address] 2/24/2022 Yes    Severe malnutrition ([address] 2/26/2022 Yes      s/p hyperkalemia    Plan:        Completed TTR protocol  Monitor myoclonus  Monitor mental status  Watch for signs of alcohol withdrawal  Daily thiamine  Steroids and aerosols for copd  On empiric antibiotics, stopped vanco iv-the gpc isolated in blood culture is cns and is contaminant  Start tube feeds today  Heparin stopped due to oozing from groin  D/w daughter-she is aware presence of seizure activity is generally a bad prognostic sign  D/w joey Joel, DO  2/27/2022  10:01 AM

## 2022-02-27 NOTE — PROGRESS NOTES
[facility] Neu[facility]   IN-PATIENT SERVICE      NEUROLOGY PROGRESS  NOTE            Date:   2/27/2022  Patient name:  Ryanne Julio  Date of admission:  2/24/2022  YOB: 1960      Interval History:     Patient continues to have frequent myoclonus as soon as sedation is turned off and patient is stimulated. It is occurring in the face, eyelids, torso and upper extremities may take several minutes to start to calm down. We have been using propofol to keep it more controlled as it is difficult for the daughter to witness her mother at bedside when the symptoms are occurring. EEG did indeed show generalized periodic epileptiform discharges which can be seen in the setting of hypoxic ischemic brain injury status post cardiac arrest.    History of Present Illness: The patient is a 58 y.o. female who presents with Cardiac Arrest  . The patient was seen and examined and the chart was reviewed.  Patient was noted to be foaming at the mouth by her  and was pulseless. EMS arrived, CPR initiated and patient was down for reportedly approximately about 20 minutes followed by return of spontaneous circulation.  Patient was intubated and noted to have limited neurologic exam. Suann Pals was made to place patient in hypothermia protocol.  Reportedly all this occurred around 4 AM and patient's temperature was already hypothermic at that time 34 Celsius.  Official hypothermic protocol started around 9 AM and patient reached temp at 10:30 AM.  Reportedly prior to starting hypothermia this morning and when sedation was briefly held she was able to open eyes to voice although unclear if it was purposeful or not.  CT head abdomen done initially showed no evidence of acute intracranial abnormalities. Kreg Bull is significant diffuse cerebral atrophy more prominent than expected for patient's age. [facility] does have a history of chronic heavy alcohol daily usage.  No family currently at bedside.     Past Medical History:     Past Medical History:   Diagnosis Date    Alcohol withdrawal ([facility]     [facility] withdrawal [facility] complication ([facility] 14/5/2330    Alcohol-induced chronic pancreatitis ([facility] 61/3/0707    Alcoholic hepatitis 27/8/8477    Cellulitis of right hip 12/18/2016    COPD (chronic obstructive pulmonary disease) ([facility] [facility] 10/2/2015    Diabetes mellitus ([facility]     \"borderline\"    DM type 2 (diabetes mellitus, type 2) ([facility] 10/2/2015    Dysphagia     Eczema     Elevated liver enzymes 10/2/2015    FTT (failure to thrive) in adult 10/2/2015    Hoarseness     Hypertension     denies,used to take bp meds    Hypomagnesemia     Hyponatremia 12/7/2016    Intertrochanteric fracture of right femur ([facility] 12/7/2016    Iron deficiency anemia 12/18/2016    Lesion of hard palate     rt side,dx with laryngoscopy 8/11/16    Moderate malnutrition ([facility] 10/2/2015    New onset seizure ([facility]     states last one was oct 2015, but states told she had an \"alcohol seizure\" this past month    Poor historian     Smoker 10/2/2015        Past Surgical History:     Past Surgical History:   Procedure Laterality Date    FEMUR FRACTURE SURGERY Right 12/07/2016    HIP FRACTURE SURGERY Left     LARYNGOSCOPY  10/28/2016    biopsie base of tongue    TONSILLECTOMY      UPPER GASTROINTESTINAL ENDOSCOPY N/A 8/23/2021    EGD BIOPSY performed by Shiloh Rojas MD at [facility]        Medications during admission:      magnesium sulfate  2,000 mg IntraVENous Once    calcium gluconate IVPB  1,000 mg IntraVENous Once    insulin lispro  0-6 Units SubCUTAneous Q6H    valproate sodium (DEPACON) IVPB  750 mg IntraVENous Q12H    thiamine and folic acid IVPB   IntraVENous Daily    sodium chloride flush  5-40 mL IntraVENous 2 times per day    ipratropium-albuterol  1 ampule Inhalation 4x daily    methylPREDNISolone  60 mg IntraVENous Q8H    cefTRIAXone (ROCEPHIN) IV  1,000 mg IntraVENous Q24H    azithromycin  500 mg IntraVENous Q24H  pantoprazole  40 mg IntraVENous Daily    chlorhexidine  15 mL Mouth/Throat BID    polyvinyl alcohol  1 drop Both Eyes Q4H    budesonide  0.5 mg Nebulization BID         Physical Exam:   /75   Pulse 85   Temp 98.2 °F (36.8 °C) (Bladder)   Resp 20   Ht 5' 4\" (1.626 m)   Wt 97 lb 9.6 oz (44.3 kg)   SpO2 99%   BMI 16.75 kg/m²   Temp (24hrs), Av.2 °F (36.8 °C), Min:97 °F (36.1 °C), Max:98.8 °F (37.1 °C)          Neurological examination:      Mental status    Patient is intubated. Off sedation. Comatose. No spontaneous eye opening to voice or painful stimulation. Not following commands.       Cranial nerves    Pupil response:           3 mm very sluggish  Oculocephalic reflex:    Present  Corneal Reflex:            Absent  Facial grimace to pin:  Absent  Gag/Cough reflex:        Absent    Breathing above vent:   Absent        Motor and sensory function  Diffuse myoclonic jerks/twitches present in the face, eyelids, jaw, upper extremities and torso upon stimulation  No spontaneous limb movements  No posturing     Absent withdrawal to deep nail bed pressure in lower extremities  Tone: Normal      DTR  0/4 throughout  Plantar response: Downgoing  (-) Mukherjee's sign bilaterally    Gait  Not tested                   Diagnostics:      Laboratory Testing:  CBC:   Recent Labs     22  0600 22  1805 22  0605   WBC 20.7* 11.7* 11.4*   HGB 11.4* 10.3* 9.9*    141 141     BMP:    Recent Labs     22  0600 22  0830 22  1805 22  2130 22  0215 22  0425 22  0605   *  --  136  --   --   --  140   K 6.4*   < > 4.0   < > 3.9 4.6 4.0   CL 99  --  102  --   --   --  104   CO2 21  --  23  --   --   --  22   BUN 9  --  12  --   --   --  16   CREATININE 0.66  --  0.87  --   --   --  0.83   GLUCOSE 135*  --  132*  --   --   --  141*    < > = values in this interval not displayed.          Lab Results   Component Value Date    ALT 40 (H) 2022 AST 58 (H) 02/25/2022    TSH 7.05 (H) 08/21/2021    INR 1.1 02/24/2022    LABA1C 5.2 12/07/2016    LABMICR Can not be calculated 02/26/2022    [id number] 1060 08/21/2021         Imaging/Diagnostics:      EEG: Generalized periodic epileptiform discharges, are a sign of bihemispheric cortical irritability of the type usually seen in setting of acute bihemispheric pathology such as a hypoxic ischemic encephalopathy. Results for orders placed during the hospital encounter of 02/24/22    CT HEAD WO CONTRAST    Narrative  EXAMINATION:  CT OF THE HEAD WITHOUT CONTRAST  2/24/2022 5:27 am    TECHNIQUE:  CT of the head was performed without the administration of intravenous  contrast. Dose modulation, iterative reconstruction, and/or weight based  adjustment of the mA/kV was utilized to reduce the radiation dose to as low  as reasonably achievable. COMPARISON:  02/12/2019    HISTORY:  ORDERING SYSTEM PROVIDED HISTORY: cardiac arrest  TECHNOLOGIST PROVIDED HISTORY:  cardiac arrest    Decision Support Exception - unselect if not a suspected or confirmed  emergency medical condition->Emergency Medical Condition (MA)  Reason for Exam: Cardiac arrest.  Per report,  woke up this evening  and found her unresponsive and foaming at the mouth and that patient was  using breathing treatments all day long secondary to wheezing and shortness  of breath. CPR was performed. Relevant Medical/Surgical History: Per pt chart, hx alcoholic hepatitis,  COPD, and non-insulin-dependent diabetes, COPD, seizure    FINDINGS:  BRAIN/VENTRICLES: There is no acute intracranial hemorrhage, mass effect or  midline shift. No abnormal extra-axial fluid collection. The gray-white  differentiation is maintained without evidence of an acute infarct. There is  no evidence of hydrocephalus. No wedge-shaped area of acute ischemia is  identified. No basilar cistern or sulcal effacement. Mild cortical atrophy.   Patchy white matter low attenuation is identified. ORBITS: The visualized portion of the orbits demonstrate no acute abnormality. SINUSES: The visualized paranasal sinuses and mastoid air cells demonstrate  no acute abnormality. SOFT TISSUES/SKULL:  No acute abnormality of the visualized skull or soft  tissues. Impression  Similar appearing CT scan of the head without acute intracranial abnormality. Mild cortical atrophy with chronic microvascular ischemic changes. I personally reviewed all of the above medications, clinical laboratory, imaging and other diagnostic tests. 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 MRI brain ordered for Monday to confirm findings of hypoxic ischemic injury  Discussed neurologic update at length with the daughter. Daughter is the only child and would be the primary decision-maker regarding patient's goals of care. She states there is one aunt who is arriving to see the patient but she has not been on close terms with her mother. Explained that these findings of myoclonic/seizure-like activity with abnormal EEG findings are consistent with hypoxic ischemic injury to the brain. We would do an MRI brain to confirm this. It is difficult to maintain the patient off of sedation as she will continue to have the myoclonus as soon as she is stimulated. Daughter prefers to have her sedated so she does not have to witnessed this and see her mother suffer. Explained that we will get palliative care team involved to further discuss goals of care. I did briefly explain the differences in the CODE STATUS to the daughter.   She is understanding of the different options and is aware that she may have to make a decision soon if we do not see any improvement in the next couple of days. All questions answered to the best of her satisfaction at this time.   We will follow        Electronically signed by Joon Queen DO on 2/27/2022 at 10:31 AM      Joon Queen, [address] [place] [address]

## 2022-02-27 NOTE — PROGRESS NOTES
Pulmonary Critical Care Progress Note       Patient seen for the follow up of acute hypoxic respiratory failure, COPD exacerbation,  Cardiac arrest ([address]     Subjective:  She  Developed myoclonic seizures sedation. She was placed back on propofol. She was Wean off propofol this morning and still is completely unresponsive. She is not breathing above the ventilator. She has tolerated tube feeds. She is off pressors. She has fair urine output. She was oozing blood from central line. Examination:  Vitals: /77   Pulse 104   Temp 97.7 °F (36.5 °C) (Bladder)   Resp 20   Ht 5' 4\" (1.626 m)   Wt 97 lb 9.6 oz (44.3 kg)   SpO2 99%   BMI 16.75 kg/m²   General appearance: Sedated,   Unresponsive on ventilator  Neck: No JVD  Lungs: Decreased  Breath sounds no crackles or wheezing  Heart: regular rate and rhythm, S1, S2 normal, no gallop  Abdomen: Soft, non tender, + BS  Extremities: no cyanosis or clubbing. No significant edema    LABs:  CBC:   Recent Labs     02/26/22  1805 02/27/22  0605   WBC 11.7* 11.4*   HGB 10.3* 9.9*   HCT 30.5* 29.1*    141     BMP:   Recent Labs     02/26/22  1805 02/26/22  2130 02/27/22  0425 02/27/22  0605     --   --  140   K 4.0   < > 4.6 4.0   CO2 23  --   --  22   BUN 12  --   --  16   CREATININE 0.87  --   --  0.83   LABGLOM >60  --   --  >60   GLUCOSE 132*  --   --  141*    < > = values in this interval not displayed. PT/INR:   No results for input(s): PROTIME, INR in the last 72 hours. APTT:  No results for input(s): APTT in the last 72 hours.   LIVER PROFILE:  Recent Labs     02/25/22  0400   AST 58*   ALT 40*   LABALBU 3.2*     ABG:  Lab Results   Component Value Date    FIO2 30.0 02/27/2022       Lab Results   Component Value Date    POCPH 7.465 02/27/2022    POCPCO2 32.1 02/27/2022    POCPO2 70.2 02/27/2022    POCHCO3 23.1 02/27/2022    NBEA 2 02/26/2022    PBEA 0 02/27/2022    CYSY3ULV 95 02/27/2022    FIO2 30.0 02/27/2022     Radiology:   chest x-ray 2/27    No focal consolidation or pneumothorax.  Stable support tubes and line  CTA of the chest 2/24/2022    Impression:  · Acute hypoxic respiratory failure  · Status post cardiac arrest?  V. tach,? Related to respiratory arrest versus seizure  · COPD with acute exacerbation  · Metabolic acidosis  · Hypothermia  · Gram positive sepsis  · Rib fractures secondary to CPR  · Elevated LFTs with history of EtOH abuse and portal hypertension  · Hyponatremia  · Active smoking  · Lung nodules, largest measuring 8 mm right upper lobe    Recommendations:  · Continue vent support, currently on 30% FiO2/PEEP 8  ·  discontinue fentanyl and propofol/monitor mental status  · DuoNeb via nebulizer  · Budesonide via nebulizer twice daily  ·  finished Hypothermia protocol   ·  nephrology consult/ monitor potassium  ·  monitor blood pressure off Levophed  · Insulin drip, monitor blood sugars  · IV Solu-Medrol 60 mg every 8 hours  · Rocephin Zithromax  · Neurology on consult /MRI brain ordered  · Cardiology on consult  ·  discontinue heparin drip;  Monitor for any bleeding.   Will consider  PICC  · Follow-up CT chest as an outpatient  · Discussed with RN  ·  discussed with daughter at bedside; she understood poor prognosis  ·  GI prophylaxis with Protonix  ·  DVT prophylaxis      Electronically signed by     Margoth Hooker MD on 2/27/2022 at 1:31 PM  Pulmonary Critical Care and Sleep Medicine,  [facility] AT [place]: [phone]  CC: 35 minutes

## 2022-02-27 NOTE — CARE COORDINATION
Discharge planning:    Rapid response called yesterday. Patient has a seizure. Neurology following and nephrology consulted. Pt. Is a FC. Prognosis guarded. Intubated and FIO2 is 30%.

## 2022-02-27 NOTE — PROGRESS NOTES
Patient has been at goal temp of 37C for 24 hours. TTR stopped and Thermogard disconnected from patient at this time per protocol. Will continue to monitor closely.

## 2022-02-27 NOTE — PLAN OF CARE
Problem: Serum Glucose Level - Abnormal:  Goal: Ability to maintain appropriate glucose levels will improve to within specified parameters  Description: Ability to maintain appropriate glucose levels will improve to within specified parameters  Outcome: Met This Shift     Problem: Falls - Risk of:  Goal: Will remain free from falls  Description: Will remain free from falls  Outcome: Met This Shift  Goal: Absence of physical injury  Description: Absence of physical injury  Outcome: Met This Shift     Problem: Discharge Planning:  Goal: Participates in care planning  Description: Participates in care planning  Outcome: Ongoing  Goal: Discharged to appropriate level of care  Description: Discharged to appropriate level of care  Outcome: Ongoing     Problem: Airway Clearance - Ineffective:  Goal: Ability to maintain a clear airway will improve  Description: Ability to maintain a clear airway will improve  Outcome: Ongoing     Problem: Anxiety/Stress:  Goal: Level of anxiety will decrease  Description: Level of anxiety will decrease  Outcome: Ongoing     Problem: Aspiration:  Goal: Absence of aspiration  Description: Absence of aspiration  Outcome: Ongoing     Problem:  Bowel Function - Altered:  Goal: Bowel elimination is within specified parameters  Description: Bowel elimination is within specified parameters  Outcome: Ongoing     Problem: Cardiac Output - Decreased:  Goal: Hemodynamic stability will improve  Description: Hemodynamic stability will improve  Outcome: Ongoing     Problem: Fluid Volume - Imbalance:  Goal: Absence of imbalanced fluid volume signs and symptoms  Description: Absence of imbalanced fluid volume signs and symptoms  Outcome: Ongoing     Problem: Gas Exchange - Impaired:  Goal: Levels of oxygenation will improve  Description: Levels of oxygenation will improve  Outcome: Ongoing     Problem: Mental Status - Impaired:  Goal: Mental status will be restored to baseline  Description: Mental status will be restored to baseline  Outcome: Ongoing     Problem: Nutrition Deficit:  Goal: Ability to achieve adequate nutritional intake will improve  Description: Ability to achieve adequate nutritional intake will improve  Outcome: Ongoing     Problem: Pain:  Goal: Pain level will decrease  Description: Pain level will decrease  Outcome: Ongoing  Goal: Recognizes and communicates pain  Description: Recognizes and communicates pain  Outcome: Ongoing  Goal: Control of acute pain  Description: Control of acute pain  Outcome: Ongoing  Goal: Control of chronic pain  Description: Control of chronic pain  Outcome: Ongoing     Problem: Skin Integrity - Impaired:  Goal: Will show no infection signs and symptoms  Description: Will show no infection signs and symptoms  Outcome: Ongoing  Goal: Absence of new skin breakdown  Description: Absence of new skin breakdown  Outcome: Ongoing     Problem: Sleep Pattern Disturbance:  Goal: Appears well-rested  Description: Appears well-rested  Outcome: Ongoing     Problem: Tissue Perfusion, Altered:  Goal: Circulatory function within specified parameters  Description: Circulatory function within specified parameters  Outcome: Ongoing     Problem: Tissue Perfusion - Cardiopulmonary, Altered:  Goal: Absence of angina  Description: Absence of angina  Outcome: Ongoing  Goal: Hemodynamic stability will improve  Description: Hemodynamic stability will improve  Outcome: Ongoing     Problem: Skin Integrity:  Goal: Will show no infection signs and symptoms  Description: Will show no infection signs and symptoms  Outcome: Ongoing  Goal: Absence of new skin breakdown  Description: Absence of new skin breakdown  Outcome: Ongoing     Problem: Nutrition  Goal: Optimal nutrition therapy  Outcome: Ongoing

## 2022-02-27 NOTE — PLAN OF CARE
Problem: Airway Clearance - Ineffective:  Goal: Ability to maintain a clear airway will improve  Description: Ability to maintain a clear airway will improve  2/27/2022 1742 by Concha Faith RN  Outcome: Ongoing    Problem: Aspiration:  Goal: Absence of aspiration  Description: Absence of aspiration  2/27/2022 1742 by Concha Faith RN  Outcome: Ongoing    Problem:  Bowel Function - Altered:  Goal: Bowel elimination is within specified parameters  Description: Bowel elimination is within specified parameters  2/27/2022 1742 by Concha Faith RN  Outcome: Ongoing       Problem: Cardiac Output - Decreased:  Goal: Hemodynamic stability will improve  Description: Hemodynamic stability will improve  2/27/2022 1742 by Concha Faith RN  Outcome: Ongoing       Problem: Fluid Volume - Imbalance:  Goal: Absence of imbalanced fluid volume signs and symptoms  Description: Absence of imbalanced fluid volume signs and symptoms  2/27/2022 1742 by Concha Faith RN  Outcome: Ongoing    Problem: Gas Exchange - Impaired:  Goal: Levels of oxygenation will improve  Description: Levels of oxygenation will improve  2/27/2022 1742 by Concha Faith RN  Outcome: Ongoing    Problem: Mental Status - Impaired:  Goal: Mental status will be restored to baseline  Description: Mental status will be restored to baseline  2/27/2022 1742 by Concha Faith RN  Outcome: Ongoing    Problem: Nutrition Deficit:  Goal: Ability to achieve adequate nutritional intake will improve  Description: Ability to achieve adequate nutritional intake will improve  2/27/2022 1742 by Concha Faith RN  Outcome: Ongoing    Problem: Pain:  Goal: Pain level will decrease  Description: Pain level will decrease  2/27/2022 1742 by Concha Faith RN  Outcome: Ongoing     Problem: Pain:  Goal: Control of acute pain  Description: Control of acute pain  2/27/2022 1742 by Concha Faith RN  Outcome: Ongoing     Problem: Pain:  Goal: Control of chronic pain  Description: Control of chronic pain  2/27/2022 1742 by Saad Gagnon RN  Outcome: Ongoing     Problem: Skin Integrity - Impaired:  Goal: Will show no infection signs and symptoms  Description: Will show no infection signs and symptoms  2/27/2022 1742 by Saad Gagnon RN  Outcome: Ongoing     Problem: Skin Integrity - Impaired:  Goal: Absence of new skin breakdown  Description: Absence of new skin breakdown  2/27/2022 1742 by Saad Gagnon RN  Outcome: Ongoing     Problem: Falls - Risk of:  Goal: Will remain free from falls  Description: Will remain free from falls  2/27/2022 1742 by Saad Gagnon RN  Outcome: Ongoing     Problem: Falls - Risk of:  Goal: Absence of physical injury  Description: Absence of physical injury  2/27/2022 1742 by Saad Gagnon RN  Outcome: Ongoing

## 2022-02-27 NOTE — PROGRESS NOTES
Progress Note    Reason for Consult:  Electrolyte abnormalities    Follow up today, abnormal EEG yesterday   Continues with intermittent seizure activity  Tube feedings started today, at 10 ml/hour    Requesting Physician:  Suzi Joel DO    HISTORY OF PRESENT ILLNESS:    The patient is a 63 y.o. female who presents with Cardiac arrest ([address] [I46.9]  She was initially cooled, she has been rewarmed during which she was hyperkalemic. She was treated with calcium gluconate, and Kayexalate. Repeat potassium improved to 4.4. Serum sodium this AM of 130, to have repeat labs at 6pm.   She has had some jerking movement today, ?seizures and is to have an EEG this evening.    She has a history of alcoholic hepatitis, COPD, and non-insulin-dependent diabetes, who is brought in by EMS in cardiac arrest.  Per report, boyfriend woke up and found her unresponsive and foaming at the mouth, called 911.   No CPR was initiated at that time. Antonella Benavides reported to [facility] patient was using breathing treatments all day long secondary to wheezing and shortness of breath.  EMS reports initial rhythm of asystole.   They performed CPR for 20 minutes, epi x1, and obtained ROSC.     Past Medical History:   has a past medical history of Alcohol withdrawal ([address] Alcohol withdrawal seizure with complication ([address] Alcohol-induced chronic pancreatitis ([address] Alcoholic hepatitis, Cellulitis of right hip, COPD (chronic obstructive pulmonary disease) ([address] Diabetes mellitus ([address] DM type 2 (diabetes mellitus, type 2) ([address] Dysphagia, Eczema, Elevated liver enzymes, FTT (failure to thrive) in adult, Hoarseness, Hypertension, Hypomagnesemia, Hyponatremia, Intertrochanteric fracture of right femur ([address] Iron deficiency anemia, Lesion of hard palate, Moderate malnutrition ([address] New onset seizure ([address] Poor historian, and Smoker    Review Of Systems:   She is vented and unresponsive       Past Medical History:   Diagnosis Date    [facility] withdrawal [facility]     Alcohol withdrawal seizure with complication ([facility] 43/2/4012    Alcohol-induced chronic pancreatitis ([facility] 93/8/4092    Alcoholic hepatitis 08/5/0894    Cellulitis of right hip 12/18/2016    COPD (chronic obstructive pulmonary disease) ([address] 10/2/2015    Diabetes mellitus ([facility]     \"borderline\"    DM type 2 (diabetes mellitus, type 2) ([facility] 10/2/2015    Dysphagia     Eczema     Elevated liver enzymes 10/2/2015    FTT (failure to thrive) in adult 10/2/2015    Hoarseness     Hypertension     denies,used to take bp meds    Hypomagnesemia     Hyponatremia 12/7/2016    Intertrochanteric fracture of right femur ([address] 12/7/2016    Iron deficiency anemia 12/18/2016    Lesion of hard palate     rt side,dx with laryngoscopy 8/11/16    Moderate malnutrition ([facility] 10/2/2015    New onset seizure ([facility]     states last one was oct 2015, but states told she had an \"alcohol seizure\" this past month    Poor historian     Smoker 10/2/2015       Past Surgical History:   Procedure Laterality Date    FEMUR FRACTURE SURGERY Right 12/07/2016    HIP FRACTURE SURGERY Left     LARYNGOSCOPY  10/28/2016    biopsie base of tongue    TONSILLECTOMY      UPPER GASTROINTESTINAL ENDOSCOPY N/A 8/23/2021    EGD BIOPSY performed by Mike Patterson MD at [address] [place] [address]       Prior to Admission medications    Medication Sig Start Date End Date Taking?  Authorizing Provider   propranolol (INDERAL) 10 MG tablet Take 1 tablet by mouth 3 times daily 8/26/21   Deonte  P Blood, DO   magnesium oxide (MAG-OX) 400 (241.3 Mg) MG TABS tablet Take 1 tablet by mouth 2 times daily 8/26/21   Deonte  P Blood, DO   folic acid (FOLVITE) 1 MG tablet Take 1 tablet by mouth daily 8/25/21   Deonte  P Blood, DO   pantoprazole (PROTONIX) 40 MG tablet Take 1 tablet by mouth every morning (before breakfast) 8/25/21   Asenath Lore Blood, DO   thiamine 100 MG tablet Take 1 tablet by mouth daily 8/26/21   Deonte  P Blood, DO   salmeterol (SEREVENT DISKUS) 50 MCG/DOSE diskus inhaler Inhale 1 puff into the lungs 2 times daily    Historical Provider, MD   albuterol sulfate HFA (PROVENTIL HFA) 108 (90 Base) MCG/ACT inhaler Inhale 2 puffs into the lungs every 6 hours as needed for Wheezing or Shortness of Breath 6/29/21   Sherren Carson, APRN - CNP   diphenhydrAMINE (BENADRYL) 25 MG capsule Take 1 capsule by mouth every 6 hours as needed for Itching 9/17/20   Yahaira Jones,    Calcium-Vitamin D 600-200 MG-UNIT TABS Take 1 tablet by mouth 2 times daily    Historical Provider, MD   albuterol (PROVENTIL) (2.5 MG/3ML) 0.083% nebulizer solution Take 3 mLs by nebulization every 6 hours as needed for Wheezing 2/8/17   Aaron Dwyer,    calcium carbonate (CALCIUM 600) 600 MG TABS tablet Take 1 tablet by mouth daily Do not take with iron 2/8/17   Lexi Dwyer,        Scheduled Meds:   insulin lispro  0-6 Units SubCUTAneous Q6H    valproate sodium (DEPACON) IVPB  750 mg IntraVENous Q12H    thiamine and folic acid IVPB   IntraVENous Daily    sodium chloride flush  5-40 mL IntraVENous 2 times per day    ipratropium-albuterol  1 ampule Inhalation 4x daily    methylPREDNISolone  60 mg IntraVENous Q8H    cefTRIAXone (ROCEPHIN) IV  1,000 mg IntraVENous Q24H    azithromycin  500 mg IntraVENous Q24H    pantoprazole  40 mg IntraVENous Daily    chlorhexidine  15 mL Mouth/Throat BID    polyvinyl alcohol  1 drop Both Eyes Q4H    budesonide  0.5 mg Nebulization BID     Continuous Infusions:   sodium chloride 20 mL/hr at 02/27/22 0118    dextrose      sodium chloride      phenylephrine (SHANON-SYNEPHRINE) 50mg/250mL infusion      norepinephrine Stopped (02/26/22 1210)    midazolam (VERSED) 1 mg/mL in D5W infusion Stopped (02/26/22 0800)    cisatracurium (NIMBEX) infusion Stopped (02/25/22 2138)    fentaNYL Stopped (02/26/22 1125)    propofol 40 mcg/kg/min (02/27/22 1146)     PRN Meds:glucose, glucagon (rDNA), dextrose, dextrose bolus (hypoglycemia) **OR** dextrose bolus (hypoglycemia), sodium chloride flush, sodium chloride, ondansetron **OR** [DISCONTINUED] ondansetron, polyethylene glycol, acetaminophen **OR** acetaminophen, hydrALAZINE, perflutren lipid microspheres, ondansetron, potassium chloride    Allergies   Allergen Reactions    Ibuprofen Nausea And Vomiting       Social History     Socioeconomic History    Marital status: Single     Spouse name: Not on file    Number of children: Not on file    Years of education: Not on file    Highest education level: Not on file   Occupational History    Not on file   Tobacco Use    Smoking status: Current Every Day Smoker     Packs/day: 1.00     Years: 35.00     Pack years: 35.00     Types: Cigarettes    Smokeless tobacco: Never Used   Substance and Sexual Activity    Alcohol use: Yes     Comment: daily    Drug use: No    Sexual activity: Not on file   Other Topics Concern    Not on file   Social History Narrative    Not on file     Social Determinants of Health     Financial Resource Strain:     Difficulty of Paying Living Expenses: Not on file   Food Insecurity:     Worried About Running Out of Food in the Last Year: Not on file    [place] of Food in the Last Year: Not on file   Transportation Needs:     Lack of Transportation (Medical): Not on file    Lack of Transportation (Non-Medical):  Not on file   Physical Activity:     Days of Exercise per Week: Not on file    Minutes of Exercise per Session: Not on file   Stress:     Feeling of Stress : Not on file   Social Connections:     Frequency of Communication with Friends and Family: Not on file    Frequency of Social Gatherings with Friends and Family: Not on file    Attends Voodoo Services: Not on file    Active Member of Clubs or Organizations: Not on file    Attends Club or Organization Meetings: Not on file    Marital Status: Not on file   Intimate Partner Violence:     Fear of Current or Ex-Partner: Not on file  Emotionally Abused: Not on file    Physically Abused: Not on file    Sexually Abused: Not on file   Housing Stability:     Unable to Pay for Housing in the Last Year: Not on file    Number of Places Lived in the Last Year: Not on file    Unstable Housing in the Last Year: Not on file       Family History   Problem Relation Age of Onset    Heart Disease Mother     Diabetes Father     Asthma Sister     Asthma Brother          Physical Exam:  Vitals:    02/27/22 0900 02/27/22 1000 02/27/22 1100 02/27/22 1200   BP: 121/78 124/75 (!) 140/80 120/77   Pulse: 102 85 80 104   Resp: 20 20 20 20   Temp:  98.2 °F (36.8 °C)  97.7 °F (36.5 °C)   TempSrc:  Bladder  Bladder   SpO2: 98% 99% 98% 99%   Weight:       Height:         I/O last 3 completed shifts: In: 1846.5 [I.V.:1310.4; NG/GT:60; IV Piggyback:476.1]  Out: 7290 [Urine:1105; Emesis/NG output:100]      General:  Vented, sedated  HEENT: Atraumatic, normocephalic. Chest: Clear, on vent  Cardiovascular: tachycardic, S1S2, no murmur, rub or gallop. No lower extremity edema. Abdomen: Soft, non tender to palpation. Active bowel sounds x 4 quadrants. Musculoskeletal:. No cyanosis   Integumentary: Pink, warm and dry. CNS: Questionable seizure activity  Data:    CBC:   Recent Labs     02/27/22  0605   WBC 11.4*   HGB 9.9*   HCT 29.1*   .7        BMP:    Recent Labs     02/26/22  0600 02/26/22  0830 02/26/22  1805 02/26/22  2130 02/27/22  0215 02/27/22  0425 02/27/22  0605   *  --  136  --   --   --  140   K 6.4*   < > 4.0   < > 3.9 4.6 4.0   CL 99  --  102  --   --   --  104   CO2 21  --  23  --   --   --  22   BUN 9  --  12  --   --   --  16   CREATININE 0.66  --  0.87  --   --   --  0.83   GLUCOSE 135*  --  132*  --   --   --  141*    < > = values in this interval not displayed.      CMP:   Recent Labs     02/25/22  0400 02/25/22  1011 02/27/22  0605      < > 140   K 3.2*   < > 4.0      < > 104   CO2 22   < > 22   BUN 5*   < > 16 CREATININE <0.40*   < > 0.83   GLUCOSE 147*   < > 141*   CALCIUM 8.9   < > 8.5*   PROT 5.9*  --   --    LABALBU 3.2*  --   --    BILITOT 0.23*  --   --    ALKPHOS 109*  --   --    AST 58*  --   --    ALT 40*  --   --     < > = values in this interval not displayed. Hepatic:   Recent Labs     02/25/22  0400   AST 58*   ALT 40*   BILITOT 0.23*   ALKPHOS 109*     BNP: No results for input(s): BNP in the last 72 hours. INR:   No results for input(s): INR in the last 72 hours. PTH: No results for input(s): PTH in the last 72 hours. Phosphorus:   Recent Labs     02/27/22  0605   PHOS 4.6*     Ionized Calcium: No results for input(s): IONCA in the last 72 hours. Magnesium:   Recent Labs     02/27/22  0605   MG 1.6     Albumin:   Recent Labs     02/25/22  0400   LABALBU 3.2*     Last 3 CK, CKMB, Troponin: No results for input(s): CKTOTAL, CKMB, TROPONINI in the last 72 hours.   Lipids: No results found for: CHOL, HDL  Urine:    Lab Results   Component Value Date    LORENZO 20 02/26/2022    PROTEINU TRACE 02/24/2022         Radiology:  Reviewed      Assessment:   Primary hypertension    Alcohol withdrawal seizure with complication (HCC)    Hyperkalemia        Alcohol-induced chronic pancreatitis (HCC)    Alcoholic hepatitis    Elevated liver enzymes    FTT (failure to thrive) in adult    Moderate malnutrition (HCC)    Tobacco abuse    COPD without exacerbation [facility]    Noncompliance    Intertrochanteric fracture of right femur (HCC)    Hyponatremia    Macrocytic anemia    Cellulitis of right hip    Iron deficiency anemia    Type 2 diabetes mellitus without complication, without long-term current use of insulin (HCC)    GIB (gastrointestinal bleeding)    Alcohol abuse    Hepatic steatosis    Elevated LFTs    Dietary folate deficiency anemia    Alcohol dependence with unspecified alcohol-induced disorder ([facility]    Portal hypertensive gastropathy ([facility]    Multifocal pneumonia    Cardiac arrest

## 2022-02-28 NOTE — ACP (ADVANCE CARE PLANNING)
..Advance Care Planning     Advance Care Planning Activator (Inpatient)  Conversation Note      Date of ACP Conversation: 2/28/2022     Conversation Conducted with: Patient's daughter Candace Flores Decision Maker: Aung Monnoah [phone]    Current Designated Health Care Decision Maker:     Click here to complete Healthcare Decision Makers including section of the Healthcare Decision Maker Relationship (ie \"Primary\")      Care Preferences    Ventilation: \"If you were in your present state of health and suddenly became very ill and were unable to breathe on your own, what would your preference be about the use of a ventilator (breathing machine) if it were available to you? \"      Would the patient desire the use of ventilator (breathing machine)?: yes  Patient is a full code   \"If your health worsens and it becomes clear that your chance of recovery is unlikely, what would your preference be about the use of a ventilator (breathing machine) if it were available to you? \"     Would the patient desire the use of ventilator (breathing machine)?: Yes  Patient is a full code      Resuscitation  \"CPR works best to restart the heart when there is a sudden event, like a heart attack, in someone who is otherwise healthy. Unfortunately, CPR does not typically restart the heart for people who have serious health conditions or who are very sick. \"    \"In the event your heart stopped as a result of an underlying serious health condition, would you want attempts to be made to restart your heart (answer \"yes\" for attempt to resuscitate) or would you prefer a natural death (answer \"no\" for do not attempt to resuscitate)? \" yes  Patient is a full code     [] Yes   [x] No   Educated Patient / Ifeoma Turner regarding differences between Advance Directives and portable DNR orders.     Length of ACP Conversation in minutes:      Conversation Outcomes:  [x] ACP discussion completed  [] Existing advance

## 2022-02-28 NOTE — PROGRESS NOTES
Section of Cardiology  Progress Note      Date:  2/28/2022  Patient: Yessenia Espinal  Admission:  2/24/2022  3:48 AM  Admit DX: Cardiac arrest ([facility] [address] [I46.9]  Age:  63 y.o., 1960     LOS: 4 days     Reason for evaluation:   Cardiopulmonary arrest, tachycardia      SUBJECTIVE:     The patient was seen and examined. Notes and labs reviewed. The patient remains sedated on the ventilator. She reportedly has intermittent seizures with associated heart rate of up to 200 bpm and returned to sinus rhythm after seizure is completed. She plans for further neurological work-up later today. Her boyfriend and daughter were at the bedside when I saw her. OBJECTIVE:      EXAM:   Vitals:    VITALS:  /64   Pulse 89   Temp 97.5 °F (36.4 °C) (Bladder)   Resp 18   Ht 5' 4\" (1.626 m)   Wt 97 lb (44 kg)   SpO2 95%   BMI 16.65 kg/m²   24HR INTAKE/OUTPUT:    Intake/Output Summary (Last 24 hours) at 2/28/2022 1426  Last data filed at 2/28/2022 1200  Gross per 24 hour   Intake 457.12 ml   Output 650 ml   Net -192.88 ml       CONSTITUTIONAL: Sedated, no signs of acute distress  HEENT: Normal jugular venous pulsations  LUNGS: Few scattered rhonchi heard throughout anteriorly, nonlabored  CARDIOVASCULAR:  regular rate and rhythm, normal S1 and S2, no S3 or S4, and no murmur or rub noted. SKIN: Warm and dry.   EXTREMITIES: Trace bilateral ankle edema    Current Inpatient Medications:   insulin lispro  0-6 Units SubCUTAneous Q6H    valproate sodium (DEPACON) IVPB  750 mg IntraVENous Q12H    thiamine and folic acid IVPB   IntraVENous Daily    sodium chloride flush  5-40 mL IntraVENous 2 times per day    ipratropium-albuterol  1 ampule Inhalation 4x daily    methylPREDNISolone  60 mg IntraVENous Q8H    cefTRIAXone (ROCEPHIN) IV  1,000 mg IntraVENous Q24H    azithromycin  500 mg IntraVENous Q24H    pantoprazole  40 mg IntraVENous Daily    chlorhexidine  15 mL Mouth/Throat BID    polyvinyl alcohol  1 drop Both Eyes Q4H    budesonide  0.5 mg Nebulization BID       IV Infusions (if any):   sodium chloride 20 mL/hr at 02/28/22 0000    dextrose      sodium chloride      phenylephrine (SHANON-SYNEPHRINE) 50mg/250mL infusion      norepinephrine Stopped (02/26/22 1210)    midazolam (VERSED) 1 mg/mL in D5W infusion 7 mg/hr (02/28/22 3428)    cisatracurium (NIMBEX) infusion Stopped (02/25/22 2138)    fentaNYL Stopped (02/26/22 1125)    propofol 45 mcg/kg/min (02/28/22 1344)       Diagnostics:   Telemetry: Sinus rhythm    ECHO: CONCLUSIONS     Summary  Left ventricle is normal in size and wall thickness. Global left ventricular systolic function is normal with an estimated  ejection fraction of 60 % . No obvious wall motion abnormality seen. Thickened mitral valve leaflets. Mild mitral regurgitation. No pericardial effusion seen.     Signature  ----------------------------------------------------------------------------   Electronically signed by Margo Buck(Sonographer) on 02/24/2022 12:37   PM  ----------------------------------------------------------------------------     ----------------------------------------------------------------------------   Electronically signed by Jose Gonzales(Interpreting physician) on   02/24/2022 05:19 PM  ----------------------------------------------------------------------------    Labs:   CBC:  Recent Labs     02/27/22 1730 02/28/22  0515   WBC 10.7 14.4*   HGB 9.5* 9.9*   HCT 28.5* 29.4*    128*     Magnesium:  Recent Labs     02/27/22 1730 02/28/22  0515   MG 2.3 2.3     BMP:  Recent Labs     02/27/22 1730 02/28/22 0515    139   K 3.4* 3.5*   CALCIUM 8.5* 8.4*   CO2 26 21   BUN 17 17   CREATININE 0.72 0.50   LABGLOM >60 >60   GLUCOSE 150* 150*     BNP:No results for input(s): BNP, PROBNP in the last 72 hours. PT/INR:No results for input(s): PROTIME, INR in the last 72 hours. APTT:No results for input(s): APTT in the last 72 hours.   CARDIAC ENZYMES:No results for input(s): MYOGLOBIN, CKTOTAL, CKMB, CKMBINDEX, TROPHS, TROPONINT in the last 72 hours. FASTING LIPID PANEL:No results found for: HDL, LDLDIRECT, LDLCALC, TRIG  LIVER PROFILE:No results for input(s): AST, ALT, LABALBU, ALKPHOS, BILITOT, BILIDIR, IBILI, PROT, GLOB, ALBUMIN in the last 72 hours. ASSESSMENT:    · Status post cardiopulmonary arrest status post hypothermia protocol  · Sinus tachycardia with reported seizures, currently in sinus rhythm-preserved LV systolic function on echocardiogram done 2/24/2022  · Borderline QTC prolongation on admission EKG, no significant ventricular arrhythmia found on telemetry alarm review  · Respiratory failure, managed by others  · Seizures/hypoxic encephalopathy, managed by others  · History of alcohol abuse  · Diabetes, management    PLAN:  1. Patient is currently maintaining sinus rhythm. Check EKG in the morning to monitor QTc interval.  No significant ventricular arrhythmia noted on telemetry lab review. Above discussed with the patient and family at the bedside, RN, and Dr. Nitin Paul.     Gavino Lomeli, APRN - CNP

## 2022-02-28 NOTE — PLAN OF CARE
Problem: Discharge Planning:  Goal: Participates in care planning  Description: Participates in care planning  2/28/2022 0424 by Silvia Diaz RN  Outcome: Ongoing  Goal: Discharged to appropriate level of care  Description: Discharged to appropriate level of care  2/28/2022 0424 by Silvia Diaz RN  Outcome: Ongoing    Problem: Airway Clearance - Ineffective:  Goal: Ability to maintain a clear airway will improve  Description: Ability to maintain a clear airway will improve  2/28/2022 0424 by Silvia Diaz RN  Outcome: Ongoing    Problem: Anxiety/Stress:  Goal: Level of anxiety will decrease  Description: Level of anxiety will decrease    Problem:  Bowel Function - Altered:  Goal: Bowel elimination is within specified parameters  Description: Bowel elimination is within specified parameters  2/28/2022 0424 by Silvia Diaz RN  Outcome: Ongoing    Problem: Cardiac Output - Decreased:  Goal: Hemodynamic stability will improve  Description: Hemodynamic stability will improve  2/28/2022 0424 by Silvia Diaz RN  Outcome: Ongoing    Problem: Fluid Volume - Imbalance:  Goal: Absence of imbalanced fluid volume signs and symptoms  Description: Absence of imbalanced fluid volume signs and symptoms  2/28/2022 0424 by Silvia Diaz RN  Outcome: Ongoing    Problem: Gas Exchange - Impaired:  Goal: Levels of oxygenation will improve  Description: Levels of oxygenation will improve  2/28/2022 0424 by Silvia Diaz RN  Outcome: Ongoing    Problem: Mental Status - Impaired:  Goal: Mental status will be restored to baseline  Description: Mental status will be restored to baseline  2/28/2022 0424 by Silvia Diaz RN  Outcome: Ongoing    Problem: Nutrition Deficit:  Goal: Ability to achieve adequate nutritional intake will improve  Description: Ability to achieve adequate nutritional intake will improve  2/28/2022 0424 by Silvia Diaz RN  Outcome: Ongoing    Problem: Serum Glucose Level - Abnormal:  Goal: Ability to maintain appropriate glucose levels will improve to within specified parameters  Description: Ability to maintain appropriate glucose levels will improve to within specified parameters  2/28/2022 0424 by Mariela Sigala RN  Outcome: Ongoing       Problem: Skin Integrity - Impaired:  Goal: Will show no infection signs and symptoms  Description: Will show no infection signs and symptoms  2/28/2022 0424 by Mariela Sigala RN  Outcome: Ongoing    Goal: Absence of new skin breakdown  Description: Absence of new skin breakdown  2/28/2022 0424 by Mariela Sigala RN  Outcome: Ongoing    Problem: Tissue Perfusion, Altered:  Goal: Circulatory function within specified parameters  Description: Circulatory function within specified parameters  2/28/2022 0424 by Mariela Sigala RN  Outcome: Ongoing    Problem: Nutrition  Goal: Optimal nutrition therapy  2/28/2022 0424 by Mariela Sigala RN  Outcome: Ongoing

## 2022-02-28 NOTE — PROGRESS NOTES
Pulmonary Critical Care Progress Note       Patient seen for the follow up of acute hypoxic respiratory failure, COPD exacerbation,  Cardiac arrest ([facility] [address]     Subjective:  She has been having seizures on sedation since yesterday. She is back on propofol. She has increased blood pressure above 787 systolic. She has been unresponsive. She has tolerated tube feeds. She has fair urine output. She has no active bleeding. Examination:  Vitals: /71   Pulse 107   Temp 97.8 °F (36.6 °C) (Oral)   Resp 18   Ht 5' 4\" (1.626 m)   Wt 97 lb (44 kg)   SpO2 96%   BMI 16.65 kg/m²   General appearance: Sedated,   Unresponsive on ventilator  Neck: No JVD  Lungs: Decreased  Breath sounds no crackles or wheezing  Heart: regular rate and rhythm, S1, S2 normal, no gallop  Abdomen: Soft, non tender, + BS  Extremities: no cyanosis or clubbing. No significant edema    LABs:  CBC:   Recent Labs     02/27/22  1730 02/28/22  0515   WBC 10.7 14.4*   HGB 9.5* 9.9*   HCT 28.5* 29.4*    128*     BMP:   Recent Labs     02/27/22  1730 02/28/22  0515    139   K 3.4* 3.5*   CO2 26 21   BUN 17 17   CREATININE 0.72 0.50   LABGLOM >60 >60   GLUCOSE 150* 150*     ABG:  Lab Results   Component Value Date    FIO2 30.0 02/28/2022       Lab Results   Component Value Date    POCPH 7.452 02/28/2022    POCPCO2 34.4 02/28/2022    POCPO2 76.2 02/28/2022    POCHCO3 24.1 02/28/2022    NBEA 2 02/26/2022    PBEA 0 02/28/2022    VWAU7XHG 96 02/28/2022    FIO2 30.0 02/28/2022     Radiology:   chest x-ray 2/28    No acute cardiopulmonary process.       Support tubes as described above       CTA of the chest 2/24/2022    Impression:  · Acute hypoxic respiratory failure  · Status post cardiac arrest?  V. tach,?   Related to respiratory arrest versus seizure  · COPD with acute exacerbation  · Metabolic acidosis  · Hypothermia  · Gram positive sepsis  · Rib fractures secondary to CPR  · Elevated LFTs with history of EtOH abuse and portal hypertension  · Hyponatremia  · Active smoking  · Lung nodules, largest measuring 8 mm right upper lobe    Recommendations:  · Continue vent support, currently on 30% FiO2/PEEP 8  · propofol to control seizures/monitor mental status  · DuoNeb via nebulizer  · Budesonide via nebulizer twice daily  ·  finished Hypothermia protocol   ·  nephrology consult/ monitor potassium  · Hydralazine IV as needed  ·  monitor blood sugars  · IV Solu-Medrol 60 mg every 8 hours  · Rocephin Zithromax  · Neurology on consult /MRI brain today  · Cardiology on consult  · Off heparin drip/will consider  PICC  · Follow-up CT chest as an outpatient  · Discussed with RN and RT  ·  d previously iscussed with daughter at bedside; she understood poor prognosis  ·  GI prophylaxis with Protonix  ·  DVT prophylaxis      Electronically signed by     Reggie Mary MD on 2/28/2022 at 8:50 AM  Pulmonary Critical Care and Sleep Medicine,  [facility] AT [place]: [phone]  CC: 35 minutes

## 2022-02-28 NOTE — FLOWSHEET NOTE
Patient is intubated while significant other and daughter are bedside. Both engage in conversation with writer, but there seems to be some issues between the two. Both are emotional but deny any spiritual or emotional needs. Writer provides presence and emotional support. Patient's daughter expresses appreciation for the visit. Spiritual care will follow as needed.        02/28/22 1100   Encounter Summary   Services provided to: Patient and family together   Referral/Consult From: [address] [place] [address] Significant other;Children   Continue Visiting   (2/28/22)   Complexity of Encounter Low   Length of Encounter 15 minutes   Routine   Type Follow up   Assessment Approachable;Grieving   Intervention Active listening;Explored coping resources;Nurtured hope   Outcome Expressed gratitude

## 2022-02-28 NOTE — PROGRESS NOTES
[facility]  Office: [address], DO, Olehenry Quincy, DO, Gayle [place], DO, Rommel Jamesonvladimir Joel, DO, Naty Vivar MD, Morales Peter MD, Flor Rogers MD, Mary Barillas MD, Everette Mary MD, Lyn Delaney MD, Domo Cleaning MD, Trisha Moctezuma, DO, Chris Delaney, DO, Judie Elmore MD,  Tashi Thornton, DO, Narciso López MD, Kenzie Starks MD, Bogdan De La Cruz MD, April Bhatia MD, Cale Chen MD, Shantel West, [facility], [facility], CNP, Wade Hinojosa, CNP, Lydia Owen, CNS, Kala Mcgill, CNP, Polly Cleveland, CNP, Queen Veronica, CNP, Ralf Garcia, CNP, Marie Ingram, CNP, Mary Carmen Peter PA-C, Adrienne Mercado [facility], Jesus Romano [facility], Zee De La Cruz, CNP, Lasandra Runner, CNP, Dalia Cabrales, CNP, Juanjose Wong, CNP, Divine Phelan, [facility], [facility]    Progress Note    2/28/2022    2:25 PM    Name:   Poncho Kruger  MRN:     [id number]     [place]:      [de-identified]   Room:   2029/2029-01  IP Day:  4  Admit Date:  2/24/2022  3:48 AM    PCP:   Jennie Marshall PA-C  Code Status:  Full Code    Subjective:     C/C:   Chief Complaint   Patient presents with    Cardiac Arrest     Interval History Status: not changed. Remains sedated on vent  completed TTR protocol    Had episodes of facial twitching and apparently before that episode also had leg movement per RT    On propofol and versed drips now  Also on depakote for seizures    Brief History:     Poncho Kruger is a 63 y.o. Non- / non  female who presents with Cardiac Arrest   and is admitted to the hospital for the management of Cardiac arrest [facility]).    This 63 yof apparently was found by  last night unresponsive and foaming at the mouth. She had been sob and using aerosols all day. When EMS arrived she was in asystole, received cpr, epi and had rosc after ~20 minutes.     Review of Systems:     unobtainable  On vent    Medications: Allergies:     Allergies   Allergen Reactions    Ibuprofen Nausea And Vomiting       Current Meds:   Scheduled Meds:    insulin lispro  0-6 Units SubCUTAneous Q6H    valproate sodium (DEPACON) IVPB  750 mg IntraVENous Q12H    thiamine and folic acid IVPB   IntraVENous Daily    sodium chloride flush  5-40 mL IntraVENous 2 times per day    ipratropium-albuterol  1 ampule Inhalation 4x daily    methylPREDNISolone  60 mg IntraVENous Q8H    cefTRIAXone (ROCEPHIN) IV  1,000 mg IntraVENous Q24H    azithromycin  500 mg IntraVENous Q24H    pantoprazole  40 mg IntraVENous Daily    chlorhexidine  15 mL Mouth/Throat BID    polyvinyl alcohol  1 drop Both Eyes Q4H    budesonide  0.5 mg Nebulization BID     Continuous Infusions:    sodium chloride 20 mL/hr at 02/28/22 0000    dextrose      sodium chloride      phenylephrine (SHANON-SYNEPHRINE) 50mg/250mL infusion      norepinephrine Stopped (02/26/22 1210)    midazolam (VERSED) 1 mg/mL in D5W infusion 7 mg/hr (02/28/22 0758)    cisatracurium (NIMBEX) infusion Stopped (02/25/22 2138)    fentaNYL Stopped (02/26/22 1125)    propofol 45 mcg/kg/min (02/28/22 1344)     PRN Meds: glucose, glucagon (rDNA), dextrose, dextrose bolus (hypoglycemia) **OR** dextrose bolus (hypoglycemia), sodium chloride flush, sodium chloride, ondansetron **OR** [DISCONTINUED] ondansetron, polyethylene glycol, acetaminophen **OR** acetaminophen, hydrALAZINE, perflutren lipid microspheres, ondansetron, potassium chloride    Data:     Past Medical History:   has a past medical history of Alcohol withdrawal ([facility] withdrawal [facility] Alcohol-induced chronic pancreatitis ([facility] Alcoholic hepatitis, Cellulitis of right hip, COPD (chronic obstructive pulmonary disease) ([facility] Diabetes mellitus ([facility] DM type 2 (diabetes mellitus, type 2) ([facility] Dysphagia, Eczema, Elevated liver enzymes, FTT (failure to thrive) in adult, Hoarseness, Hypertension, Hypomagnesemia, Hyponatremia, Intertrochanteric fracture of right femur ([address] Iron deficiency anemia, Lesion of hard palate, Moderate malnutrition ([address] New onset seizure ([facility] Poor historian, and Smoker. Social History:   reports that she has been smoking cigarettes. She has a 35.00 pack-year smoking history. She has never used smokeless tobacco. She reports current alcohol use. She reports that she does not use drugs. Family History:   Family History   Problem Relation Age of Onset    Heart Disease Mother     Diabetes Father     Asthma Sister     Asthma Brother        Vitals:  /64   Pulse 89   Temp 97.5 °F (36.4 °C) (Bladder)   Resp 18   Ht 5' 4\" (1.626 m)   Wt 97 lb (44 kg)   SpO2 95%   BMI 16.65 kg/m²   Temp (24hrs), Av.7 °F (36.5 °C), Min:96.8 °F (36 °C), Max:98.2 °F (36.8 °C)    Recent Labs     22  2200 22  0306 22  0739 22  1116   POCGLU 163* 134* 116* 216*       I/O (24Hr):     Intake/Output Summary (Last 24 hours) at 2022 1425  Last data filed at 2022 1200  Gross per 24 hour   Intake 457.12 ml   Output 650 ml   Net -192.88 ml       Labs:  Hematology:  Recent Labs     22  0605 22  1730 22  0515   WBC 11.4* 10.7 14.4*   RBC 2.89* 2.78* 2.89*   HGB 9.9* 9.5* 9.9*   HCT 29.1* 28.5* 29.4*   .7 102.5 101.7   MCH 34.3* 34.2* 34.3*   MCHC 34.0 33.3 33.7   RDW 11.9 11.9 11.9    142 128*   MPV 10.3 10.2 10.1     Chemistry:  Recent Labs     22  0600 22  0830 22  1805 22  0215 22  0215 22  0425 22  0605 22  1730 22  0515   *   < >   < >  --   --   --  140 136 139   K 6.4*   < >   < > 3.9   < > 4.6 4.0 3.4* 3.5*   CL 99   < >   < >  --   --   --  104 101 105   CO2 21   < >   < >  --   --   --  22 26 21   GLUCOSE 135*   < >   < >  --   --   --  141* 150* 150*   BUN 9   < >   < >  --   --   --  16 17 17   CREATININE 0.66   < >   < >  --   --   --  0.83 0.72 0.50   MG 1.7   < >   < > 1.6   < > 1.6 1.6 2.3 2.3   ANIONGAP 10   < >   < >  --   --   --  14 9 13   LABGLOM >60   < >   < >  --   --   --  >60 >60 >60   GFRAA >60   < >   < >  --   --   --  >60 >60 >60   CALCIUM 9.5   < >   < >  --   --   --  8.5* 8.5* 8.4*   CAION 1.33  --   --   --   --   --  1.15  --   --    PHOS 4.1   < >  --  4.9*  --  5.0* 4.6*  --   --     < > = values in this interval not displayed. Recent Labs     02/27/22  1534 02/27/22  1928 02/27/22  2200 02/28/22  0306 02/28/22  0739 02/28/22  1116   POCGLU 127* 136* 163* 134* 116* 216*     ABG:  Lab Results   Component Value Date    POCPH 7.452 02/28/2022    POCPCO2 34.4 02/28/2022    POCPO2 76.2 02/28/2022    POCHCO3 24.1 02/28/2022    NBEA 2 02/26/2022    PBEA 0 02/28/2022    AMJS5MWR 96 02/28/2022    FIO2 30.0 02/28/2022     Lab Results   Component Value Date/Time    SPECIAL 18ML RT RADIAL 02/24/2022 08:36 AM     Lab Results   Component Value Date/Time    CULTURE NO GROWTH 4 DAYS 02/24/2022 08:36 AM       Radiology:  CT HEAD WO CONTRAST    Result Date: 2/24/2022  Similar appearing CT scan of the head without acute intracranial abnormality. Mild cortical atrophy with chronic microvascular ischemic changes. CT ABDOMEN PELVIS W IV CONTRAST Additional Contrast? None    Result Date: 2/24/2022  No acute abnormality evident. Mild emphysema. Status post ORIF right hip fracture. XR CHEST PORTABLE    Result Date: 2/25/2022  Endotracheal tube tip is 4 cm above the katy. Right femoral central venous catheter tip is at the SVC/RA junction region. Nasogastric tube tip is in the stomach. Probable skin fold projecting over the left apex which can be reassessed on follow-up. XR CHEST PORTABLE    Result Date: 2/24/2022  Endotracheal tube appears in satisfactory position. Negative chest.     CT CHEST PULMONARY EMBOLISM W CONTRAST    Result Date: 2/24/2022  No pulmonary emboli are identified. Right anterior 3rd, 4th, 5th and 7th rib fractures are seen.  Left anterior 7th rib fracture, and questionable 4th and 5th anterolateral rib fractures. Mild bronchial thickening, with atelectatic changes. Focal clustered nodule seen in the right upper lobe, the largest of which measuring just under 8 mm in size. An inflammatory process is favored. Recommendations as below for follow-up. No spiculated lung mass, consolidation or pneumothorax. RECOMMENDATIONS: Multiple pulmonary nodules. Most severe: 8 mm right solid pulmonary nodule within the upper lobe. Recommend a non-contrast Chest CT at 3-6 months, then another non-contrast Chest CT at 18-24 months. These guidelines do not apply to immunocompromised patients and patients with cancer. Follow up in patients with significant comorbidities as clinically warranted. For lung cancer screening, adhere to Lung-RADS guidelines. Reference: Radiology. 2017; 284(1):228-43. XR ABDOMEN FOR NG/OG/NE TUBE PLACEMENT    Result Date: 2/25/2022  Endotracheal tube tip is 4 cm above the katy. Right femoral central venous catheter tip is at the SVC/RA junction region. Nasogastric tube tip is in the stomach. Probable skin fold projecting over the left apex which can be reassessed on follow-up.        Physical Examination:        General appearance:  no distress  Mental Status:  sedated  Lungs:  clear to auscultation bilaterally, normal effort on vent  Heart:  regular rate and rhythm, no murmur  Abdomen:  soft, nontender, nondistended, normal bowel sounds, no masses, hepatomegaly, splenomegaly  Extremities:  no edema, redness, tenderness in the calves  Skin:  no gross lesions, rashes, induration    Assessment:        Hospital Problems           Last Modified POA    * (Principal) Cardiac arrest ([facility] 2/24/2022 Yes    Primary hypertension 2/24/2022 Yes    Tobacco abuse (Chronic) 2/24/2022 Yes    COPD without exacerbation ([facility] 2/24/2022 Yes    Type 2 diabetes mellitus without complication, without long-term current use of insulin (HCC) (Chronic) 2/24/2022 Yes Alcohol abuse 2/24/2022 Yes    Portal hypertension ([facility] 2/24/2022 Yes    Severe malnutrition ([facility] [facility] 2/26/2022 Yes    Hypoxic ischemic encephalopathy 2/27/2022 Yes      s/p hyperkalemia: labile levels    Plan:        Completed TTR protocol  Monitor myoclonus  Daily thiamine  Steroids and aerosols for copd  On empiric antibiotics, stopped vanco iv-the gpc isolated in blood culture is cns and is contaminant  Started tube feeds yesterday  Heparin stopped due to oozing from groin  D/w daughter-she is aware presence of seizure activity is generally a bad prognostic sign: to go for MRI brain today  On depakote for seizure activity; versed drip added overnight; using propofol to suppress activity  Replace k  Pall care luciano  D/w joey OBRIEN Blood, DO  2/28/2022  2:25 PM

## 2022-02-28 NOTE — PROGRESS NOTES
[facility]) Neurology Specialist  [address]  [place], [address] [facility]:  [phone] or [phone]  FAX:  [phone]            Brief history: Janeen Moran is a 63 y.o. old female admitted on 2/24/2022 with anoxic encephalopathy     Subjective: No new neurological events overnight. The patient continues to be intubated. She is on sedation     Objective: /66   Pulse 93   Temp 97.8 °F (36.6 °C) (Oral)   Resp 18   Ht 5' 4\" (1.626 m)   Wt 97 lb (44 kg)   SpO2 95%   BMI 16.65 kg/m²       Medications:    insulin lispro  0-6 Units SubCUTAneous Q6H    valproate sodium (DEPACON) IVPB  750 mg IntraVENous Q12H    thiamine and folic acid IVPB   IntraVENous Daily    sodium chloride flush  5-40 mL IntraVENous 2 times per day    ipratropium-albuterol  1 ampule Inhalation 4x daily    methylPREDNISolone  60 mg IntraVENous Q8H    cefTRIAXone (ROCEPHIN) IV  1,000 mg IntraVENous Q24H    azithromycin  500 mg IntraVENous Q24H    pantoprazole  40 mg IntraVENous Daily    chlorhexidine  15 mL Mouth/Throat BID    polyvinyl alcohol  1 drop Both Eyes Q4H    budesonide  0.5 mg Nebulization BID          Mental status   Patient is intubated. No spontaneous eye opening.     Patient is not purposefully following commands   Cranial nerves   Pupil response: Present   Corneal Reflex: Absent  Oculocephalic reflex: Present     Cough reflex: Present        Motor and sensory function  No motor response   DTR Intact symmetric  Babinski Absent   Gait Not tested        No results found for: LDLCALC, LDLCHOLESTEROL, LDLDIRECT  No components found for: CHLPL  No results found for: TRIG  No results found for: HDL  No results found for: LDLCALC  No results found for: LABVLDL  Lab Results   Component Value Date    LABA1C 5.2 12/07/2016     Lab Results   Component Value Date     12/07/2016     Lab Results   Component Value Date    [id number] 8004 08/21/2021      Neurological work up:  CT head  CTA head and neck  MRI brain   2 D echo     Assessment recommendation:  Anoxic encephalopathy secondary to cardiac arrest 2/24/2022  Patient with history of alcoholism    Currently patient with Depakote 750 mg twice daily. On examination, the patient has pupil response. Negative corneals and no motor response to painful stimulation    Await MRI scan of the brain    I had extensive discussion with patient's daughter at the bedside. Discussed the neurological prognosis. If MRI shows anoxic injury, the family is leaning towards comfort care    Will follow. This note is created with the assistance of a speech-recognition program. While intending to generate a document that actually reflects the content of the visit, the document can still have some errors including those of syntax and sound a- like substitutions which may escape proofreading. In such instances, actual meaning can be extrapolated by contextual derivation.

## 2022-02-28 NOTE — PROGRESS NOTES
Progress Note    Reason for Consult:  Electrolyte abnormalities    Remains on vent support  Continues with intermittent seizure activity  Tube feedings started    Requesting Physician:  Freda Joel DO    HISTORY OF PRESENT ILLNESS:    The patient is a 63 y.o. female who presents with Cardiac arrest ([address] [I46.9]  She was initially cooled, she has been rewarmed during which she was hyperkalemic. She was treated with calcium gluconate, and Kayexalate. Repeat potassium improved to 4.4. Serum sodium this AM of 130, to have repeat labs at 6pm.   She has had some jerking movement today, ?seizures and is to have an EEG this evening.    She has a history of alcoholic hepatitis, COPD, and non-insulin-dependent diabetes, who is brought in by EMS in cardiac arrest.  Per report, boyfriend woke up and found her unresponsive and foaming at the mouth, called 911.   No CPR was initiated at that time. Lizette Cui reported to [facility] patient was using breathing treatments all day long secondary to wheezing and shortness of breath.  EMS reports initial rhythm of asystole.   They performed CPR for 20 minutes, epi x1, and obtained ROSC.     Past Medical History:   has a past medical history of Alcohol withdrawal ([address] Alcohol withdrawal seizure with complication ([address] Alcohol-induced chronic pancreatitis ([address] Alcoholic hepatitis, Cellulitis of right hip, COPD (chronic obstructive pulmonary disease) ([address] Diabetes mellitus ([address] DM type 2 (diabetes mellitus, type 2) ([address] Dysphagia, Eczema, Elevated liver enzymes, FTT (failure to thrive) in adult, Hoarseness, Hypertension, Hypomagnesemia, Hyponatremia, Intertrochanteric fracture of right femur ([address] Iron deficiency anemia, Lesion of hard palate, Moderate malnutrition ([address] New onset seizure ([address] Poor historian, and Smoker    Review Of Systems:   She is vented and unresponsive       Past Medical History:   Diagnosis Date    Alcohol withdrawal ([address]     Alcohol withdrawal seizure with complication ([facility] 73/2/4811    Alcohol-induced chronic pancreatitis ([facility] 87/8/8657    Alcoholic hepatitis 62/0/9140    Cellulitis of right hip 12/18/2016    COPD (chronic obstructive pulmonary disease) ([facility] 10/2/2015    Diabetes mellitus ([facility]     \"borderline\"    DM type 2 (diabetes mellitus, type 2) ([facility] 10/2/2015    Dysphagia     Eczema     Elevated liver enzymes 10/2/2015    FTT (failure to thrive) in adult 10/2/2015    Hoarseness     Hypertension     denies,used to take bp meds    Hypomagnesemia     Hyponatremia 12/7/2016    Intertrochanteric fracture of right femur ([facility] 12/7/2016    Iron deficiency anemia 12/18/2016    Lesion of hard palate     rt side,dx with laryngoscopy 8/11/16    Moderate malnutrition ([facility] 10/2/2015    New onset seizure ([facility]     states last one was oct 2015, but states told she had an \"alcohol seizure\" this past month    Poor historian     Smoker 10/2/2015       Past Surgical History:   Procedure Laterality Date    FEMUR FRACTURE SURGERY Right 12/07/2016    HIP FRACTURE SURGERY Left     LARYNGOSCOPY  10/28/2016    biopsie base of tongue    TONSILLECTOMY      UPPER GASTROINTESTINAL ENDOSCOPY N/A 8/23/2021    EGD BIOPSY performed by Octavia Cortes MD at [facility]       Prior to Admission medications    Medication Sig Start Date End Date Taking?  Authorizing Provider   propranolol (INDERAL) 10 MG tablet Take 1 tablet by mouth 3 times daily 8/26/21   Soledad Torres P Blood, DO   magnesium oxide (MAG-OX) 400 (241.3 Mg) MG TABS tablet Take 1 tablet by mouth 2 times daily 8/26/21   Soledad Torres P Blood, DO   folic acid (FOLVITE) 1 MG tablet Take 1 tablet by mouth daily 8/25/21   Soledad Torres P Blood, DO   pantoprazole (PROTONIX) 40 MG tablet Take 1 tablet by mouth every morning (before breakfast) 8/25/21   [facility] Blood, DO   thiamine 100 MG tablet Take 1 tablet by mouth daily 8/26/21   Soledad Torres P Blood, DO   salmeterol (SEREVENT DISKUS) 50 MCG/DOSE diskus inhaler Inhale 1 puff into the lungs 2 times daily    Historical Provider, MD   albuterol sulfate HFA (PROVENTIL HFA) 108 (90 Base) MCG/ACT inhaler Inhale 2 puffs into the lungs every 6 hours as needed for Wheezing or Shortness of Breath 6/29/21   Frances Nunez APRN - CNP   diphenhydrAMINE (BENADRYL) 25 MG capsule Take 1 capsule by mouth every 6 hours as needed for Itching 9/17/20   Monisha Manifold, DO   Calcium-Vitamin D 600-200 MG-UNIT TABS Take 1 tablet by mouth 2 times daily    Historical Provider, MD   albuterol (PROVENTIL) (2.5 MG/3ML) 0.083% nebulizer solution Take 3 mLs by nebulization every 6 hours as needed for Wheezing 2/8/17   Aaron Dwyer,    calcium carbonate (CALCIUM 600) 600 MG TABS tablet Take 1 tablet by mouth daily Do not take with iron 2/8/17   Perfecto Goldberg Karatsoridis, DO       Scheduled Meds:   insulin lispro  0-6 Units SubCUTAneous Q6H    valproate sodium (DEPACON) IVPB  750 mg IntraVENous Q12H    thiamine and folic acid IVPB   IntraVENous Daily    sodium chloride flush  5-40 mL IntraVENous 2 times per day    ipratropium-albuterol  1 ampule Inhalation 4x daily    methylPREDNISolone  60 mg IntraVENous Q8H    cefTRIAXone (ROCEPHIN) IV  1,000 mg IntraVENous Q24H    azithromycin  500 mg IntraVENous Q24H    pantoprazole  40 mg IntraVENous Daily    chlorhexidine  15 mL Mouth/Throat BID    polyvinyl alcohol  1 drop Both Eyes Q4H    budesonide  0.5 mg Nebulization BID     Continuous Infusions:   sodium chloride 20 mL/hr at 02/28/22 0000    dextrose      sodium chloride      phenylephrine (SHANON-SYNEPHRINE) 50mg/250mL infusion      norepinephrine Stopped (02/26/22 1210)    midazolam (VERSED) 1 mg/mL in D5W infusion 7 mg/hr (02/28/22 0758)    cisatracurium (NIMBEX) infusion Stopped (02/25/22 2138)    fentaNYL Stopped (02/26/22 1125)    propofol 45 mcg/kg/min (02/28/22 0844)     PRN Meds:glucose, glucagon (rDNA), dextrose, dextrose bolus (hypoglycemia) **OR** dextrose bolus (hypoglycemia), sodium chloride flush, sodium chloride, ondansetron **OR** [DISCONTINUED] ondansetron, polyethylene glycol, acetaminophen **OR** acetaminophen, hydrALAZINE, perflutren lipid microspheres, ondansetron, potassium chloride    Allergies   Allergen Reactions    Ibuprofen Nausea And Vomiting       Social History     Socioeconomic History    Marital status: Single     Spouse name: Not on file    Number of children: Not on file    Years of education: Not on file    Highest education level: Not on file   Occupational History    Not on file   Tobacco Use    Smoking status: Current Every Day Smoker     Packs/day: 1.00     Years: 35.00     Pack years: 35.00     Types: Cigarettes    Smokeless tobacco: Never Used   Substance and Sexual Activity    Alcohol use: Yes     Comment: daily    Drug use: No    Sexual activity: Not on file   Other Topics Concern    Not on file   Social History Narrative    Not on file     Social Determinants of Health     Financial Resource Strain:     Difficulty of Paying Living Expenses: Not on file   Food Insecurity:     Worried About Running Out of Food in the Last Year: Not on file    Aydin of Food in the Last Year: Not on file   Transportation Needs:     Lack of Transportation (Medical): Not on file    Lack of Transportation (Non-Medical):  Not on file   Physical Activity:     Days of Exercise per Week: Not on file    Minutes of Exercise per Session: Not on file   Stress:     Feeling of Stress : Not on file   Social Connections:     Frequency of Communication with Friends and Family: Not on file    Frequency of Social Gatherings with Friends and Family: Not on file    Attends Caodaism Services: Not on file    Active Member of Clubs or Organizations: Not on file    Attends Club or Organization Meetings: Not on file    Marital Status: Not on file   Intimate Partner Violence:     Fear of Current or Ex-Partner: Not on file    Emotionally Abused: Not on file  Physically Abused: Not on file    Sexually Abused: Not on file   Housing Stability:     Unable to Pay for Housing in the Last Year: Not on file    Number of Places Lived in the Last Year: Not on file    Unstable Housing in the Last Year: Not on file       Family History   Problem Relation Age of Onset    Heart Disease Mother     Diabetes Father     Asthma Sister     Asthma Brother          Physical Exam:  Vitals:    02/28/22 1030 02/28/22 1045 02/28/22 1100 02/28/22 1113   BP:   103/61    Pulse: 92 94 95 95   Resp: 18 18 18 18   Temp:       TempSrc:       SpO2: 95% 96% 96% 96%   Weight:       Height:         I/O last 3 completed shifts: In: 1081.1 [I.V.:624.4; NG/GT:180; IV Piggyback:276.7]  Out: 745 [Urine:745]      General:  Vented, sedated  HEENT: Atraumatic, normocephalic. Chest: Clear, on vent  Cardiovascular: tachycardic, S1S2, no murmur, rub or gallop. No lower extremity edema. Abdomen: Soft, non tender to palpation. Active bowel sounds x 4 quadrants. Musculoskeletal:. No cyanosis   Integumentary: Pink, warm and dry. CNS: Questionable seizure activity  Data:    CBC:   Recent Labs     02/28/22  0515   WBC 14.4*   HGB 9.9*   HCT 29.4*   .7   *     BMP:    Recent Labs     02/27/22  0605 02/27/22  1730 02/28/22  0515    136 139   K 4.0 3.4* 3.5*    101 105   CO2 22 26 21   BUN 16 17 17   CREATININE 0.83 0.72 0.50   GLUCOSE 141* 150* 150*     CMP:   Recent Labs     02/28/22  0515      K 3.5*      CO2 21   BUN 17   CREATININE 0.50   GLUCOSE 150*   CALCIUM 8.4*      Hepatic:   No results for input(s): AST, ALT, ALB, BILITOT, ALKPHOS in the last 72 hours. BNP: No results for input(s): BNP in the last 72 hours. INR:   No results for input(s): INR in the last 72 hours. PTH: No results for input(s): PTH in the last 72 hours.   Phosphorus:   Recent Labs     02/27/22  0605   PHOS 4.6*     Ionized Calcium: No results for input(s): IONCA in the last 72 hours.  Magnesium:   Recent Labs     02/28/22  0515   MG 2.3     Albumin:   No results for input(s): LABALBU in the last 72 hours. Last 3 CK, CKMB, Troponin: No results for input(s): CKTOTAL, CKMB, TROPONINI in the last 72 hours. Lipids: No results found for: CHOL, HDL  Urine:    Lab Results   Component Value Date    LORENZO 20 02/26/2022    PROTEINU TRACE 02/24/2022         Radiology:  Reviewed      Assessment:   Primary hypertension    Alcohol withdrawal seizure with complication (HCC)    Hypokalemia    Alcohol-induced chronic pancreatitis (HCC)    Alcoholic hepatitis    Elevated liver enzymes    FTT (failure to thrive) in adult    Moderate malnutrition (HCC)    Tobacco abuse    COPD without exacerbation ([address]    Noncompliance    Intertrochanteric fracture of right femur (HCC)    Hyponatremia    Macrocytic anemia    Cellulitis of right hip    Iron deficiency anemia    Type 2 diabetes mellitus without complication, without long-term current use of insulin (HCC)    GIB (gastrointestinal bleeding)    Alcohol abuse    Hepatic steatosis    Elevated LFTs    Dietary folate deficiency anemia    Alcohol dependence with unspecified alcohol-induced disorder ([address]    Portal hypertensive gastropathy ([address]    Multifocal pneumonia    Cardiac arrest ([address]    Portal hypertension ([address]        Plan: Will replete Potassium  Closely monitor labs, renal function stable at this point  Neurology following  I/O's  Avoid NSAIDs      Thank you for the consultation. Please do not hesitate to call with questions. We will continue to follow with you.      Electronically signed by Miri Winslow MD on 2/28/2022 at 11:42 AM  [facility].   Ph: [phone]

## 2022-02-28 NOTE — PROGRESS NOTES
Nutrition Assessment     Type and Reason for Visit: Reassess    Nutrition Recommendations/Plan:   1. Continue NPO status  2. Continue current TF Order: Glucerna 1.5 Pavel at goal rate 30 mL/hr   3. Monitor TF rate/tolerance, GI status, weights and labs      Nutrition Assessment:  Patient tolerating TF at goal rate. Propofol at 12 mL/hr (317 kcal). Pt with myoclonic seizures on sedation and per Neurology, abnormal EEG findings consistent with hypoxic ischemic injury to the brain. Plan for MRI today to confirm. Will continue current tube feeding Order: Glucerna 1.5 Pavel at goal rate 30 mL/hr (720 mL total volume). Monitor TF tolerance. GI status, labs, and nutrition status. Malnutrition Assessment:  Malnutrition Status: Severe malnutrition    Estimated Daily Nutrient Needs:  Energy (kcal): 9644-6182 kcal (25-27 kcal/kg); Weight Used for Energy Requirements:  Current     Protein (g): 58-66 gm of protein (1.3-1.5 gm/kg); Weight Used for Protein Requirements:  Current        Weight Used for Fluid Requirements:  1 ml/kcal      Nutrition Related Findings: No edema. Hypoactive bowel sounds.       Current Nutrition Therapies:    Diet NPO  ADULT TUBE FEEDING; Orogastric; Diabetic; Continuous; 10; Yes; 10; Q 4 hours; 30; 30; Q 4 hours    Anthropometric Measures:  · Height: 5' 4\" (162.6 cm)  · Current Body Wt: 97 lb (44 kg)   · BMI: 16.6    Nutrition Diagnosis:   · Severe malnutrition related to inadequate protein-energy intake as evidenced by severe muscle loss,severe loss of subcutaneous fat,weight loss    Nutrition Interventions:   Food and/or Nutrient Delivery:  Continue NPO,Continue Current Tube Feeding  Nutrition Education/Counseling:  Education not indicated   Coordination of Nutrition Care:  Continue to monitor while inpatient    Goals:  Enteral nutrition will meet greater than 75% of estimated nutrition needs       Nutrition Monitoring and Evaluation:   Behavioral-Environmental Outcomes:  None Identified Food/Nutrient Intake Outcomes:  Diet Advancement/Tolerance,Enteral Nutrition Intake/Tolerance  Physical Signs/Symptoms Outcomes:  Biochemical Data,Fluid Status or Edema,Skin,Weight     Discharge Planning:     Too soon to determine     Lenice Seen, [address] [place] [address], [address] [place] [address]  Office Number: [phone]

## 2022-02-28 NOTE — CONSULTS
..    Palliative Care Inpatient Consult    NAME:  Diomedes Bautista RECORD NUMBER:  [id number]  AGE: 63 y.o. GENDER: female  : 1960  TODAY'S DATE:  2022    Reasons for Consultation:    Provision of information regarding PC and/or hospice philosophies  Complex, time-intensive communication and interdisciplinary psychosocial support  Clarification of goals of care and/or assistance with difficult decision-making  Guidance in regards to resources and transition(s)    Code Status:     Members of PC team contributing to this consultation are :  Amanda Boyd R.N     History of Present Illness     The patient is a 63 y.o. Non- / non  female who presents with Cardiac Arrest    Referred to Palliative Care by   [x] Physician   [] Nursing  [] Family Request   [] Other:       She was admitted to the primary service for Cardiac arrest ([facility] [I46.9]. Her hospital course has been associated with Cardiac arrest ([facility] The patient has a complicated medical history and has been hospitalized since 2022  3:48 AM.    Active Hospital Problems    Diagnosis Date Noted    Hypoxic ischemic encephalopathy [P91.60]     Severe malnutrition ([facility] [E43] 2022    Cardiac arrest ([facility] [I46.9] 2022    Portal hypertension ([facility] [K76.6] 2022    Alcohol abuse [F10.10] 2021    Type 2 diabetes mellitus without complication, without long-term current use of insulin (HCC) [E11.9]     Tobacco abuse [Z72.0]     COPD without exacerbation ([facility] [J44.9]     Primary hypertension [I10]        Data        Code Status: Full Code     ADVANCED CARE PLANNING:  Patient has capacity for medical decisions: no  Health Care Power of : no  Living Will: no     Personal, Social, and Family History  Marital Status: single  Living situation: with significant other  Chanel/Spiritual History:  Not asked  Psychological Distress: patient intubated  Does patient understand diagnosis/treatment?  patient is intubated and not responsive  Does family/caregiver understand diagnosis/treatment? yes    Assessment        Palliative Performance Scale:    ___100% Full ambulation; normal activity and work; no evidence of disease; able to do own self care; normal intake; fully conscious  ___90% Full ambulation; normal activity and work; some evidence of disease; able to do own self care; normal intake; fully conscious  ___80% Full ambulation; normal activity with effort; some evidence of disease; able to do own self care; normal or reduced intake; fully conscious  ___70% Ambulation reduced; unable to perform normal job/work; significant disease; able to do own self care; normal or reduced intake; fully conscious  ___60%  Ambulation reduced; cannot do hobbies/housework; significant disease; occasional assist; intake normal or reduced; fully conscious/some confusion  ___50%  Mainly sit/lie; can't do any work; extensive disease; considerable assist; intake normal or reduced; fully conscious/some confusion  ___40%  Mainly in bed; extensive disease; mainly assist; intake normal or reduced; fully conscious/ some confusion   ___30%  Bed bound; extensive disease; total care; intake reduced; fully conscious/some confusion  ___20%  Bed bound; extensive disease; total care; intake minimal; drowsy/coma  X_10%  Bed bound; extensive disease; total care; mouth care only; drowsy/coma  ___0       Death       Risk Assessments:    Noam Risk Score: [unfilled]    Readmission Risk Score: 15.3 ( )        1 Year Mortality Risk Score: @1YEARMORTALITYRISK@     Plan        Palliative Interaction:Patient is intubated and sedated. FIO2 30% and peep of 8. Per the bedside nurse the patient's daughter Bernardino Jay is at the beside. I introduce my palliative care role with her. Per the progress notes the patient is unable to come off the sedation as she has continued seizures.  Per progress notes the patient has pupil response, no motor response to painful stimulation. I ask Tasneem Ibrahim if she wants to talk in private and she states\"yes. \" Tasneem Ibrahim states that she is an only child and that she is aware that she is her Mom's decision maker. Tasneem Ibrahim is aware of her Mom's critical condition and poor prognosis. She is aware that she is having seizures and that is not a good sign. Tasneem Ibrahim is tearful and states\" my Mom would not want to lay there like that. \" Tasneem Ibrahim states that her Mom's S.O. wants her Mom to be on the vent for a long time, hoping that she will recover. Tasneem Ibrahim states\" he does not get it. \" Tasneem Ibrahim states \" I won't do that to my Mom, and that we will wait until the MRI comes back, and then we will take her off the vent. \" I explain what the protocol is to remove the vent and focus on comfort for her Mom. I tell Tasneem Ibrahim that it is in her timing, and when family can get here to see her. We then explain the code status classifications and the [facility] code status and all the details, as well gave her a brochure explaining the code classifications. Tasneem Ibrahim is not sure yet, as she stated\" I am not sure as I want to be here if something happens to her. I tell Tasneem Ibrahim to take her time and think about her decision. I offer Tasneem Ibrahim much emotional support and she is very appreciative. Will follow for goals of care and support. Education/support to family  Communications with primary service  Providing support for coping/adaptation/distress of family  Discussing meaning/purpose   Continue with current plan of care  Code status clarified: Full Code  Validating patient/family distress  Continued communication updates  Patient is intubated and sedated. Her daughter Tasneem Ibrahim is at the bedside and we talk in private. Tasneem Ibrahim is aware of her Mom's grave prognosis and is planning on taking her Mom off the vent after her MRI, as she states\" I don't want her to stay like that. '\" Will follow for goals of care and family support.         Principle Problem/Diagnosis:  Cardiac arrest ([facility] [I46.9]    Goals of care evaluation:  The patient goals of care are provide comfort care/support/palliation/relieve suffering   Goals of care discussed with:    [] Patient independently    [] Patient and Family    [x] Family or Healthcare DPOA independently    [] Unable to discuss with patient, family/DPOA not present    Code Status  Full Code    Other recommendations:  Please call with any palliative questions or concerns. Palliative Care Team is available via perfect serve or via phone - [phone]. Palliative Care will continue to follow Ms. Manohar Yates care as needed. Thank you for allowing Palliative Care to participate in the care of Ms. Trinidad Swanson .     Electronically signed by   Rosita Coon RN  Palliative Care Team  on 2/28/2022 at 3:13 PM    Palliative care office: [phone]

## 2022-03-01 PROBLEM — G93.1 ANOXIC BRAIN DAMAGE (HCC): Status: ACTIVE | Noted: 2022-01-01

## 2022-03-01 PROBLEM — Z99.11 ON MECHANICALLY ASSISTED VENTILATION (HCC): Status: ACTIVE | Noted: 2022-01-01

## 2022-03-01 NOTE — PLAN OF CARE
Problem: Discharge Planning:  Goal: Participates in care planning  Description: Participates in care planning  Outcome: Ongoing  Goal: Discharged to appropriate level of care  Description: Discharged to appropriate level of care  Outcome: Ongoing     Problem: Airway Clearance - Ineffective:  Goal: Ability to maintain a clear airway will improve  Description: Ability to maintain a clear airway will improve  Outcome: Ongoing     Problem: Anxiety/Stress:  Goal: Level of anxiety will decrease  Description: Level of anxiety will decrease  Outcome: Ongoing     Problem: Aspiration:  Goal: Absence of aspiration  Description: Absence of aspiration  Outcome: Ongoing     Problem:  Bowel Function - Altered:  Goal: Bowel elimination is within specified parameters  Description: Bowel elimination is within specified parameters  Outcome: Ongoing     Problem: Cardiac Output - Decreased:  Goal: Hemodynamic stability will improve  Description: Hemodynamic stability will improve  Outcome: Ongoing     Problem: Fluid Volume - Imbalance:  Goal: Absence of imbalanced fluid volume signs and symptoms  Description: Absence of imbalanced fluid volume signs and symptoms  Outcome: Ongoing     Problem: Gas Exchange - Impaired:  Goal: Levels of oxygenation will improve  Description: Levels of oxygenation will improve  Outcome: Ongoing     Problem: Mental Status - Impaired:  Goal: Mental status will be restored to baseline  Description: Mental status will be restored to baseline  Outcome: Ongoing     Problem: Nutrition Deficit:  Goal: Ability to achieve adequate nutritional intake will improve  Description: Ability to achieve adequate nutritional intake will improve  Outcome: Ongoing     Problem: Pain:  Goal: Pain level will decrease  Description: Pain level will decrease  Outcome: Ongoing  Goal: Recognizes and communicates pain  Description: Recognizes and communicates pain  Outcome: Ongoing  Goal: Control of acute pain  Description: Control of acute pain  Outcome: Ongoing  Goal: Control of chronic pain  Description: Control of chronic pain  Outcome: Ongoing     Problem: Serum Glucose Level - Abnormal:  Goal: Ability to maintain appropriate glucose levels will improve to within specified parameters  Description: Ability to maintain appropriate glucose levels will improve to within specified parameters  Outcome: Ongoing     Problem: Skin Integrity - Impaired:  Goal: Will show no infection signs and symptoms  Description: Will show no infection signs and symptoms  Outcome: Ongoing  Goal: Absence of new skin breakdown  Description: Absence of new skin breakdown  Outcome: Ongoing     Problem: Sleep Pattern Disturbance:  Goal: Appears well-rested  Description: Appears well-rested  Outcome: Ongoing     Problem: Tissue Perfusion, Altered:  Goal: Circulatory function within specified parameters  Description: Circulatory function within specified parameters  Outcome: Ongoing     Problem: Tissue Perfusion - Cardiopulmonary, Altered:  Goal: Absence of angina  Description: Absence of angina  Outcome: Ongoing  Goal: Hemodynamic stability will improve  Description: Hemodynamic stability will improve  Outcome: Ongoing     Problem: Skin Integrity:  Goal: Will show no infection signs and symptoms  Description: Will show no infection signs and symptoms  Outcome: Ongoing  Goal: Absence of new skin breakdown  Description: Absence of new skin breakdown  Outcome: Ongoing     Problem: Falls - Risk of:  Goal: Will remain free from falls  Description: Will remain free from falls  Outcome: Ongoing  Goal: Absence of physical injury  Description: Absence of physical injury  Outcome: Ongoing     Problem: Nutrition  Goal: Optimal nutrition therapy  Outcome: Ongoing

## 2022-03-01 NOTE — FLOWSHEET NOTE
Patient is intubated and unresponsive. Patient's daughter and sister are bedside. Family is considering a terminal wean and are unsure about  arrangements. Family asks about  homes and other considerations. Family is considering cremation. Writer assists family in obtaining contact information for various service providers so the family can investigate costs, etc. Family is grieving but seem to be coping adequately. The daughter has periods of tearfulness and alternates with periods of sharing about her mother. Writer provides supportive conversation and emotional support. Family expresses appreciation for the visit. Spiritual Care will follow as needed.        22 1400   Encounter Summary   Services provided to: Patient and family together   Referral/Consult From: Nurse   Support System Significant other;Children;Family members   Continue Visiting   (3/1/22 Intubated)   Complexity of Encounter Moderate   Length of Encounter 30 minutes   Spiritual Assessment Completed Yes   Routine   Type Follow up   Assessment Approachable;Grieving   Intervention Active listening;Explored coping resources;Nurtured hope   Outcome Expressed gratitude

## 2022-03-01 NOTE — PROGRESS NOTES
Pulmonary Critical Care Progress Note       Patient seen for the follow up of acute hypoxic respiratory failure, COPD exacerbation,  Cardiac arrest ([address]     Subjective:  She has been sedated on the ventilator with propofol. Versed was added to control seizures. S blood pressure is better controlled. .  She has been unresponsive. She has tolerated tube feeds. She has fair urine output. She has no active bleeding. Examination:  Vitals: BP (!) 147/82   Pulse 90   Temp 97.5 °F (36.4 °C) (Bladder)   Resp 18   Ht 5' 4\" (1.626 m)   Wt 97 lb (44 kg)   SpO2 94%   BMI 16.65 kg/m²   General appearance: Sedated,   Unresponsive on ventilator  Neck: No JVD  Lungs: Decreased  Breath sounds no crackles or wheezing  Heart: regular rate and rhythm, S1, S2 normal, no gallop  Abdomen: Soft, non tender, + BS  Extremities: no cyanosis or clubbing.  No significant edema    LABs:  CBC:   Recent Labs     02/28/22  0515 03/01/22  0540   WBC 14.4* 9.8   HGB 9.9* 9.9*   HCT 29.4* 30.0*   * 126*     BMP:   Recent Labs     02/28/22  0515 03/01/22  0540    137   K 3.5* 4.1   CO2 21 24   BUN 17 17   CREATININE 0.50 <0.40*   LABGLOM >60 Can not be calculated   GLUCOSE 150* 160*     ABG:  Lab Results   Component Value Date    FIO2 30.0 02/28/2022       Lab Results   Component Value Date    POCPH 7.452 02/28/2022    POCPCO2 34.4 02/28/2022    POCPO2 76.2 02/28/2022    POCHCO3 24.1 02/28/2022    NBEA 2 02/26/2022    PBEA 0 02/28/2022    DSNI4OBP 96 02/28/2022    FIO2 30.0 02/28/2022     Radiology:   chest x-ray3/1    Endotracheal tube has been retracted in the interval, now 6.5 cm above the   katy.  Recommend advancement 2 cm for optimal positioning     MRI brain 2/28  Diffuse abnormal signal on the diffusion-weighted images involving the cortex   of the supratentorial brain as well as the basal ganglia which is highly   suggestive of anoxic/ischemic injury.       Cerebral atrophy.  Mild chronic small vessel ischemic changes.               CTA of the chest 2/24/2022  Discontinue  Impression:  · Acute hypoxic respiratory failure  · Status post cardiac arrest?  V. tach,?   Related to respiratory arrest versus seizure  · COPD with acute exacerbation   · severe ischemic encephalopathy  · Metabolic acidosis  · Hypothermia  · Gram positive sepsis  · Rib fractures secondary to CPR  · Elevated LFTs with history of EtOH abuse and portal hypertension  · Hyponatremia  · Active smoking  · Lung nodules, largest measuring 8 mm right upper lobe    Recommendations:  · Continue vent support, currently on 30% FiO2/PEEP 8  · Versed and propofol to control seizures  · DuoNeb via nebulizer as needed  · Discontinue budesonide via nebulizer twice daily  ·  finished Hypothermia protocol   ·  nephrology consult/ monitor potassium  · Hydralazine IV as needed  ·  monitor blood sugars  · IV Solu-Medrol 60 mg every 8 hours  · Rocephin Zithromax  · Neurology on consult   · Cardiology on consult  · Off heparin drip  · Hold off PICC insertion /keep central line   · Discussed with RN   ·  Discussed with daughter and sister at bedside; she understood poor prognosis they want to withdraw care on Thursday after all patient siblings arrive and see her  ·  GI prophylaxis with Protonix  ·  DVT prophylaxis      Electronically signed by     Ronaldo Aschoff, MD on 3/1/2022 at 5:34 PM  Pulmonary Critical Care and Sleep Medicine,  [facility] [place]: [phone]  CC: 35 minutes

## 2022-03-01 NOTE — PROGRESS NOTES
ANTIMICROBIAL STEWARDSHIP  Upon review of the patient's chart, the committee has the following recommendation for your consideration:    Indication: AECOPD  Day of Antimicrobial Therapy: 6    Recommendation   Patient has received 6 days of ceftriaxone/azithromycin that would be sufficient to treat the indication above. Leukocytosis resolved, afebrile. We recommend discontinuing antibiotic therapy. If not, please consult the ID service to assess the need of prolonged antibiotic therapy than recommended. Recent Labs     02/28/22  0515 03/01/22  0540   WBC 14.4* 9.8     No results for input(s): PROCAL in the last 72 hours. Unnecessary or inappropriate antimicrobial use increases the risk of subsequent infections with resistant bacterial infections and drug-associated toxicities including C. difficile infection. Thank you. Joseph Youssef, [facility], PharmD  3/1/2022  1:28 PM    The Antimicrobial Stewardship Committee at [facility] is led by  Duane Russ MD, Infectious Diseases Section Chair.

## 2022-03-01 NOTE — PROGRESS NOTES
[facility]  Office: [address], DO, Sandy Edwards, DO, Carrol Coffman, DO, Olive Joel, DO, Ortega Lopez MD, Tono Perez MD, Nia Carpio MD, Kendall Mccabe MD, Carlos Bee MD, Aissatou Corona MD, Monroe Montejo MD, Pita Heranndez, DO, Meenu Medrano, DO, Lucy Peña MD,  Janelle Epstein, DO, Tomeka Tom MD, Barb Hyatt MD, David Goodrich MD, Kay Wing MD, Wendi Up MD, Tia Miramontes, [facility], [facility], CNP, Eileen Renee, CNP, Caesar Smith, CNS, Dennis Espana CNP, Jez Garcia, CNP, Abraham Azevedo, CNP, Criss iPnzon, CNP, Leatha Jeong, CNP, Maurisio Hassan PA-C, Jacquelin Graham, [facility], Trista Rm [facility], Ervin Collier, CNP, Trinidad Brantley, CNP, Manuel Moser, CNP, Darshan Maxwell, CNP, Elijah Preciado, CNP, Shameka Lugo, [facility]    Progress Note    3/1/2022    5:17 PM    Name:   Bridget Andrade  MRN:     [id number]     Acct:      [de-identified]   Room:   2029/2029-01   Day:  5  Admit Date:  2/24/2022  3:48 AM    PCP:   Ananya Kate PA-C  Code Status:  Full Code    Subjective:     C/C:   Chief Complaint   Patient presents with    Cardiac Arrest     Interval History Status: not changed. Patient intubated and sedated, unresponsive to verbal or tactile stimuli    Brief History: This is a 80-year-old female who is admitted with an episode of unresponsiveness and subsequent cardiac arrest.  She was found down unresponsive by her  called 911 and when EMS arrived she was in cardiac arrest with asystole. She underwent CPR with epinephrine and routine ACLS protocol and ROSC was obtained after approximately 20 minutes. She is remained unresponsive here and has MRI evidence of anoxic brain injury. Review of Systems:     Cannot be obtained due to respiratory failure    Medications: Allergies:     Allergies   Allergen Reactions    Ibuprofen Nausea And Vomiting       Current Meds:   Scheduled Meds:    insulin lispro  0-6 Units SubCUTAneous Q6H    valproate sodium (DEPACON) IVPB  750 mg IntraVENous Q12H    thiamine and folic acid IVPB   IntraVENous Daily    sodium chloride flush  5-40 mL IntraVENous 2 times per day    ipratropium-albuterol  1 ampule Inhalation 4x daily    methylPREDNISolone  60 mg IntraVENous Q8H    cefTRIAXone (ROCEPHIN) IV  1,000 mg IntraVENous Q24H    azithromycin  500 mg IntraVENous Q24H    pantoprazole  40 mg IntraVENous Daily    chlorhexidine  15 mL Mouth/Throat BID    polyvinyl alcohol  1 drop Both Eyes Q4H    budesonide  0.5 mg Nebulization BID     Continuous Infusions:    sodium chloride Stopped (03/01/22 1127)    dextrose      sodium chloride      phenylephrine (SHANON-SYNEPHRINE) 50mg/250mL infusion      norepinephrine Stopped (02/26/22 1210)    midazolam (VERSED) 1 mg/mL in D5W infusion 7 mg/hr (03/01/22 1641)    cisatracurium (NIMBEX) infusion Stopped (02/25/22 2138)    fentaNYL Stopped (02/26/22 1125)    propofol 45 mcg/kg/min (03/01/22 1641)     PRN Meds: glucose, glucagon (rDNA), dextrose, dextrose bolus (hypoglycemia) **OR** dextrose bolus (hypoglycemia), sodium chloride flush, sodium chloride, ondansetron **OR** [DISCONTINUED] ondansetron, polyethylene glycol, acetaminophen **OR** acetaminophen, hydrALAZINE, perflutren lipid microspheres, ondansetron, potassium chloride    Data:     Past Medical History:   has a past medical history of Alcohol withdrawal ([facility] [address] Alcohol withdrawal seizure with complication ([facility] [address] Alcohol-induced chronic pancreatitis ([facility] [address] Alcoholic hepatitis, Cellulitis of right hip, COPD (chronic obstructive pulmonary disease) ([facility] [address] Diabetes mellitus ([facility] [address] DM type 2 (diabetes mellitus, type 2) ([facility] [address] Dysphagia, Eczema, Elevated liver enzymes, FTT (failure to thrive) in adult, Hoarseness, Hypertension, Hypomagnesemia, Hyponatremia, Intertrochanteric fracture of right femur ([facility] [address] Iron deficiency anemia, Lesion of hard palate, Moderate malnutrition ([address] New onset seizure ([facility] [address] Poor historian, and Smoker. Social History:   reports that she has been smoking cigarettes. She has a 35.00 pack-year smoking history. She has never used smokeless tobacco. She reports current alcohol use. She reports that she does not use drugs. Family History:   Family History   Problem Relation Age of Onset    Heart Disease Mother     Diabetes Father     Asthma Sister     Asthma Brother        Vitals:  BP (!) 147/82   Pulse 90   Temp 97.5 °F (36.4 °C) (Bladder)   Resp 18   Ht 5' 4\" (1.626 m)   Wt 97 lb (44 kg)   SpO2 94%   BMI 16.65 kg/m²   Temp (24hrs), Av.6 °F (36.4 °C), Min:97.5 °F (36.4 °C), Max:97.9 °F (36.6 °C)    Recent Labs     22  0459 22  1024 22  1159 22  1644   POCGLU 166* 159* 199* 164*       I/O (24Hr):     Intake/Output Summary (Last 24 hours) at 3/1/2022 1717  Last data filed at 3/1/2022 1641  Gross per 24 hour   Intake 1533.58 ml   Output 425 ml   Net 1108.58 ml       Labs:  Hematology:  Recent Labs     22  1730 22  0515 22  0540   WBC 10.7 14.4* 9.8   RBC 2.78* 2.89* 2.92*   HGB 9.5* 9.9* 9.9*   HCT 28.5* 29.4* 30.0*   .5 101.7 102.7   MCH 34.2* 34.3* 33.9*   MCHC 33.3 33.7 33.0   RDW 11.9 11.9 11.9    128* 126*   MPV 10.2 10.1 10.4     Chemistry:  Recent Labs     22  1805 22  0425 22  0605 22  0605 22  1730 22  0515 22  0540   NA   < >  --  140   < > 136 139 137   K   < > 4.6 4.0   < > 3.4* 3.5* 4.1   CL   < >  --  104   < > 101 105 103   CO2   < >  --  22   < > 26 21 24   GLUCOSE   < >  --  141*   < > 150* 150* 160*   BUN   < >  --  16   < > 17 17 17   CREATININE   < >  --  0.83   < > 0.72 0.50 <0.40*   MG   < > 1.6 1.6   < > 2.3 2.3 2.1   ANIONGAP   < >  --  14   < > 9 13 10   LABGLOM   < >  --  >60   < > >60 >60 Can not be calculated   GFRAA   < >  --  >60   < > >60 >60 Can not be calculated   CALCIUM   < > --  8.5*   < > 8.5* 8.4* 8.9   CAION  --   --  1.15  --   --   --  1.23   PHOS  --  5.0* 4.6*  --   --   --  2.4*    < > = values in this interval not displayed. Recent Labs     02/28/22  1656 02/28/22  1938 03/01/22  0459 03/01/22  1024 03/01/22  1159 03/01/22  1644   POCGLU 315* 131* 166* 159* 199* 164*     ABG:  Lab Results   Component Value Date    POCPH 7.452 02/28/2022    POCPCO2 34.4 02/28/2022    POCPO2 76.2 02/28/2022    POCHCO3 24.1 02/28/2022    NBEA 2 02/26/2022    PBEA 0 02/28/2022    QESY5DWU 96 02/28/2022    FIO2 30.0 02/28/2022     Lab Results   Component Value Date/Time    SPECIAL 18ML RT RADIAL 02/24/2022 08:36 AM     Lab Results   Component Value Date/Time    CULTURE NO GROWTH 5 DAYS 02/24/2022 08:36 AM       Radiology:  CT HEAD WO CONTRAST    Result Date: 2/24/2022  Similar appearing CT scan of the head without acute intracranial abnormality. Mild cortical atrophy with chronic microvascular ischemic changes. CT ABDOMEN PELVIS W IV CONTRAST Additional Contrast? None    Result Date: 2/24/2022  No acute abnormality evident. Mild emphysema. Status post ORIF right hip fracture. XR CHEST PORTABLE    Result Date: 3/1/2022  Endotracheal tube has been retracted in the interval, now 6.5 cm above the katy. Recommend advancement 2 cm for optimal positioning. XR CHEST PORTABLE    Result Date: 2/28/2022  No acute cardiopulmonary process. Support tubes as described above. XR CHEST PORTABLE    Result Date: 2/27/2022  No focal consolidation or pneumothorax. Stable support tubes and line. XR CHEST PORTABLE    Result Date: 2/26/2022  Endotracheal tube now 6.2 cm above the katy. Remaining support lines and tubes stable. Lungs remain clear with redemonstrated probable skin fold versus small pneumothorax left upper lung. There do appear to be lung markings beyond this. XR CHEST PORTABLE    Result Date: 2/25/2022  Endotracheal tube tip is 4 cm above the katy.  Right femoral central venous catheter tip is at the SVC/RA junction region. Nasogastric tube tip is in the stomach. Probable skin fold projecting over the left apex which can be reassessed on follow-up. Findings were discussed with Raul Vivas RN at 8:08 am on 2/25/2022. XR CHEST PORTABLE    Result Date: 2/24/2022  Endotracheal tube appears in satisfactory position. Negative chest.     CT CHEST PULMONARY EMBOLISM W CONTRAST    Result Date: 2/24/2022  No pulmonary emboli are identified. Right anterior 3rd, 4th, 5th and 7th rib fractures are seen. Left anterior 7th rib fracture, and questionable 4th and 5th anterolateral rib fractures. Mild bronchial thickening, with atelectatic changes. Focal clustered nodule seen in the right upper lobe, the largest of which measuring just under 8 mm in size. An inflammatory process is favored. Recommendations as below for follow-up. No spiculated lung mass, consolidation or pneumothorax. RECOMMENDATIONS: Multiple pulmonary nodules. Most severe: 8 mm right solid pulmonary nodule within the upper lobe. Recommend a non-contrast Chest CT at 3-6 months, then another non-contrast Chest CT at 18-24 months. These guidelines do not apply to immunocompromised patients and patients with cancer. Follow up in patients with significant comorbidities as clinically warranted. For lung cancer screening, adhere to Lung-RADS guidelines. Reference: Radiology. 2017; 284(1):228-43. XR ABDOMEN FOR NG/OG/NE TUBE PLACEMENT    Result Date: 2/25/2022  Endotracheal tube tip is 4 cm above the katy. Right femoral central venous catheter tip is at the SVC/RA junction region. Nasogastric tube tip is in the stomach. Probable skin fold projecting over the left apex which can be reassessed on follow-up. Findings were discussed with Raul Vivas RN at 8:08 am on 2/25/2022.      MRI BRAIN WO CONTRAST    Result Date: 2/28/2022  Diffuse abnormal signal on the diffusion-weighted images involving the cortex of the supratentorial brain as well as the basal ganglia which is highly suggestive of anoxic/ischemic injury. Cerebral atrophy. Mild chronic small vessel ischemic changes.        Physical Examination:        General appearance: Sedated and vented  Mental Status: Cannot be assessed due to respiratory failure  Lungs: Scattered wheezes bilaterally, vented  Heart:  regular rate and rhythm, no murmur  Abdomen:  soft, nontender, nondistended, normal bowel sounds, no masses, hepatomegaly, splenomegaly  Extremities:  no edema, redness, tenderness in the calves  Skin:  no gross lesions, rashes, induration    Assessment:        Hospital Problems           Last Modified POA    * (Principal) Cardiac arrest ([address] 2/24/2022 Yes    Primary hypertension 2/24/2022 Yes    Tobacco abuse (Chronic) 2/24/2022 Yes    COPD without exacerbation ([address] 2/24/2022 Yes    Type 2 diabetes mellitus without complication, without long-term current use of insulin (HCC) (Chronic) 2/24/2022 Yes    Alcohol abuse 2/24/2022 Yes    Portal hypertension ([address] 2/24/2022 Yes    Severe malnutrition ([address] 2/26/2022 Yes    Hypoxic ischemic encephalopathy 2/27/2022 Yes    Anoxic brain damage ([address] 3/1/2022 Yes    On mechanically assisted ventilation ([address] 3/1/2022 Yes          Plan:        Continue vent support  Neurology evaluation for anoxic brain injury   insulin scale  Monitor and control blood pressure  Nutritional supplements  GI and DVT prophylaxis  Family considering withdrawal of care pending neurology recommendations  Aerosol protocol  Trend labs    Isela Carrington DO  3/1/2022  5:17 PM

## 2022-03-01 NOTE — PROGRESS NOTES
Ms. Maxime Owens returns for follow-up of her type 2 diabetes mellitus with poor blood sugar control.    When we saw her last  she was on Toujeo insulin 56 units at bedtime, Humalog insulin 24 breakfast 20 for lunch and 35 for dinner and Metformin somewhere between 2 and 3 tablets daily. [place] renal function is normal and she had been making some improvements in her diet. Unfortunately she is back to taking only one metformin a day because of GI distress. She is continued to take the insulin as noted above. She has a recent A1c of 10.1%. She is been notably decreased in physical activity and her diet is not as good as it had been. After considerable negotiation, she agreed that she will try to increase her physical activity to tolerance.       Breakfast is juan eggs hashbrowns and toast.  Last night for dinner she had pork chops cabbage rice and biscuit. I downloaded her meter. She is checking a lot more blood sugars and she was previously. She can have a whole weeks when her blood sugars are in the 120-160 range. She then has other weeks when her blood sugars are in the 200-260 range. She claims adherence to the regimen. She does say that her physical activity has been intermittent. It is interesting that in the weeks where her blood sugars are good in the morning those are the weeks that she is been walking regularly. She fell recently and was not able to walk for a week or so and her blood sugars in the morning were higher. So there seems to be a clear correlation between physical activity and her morning blood sugars. Her diet is unchanged. Examination  Blood pressure 163/67  Pulse 59  Weight 232    Impression type 2 diabetes mellitus with an A1c of 10.9% which is up from 10.1% 3 months ago. Plan: We have strongly encouraged the physical activity because this is paying off in terms of numbers. We did not increase the insulin. We will see her back in 3 months or sooner as needed.     We spent more Progress Note    Reason for Consult:  Electrolyte abnormalities    Remains on vent support  Continues with intermittent seizure activity  Tube feedings started    Requesting Physician:  Denise Lanza DO    HISTORY OF PRESENT ILLNESS:    The patient is a 63 y.o. female who presents with Cardiac arrest ([address] [I46.9]  She was initially cooled, she has been rewarmed during which she was hyperkalemic. She was treated with calcium gluconate, and Kayexalate. Repeat potassium improved to 4.4. Serum sodium this AM of 130, to have repeat labs at 6pm.   She has had some jerking movement today, ?seizures and is to have an EEG this evening.    She has a history of alcoholic hepatitis, COPD, and non-insulin-dependent diabetes, who is brought in by EMS in cardiac arrest.  Per report, boyfriend woke up and found her unresponsive and foaming at the mouth, called 911.   No CPR was initiated at that time. Reena Swift reported to [facility] patient was using breathing treatments all day long secondary to wheezing and shortness of breath.  EMS reports initial rhythm of asystole.   They performed CPR for 20 minutes, epi x1, and obtained ROSC.     Past Medical History:   has a past medical history of Alcohol withdrawal ([address] Alcohol withdrawal seizure with complication ([address] Alcohol-induced chronic pancreatitis ([address] Alcoholic hepatitis, Cellulitis of right hip, COPD (chronic obstructive pulmonary disease) ([address] Diabetes mellitus ([address] DM type 2 (diabetes mellitus, type 2) ([address] Dysphagia, Eczema, Elevated liver enzymes, FTT (failure to thrive) in adult, Hoarseness, Hypertension, Hypomagnesemia, Hyponatremia, Intertrochanteric fracture of right femur ([address] Iron deficiency anemia, Lesion of hard palate, Moderate malnutrition ([address] New onset seizure ([address] Poor historian, and Smoker    Review Of Systems:   She is vented and unresponsive       Past Medical History:   Diagnosis Date    Alcohol withdrawal ([address]     Alcohol withdrawal seizure with complication ([facility] 21/3/9087    Alcohol-induced chronic pancreatitis ([facility] 78/0/2755    Alcoholic hepatitis 43/8/7266    Cellulitis of right hip 12/18/2016    COPD (chronic obstructive pulmonary disease) ([facility] 10/2/2015    Diabetes mellitus ([facility]     \"borderline\"    DM type 2 (diabetes mellitus, type 2) ([facility] 10/2/2015    Dysphagia     Eczema     Elevated liver enzymes 10/2/2015    FTT (failure to thrive) in adult 10/2/2015    Hoarseness     Hypertension     denies,used to take bp meds    Hypomagnesemia     Hyponatremia 12/7/2016    Intertrochanteric fracture of right femur ([facility] 12/7/2016    Iron deficiency anemia 12/18/2016    Lesion of hard palate     rt side,dx with laryngoscopy 8/11/16    Moderate malnutrition ([facility] 10/2/2015    New onset seizure ([facility]     states last one was oct 2015, but states told she had an \"alcohol seizure\" this past month    Poor historian     Smoker 10/2/2015       Past Surgical History:   Procedure Laterality Date    FEMUR FRACTURE SURGERY Right 12/07/2016    HIP FRACTURE SURGERY Left     LARYNGOSCOPY  10/28/2016    biopsie base of tongue    TONSILLECTOMY      UPPER GASTROINTESTINAL ENDOSCOPY N/A 8/23/2021    EGD BIOPSY performed by Renzo Chan MD at [address] [place] [address]       Prior to Admission medications    Medication Sig Start Date End Date Taking?  Authorizing Provider   propranolol (INDERAL) 10 MG tablet Take 1 tablet by mouth 3 times daily 8/26/21   Schaefer Carls P Blood, DO   magnesium oxide (MAG-OX) 400 (241.3 Mg) MG TABS tablet Take 1 tablet by mouth 2 times daily 8/26/21   Schaefer Carls P Blood, DO   folic acid (FOLVITE) 1 MG tablet Take 1 tablet by mouth daily 8/25/21   Schaefer Carls P Blood, DO   pantoprazole (PROTONIX) 40 MG tablet Take 1 tablet by mouth every morning (before breakfast) 8/25/21   Duplin Musca Blood, DO   thiamine 100 MG tablet Take 1 tablet by mouth daily 8/26/21   Schaefer Carls P Blood, DO   salmeterol (SEREVENT DISKUS) 50 MCG/DOSE diskus inhaler Inhale than 25 mintutes face to face, more than 50% was in counseling regarding diet, exercise and carbohydrate intake. 1 puff into the lungs 2 times daily    Historical Provider, MD   albuterol sulfate HFA (PROVENTIL HFA) 108 (90 Base) MCG/ACT inhaler Inhale 2 puffs into the lungs every 6 hours as needed for Wheezing or Shortness of Breath 6/29/21   RONI Sweet - CNP   diphenhydrAMINE (BENADRYL) 25 MG capsule Take 1 capsule by mouth every 6 hours as needed for Itching 9/17/20   Archana Castellanos, DO   Calcium-Vitamin D 600-200 MG-UNIT TABS Take 1 tablet by mouth 2 times daily    Historical Provider, MD   albuterol (PROVENTIL) (2.5 MG/3ML) 0.083% nebulizer solution Take 3 mLs by nebulization every 6 hours as needed for Wheezing 2/8/17   Aaron Dwyer,    calcium carbonate (CALCIUM 600) 600 MG TABS tablet Take 1 tablet by mouth daily Do not take with iron 2/8/17   Hoang Dwyer, DO       Scheduled Meds:   insulin lispro  0-6 Units SubCUTAneous Q6H    valproate sodium (DEPACON) IVPB  750 mg IntraVENous Q12H    thiamine and folic acid IVPB   IntraVENous Daily    sodium chloride flush  5-40 mL IntraVENous 2 times per day    ipratropium-albuterol  1 ampule Inhalation 4x daily    methylPREDNISolone  60 mg IntraVENous Q8H    cefTRIAXone (ROCEPHIN) IV  1,000 mg IntraVENous Q24H    azithromycin  500 mg IntraVENous Q24H    pantoprazole  40 mg IntraVENous Daily    chlorhexidine  15 mL Mouth/Throat BID    polyvinyl alcohol  1 drop Both Eyes Q4H    budesonide  0.5 mg Nebulization BID     Continuous Infusions:   sodium chloride 20 mL/hr at 02/28/22 0000    dextrose      sodium chloride      phenylephrine (SHANON-SYNEPHRINE) 50mg/250mL infusion      norepinephrine Stopped (02/26/22 1210)    midazolam (VERSED) 1 mg/mL in D5W infusion 7 mg/hr (02/28/22 2248)    cisatracurium (NIMBEX) infusion Stopped (02/25/22 2138)    fentaNYL Stopped (02/26/22 1125)    propofol 45 mcg/kg/min (03/01/22 0802)     PRN Meds:glucose, glucagon (rDNA), dextrose, dextrose bolus (hypoglycemia) **OR** dextrose bolus (hypoglycemia), sodium chloride flush, sodium chloride, ondansetron **OR** [DISCONTINUED] ondansetron, polyethylene glycol, acetaminophen **OR** acetaminophen, hydrALAZINE, perflutren lipid microspheres, ondansetron, potassium chloride    Allergies   Allergen Reactions    Ibuprofen Nausea And Vomiting       Social History     Socioeconomic History    Marital status: Single     Spouse name: Not on file    Number of children: Not on file    Years of education: Not on file    Highest education level: Not on file   Occupational History    Not on file   Tobacco Use    Smoking status: Current Every Day Smoker     Packs/day: 1.00     Years: 35.00     Pack years: 35.00     Types: Cigarettes    Smokeless tobacco: Never Used   Substance and Sexual Activity    Alcohol use: Yes     Comment: daily    Drug use: No    Sexual activity: Not on file   Other Topics Concern    Not on file   Social History Narrative    Not on file     Social Determinants of Health     Financial Resource Strain:     Difficulty of Paying Living Expenses: Not on file   Food Insecurity:     Worried About Running Out of Food in the Last Year: Not on file    A[place]in of Food in the Last Year: Not on file   Transportation Needs:     Lack of Transportation (Medical): Not on file    Lack of Transportation (Non-Medical):  Not on file   Physical Activity:     Days of Exercise per Week: Not on file    Minutes of Exercise per Session: Not on file   Stress:     Feeling of Stress : Not on file   Social Connections:     Frequency of Communication with Friends and Family: Not on file    Frequency of Social Gatherings with Friends and Family: Not on file    Attends Caodaism Services: Not on file    Active Member of Clubs or Organizations: Not on file    Attends Club or Organization Meetings: Not on file    Marital Status: Not on file   Intimate Partner Violence:     Fear of Current or Ex-Partner: Not on file    Emotionally Abused: Not on file  Physically Abused: Not on file    Sexually Abused: Not on file   Housing Stability:     Unable to Pay for Housing in the Last Year: Not on file    Number of Places Lived in the Last Year: Not on file    Unstable Housing in the Last Year: Not on file       Family History   Problem Relation Age of Onset    Heart Disease Mother     Diabetes Father     Asthma Sister     Asthma Brother          Physical Exam:  Vitals:    03/01/22 0808 03/01/22 0930 03/01/22 1000 03/01/22 1100   BP:   133/80 (!) 145/83   Pulse: 102 90 83 84   Resp: 16 17 18 18   Temp:       TempSrc:       SpO2: 98% 95% 94% 95%   Weight:       Height:         I/O last 3 completed shifts: In: 2888.8 [I.V.:1247. 9; NG/GT:347; IV Piggyback:1293.9]  Out: 3221 [Urine:1125]      General:  Vented, sedated  HEENT: Atraumatic, normocephalic. Chest: Clear, on vent  Cardiovascular: tachycardic, S1S2, no murmur, rub or gallop. No lower extremity edema. Abdomen: Soft, non tender to palpation. Active bowel sounds x 4 quadrants. Musculoskeletal:. No cyanosis   Integumentary: Pink, warm and dry. CNS: Questionable seizure activity  Data:    CBC:   Recent Labs     03/01/22  0540   WBC 9.8   HGB 9.9*   HCT 30.0*   .7   *     BMP:    Recent Labs     02/27/22  1730 02/28/22  0515 03/01/22  0540    139 137   K 3.4* 3.5* 4.1    105 103   CO2 26 21 24   BUN 17 17 17   CREATININE 0.72 0.50 <0.40*   GLUCOSE 150* 150* 160*     CMP:   Recent Labs     03/01/22  0540      K 4.1      CO2 24   BUN 17   CREATININE <0.40*   GLUCOSE 160*   CALCIUM 8.9      Hepatic:   No results for input(s): AST, ALT, ALB, BILITOT, ALKPHOS in the last 72 hours. BNP: No results for input(s): BNP in the last 72 hours. INR:   No results for input(s): INR in the last 72 hours. PTH: No results for input(s): PTH in the last 72 hours.   Phosphorus:   Recent Labs     03/01/22  0540   PHOS 2.4*     Ionized Calcium: No results for input(s): IONCA in the last 72 hours. Magnesium:   Recent Labs     03/01/22  0540   MG 2.1     Albumin:   No results for input(s): LABALBU in the last 72 hours. Last 3 CK, CKMB, Troponin: No results for input(s): CKTOTAL, CKMB, TROPONINI in the last 72 hours. Lipids: No results found for: CHOL, HDL  Urine:    Lab Results   Component Value Date    LORENZO 20 02/26/2022    PROTEINU TRACE 02/24/2022         Radiology:  Reviewed      Assessment:   Primary hypertension    Alcohol withdrawal seizure with complication (HCC)    Hypokalemia, hypophosphatemia, hyponatremia    Alcohol-induced chronic pancreatitis (HCC)    Alcoholic hepatitis    Elevated liver enzymes    FTT (failure to thrive) in adult    Moderate malnutrition (HCC)    Tobacco abuse    COPD without exacerbation ([address]    Noncompliance    Intertrochanteric fracture of right femur (HCC)    Macrocytic anemia    Cellulitis of right hip    Iron deficiency anemia    Type 2 diabetes mellitus without complication, without long-term current use of insulin (HCC)    GIB (gastrointestinal bleeding)    Alcohol abuse    Hepatic steatosis    Elevated LFTs    Dietary folate deficiency anemia    Alcohol dependence with unspecified alcohol-induced disorder ([address]    Portal hypertensive gastropathy ([address]    Multifocal pneumonia    Cardiac arrest ([address]    Portal hypertension ([address]        Plan: Will replete Phosphorus  Closely monitor labs, renal function stable at this point  Has anoxic brain injury by MRI. Neurology following  I/O's  Avoid NSAIDs      Thank you for the consultation. Please do not hesitate to call with questions. We will continue to follow with you.      Electronically signed by Rebel Lambert MD on 3/1/2022 at [address] [place] [address] [facility].   Ph: [phone]

## 2022-03-01 NOTE — PROGRESS NOTES
Section of Cardiology  Progress Note      Date:  3/1/2022  Patient: Clement Fuller  Admission:  2/24/2022  3:48 AM  Admit DX: Cardiac arrest ([address] [I46.9]  Age:  63 y.o., 1960     LOS: 5 days     Reason for evaluation:   Cardiopulmonary arrest, tachycardia      SUBJECTIVE:     The patient was seen and examined. Notes and labs reviewed. The patient is requiring more sedation due to seizure activity. She remains sedated on the ventilator. Her family met with palliative care yesterday and is considering withdrawal of care. OBJECTIVE:      EXAM:   Vitals:    VITALS:  BP (!) 146/93   Pulse 102   Temp 97.7 °F (36.5 °C) (Bladder)   Resp 16   Ht 5' 4\" (1.626 m)   Wt 97 lb (44 kg)   SpO2 98%   BMI 16.65 kg/m²   24HR INTAKE/OUTPUT:      Intake/Output Summary (Last 24 hours) at 3/1/2022 0942  Last data filed at 3/1/2022 0600  Gross per 24 hour   Intake 2461.66 ml   Output 750 ml   Net 1711.66 ml       CONSTITUTIONAL: Sedated, on the ventilator, no signs of acute distress  HEENT: Normal jugular venous pulsations  LUNGS: Few scattered rhonchi heard throughout anteriorly, nonlabored  CARDIOVASCULAR:  regular rate and rhythm, normal S1 and S2, no S3 or S4, and no murmur or rub noted. SKIN: Warm and dry.   EXTREMITIES: No edema    Current Inpatient Medications:   insulin lispro  0-6 Units SubCUTAneous Q6H    valproate sodium (DEPACON) IVPB  750 mg IntraVENous Q12H    thiamine and folic acid IVPB   IntraVENous Daily    sodium chloride flush  5-40 mL IntraVENous 2 times per day    ipratropium-albuterol  1 ampule Inhalation 4x daily    methylPREDNISolone  60 mg IntraVENous Q8H    cefTRIAXone (ROCEPHIN) IV  1,000 mg IntraVENous Q24H    azithromycin  500 mg IntraVENous Q24H    pantoprazole  40 mg IntraVENous Daily    chlorhexidine  15 mL Mouth/Throat BID    polyvinyl alcohol  1 drop Both Eyes Q4H    budesonide  0.5 mg Nebulization BID       IV Infusions (if any):   sodium chloride 20 mL/hr at 02/28/22 0000    dextrose      sodium chloride      phenylephrine (SHANON-SYNEPHRINE) 50mg/250mL infusion      norepinephrine Stopped (02/26/22 1210)    midazolam (VERSED) 1 mg/mL in D5W infusion 7 mg/hr (02/28/22 2248)    cisatracurium (NIMBEX) infusion Stopped (02/25/22 2138)    fentaNYL Stopped (02/26/22 1125)    propofol 45 mcg/kg/min (03/01/22 0802)       Diagnostics:   Telemetry: Sinus rhythm with mild intermittent sinus tachycardia  EKG: Ordered yesterday, not available on the chart yet    ECHO: CONCLUSIONS     Summary  Left ventricle is normal in size and wall thickness. Global left ventricular systolic function is normal with an estimated  ejection fraction of 60 % . No obvious wall motion abnormality seen. Thickened mitral valve leaflets. Mild mitral regurgitation. No pericardial effusion seen.     Signature  ----------------------------------------------------------------------------   Electronically signed by Margo Buck(Sonographer) on 02/24/2022 12:37   PM  ----------------------------------------------------------------------------     ----------------------------------------------------------------------------   Electronically signed by Jose Gonzales(Interpreting physician) on   02/24/2022 05:19 PM  ----------------------------------------------------------------------------    Labs:   CBC:  Recent Labs     02/28/22 0515 03/01/22  0540   WBC 14.4* 9.8   HGB 9.9* 9.9*   HCT 29.4* 30.0*   * 126*     Magnesium:  Recent Labs     02/28/22 0515 03/01/22  0540   MG 2.3 2.1     BMP:  Recent Labs     02/28/22 0515 03/01/22  0540    137   K 3.5* 4.1   CALCIUM 8.4* 8.9   CO2 21 24   BUN 17 17   CREATININE 0.50 <0.40*   LABGLOM >60 Can not be calculated   GLUCOSE 150* 160*     BNP:No results for input(s): BNP, PROBNP in the last 72 hours. PT/INR:No results for input(s): PROTIME, INR in the last 72 hours. APTT:No results for input(s): APTT in the last 72 hours.   CARDIAC ENZYMES:No results for input(s): MYOGLOBIN, CKTOTAL, CKMB, CKMBINDEX, TROPHS, TROPONINT in the last 72 hours. FASTING LIPID PANEL:No results found for: HDL, LDLDIRECT, LDLCALC, TRIG  LIVER PROFILE:No results for input(s): AST, ALT, LABALBU, ALKPHOS, BILITOT, BILIDIR, IBILI, PROT, GLOB, ALBUMIN in the last 72 hours. ASSESSMENT:    · Status post cardiopulmonary arrest status post hypothermia protocol  · Sinus tachycardia with reported seizures, currently in sinus rhythm-preserved LV systolic function on echocardiogram done 2/24/2022  · Borderline QTC prolongation on admission EKG, no significant ventricular arrhythmia on telemetry alarm review   · Respiratory failure, managed by others  · Seizures/hypoxic encephalopathy, managed by others  · History of alcohol abuse  · Diabetes, management    PLAN:  1. Patient is maintaining sinus rhythm and family is reportedly considering withdrawal of care. Above discussed with the patient's nurse and Dr. Mau Chen.     Yadira Ortiz, APRN - CNP

## 2022-03-01 NOTE — PROGRESS NOTES
[facility]) Neurology Specialist  [address]  [facility], [address] [facility] 30:  [phone] or [phone]  FAX:  [phone]            Brief history: Milagros Soler is a 63 y.o. old female admitted on 2/24/2022 with anoxic encephalopathy     Subjective: No new neurological events overnight. The patient continues to be intubated. She is on sedation     Objective: /80   Pulse 83   Temp 97.7 °F (36.5 °C) (Bladder)   Resp 18   Ht 5' 4\" (1.626 m)   Wt 97 lb (44 kg)   SpO2 94%   BMI 16.65 kg/m²       Medications:    insulin lispro  0-6 Units SubCUTAneous Q6H    valproate sodium (DEPACON) IVPB  750 mg IntraVENous Q12H    thiamine and folic acid IVPB   IntraVENous Daily    sodium chloride flush  5-40 mL IntraVENous 2 times per day    ipratropium-albuterol  1 ampule Inhalation 4x daily    methylPREDNISolone  60 mg IntraVENous Q8H    cefTRIAXone (ROCEPHIN) IV  1,000 mg IntraVENous Q24H    azithromycin  500 mg IntraVENous Q24H    pantoprazole  40 mg IntraVENous Daily    chlorhexidine  15 mL Mouth/Throat BID    polyvinyl alcohol  1 drop Both Eyes Q4H    budesonide  0.5 mg Nebulization BID          Mental status   Patient is intubated. No spontaneous eye opening.     Patient is not purposefully following commands   Cranial nerves   Pupil response: Present   Corneal Reflex: Absent  Oculocephalic reflex: Not tested  Cough reflex: Present        Motor and sensory function  No motor response   DTR Intact symmetric  Babinski Absent   Gait Not tested        No results found for: LDLCALC, LDLCHOLESTEROL, LDLDIRECT  No components found for: CHLPL  No results found for: TRIG  No results found for: HDL  No results found for: LDLCALC  No results found for: LABVLDL  Lab Results   Component Value Date    LABA1C 5.2 12/07/2016     Lab Results   Component Value Date     12/07/2016     Lab Results   Component Value Date    [id number] 08/21/2021      Neurological work up:  CT head  CTA head and neck  MRI brain     2 D echo     Assessment recommendation:  Anoxic encephalopathy secondary to cardiac arrest 2/24/2022  Patient with history of alcoholism    MRI of the brain has been personally reviewed. The findings are consistent with diffuse anoxic changes. Given the abnormality on MRI scan, EEG showing generalized periodic discharges and lack of clinical improvement, the prognosis is guarded and chances for meaningful neurological improvement are minimal.    I discussed the MRI findings and clinical course in detail with the patient's daughter. The family leaning towards comfort care. Continue Depakote 750 mg twice daily    We will follow. This note is created with the assistance of a speech-recognition program. While intending to generate a document that actually reflects the content of the visit, the document can still have some errors including those of syntax and sound a- like substitutions which may escape proofreading. In such instances, actual meaning can be extrapolated by contextual derivation.

## 2022-03-02 NOTE — PROGRESS NOTES
Nephrology Progress Note    Reason for Consult:  Electrolyte abnormalities    Remains on vent support. Continues to have seizure activity. Renal function is stable. K low. Other electrolytes are stable. Requesting Physician:  Isela Carrington DO    HISTORY OF PRESENT ILLNESS:    The patient is a 63 y.o. female who presents with Cardiac arrest ([address] [I46.9]  She was initially cooled, she has been rewarmed during which she was hyperkalemic. She was treated with calcium gluconate, and Kayexalate. Repeat potassium improved to 4.4. Serum sodium this AM of 130, to have repeat labs at 6pm.   She has had some jerking movement today, ?seizures and is to have an EEG this evening. She has a history of alcoholic hepatitis, COPD, and non-insulin-dependent diabetes, who is brought in by EMS in cardiac arrest.  Per report, boyfriend woke up and found her unresponsive and foaming at the mouth, called 911.   No CPR was initiated at that time. Wilma Moraleshenry reported to [facility] patient was using breathing treatments all day long secondary to wheezing and shortness of breath.  EMS reports initial rhythm of asystole.   They performed CPR for 20 minutes, epi x1, and obtained ROSC.     Past Medical History:   has a past medical history of Alcohol withdrawal ([address] Alcohol withdrawal seizure with complication ([address] Alcohol-induced chronic pancreatitis ([address] Alcoholic hepatitis, Cellulitis of right hip, COPD (chronic obstructive pulmonary disease) ([address] Diabetes mellitus ([address] DM type 2 (diabetes mellitus, type 2) ([address] Dysphagia, Eczema, Elevated liver enzymes, FTT (failure to thrive) in adult, Hoarseness, Hypertension, Hypomagnesemia, Hyponatremia, Intertrochanteric fracture of right femur ([address] Iron deficiency anemia, Lesion of hard palate, Moderate malnutrition ([address] New onset seizure ([address] Poor historian, and Smoker    Review Of Systems:   Unable to obtain due to current clinical condition on ventilator.        Past Medical History:   Diagnosis Date    Alcohol withdrawal ([facility]     Alcohol withdrawal seizure with complication ([facility] 37/0/5946    Alcohol-induced chronic pancreatitis ([facility] 65/1/8371    Alcoholic hepatitis 99/6/9727    Cellulitis of right hip 12/18/2016    COPD (chronic obstructive pulmonary disease) ([facility] 10/2/2015    Diabetes mellitus ([facility]     \"borderline\"    DM type 2 (diabetes mellitus, type 2) ([facility] 10/2/2015    Dysphagia     Eczema     Elevated liver enzymes 10/2/2015    FTT (failure to thrive) in adult 10/2/2015    Hoarseness     Hypertension     denies,used to take bp meds    Hypomagnesemia     Hyponatremia 12/7/2016    Intertrochanteric fracture of right femur ([facility] 12/7/2016    Iron deficiency anemia 12/18/2016    Lesion of hard palate     rt side,dx with laryngoscopy 8/11/16    Moderate malnutrition ([facility] 10/2/2015    New onset seizure ([facility]     states last one was oct 2015, but states told she had an \"alcohol seizure\" this past month    Poor historian     Smoker 10/2/2015       Past Surgical History:   Procedure Laterality Date    FEMUR FRACTURE SURGERY Right 12/07/2016    HIP FRACTURE SURGERY Left     LARYNGOSCOPY  10/28/2016    biopsie base of tongue    TONSILLECTOMY      UPPER GASTROINTESTINAL ENDOSCOPY N/A 8/23/2021    EGD BIOPSY performed by Lea Grace MD at [address] [place] [address]       Prior to Admission medications    Medication Sig Start Date End Date Taking?  Authorizing Provider   propranolol (INDERAL) 10 MG tablet Take 1 tablet by mouth 3 times daily 8/26/21   Francella Cobia P Blood, DO   magnesium oxide (MAG-OX) 400 (241.3 Mg) MG TABS tablet Take 1 tablet by mouth 2 times daily 8/26/21   Francella Cobia P Blood, DO   folic acid (FOLVITE) 1 MG tablet Take 1 tablet by mouth daily 8/25/21   Francella Cobia P Blood, DO   pantoprazole (PROTONIX) 40 MG tablet Take 1 tablet by mouth every morning (before breakfast) 8/25/21   Francella Cobia P Blood, DO   thiamine 100 MG tablet Take 1 tablet by mouth daily 8/26/21 Anthony Joel, DO   salmeterol (SEREVENT DISKUS) 50 MCG/DOSE diskus inhaler Inhale 1 puff into the lungs 2 times daily    Historical Provider, MD   albuterol sulfate HFA (PROVENTIL HFA) 108 (90 Base) MCG/ACT inhaler Inhale 2 puffs into the lungs every 6 hours as needed for Wheezing or Shortness of Breath 6/29/21   Noemi Sanchez, APRN - CNP   diphenhydrAMINE (BENADRYL) 25 MG capsule Take 1 capsule by mouth every 6 hours as needed for Itching 9/17/20   Margarito Mohamud, DO   Calcium-Vitamin D 600-200 MG-UNIT TABS Take 1 tablet by mouth 2 times daily    Historical Provider, MD   albuterol (PROVENTIL) (2.5 MG/3ML) 0.083% nebulizer solution Take 3 mLs by nebulization every 6 hours as needed for Wheezing 2/8/17   Aaron Dwyer, DO   calcium carbonate (CALCIUM 600) 600 MG TABS tablet Take 1 tablet by mouth daily Do not take with iron 2/8/17   Claudetta Denis Karatsoridis, DO       Scheduled Meds:   insulin lispro  0-6 Units SubCUTAneous Q6H    methylPREDNISolone  500 mg IntraVENous Q6H    valproate sodium (DEPACON) IVPB  750 mg IntraVENous Q12H    thiamine and folic acid IVPB   IntraVENous Daily    sodium chloride flush  5-40 mL IntraVENous 2 times per day    ipratropium-albuterol  1 ampule Inhalation 4x daily    cefTRIAXone (ROCEPHIN) IV  1,000 mg IntraVENous Q24H    azithromycin  500 mg IntraVENous Q24H    pantoprazole  40 mg IntraVENous Daily    chlorhexidine  15 mL Mouth/Throat BID    polyvinyl alcohol  1 drop Both Eyes Q4H     Continuous Infusions:   sodium chloride Stopped (03/01/22 1127)    dextrose      sodium chloride      phenylephrine (SHANON-SYNEPHRINE) 50mg/250mL infusion      norepinephrine Stopped (02/26/22 1210)    midazolam (VERSED) 1 mg/mL in D5W infusion 7 mg/hr (03/02/22 1245)    cisatracurium (NIMBEX) infusion Stopped (02/25/22 2138)    fentaNYL Stopped (02/26/22 1125)    propofol 45 mcg/kg/min (03/02/22 1245)     PRN Meds:glucose, glucagon (rDNA), dextrose, dextrose bolus (hypoglycemia) **OR** dextrose bolus (hypoglycemia), sodium chloride flush, sodium chloride, ondansetron **OR** [DISCONTINUED] ondansetron, polyethylene glycol, acetaminophen **OR** acetaminophen, hydrALAZINE, perflutren lipid microspheres, ondansetron, potassium chloride    Allergies   Allergen Reactions    Ibuprofen Nausea And Vomiting       Social History     Socioeconomic History    Marital status: Single     Spouse name: Not on file    Number of children: Not on file    Years of education: Not on file    Highest education level: Not on file   Occupational History    Not on file   Tobacco Use    Smoking status: Current Every Day Smoker     Packs/day: 1.00     Years: 35.00     Pack years: 35.00     Types: Cigarettes    Smokeless tobacco: Never Used   Substance and Sexual Activity    Alcohol use: Yes     Comment: daily    Drug use: No    Sexual activity: Not on file   Other Topics Concern    Not on file   Social History Narrative    Not on file     Social Determinants of Health     Financial Resource Strain:     Difficulty of Paying Living Expenses: Not on file   Food Insecurity:     Worried About Running Out of Food in the Last Year: Not on file    Aydin of Food in the Last Year: Not on file   Transportation Needs:     Lack of Transportation (Medical): Not on file    Lack of Transportation (Non-Medical):  Not on file   Physical Activity:     Days of Exercise per Week: Not on file    Minutes of Exercise per Session: Not on file   Stress:     Feeling of Stress : Not on file   Social Connections:     Frequency of Communication with Friends and Family: Not on file    Frequency of Social Gatherings with Friends and Family: Not on file    Attends [employer] Services: Not on file    Active Member of Clubs or Organizations: Not on file    Attends Club or Organization Meetings: Not on file    Marital Status: Not on file   Intimate Partner Violence:     Fear of Current or Ex-Partner: Not on file  Emotionally Abused: Not on file    Physically Abused: Not on file    Sexually Abused: Not on file   Housing Stability:     Unable to Pay for Housing in the Last Year: Not on file    Number of Places Lived in the Last Year: Not on file    Unstable Housing in the Last Year: Not on file       Family History   Problem Relation Age of Onset    Heart Disease Mother     Diabetes Father     Asthma Sister     Asthma Brother          Physical Exam:  Vitals:    03/02/22 1132 03/02/22 1135 03/02/22 1200 03/02/22 1300   BP:   (!) 151/90 122/73   Pulse:  85 100 84   Resp: 18 18 18 18   Temp:   98.6 °F (37 °C)    TempSrc:   Bladder    SpO2: 95% 95% 96% 94%   Weight:       Height:         I/O last 3 completed shifts: In: 1887.6 [I.V.:737.7; NG/GT:354; IV Piggyback:795.9]  Out: 3237 [Urine:1340; Stool:1]      General:  Vented, sedated  HEENT: Atraumatic, normocephalic. Chest: Clear, on vent  Cardiovascular: tachycardic, S1S2, no murmur, rub or gallop. No lower extremity edema. Abdomen: Soft, non tender to palpation. Active bowel sounds x 4 quadrants. Musculoskeletal:. No cyanosis   Integumentary: Pink, warm and dry. CNS: Questionable seizure activity, tremors  Data:    CBC:   Recent Labs     03/02/22  1200   WBC 6.1   HGB 9.0*   HCT 27.1*   .0*   *     BMP:    Recent Labs     03/02/22  0005 03/02/22  0535 03/02/22  1200    138 136   K 3.9 3.9 3.6*    102 100   CO2 28 26 27   BUN 17 17 17   CREATININE 0.42* 0.46* 0.45*   GLUCOSE 169* 157* 220*     CMP:   Recent Labs     03/02/22  1200      K 3.6*      CO2 27   BUN 17   CREATININE 0.45*   GLUCOSE 220*   CALCIUM 8.2*   PROT 5.3*   LABALBU 2.8*   BILITOT 0.14*   ALKPHOS 94   AST 88*   ALT 63*      Hepatic:   Recent Labs     03/02/22  0005 03/02/22  0535 03/02/22  1200   * 100* 88*   ALT 72* 67* 63*   BILITOT 0.17* 0.23* 0.14*   ALKPHOS 99 101 94     BNP: No results for input(s): BNP in the last 72 hours.   INR:   Recent Labs     03/02/22  0005 03/02/22  0535 03/02/22  1200   INR 1.0 1.0 1.0     PTH: No results for input(s): PTH in the last 72 hours. Phosphorus:   Recent Labs     03/02/22  1200   PHOS 2.6     Ionized Calcium: No results for input(s): IONCA in the last 72 hours. Magnesium:   Recent Labs     03/02/22  1200   MG 2.0     Albumin:   Recent Labs     03/02/22  1200   LABALBU 2.8*     Last 3 CK, CKMB, Troponin: No results for input(s): CKTOTAL, CKMB, TROPONINI in the last 72 hours. Lipids: No results found for: CHOL, HDL  Urine:    Lab Results   Component Value Date    LORENZO 20 02/26/2022    PROTEINU NEGATIVE 03/02/2022         Radiology:  Reviewed      Assessment:   Primary hypertension    Alcohol withdrawal seizure with complication (HCC)    Hypokalemia, hypophosphatemia, hyponatremia    Alcohol-induced chronic pancreatitis (HCC)    Alcoholic hepatitis    Elevated liver enzymes    FTT (failure to thrive) in adult    Moderate malnutrition (HCC)    Tobacco abuse    COPD without exacerbation ([address]    Noncompliance    Intertrochanteric fracture of right femur (HCC)    Macrocytic anemia    Cellulitis of right hip    Iron deficiency anemia    Type 2 diabetes mellitus without complication, without long-term current use of insulin (HCC)    GIB (gastrointestinal bleeding)    Alcohol abuse    Hepatic steatosis    Elevated LFTs    Dietary folate deficiency anemia    Alcohol dependence with unspecified alcohol-induced disorder ([address]    Portal hypertensive gastropathy ([address]    Multifocal pneumonia    Cardiac arrest ([address]    Portal hypertension ([address]        Plan: Will replace potassium. Plans to terminally wean in the AM noted. Has anoxic brain injury by MRI. Neurology following. Monitor I/O's  Avoid NSAIDs and IV contrast.  Follow up labs in the AM.   Please do not hesitate to call with questions. We will continue to follow with you. Patient seen in collaboration with Dr. Kailyn Singh. Electronically signed by RONI Marin CNP on 3/2/2022 at 2:37 PM  [facility].   Ph: [phone]    Physician Addendum  I evaluated the patient with CNP   Agree with above assessment and plan      Electronically signed by Sue Jo MD on 03/03/22 4:10 PM

## 2022-03-02 NOTE — PROGRESS NOTES
Pulmonary Critical Care Progress Note  Sonia Chapman MD     Patient seen for the follow up of acute hypoxic respiratory failure, COPD exacerbation,  Cardiac arrest ([facility]     Subjective:  She is sedated on ventilator. She is on Versed and propofol secondary to seizures. Her blood pressure is stable. She remains unresponsive. She is tolerating tube feeds. She has been evaluated by [facility] for organ donation. Tentatively is going to the OR sometime tomorrow. Examination:  Vitals: /73   Pulse 84   Temp 98.6 °F (37 °C) (Bladder)   Resp 18   Ht 5' 4\" (1.626 m)   Wt 97 lb (44 kg)   SpO2 94%   BMI 16.65 kg/m²   General appearance: Sedated on ventilator, unresponsive  Neck: No JVD  Lungs: Decreased breath sounds, no crackles or wheezing  Heart: regular rate and rhythm, S1, S2 normal, no gallop  Abdomen: Soft, non tender, + BS  Extremities: no cyanosis or clubbing. No significant edema    LABs:  CBC:   Recent Labs     03/02/22  0535 03/02/22  1200   WBC 7.2 6.1   HGB 9.4* 9.0*   HCT 28.9* 27.1*   * 114*     BMP:   Recent Labs     03/02/22  0535 03/02/22  1200    136   K 3.9 3.6*   CO2 26 27   BUN 17 17   CREATININE 0.46* 0.45*   LABGLOM >60 >60   GLUCOSE 157* 220*     ABG:  Lab Results   Component Value Date    FIO2 30.0 03/02/2022       Lab Results   Component Value Date    POCPH 7.539 03/02/2022    POCPCO2 31.7 03/02/2022    POCPO2 70.5 03/02/2022    POCHCO3 27.1 03/02/2022    NBEA 2 02/26/2022    PBEA 4 03/02/2022    ENFS6VWC 96 03/02/2022    FIO2 30.0 03/02/2022     Radiology:  X-ray chest 3/2/2022      MRI brain 2/28  Diffuse abnormal signal on the diffusion-weighted images involving the cortex   of the supratentorial brain as well as the basal ganglia which is highly   suggestive of anoxic/ischemic injury.       Cerebral atrophy.  Mild chronic small vessel ischemic changes.         Impression:  · Acute hypoxic respiratory failure  · Status post cardiac arrest?  V. tach,?

## 2022-03-02 NOTE — PLAN OF CARE
This is a 72-year-old female male with cardiac arrest and subsequent anoxic brain injury with associated myoclonic jerking/seizure-like activity. She has irreversible brain damage due to anoxia as result of her cardiac arrest with ventilator dependence. She has been unable to undergo ventilator weaning trial due to need for sedation for the myoclonic jerking/seizure-like activity. The resulting anoxic brain injury is irreversible and life is not sustainable without palliative support. She is a prior registered organ donor and per her prior wishes family has consented to terminal wean and organ donation.

## 2022-03-02 NOTE — PROGRESS NOTES
[facility]  Office: [address], DO, Damaso Calloway, DO, Marii Gamboa, DO, Kaia Landers Blood, DO, Hayes Yost MD, Urmila Cardoza MD, Victoriano Chacon MD, Gaviota Shah MD, Bonnie Hodgkin, MD, Norma Leong MD, Benja Brennan MD, Preeti Lynch, DO, Willard Vizcarra, DO, Yonathan Villela MD,  Linnea Rodriguez, DO, Farhad Hernandez MD, Marti Viera MD, Kevin Gomes MD, Svetlana Marin MD, Alcides Pak MD, Raad Holt, [employer], [facility] Jw, CNP, Drea Ochoa, CNP, Flavio Khanna, CNS, Maddie George, CNP, Carmita Velazquez, CNP, Joel Sin, CNP, Camila Burroughs, CNP, Manuel Crooks, CNP, Radonna Aschoff, PA-C, Kirsten Myers [facility], Ethan Putnam, [facility], Zane Ames, CNP, Braxton Patel, CNP, Andrés López, CNP, Abad Corona, CNP, Shravan Sanches, CNP, Daniel Ibarra, [facility]    Progress Note    3/2/2022    7:58 AM    Name:   Yessenia Espinal  MRN:     [id number]     Acct:      [de-identified]   Room:   2029/2029-01   Day:  6  Admit Date:  2/24/2022  3:48 AM    PCP:   Delvin Michel PA-C  Code Status:  Full Code    Subjective:     C/C:   Chief Complaint   Patient presents with    Cardiac Arrest     Interval History Status: not changed. Patient remains intubated and sedated, no response to verbal or tactile stimuli. Brief History: This is a 61-year-old female who is admitted with an episode of unresponsiveness and subsequent cardiac arrest.  She was found down unresponsive by her  called 911 and when EMS arrived she was in cardiac arrest with asystole. She underwent CPR with epinephrine and routine ACLS protocol and ROSC was obtained after approximately 20 minutes. She is remained unresponsive here and has MRI evidence of anoxic brain injury. Review of Systems:     Cannot be obtained due to respiratory failure    Medications: Allergies:     Allergies   Allergen Reactions    Ibuprofen Nausea And Vomiting Current Meds:   Scheduled Meds:    insulin lispro  0-6 Units SubCUTAneous Q6H    methylPREDNISolone  500 mg IntraVENous Q6H    valproate sodium (DEPACON) IVPB  750 mg IntraVENous Q12H    thiamine and folic acid IVPB   IntraVENous Daily    sodium chloride flush  5-40 mL IntraVENous 2 times per day    ipratropium-albuterol  1 ampule Inhalation 4x daily    cefTRIAXone (ROCEPHIN) IV  1,000 mg IntraVENous Q24H    azithromycin  500 mg IntraVENous Q24H    pantoprazole  40 mg IntraVENous Daily    chlorhexidine  15 mL Mouth/Throat BID    polyvinyl alcohol  1 drop Both Eyes Q4H     Continuous Infusions:    sodium chloride Stopped (03/01/22 1127)    dextrose      sodium chloride      phenylephrine (SHANON-SYNEPHRINE) 50mg/250mL infusion      norepinephrine Stopped (02/26/22 1210)    midazolam (VERSED) 1 mg/mL in D5W infusion 7 mg/hr (03/01/22 1641)    cisatracurium (NIMBEX) infusion Stopped (02/25/22 2138)    fentaNYL Stopped (02/26/22 1125)    propofol 45 mcg/kg/min (03/02/22 0016)     PRN Meds: glucose, glucagon (rDNA), dextrose, dextrose bolus (hypoglycemia) **OR** dextrose bolus (hypoglycemia), sodium chloride flush, sodium chloride, ondansetron **OR** [DISCONTINUED] ondansetron, polyethylene glycol, acetaminophen **OR** acetaminophen, hydrALAZINE, perflutren lipid microspheres, ondansetron, potassium chloride    Data:     Past Medical History:   has a past medical history of Alcohol withdrawal ([facility] Alcohol withdrawal seizure with complication ([facility] Alcohol-induced chronic pancreatitis ([facility] Alcoholic hepatitis, Cellulitis of right hip, COPD (chronic obstructive pulmonary disease) ([facility] Diabetes mellitus ([facility] DM type 2 (diabetes mellitus, type 2) ([facility] Dysphagia, Eczema, Elevated liver enzymes, FTT (failure to thrive) in adult, Hoarseness, Hypertension, Hypomagnesemia, Hyponatremia, Intertrochanteric fracture of right femur ([facility] Iron deficiency anemia, Lesion of hard palate, Moderate malnutrition ([address] New onset seizure ([facility] Poor historian, and Smoker. Social History:   reports that she has been smoking cigarettes. She has a 35.00 pack-year smoking history. She has never used smokeless tobacco. She reports current alcohol use. She reports that she does not use drugs. Family History:   Family History   Problem Relation Age of Onset    Heart Disease Mother     Diabetes Father     Asthma Sister     Asthma Brother        Vitals:  BP (!) 143/84   Pulse 98   Temp 98.4 °F (36.9 °C) (Bladder)   Resp 18   Ht 5' 4\" (1.626 m)   Wt 97 lb (44 kg)   SpO2 93%   BMI 16.65 kg/m²   Temp (24hrs), Av.1 °F (36.7 °C), Min:97.5 °F (36.4 °C), Max:98.8 °F (37.1 °C)    Recent Labs     22  1024 22  1159 22  1644 22  0541   POCGLU 159* 199* 164* 171*       I/O (24Hr):     Intake/Output Summary (Last 24 hours) at 3/2/2022 0758  Last data filed at 3/2/2022 0330  Gross per 24 hour   Intake 1413.58 ml   Output 916 ml   Net 497.58 ml       Labs:  Hematology:  Recent Labs     22  0540 22  1800 22  0005 22  0535   WBC 9.8 9.2  --  7.2   RBC 2.92* 2.71*  --  2.83*   HGB 9.9* 9.2*  --  9.4*   HCT 30.0* 27.9*  --  28.9*   .7 103.0*  --  102.1   MCH 33.9* 33.9*  --  33.2   MCHC 33.0 33.0  --  32.5   RDW 11.9 12.0  --  11.8   * 118*  --  118*   MPV 10.4 10.5  --  10.5   INR  --  1.0 1.0 1.0     Chemistry:  Recent Labs     22  0540 22  0540 22  1800 22  0005 22  0535      < > 137 138 138   K 4.1   < > 3.7 3.9 3.9      < > 101 101 102   CO2 24   < > 28 28 26   GLUCOSE 160*   < > 161* 169* 157*   BUN 17   < > 17 17 17   CREATININE <0.40*   < > 0.41* 0.42* 0.46*   MG 2.1   < > 2.0 2.0 1.9   ANIONGAP 10   < > 8* 9 10   LABGLOM Can not be calculated   < > >60 >60 >60   GFRAA Can not be calculated   < > >60 >60 >60   CALCIUM 8.9   < > 8.4* 8.5* 8.5*   CAION 1.23  --  1.16  --   --    PHOS 2.4*   < > 3.0 2.8 2.8    < > = values in this interval not displayed. Recent Labs     02/28/22  1938 03/01/22  0459 03/01/22  1024 03/01/22  1159 03/01/22  1644 03/01/22  1800 03/02/22  0005 03/02/22  0535 03/02/22  0541   PROT  --   --   --   --   --  5.3* 5.4* 5.4*  --    LABALBU  --   --   --   --   --  2.8* 2.8* 3.0*  --    AST  --   --   --   --   --  125* 110* 100*  --    ALT  --   --   --   --   --  74* 72* 67*  --    ALKPHOS  --   --   --   --   --  98 99 101  --    BILITOT  --   --   --   --   --  0.16* 0.17* 0.23*  --    BILIDIR  --   --   --   --   --  0.09 0.09 0.13  --    POCGLU 131* 166* 159* 199* 164*  --   --   --  171*     ABG:  Lab Results   Component Value Date    POCPH 7.535 03/02/2022    POCPCO2 33.8 03/02/2022    POCPO2 72.0 03/02/2022    POCHCO3 28.5 03/02/2022    NBEA 2 02/26/2022    PBEA 6 03/02/2022    ZLMB7RSE 96 03/02/2022    FIO2 30.0 03/02/2022     Lab Results   Component Value Date/Time    SPECIAL 18ML RT RADIAL 02/24/2022 08:36 AM     Lab Results   Component Value Date/Time    CULTURE NO GROWTH 5 DAYS 02/24/2022 08:36 AM       Radiology:  CT HEAD WO CONTRAST    Result Date: 2/24/2022  Similar appearing CT scan of the head without acute intracranial abnormality. Mild cortical atrophy with chronic microvascular ischemic changes. CT ABDOMEN PELVIS W IV CONTRAST Additional Contrast? None    Result Date: 2/24/2022  No acute abnormality evident. Mild emphysema. Status post ORIF right hip fracture. XR CHEST PORTABLE    Result Date: 3/1/2022  Endotracheal tube has been retracted in the interval, now 6.5 cm above the katy. Recommend advancement 2 cm for optimal positioning. XR CHEST PORTABLE    Result Date: 2/28/2022  No acute cardiopulmonary process. Support tubes as described above. XR CHEST PORTABLE    Result Date: 2/27/2022  No focal consolidation or pneumothorax. Stable support tubes and line. XR CHEST PORTABLE    Result Date: 2/26/2022  Endotracheal tube now 6.2 cm above the katy. Remaining support lines and tubes stable. Lungs remain clear with redemonstrated probable skin fold versus small pneumothorax left upper lung. There do appear to be lung markings beyond this. XR CHEST PORTABLE    Result Date: 2/25/2022  Endotracheal tube tip is 4 cm above the katy. Right femoral central venous catheter tip is at the SVC/RA junction region. Nasogastric tube tip is in the stomach. Probable skin fold projecting over the left apex which can be reassessed on follow-up. Findings were discussed with Lydia Ruiz RN at 8:08 am on 2/25/2022. XR CHEST PORTABLE    Result Date: 2/24/2022  Endotracheal tube appears in satisfactory position. Negative chest.     CT CHEST PULMONARY EMBOLISM W CONTRAST    Result Date: 2/24/2022  No pulmonary emboli are identified. Right anterior 3rd, 4th, 5th and 7th rib fractures are seen. Left anterior 7th rib fracture, and questionable 4th and 5th anterolateral rib fractures. Mild bronchial thickening, with atelectatic changes. Focal clustered nodule seen in the right upper lobe, the largest of which measuring just under 8 mm in size. An inflammatory process is favored. Recommendations as below for follow-up. No spiculated lung mass, consolidation or pneumothorax. RECOMMENDATIONS: Multiple pulmonary nodules. Most severe: 8 mm right solid pulmonary nodule within the upper lobe. Recommend a non-contrast Chest CT at 3-6 months, then another non-contrast Chest CT at 18-24 months. These guidelines do not apply to immunocompromised patients and patients with cancer. Follow up in patients with significant comorbidities as clinically warranted. For lung cancer screening, adhere to Lung-RADS guidelines. Reference: Radiology. 2017; 284(1):228-43. XR ABDOMEN FOR NG/OG/NE TUBE PLACEMENT    Result Date: 2/25/2022  Endotracheal tube tip is 4 cm above the katy. Right femoral central venous catheter tip is at the SVC/RA junction region.  Nasogastric tube tip is in the stomach. Probable skin fold projecting over the left apex which can be reassessed on follow-up. Findings were discussed with Flako Britt RN at 8:08 am on 2/25/2022. MRI BRAIN WO CONTRAST    Result Date: 2/28/2022  Diffuse abnormal signal on the diffusion-weighted images involving the cortex of the supratentorial brain as well as the basal ganglia which is highly suggestive of anoxic/ischemic injury. Cerebral atrophy. Mild chronic small vessel ischemic changes. Physical Examination:        General appearance: Sedated and vented  Mental Status: Cannot be assessed due to respiratory failure  Lungs: Scattered wheezes bilaterally, vented  Heart:  regular rate and rhythm, no murmur  Abdomen:  soft, nontender, nondistended, normal bowel sounds, no masses, hepatomegaly, splenomegaly  Extremities:  no edema, redness, tenderness in the calves  Skin:  no gross lesions, rashes, induration    Assessment:        Hospital Problems           Last Modified POA    * (Principal) Cardiac arrest ([address] 2/24/2022 Yes    Primary hypertension 2/24/2022 Yes    Tobacco abuse (Chronic) 2/24/2022 Yes    COPD without exacerbation ([address] 2/24/2022 Yes    Type 2 diabetes mellitus without complication, without long-term current use of insulin (HCC) (Chronic) 2/24/2022 Yes    Alcohol abuse 2/24/2022 Yes    Portal hypertension ([address] 2/24/2022 Yes    Severe malnutrition ([address] 2/26/2022 Yes    Hypoxic ischemic encephalopathy 2/27/2022 Yes    Anoxic brain damage ([address] 3/1/2022 Yes    On mechanically assisted ventilation ([address] 3/1/2022 Yes          Plan:        Continue vent support  Neurology evaluation for anoxic brain injury   Insulin scale for glycemic control  Continue present antihypertensives, adjust as needed  GI DVT prophylaxis  Nutritional supplements  Aerosol protocol  Trend labs, correct electrolytes as needed  Anticipate withdrawal of care in the next 24 hours with organ procurement. [facility] following.   Will need to discuss CODE STATUS with family when available    Clay Fraire DO  3/2/2022  7:58 AM

## 2022-03-02 NOTE — CONSULTS
Clinical Ethics Consultation Note    History and Context:  Principal Problem:    Cardiac arrest [facility])  Active Problems:    Primary hypertension    Tobacco abuse    COPD without exacerbation ([address]    Type 2 diabetes mellitus without complication, without long-term current use of insulin (HCC)    Alcohol abuse    Portal hypertension (HCC)    Severe malnutrition (HCC)    Hypoxic ischemic encephalopathy    Anoxic brain damage (HCC)    On mechanically assisted ventilation (HCC)  Resolved Problems:    * No resolved hospital problems. *    Age: 63 y.o. Gender: female  Gender Identity: female  Admitted Date: 2/24/2022  Consult Date: 03/02/22  MRN: [id number]  Valid Advanced Medical Directive: No    Process:  Writer consulted per [facility] policy regarding organ donation process. Writer reviews chart; speaks with bedside RN; speaks with [facility] ; speaks with CMO. Writer notes Palliative Care is onboard. Writer checks in with [facility] contact regarding their process. Writer speaks with patient's daughter Reese Gaitan) at bedside. Writer clarifies patient's daughter is only child of patient and is default healthcare decision maker per [place] Revised code. Ethical Question(s) and Concern(s):  There appear to be no ethical questions or concerns at this time. Analysis:  All parties appear to be following appropriate processes regarding decision making. All parties appear to be in agreement with treatment plan. Recommendations:  Recommend moving forward with plan, in accord with all pertinent policies, at time agreed upon by all parties. Closing: Thank you for this consult. I will continue to follow with you.     Rich Guerrero MDiv, [address]    Primary Ethical Theme: Primary Ethical Themes: End of Life related  Secondary Ethical Theme: Secondary Ethical Themes: Treatment appropriateness

## 2022-03-02 NOTE — PROGRESS NOTES
[facility] PALLIATIVE CARE NURSING ASSESSMENT    Patient: Aayush Colvin  Room: 2029/2029-01    Reason For Consult   Goals of care evaluation  Distress management  Guidance and support  Facilitate communications  Assistance in coordinating care    Code Status: Full Code      Impression: Aayush Colvin is a 63y.o. year old female  has a past medical history of Alcohol withdrawal ([address] Alcohol withdrawal seizure with complication ([address] Alcohol-induced chronic pancreatitis ([address] Alcoholic hepatitis, Cellulitis of right hip, COPD (chronic obstructive pulmonary disease) ([address] Diabetes mellitus ([address] DM type 2 (diabetes mellitus, type 2) ([address] Dysphagia, Eczema, Elevated liver enzymes, FTT (failure to thrive) in adult, Hoarseness, Hypertension, Hypomagnesemia, Hyponatremia, Intertrochanteric fracture of right femur ([address] Iron deficiency anemia, Lesion of hard palate, Moderate malnutrition ([address] New onset seizure ([address] Poor historian, and Smoker. .  Currently hospitalized for the management of cardiac arrest.  The Palliative Care Team is following to assist with goals of care. Vital Signs  Blood pressure 131/73, pulse 91, temperature 98.6 °F (37 °C), temperature source Bladder, resp. rate 16, height 5' 4\" (1.626 m), weight 97 lb (44 kg), SpO2 97 %, not currently breastfeeding.     Patient Active Problem List   Diagnosis    Primary hypertension    Alcohol withdrawal seizure with complication (HCC)    Alcohol withdrawal ([address]    Hypokalemia    Alcohol-induced chronic pancreatitis (HCC)    Alcoholic hepatitis    Elevated liver enzymes    FTT (failure to thrive) in adult    Moderate malnutrition (HCC)    Tobacco abuse    COPD without exacerbation ([address]    Noncompliance    Intertrochanteric fracture of right femur (HCC)    Hyponatremia    Macrocytic anemia    Cellulitis of right hip    Iron deficiency anemia    Type 2 diabetes mellitus without complication, without long-term current use of insulin

## 2022-03-02 NOTE — FLOWSHEET NOTE
Writer entered room and was introduced to family member by the Director of [facility] who was providing spiritual support. Writer was asked if he would provide a prayer for the patient and family on tomorrow, 3/3/2022. Writer stated he will be honored to do so. Spiritual care will follow up.     03/02/22 1159   Encounter Summary   Services provided to: Patient and family together   Referral/Consult From: Palliative Care   Continue Visiting   (3/2/2022)   Complexity of Encounter Low   Length of Encounter 15 minutes   Routine   Type Follow up   Assessment Calm; Approachable   Intervention Active listening;Sustaining presence/ Ministry of presence   Outcome Expressed gratitude Name Of Product 7: Tacrolimus ointment 0.1%

## 2022-03-02 NOTE — PROGRESS NOTES
Plan is for terminal extubation and organ harvesting is in process. Cardiology will sign off.     RONI Wylie - CNP

## 2022-03-02 NOTE — PROGRESS NOTES
[facility]) Neurology Specialist  [address]  [place], [address] [facility]:  [phone] or [phone]  FAX:  [phone]            Brief history: Donna Gordillo is a 63 y.o. old female admitted on 2/24/2022 with anoxic encephalopathy     Subjective: No new neurological events overnight. The patient continues to be intubated. Objective: /73   Pulse 84   Temp 98.6 °F (37 °C) (Bladder)   Resp 18   Ht 5' 4\" (1.626 m)   Wt 97 lb (44 kg)   SpO2 94%   BMI 16.65 kg/m²       Medications:    insulin lispro  0-6 Units SubCUTAneous Q6H    methylPREDNISolone  500 mg IntraVENous Q6H    valproate sodium (DEPACON) IVPB  750 mg IntraVENous Q12H    thiamine and folic acid IVPB   IntraVENous Daily    sodium chloride flush  5-40 mL IntraVENous 2 times per day    ipratropium-albuterol  1 ampule Inhalation 4x daily    cefTRIAXone (ROCEPHIN) IV  1,000 mg IntraVENous Q24H    azithromycin  500 mg IntraVENous Q24H    pantoprazole  40 mg IntraVENous Daily    chlorhexidine  15 mL Mouth/Throat BID    polyvinyl alcohol  1 drop Both Eyes Q4H        Mental status   Patient is intubated. No spontaneous eye opening.     Patient is not purposefully following commands   Cranial nerves   Pupil response: Present   Corneal Reflex: Absent  Oculocephalic reflex: Not tested  Cough reflex: Present        Motor and sensory function  No motor response   DTR Intact symmetric  Babinski Absent   Gait Not tested        No results found for: LDLCALC, LDLCHOLESTEROL, LDLDIRECT  No components found for: CHLPL  No results found for: TRIG  No results found for: HDL  No results found for: LDLCALC  No results found for: LABVLDL  Lab Results   Component Value Date    LABA1C 5.2 12/07/2016     Lab Results   Component Value Date     12/07/2016     Lab Results   Component Value Date    [id number] 08/21/2021      Neurological work up:  CT head  CTA head and neck  MRI brain     2 D echo     Assessment recommendation:  Anoxic encephalopathy secondary to cardiac arrest 2/24/2022  Patient with history of alcoholism    No improvement patient neurological condition. The family is leaning towards comfort care with possible organ donation tomorrow  No further recommendation at this time. We will sign off. Please call with questions. Thank you for the consult      This note is created with the assistance of a speech-recognition program. While intending to generate a document that actually reflects the content of the visit, the document can still have some errors including those of syntax and sound a- like substitutions which may escape proofreading. In such instances, actual meaning can be extrapolated by contextual derivation.

## 2022-03-02 NOTE — PROGRESS NOTES
Nephrology Progress Note    Reason for Consult:  Electrolyte abnormalities    Remains on vent support. Continues to have seizure activity. Renal function is stable. K low. Other electrolytes are stable. Requesting Physician:  Olu Devi DO    HISTORY OF PRESENT ILLNESS:    The patient is a 63 y.o. female who presents with Cardiac arrest ([address] [I46.9]  She was initially cooled, she has been rewarmed during which she was hyperkalemic. She was treated with calcium gluconate, and Kayexalate. Repeat potassium improved to 4.4. Serum sodium this AM of 130, to have repeat labs at 6pm.   She has had some jerking movement today, ?seizures and is to have an EEG this evening. She has a history of alcoholic hepatitis, COPD, and non-insulin-dependent diabetes, who is brought in by EMS in cardiac arrest.  Per report, boyfriend woke up and found her unresponsive and foaming at the mouth, called 911.   No CPR was initiated at that time. Zoran Carreno reported to [facility] patient was using breathing treatments all day long secondary to wheezing and shortness of breath.  EMS reports initial rhythm of asystole.   They performed CPR for 20 minutes, epi x1, and obtained ROSC.     Past Medical History:   has a past medical history of Alcohol withdrawal ([address] Alcohol withdrawal seizure with complication ([address] Alcohol-induced chronic pancreatitis ([address] Alcoholic hepatitis, Cellulitis of right hip, COPD (chronic obstructive pulmonary disease) ([address] Diabetes mellitus ([address] DM type 2 (diabetes mellitus, type 2) ([address] Dysphagia, Eczema, Elevated liver enzymes, FTT (failure to thrive) in adult, Hoarseness, Hypertension, Hypomagnesemia, Hyponatremia, Intertrochanteric fracture of right femur ([address] Iron deficiency anemia, Lesion of hard palate, Moderate malnutrition ([address] New onset seizure ([address] Poor historian, and Smoker    Review Of Systems:   Unable to obtain due to current clinical condition on ventilator.        Past Medical History:   Diagnosis Date    Alcohol withdrawal ([facility]     Alcohol withdrawal seizure with complication ([facility] 39/1/9411    Alcohol-induced chronic pancreatitis ([facility] 68/6/1372    Alcoholic hepatitis 02/3/5543    Cellulitis of right hip 12/18/2016    COPD (chronic obstructive pulmonary disease) [facility] 10/2/2015    Diabetes mellitus ([facility]     \"borderline\"    DM type 2 (diabetes mellitus, type 2) ([facility] 10/2/2015    Dysphagia     Eczema     Elevated liver enzymes 10/2/2015    FTT (failure to thrive) in adult 10/2/2015    Hoarseness     Hypertension     denies,used to take bp meds    Hypomagnesemia     Hyponatremia 12/7/2016    Intertrochanteric fracture of right femur ([facility] 12/7/2016    Iron deficiency anemia 12/18/2016    Lesion of hard palate     rt side,dx with laryngoscopy 8/11/16    Moderate malnutrition ([facility] 10/2/2015    New onset seizure ([facility]     states last one was oct 2015, but states told she had an \"alcohol seizure\" this past month    Poor historian     Smoker 10/2/2015       Past Surgical History:   Procedure Laterality Date    FEMUR FRACTURE SURGERY Right 12/07/2016    HIP FRACTURE SURGERY Left     LARYNGOSCOPY  10/28/2016    biopsie base of tongue    TONSILLECTOMY      UPPER GASTROINTESTINAL ENDOSCOPY N/A 8/23/2021    EGD BIOPSY performed by Rina Rizvi MD at [facility]       Prior to Admission medications    Medication Sig Start Date End Date Taking?  Authorizing Provider   propranolol (INDERAL) 10 MG tablet Take 1 tablet by mouth 3 times daily 8/26/21   Lakhwinder Theresa P Blood, DO   magnesium oxide (MAG-OX) 400 (241.3 Mg) MG TABS tablet Take 1 tablet by mouth 2 times daily 8/26/21   Lakhwinder Theresa P Blood, DO   folic acid (FOLVITE) 1 MG tablet Take 1 tablet by mouth daily 8/25/21   Lakhwinder Theresa P Blood, DO   pantoprazole (PROTONIX) 40 MG tablet Take 1 tablet by mouth every morning (before breakfast) 8/25/21   Lakhwinder Theresa P Blood, DO   thiamine 100 MG tablet Take 1 tablet by mouth daily 8/26/21 Anthony Joel, DO   salmeterol (SEREVENT DISKUS) 50 MCG/DOSE diskus inhaler Inhale 1 puff into the lungs 2 times daily    Historical Provider, MD   albuterol sulfate HFA (PROVENTIL HFA) 108 (90 Base) MCG/ACT inhaler Inhale 2 puffs into the lungs every 6 hours as needed for Wheezing or Shortness of Breath 6/29/21   [facility], APRN - CNP   diphenhydrAMINE (BENADRYL) 25 MG capsule Take 1 capsule by mouth every 6 hours as needed for Itching 9/17/20   Malissa Hogan, DO   Calcium-Vitamin D 600-200 MG-UNIT TABS Take 1 tablet by mouth 2 times daily    Historical Provider, MD   albuterol (PROVENTIL) (2.5 MG/3ML) 0.083% nebulizer solution Take 3 mLs by nebulization every 6 hours as needed for Wheezing 2/8/17   Aaron Dwyer, DO   calcium carbonate (CALCIUM 600) 600 MG TABS tablet Take 1 tablet by mouth daily Do not take with iron 2/8/17   Katarzyna Dwyer, DO       Scheduled Meds:   insulin lispro  0-6 Units SubCUTAneous Q6H    methylPREDNISolone  500 mg IntraVENous Q6H    valproate sodium (DEPACON) IVPB  750 mg IntraVENous Q12H    thiamine and folic acid IVPB   IntraVENous Daily    sodium chloride flush  5-40 mL IntraVENous 2 times per day    ipratropium-albuterol  1 ampule Inhalation 4x daily    cefTRIAXone (ROCEPHIN) IV  1,000 mg IntraVENous Q24H    azithromycin  500 mg IntraVENous Q24H    pantoprazole  40 mg IntraVENous Daily    chlorhexidine  15 mL Mouth/Throat BID    polyvinyl alcohol  1 drop Both Eyes Q4H     Continuous Infusions:   sodium chloride Stopped (03/01/22 1127)    dextrose      sodium chloride      phenylephrine (SHANON-SYNEPHRINE) 50mg/250mL infusion      norepinephrine Stopped (02/26/22 1210)    midazolam (VERSED) 1 mg/mL in D5W infusion 7 mg/hr (03/02/22 1515)    cisatracurium (NIMBEX) infusion Stopped (02/25/22 2138)    fentaNYL Stopped (02/26/22 1125)    propofol 45 mcg/kg/min (03/02/22 1515)     PRN Meds:glucose, glucagon (rDNA), dextrose, dextrose bolus (hypoglycemia) **OR** dextrose bolus (hypoglycemia), sodium chloride flush, sodium chloride, ondansetron **OR** [DISCONTINUED] ondansetron, polyethylene glycol, acetaminophen **OR** acetaminophen, hydrALAZINE, perflutren lipid microspheres, ondansetron, potassium chloride    Allergies   Allergen Reactions    Ibuprofen Nausea And Vomiting       Social History     Socioeconomic History    Marital status: Single     Spouse name: Not on file    Number of children: Not on file    Years of education: Not on file    Highest education level: Not on file   Occupational History    Not on file   Tobacco Use    Smoking status: Current Every Day Smoker     Packs/day: 1.00     Years: 35.00     Pack years: 35.00     Types: Cigarettes    Smokeless tobacco: Never Used   Substance and Sexual Activity    Alcohol use: Yes     Comment: daily    Drug use: No    Sexual activity: Not on file   Other Topics Concern    Not on file   Social History Narrative    Not on file     Social Determinants of Health     Financial Resource Strain:     Difficulty of Paying Living Expenses: Not on file   Food Insecurity:     Worried About Running Out of Food in the Last Year: Not on file    [place] of Food in the Last Year: Not on file   Transportation Needs:     Lack of Transportation (Medical): Not on file    Lack of Transportation (Non-Medical):  Not on file   Physical Activity:     Days of Exercise per Week: Not on file    Minutes of Exercise per Session: Not on file   Stress:     Feeling of Stress : Not on file   Social Connections:     Frequency of Communication with Friends and Family: Not on file    Frequency of Social Gatherings with Friends and Family: Not on file    Attends Buddhist Services: Not on file    Active Member of Clubs or Organizations: Not on file    Attends Club or Organization Meetings: Not on file    Marital Status: Not on file   Intimate Partner Violence:     Fear of Current or Ex-Partner: Not on file  Emotionally Abused: Not on file    Physically Abused: Not on file    Sexually Abused: Not on file   Housing Stability:     Unable to Pay for Housing in the Last Year: Not on file    Number of Places Lived in the Last Year: Not on file    Unstable Housing in the Last Year: Not on file       Family History   Problem Relation Age of Onset    Heart Disease Mother     Diabetes Father     Asthma Sister     Asthma Brother          Physical Exam:  Vitals:    03/02/22 1300 03/02/22 1425 03/02/22 1442 03/02/22 1500   BP: 122/73 134/87  131/73   Pulse: 84 97 96 91   Resp: 18 20 22 16   Temp:       TempSrc:       SpO2: 94% 97% 98% 97%   Weight:       Height:         I/O last 3 completed shifts: In: 1887.6 [I.V.:737.7; NG/GT:354; IV Piggyback:795.9]  Out: 0333 [Urine:1340; Stool:1]      General:  Vented, sedated  HEENT: Atraumatic, normocephalic. Chest: Clear, on vent  Cardiovascular: tachycardic, S1S2, no murmur, rub or gallop. No lower extremity edema. Abdomen: Soft, non tender to palpation. Active bowel sounds x 4 quadrants. Musculoskeletal:. No cyanosis   Integumentary: Pink, warm and dry. CNS: Questionable seizure activity, tremors  Data:    CBC:   Recent Labs     03/02/22  1200   WBC 6.1   HGB 9.0*   HCT 27.1*   .0*   *     BMP:    Recent Labs     03/02/22  0005 03/02/22  0535 03/02/22  1200    138 136   K 3.9 3.9 3.6*    102 100   CO2 28 26 27   BUN 17 17 17   CREATININE 0.42* 0.46* 0.45*   GLUCOSE 169* 157* 220*     CMP:   Recent Labs     03/02/22  1200      K 3.6*      CO2 27   BUN 17   CREATININE 0.45*   GLUCOSE 220*   CALCIUM 8.2*   PROT 5.3*   LABALBU 2.8*   BILITOT 0.14*   ALKPHOS 94   AST 88*   ALT 63*      Hepatic:   Recent Labs     03/02/22  0005 03/02/22  0535 03/02/22  1200   * 100* 88*   ALT 72* 67* 63*   BILITOT 0.17* 0.23* 0.14*   ALKPHOS 99 101 94     BNP: No results for input(s): BNP in the last 72 hours.   INR:   Recent Labs by Dayami Gunter MD on 3/2/2022 at [facility].   Ph: [phone]      Physician Addendum  I evaluated the patient with CNP   Agree with above assessment and plan      Electronically signed by Phoenix Carpio MD on 03/02/22 4:05 PM

## 2022-03-02 NOTE — PROGRESS NOTES
Verification made with coroners office, [facility]  stated that patient will not be a coroners case.

## 2022-03-03 NOTE — FLOWSHEET NOTE
Writer was asked by family to provide prayer for patient and family prior to extubation for organ donation. Writer provided words of comfort as well as a prayer of celebration for the life of the patient. Writer will follow up once extubation has been completed.      03/03/22 1531   Encounter Summary   Services provided to: Patient and family together   Referral/Consult From: [address] Significant other;Children;Family members;Palliative Care   Continue Visiting   (3/3/2022)   Complexity of Encounter Moderate   Length of Encounter 3 hours   Spiritual Assessment Completed Yes   Spiritual/Scientology   Type Spiritual support   Assessment Tearful;Grieving   Intervention Active listening;Prayer;Sustaining presence/ Ministry of presence   Outcome Expressed gratitude;Engaged in conversation

## 2022-03-03 NOTE — PROGRESS NOTES
Pulmonary Critical Care Progress Note  Harlan Douglass MD     Patient seen for the follow up of acute hypoxic respiratory failure, COPD exacerbation,  Cardiac arrest [facility])     Subjective:  Patient is intubated and on ventilator. She is on Versed and propofol secondary to seizures. Her blood pressure is stable. She remains unresponsive. She is tolerating tube feeds. She has been evaluated by [facility] for organ donation. Examination:  Vitals: /75   Pulse 88   Temp 97.9 °F (36.6 °C) (Bladder)   Resp 18   Ht 5' 4\" (1.626 m)   Wt 97 lb (44 kg)   SpO2 97%   BMI 16.65 kg/m²   General appearance: Sedated on ventilator, unresponsive  Neck: No JVD  Lungs: Decreased breath sounds, no crackles or wheezing  Heart: regular rate and rhythm, S1, S2 normal, no gallop  Abdomen: Soft, non tender, + BS  Extremities: no cyanosis or clubbing. No significant edema    LABs:  CBC:   Recent Labs     03/02/22  1800 03/03/22  0530   WBC 6.9 6.0   HGB 9.3* 9.0*   HCT 27.8* 26.9*   * 102*     BMP:   Recent Labs     03/02/22  1800 03/03/22  0530    142   K 4.1 3.7   CO2 29 27   BUN 17 18   CREATININE <0.40* <0.40*   LABGLOM Can not be calculated Can not be calculated   GLUCOSE 158* 164*     ABG:  Lab Results   Component Value Date    FIO2 30.0 03/03/2022       Lab Results   Component Value Date    POCPH 7.540 03/03/2022    POCPCO2 34.3 03/03/2022    POCPO2 72.5 03/03/2022    POCHCO3 29.3 03/03/2022    NBEA 2 02/26/2022    PBEA 6 03/03/2022    QNNH2NZU 96 03/03/2022    FIO2 30.0 03/03/2022     Radiology:  X-ray chest 3/3/2022      MRI brain 2/28  Diffuse abnormal signal on the diffusion-weighted images involving the cortex   of the supratentorial brain as well as the basal ganglia which is highly   suggestive of anoxic/ischemic injury.       Cerebral atrophy.  Mild chronic small vessel ischemic changes.         Impression:  · Acute hypoxic respiratory failure  · Status post cardiac arrest?  V. tach,? Related to respiratory arrest versus seizure  · COPD with acute exacerbation   · severe ischemic encephalopathy  · Metabolic acidosis  · Hypothermia  · Gram positive sepsis  · Rib fractures secondary to CPR  · Elevated LFTs with history of EtOH abuse and portal hypertension  · Hyponatremia  · Active smoking  · Lung nodules, largest measuring 8 mm right upper lobe    Recommendations:  · Continue vent support, currently on 30% FiO2/PEEP 8  · Versed and propofol to control seizures  · DuoNeb via nebulizer as needed  · Status post hypothermia protocol   · Nephrology following  · Hydralazine IV as needed  · Monitor blood sugars  · IV Solu-Medrol 500 mg every 6 hours for organ donation  · Continue Rocephin and Zithromax  · IV Depacon, neurology on consult   · Cardiology on consult  · Off heparin drip  · Discussed with RN/RT  · GI prophylaxis with Protonix  · DVT prophylaxis, EPC's  · Organ donation services involved. Discussed with Dr. Sandra Velasco and also Dr. Eugenia Shell.   Electronically signed by     Josh Aviles MD on 3/3/2022 at 12:11 PM  Pulmonary Critical Care and Sleep Medicine,  [facility]  Cell: [phone]  Office: [phone]

## 2022-03-03 NOTE — FLOWSHEET NOTE
[address]   Patient Death Note  DEATH                  Room # STAZ OR Pool/NONE   Name: Donna Gordillo            Age: 63 y.o. Gender: female          Mandaeism: Non-Bahai        Admit Date & Time: 2022  3:48 AM        Actual date of death: 3/3/2022 TOD: 15:24      Referral:  Unit: CVICU   Nurse: ADONIS Landaverde [facility])    SITUATION AT DEATH:  Writer provided a family prayer over the patient (celebration of life) prior to the extubation. Writer provided spiritual support during extubation and passing of the patient as family members grieved. Carlee Nash assisted in escorting family members back to the chapel for further discussion of  home arrangments and final goodbyes. IS THIS A 'S CASE? No    SPIRITUAL/EMOTIONAL INTERVENTION:  (Please note all relevant care assessments and interventions and duties performed. Also include important family names, numbers, and dynamics)       DOCTOR SIGNING DEATH CERTIFICATE:  Name: Unknown  Phone number: Unknown    Copy of COMPLETED Release of Body Form Received? Yes    Patient's belongings: With family members    [address]:  Contacted Time 18:30  Name: Whitney Covington: Ana Adame  Phone Number: [phone]    NEXT OF KIN:  Name: Cristela Watson  Relationship: Daughter   Street Address: [address]   City: [place]: [facility]  Zip code: [address]  Phone Number: [phone]    [facility]?   No        Electronically signed by Cathy Leal on 3/3/2022 at 6:18 PM.  Shannon  [phone]

## 2022-03-03 NOTE — PROGRESS NOTES
Was present with family during extubation and end of life. Pt was medicated for comfort throughout the process. Emotional support offered to family.  present. Pt  and was taken to surgery. Family escorted to [address] [place] [address] to discuss  arrangements with . Family appreciative of support.     [id number], Sonali RN, [facility]

## 2022-03-03 NOTE — PROGRESS NOTES
[facility]  Office: [address], DO, Rossana March, DO, Wesly Guzman, DO, Haroon Hernández Blood, DO, Dong Jeter MD, Fran Tolentino MD, Philly Mcdonough MD, Bishop Galindo MD, Andres Cabrera MD, Manuel Salomon MD, Eduin Witt MD, Zaida Lopes, DO, Gissel Worrell, DO, Brandyn Ibrahim MD,  Virgen Goana, DO, Devika Retana MD, Gabby Lutz MD, Vaishali Rios MD, Tamie Sykes MD, Sanaz Buckley MD, Jose Thorne, [facility], [facility], CNP, Tima Regalado, CNP, [facility], CNS, Kenya Carrizales, CNP, oMnae Grant, CNP, Aron Hammer, CNP, Vani García, CNP, Tee Torres, CNP, Adelso Fleming PA-C, Vijay Mascorro [facility], Claudette Anis, [facility], Candido Cárdenas, CNP, Manuelito Pizarro, CNP, [facility], CNP, Blair Farris, CNP, Christina Randhawa, [facility], Imani Mcqueen, [facility]    Progress Note    3/3/2022    7:03 AM    Name:   Hola Haley  MRN:     [id number]     Acct:      [de-identified]   Room:   2029/2029-01   Day:  7  Admit Date:  2/24/2022  3:48 AM    PCP:   Margarita Mcnulty PA-C  Code Status:  Full Code    Subjective:     C/C:   Chief Complaint   Patient presents with    Cardiac Arrest     Interval History Status: not changed. Patient remains intubated and sedated, on propofol and Versed for sedation. Actively seizing at the time of evaluation this morning. Brief History: This is a 65-year-old female who is admitted with an episode of unresponsiveness and subsequent cardiac arrest.  She was found down unresponsive by her  called 911 and when EMS arrived she was in cardiac arrest with asystole. She underwent CPR with epinephrine and routine ACLS protocol and ROSC was obtained after approximately 20 minutes. She is remained unresponsive here and has MRI evidence of anoxic brain injury. Review of Systems:     Cannot be obtained due to respiratory failure    Medications: Allergies:     Allergies   Allergen Reactions    Ibuprofen Nausea And Vomiting       Current Meds:   Scheduled Meds:    insulin lispro  0-6 Units SubCUTAneous Q6H    methylPREDNISolone  500 mg IntraVENous Q6H    valproate sodium (DEPACON) IVPB  750 mg IntraVENous Q12H    thiamine and folic acid IVPB   IntraVENous Daily    sodium chloride flush  5-40 mL IntraVENous 2 times per day    ipratropium-albuterol  1 ampule Inhalation 4x daily    cefTRIAXone (ROCEPHIN) IV  1,000 mg IntraVENous Q24H    azithromycin  500 mg IntraVENous Q24H    pantoprazole  40 mg IntraVENous Daily    chlorhexidine  15 mL Mouth/Throat BID    polyvinyl alcohol  1 drop Both Eyes Q4H     Continuous Infusions:    sodium chloride Stopped (03/01/22 1127)    dextrose      sodium chloride      phenylephrine (SHANON-SYNEPHRINE) 50mg/250mL infusion      norepinephrine Stopped (02/26/22 1210)    midazolam (VERSED) 1 mg/mL in D5W infusion 7 mg/hr (03/03/22 0616)    cisatracurium (NIMBEX) infusion Stopped (02/25/22 2138)    fentaNYL Stopped (02/26/22 1125)    propofol 50 mcg/kg/min (03/03/22 0621)     PRN Meds: glucose, glucagon (rDNA), dextrose, dextrose bolus (hypoglycemia) **OR** dextrose bolus (hypoglycemia), sodium chloride flush, sodium chloride, ondansetron **OR** [DISCONTINUED] ondansetron, polyethylene glycol, acetaminophen **OR** acetaminophen, hydrALAZINE, perflutren lipid microspheres, ondansetron, potassium chloride    Data:     Past Medical History:   has a past medical history of Alcohol withdrawal ([address] Alcohol withdrawal seizure with complication ([address] Alcohol-induced chronic pancreatitis ([address] Alcoholic hepatitis, Cellulitis of right hip, COPD (chronic obstructive pulmonary disease) ([address] Diabetes mellitus ([address] DM type 2 (diabetes mellitus, type 2) ([address] Dysphagia, Eczema, Elevated liver enzymes, FTT (failure to thrive) in adult, Hoarseness, Hypertension, Hypomagnesemia, Hyponatremia, Intertrochanteric fracture of right femur ([address] Iron deficiency anemia, Lesion of hard palate, Moderate malnutrition ([address] New onset seizure ([facility] [address] Poor historian, and Smoker. Social History:   reports that she has been smoking cigarettes. She has a 35.00 pack-year smoking history. She has never used smokeless tobacco. She reports current alcohol use. She reports that she does not use drugs. Family History:   Family History   Problem Relation Age of Onset    Heart Disease Mother     Diabetes Father     Asthma Sister     Asthma Brother        Vitals:  /71   Pulse 79   Temp 97.5 °F (36.4 °C) (Bladder)   Resp 18   Ht 5' 4\" (1.626 m)   Wt 97 lb (44 kg)   SpO2 94%   BMI 16.65 kg/m²   Temp (24hrs), Av.3 °F (36.8 °C), Min:97.5 °F (36.4 °C), Max:99.1 °F (37.3 °C)    Recent Labs     22  1159 22  1644 22  0541 22  0141   POCGLU 199* 164* 171* 168*       I/O (24Hr):     Intake/Output Summary (Last 24 hours) at 3/3/2022 0703  Last data filed at 3/3/2022 0600  Gross per 24 hour   Intake 2431.16 ml   Output 1500 ml   Net 931.16 ml       Labs:  Hematology:  Recent Labs     22  1200 22  1800 22  0530   WBC 6.1 6.9 6.0   RBC 2.63* 2.72* 2.63*   HGB 9.0* 9.3* 9.0*   HCT 27.1* 27.8* 26.9*   .0* 102.2 102.3   MCH 34.2* 34.2* 34.2*   MCHC 33.2 33.5 33.5   RDW 11.9 11.9 11.9   * 119* 102*   MPV 10.6 10.9 11.1   INR 1.0 1.0 1.0     Chemistry:  Recent Labs     22  1800 22  2350 22  0535 22  0535 22  1200 22  1800 22  0530      < > 138   < > 136 137 142   K 3.7   < > 3.9   < > 3.6* 4.1 3.7      < > 102   < > 100 100 104   CO2 28   < > 26   < > 27 29 27   GLUCOSE 161*   < > 157*   < > 220* 158* 164*   BUN 17   < > 17   < > 17 17 18   CREATININE 0.41*   < > 0.46*   < > 0.45* <0.40* <0.40*   MG 2.0   < > 1.9   < > 2.0 2.0 1.9   ANIONGAP 8*   < > 10   < > 9 8* 11   LABGLOM >60   < > >60   < > >60 Can not be calculated Can not be calculated   GFRAA >60   < > >60   < > >60 Can not be calculated Can not be calculated   CALCIUM 8.4*   < > 8.5*   < > 8.2* 8.4* 8.0*   CAION 1.16  --  1.18  --   --  1.13  --    PHOS 3.0   < > 2.8   < > 2.6 2.6 2.9    < > = values in this interval not displayed. Recent Labs     03/01/22  0459 03/01/22  1024 03/01/22  1159 03/01/22  1644 03/01/22  1800 03/02/22  0535 03/02/22  0541 03/02/22  1200 03/02/22  1800 03/03/22  0141 03/03/22  0530   PROT  --   --   --   --    < >   < >  --  5.3* 5.4*  --  5.0*   LABALBU  --   --   --   --    < >   < >  --  2.8* 2.9*  --  2.9*   AST  --   --   --   --    < >   < >  --  88* 91*  --  76*   ALT  --   --   --   --    < >   < >  --  63* 65*  --  54*   ALKPHOS  --   --   --   --    < >   < >  --  94 99  --  92   BILITOT  --   --   --   --    < >   < >  --  0.14* 0.20*  --  0.22*   BILIDIR  --   --   --   --    < >   < >  --  0.09 0.10  --  0.11   POCGLU 166* 159* 199* 164*  --   --  171*  --   --  168*  --     < > = values in this interval not displayed. ABG:  Lab Results   Component Value Date    POCPH 7.557 03/03/2022    POCPCO2 33.7 03/03/2022    POCPO2 73.3 03/03/2022    POCHCO3 30.0 03/03/2022    NBEA 2 02/26/2022    PBEA 7 03/03/2022    IOAX7QSQ 96 03/03/2022    FIO2 30.0 03/03/2022     Lab Results   Component Value Date/Time    SPECIAL 18ML RT RADIAL 02/24/2022 08:36 AM     Lab Results   Component Value Date/Time    CULTURE NO GROWTH 5 DAYS 02/24/2022 08:36 AM       Radiology:  CT HEAD WO CONTRAST    Result Date: 2/24/2022  Similar appearing CT scan of the head without acute intracranial abnormality. Mild cortical atrophy with chronic microvascular ischemic changes. CT ABDOMEN PELVIS W IV CONTRAST Additional Contrast? None    Result Date: 2/24/2022  No acute abnormality evident. Mild emphysema. Status post ORIF right hip fracture. XR CHEST PORTABLE    Result Date: 3/1/2022  Endotracheal tube has been retracted in the interval, now 6.5 cm above the katy.   Recommend advancement 2 cm for optimal positioning. XR CHEST PORTABLE    Result Date: 2/28/2022  No acute cardiopulmonary process. Support tubes as described above. XR CHEST PORTABLE    Result Date: 2/27/2022  No focal consolidation or pneumothorax. Stable support tubes and line. XR CHEST PORTABLE    Result Date: 2/26/2022  Endotracheal tube now 6.2 cm above the katy. Remaining support lines and tubes stable. Lungs remain clear with redemonstrated probable skin fold versus small pneumothorax left upper lung. There do appear to be lung markings beyond this. XR CHEST PORTABLE    Result Date: 2/25/2022  Endotracheal tube tip is 4 cm above the katy. Right femoral central venous catheter tip is at the SVC/RA junction region. Nasogastric tube tip is in the stomach. Probable skin fold projecting over the left apex which can be reassessed on follow-up. Findings were discussed with Lydia Ruiz RN at 8:08 am on 2/25/2022. XR CHEST PORTABLE    Result Date: 2/24/2022  Endotracheal tube appears in satisfactory position. Negative chest.     CT CHEST PULMONARY EMBOLISM W CONTRAST    Result Date: 2/24/2022  No pulmonary emboli are identified. Right anterior 3rd, 4th, 5th and 7th rib fractures are seen. Left anterior 7th rib fracture, and questionable 4th and 5th anterolateral rib fractures. Mild bronchial thickening, with atelectatic changes. Focal clustered nodule seen in the right upper lobe, the largest of which measuring just under 8 mm in size. An inflammatory process is favored. Recommendations as below for follow-up. No spiculated lung mass, consolidation or pneumothorax. RECOMMENDATIONS: Multiple pulmonary nodules. Most severe: 8 mm right solid pulmonary nodule within the upper lobe. Recommend a non-contrast Chest CT at 3-6 months, then another non-contrast Chest CT at 18-24 months. These guidelines do not apply to immunocompromised patients and patients with cancer.  Follow up in patients with significant comorbidities as clinically warranted. For lung cancer screening, adhere to Lung-RADS guidelines. Reference: Radiology. 2017; 284(1):228-43. XR ABDOMEN FOR NG/OG/NE TUBE PLACEMENT    Result Date: 2/25/2022  Endotracheal tube tip is 4 cm above the katy. Right femoral central venous catheter tip is at the SVC/RA junction region. Nasogastric tube tip is in the stomach. Probable skin fold projecting over the left apex which can be reassessed on follow-up. Findings were discussed with Dong Vega RN at 8:08 am on 2/25/2022. MRI BRAIN WO CONTRAST    Result Date: 2/28/2022  Diffuse abnormal signal on the diffusion-weighted images involving the cortex of the supratentorial brain as well as the basal ganglia which is highly suggestive of anoxic/ischemic injury. Cerebral atrophy. Mild chronic small vessel ischemic changes.        Physical Examination:        General appearance: Sedated and vented  Mental Status: Cannot be assessed due to respiratory failure  Lungs: Scattered wheezes bilaterally, vented  Heart:  regular rate and rhythm, no murmur  Abdomen:  soft, nontender, nondistended, normal bowel sounds, no masses, hepatomegaly, splenomegaly  Extremities:  no edema, redness, tenderness in the calves  Skin:  no gross lesions, rashes, induration    Assessment:        Hospital Problems           Last Modified POA    * (Principal) Cardiac arrest ([address] 2/24/2022 Yes    Primary hypertension 2/24/2022 Yes    Tobacco abuse (Chronic) 2/24/2022 Yes    COPD without exacerbation ([address] 2/24/2022 Yes    Type 2 diabetes mellitus without complication, without long-term current use of insulin (HCC) (Chronic) 2/24/2022 Yes    Alcohol abuse 2/24/2022 Yes    Portal hypertension ([address] 2/24/2022 Yes    Severe malnutrition ([address] 2/26/2022 Yes    Hypoxic ischemic encephalopathy 2/27/2022 Yes    Anoxic brain damage ([address] 3/1/2022 Yes    On mechanically assisted ventilation ([address] 3/1/2022 Yes          Plan:        Continue vent support  Neurology evaluation for anoxic brain injury, positive irreversible injury  Insulin scale for glycemic control  Continue efforts for seizure control, Versed drip, lorazepam as needed  Monitor and control blood pressure  Nutritional supplements  Likely terminal wean and organ procurement today, life connections following  GI DVT prophylaxis  Palliative care evaluation  CODE STATUS discussion prior to terminal wean    Patricia Morales DO  3/3/2022  7:03 AM

## 2022-03-03 NOTE — PLAN OF CARE
Writer accompanied patient and staff to OR in preparation for organ procurement     Life sustaining support was terminated on 3/3/2022 at [phone]  Patient was noted to be in PEA at 1540   5 minutes auto-resuscitation period was observed  Patient was pronounced dead on 3/3/2022 at [phone]

## 2022-03-03 NOTE — PROGRESS NOTES
Nutrition Assessment     Type and Reason for Visit: Reassess    Nutrition Assessment:  Family has consented to terminal wean and organ donation. No additional nutrition intervention is planned at this time. Patient will be followed based on length of stay, unless otherwise requested.       Erlinda Sheldon RD, LD  Office Number: [phone]

## 2022-03-07 LAB — SURGICAL PATHOLOGY REPORT: NORMAL

## 2022-03-07 NOTE — PROGRESS NOTES
Physician Progress Note      Smith Hodge  CSN #:                  [id number]  :                       1960  ADMIT DATE:       2022 3:48 AM  DISCH DATE:        3/3/2022 8:01 PM  RESPONDING  PROVIDER #:        Kristopher GONZALEZ DO          QUERY TEXT:    Patient admitted following cardiac arrest.  If possible, please document in   progress notes the likely etiology of cardiac arrest:      The medical record reflects the following:  Risk Factors: COPD  Clinical Indicators: per ED notes: patient was using breathing treatments   all day long secondary to wheezing and shortness of breath. performed CPR for   20 minutes, epi x1, and obtained ROSC. LMA in airway, bilateral breath   sounds, heart rate 110, /131, sats 99%. Treatment: CPR with epinephrine and routine ACLS protocol  Options provided:  -- Cardiac Arrest likely due to Acute Respiratory Failure  -- Cardiac Arrest likely due to, please specify likely etiology of cardiac   arrest.  -- Other - I will add my own diagnosis  -- Disagree - Not applicable / Not valid  -- Disagree - Clinically unable to determine / Unknown  -- Refer to Clinical Documentation Reviewer    PROVIDER RESPONSE TEXT:    This patient had cardiac arrest likely due to acute respiratory failure.     Query created by: Tg Moon on 3/7/2022 1:43 PM      Electronically signed by:  Tonio Matias DO 3/7/2022 2:42 PM

## 2022-03-15 NOTE — PROGRESS NOTES
Physician Progress Note      Jairon Moralez  [facility] #:                  [id number]  :                       1960  ADMIT DATE:       2022 3:48 AM  DISCH DATE:        3/3/2022 8:01 PM  RESPONDING  PROVIDER #:        Radha GONZALEZ DO          QUERY TEXT:    Patient admitted with cardiac arrest following acute respiratory failure. Documentation reflects sepsis/gram positive sepsis by pulmonology/CC in notes   -3/3. Noted documentation of 1 of 2 blood cultures as contaminant and no   other identified source of infection. If possible, please provide further   clarity regarding documented sepsis:    The medical record reflects the following:  Risk Factors:  severe malnutrition, alcohol abuse with portal hypertension,   found unresponsive in cardiac arrest  Clinical Indicators:  no identified or apparent source of infection; UAs on   , 3/2 and 3/3 without bacteria, leukocyte esterase, or nitrites and 0-5   WBCs; CXR and CT chest with atelectasis, emphysema but without consolidation   or infiltrates;   CT A/P:  no acute findings;  Per - progress   notes: \"On empiric antibiotics, can stop vanco iv-the gpc isolated in blood   culture is contaminant. \"  Treatment: diagnostic testing and empiric antibiotics Zithromax and Rocephin,   Vancomycin discontinued  Options provided:  -- No suspected sepsis  -- Other - I will add my own diagnosis  -- Disagree - Not applicable / Not valid  -- Disagree - Clinically unable to determine / Unknown  -- Refer to Clinical Documentation Reviewer    PROVIDER RESPONSE TEXT:    Patient was not suspected to be septic.     Query created by: Ra Teixeira on 3/11/2022 8:04 AM      Electronically signed by:  Dorina Regalado DO 3/15/2022 9:48 AM

## 2023-03-10 NOTE — CARE COORDINATION
Social work: [address] from [facility] has advised that pt is accepted following her on site visit and that they were assuming they would approve and started precert this am.  Will need delmi and Rx at discharge. Will do hens.  Sheree johnson
Social work: Call back from [facility], they are full. Liz Mendez for more choices- she is looking over the list again. As previously charted, [facility] and [facility] are [facility] denied.
Social work: Dtr gave another SNF choice of [facility]. Call back from [facility], they are full. Phone call to dtr for another choice- she is going tor review the list this evening and will update sw tomorrow.
Social work: Dtr gave another snf choice of [facility], referral sent. HENS started. Will need precert once accepted.
Social work: Dtr gave more choices of [address], referrals faxed to both. UPDATE: Phone call from [facility], they denied patient; await response from [facility].
Social work: Long conversation had with patient and daughter in room regarding dc planning. Patient likely will benefit from SNF at discharge, PT/OT pending. Patient seems agreeable at this time, list reviewed and dtr gave choice of [facility] or [facility]. Will refer. Writer also offered ETOH resources, as patient has long h/o alcohol abuse. Pt's daughter very tearful, she wants her mom to get help she needs. AOD resource list provided and SW will f/u tomorrow to further discuss. Referral made to [facility] this date.
Social work: Phone call from [facility], [address] facility is full, no anticipated beds until end of next week. Mac Phillips will follow for their [place] facility, they want to see if patient's mentation improves overnight and will then need to do onsite. Daughter updated and is agreeable to [facility] of [place] location.
Social work: Phone call from admissions/[place] intake, authorization received for admission to [facility]. If patient is ready for admission contact central intake at [phone]. Fax: [phone].
Social work: Spoke with Patti/Jamila vera, anticipate auth today. She will call writer as soon as its received. Update: Writer advised patient not ready today, still waiting on authorization as well. To f/u on precert over weekend call [phone].
Social work: phone call from Jose Raul beatty, requesting referral to [facility], referral faxed to [phone].
Social work: spoke to pt who is referred to [facility]. [facility] is coming late this am to do an on site and if pt is not more care than they can handle due to confusion they will start precert. Will keep in touch with [facility] also. Will fax them PT/OT notes.   Angie johnson
Social work: t/c from [facility], both are out of network with [facility]. Msg to dtr for more choices.
Spoke with patient's daughter, Bran Ramos. [phone]. Daughter is very tearful and states that she has been trying to get her mom help, but patient just refuses. The patient was in a SNF in [place], where the daughter used to live. Daughter states that the patient was there for a month and doing well. Daughter wanted the patient to come live with her, but the patient refused and started drinking again. Patient currently lives in a [address] with her boyfriend, Cesar Chen. Bran Ramos states that the boyfriend has cancer. Patient did see her PCP Teri Collado 2 months ago. Patient does not use any DME. Her Hgb was 5.1 and she received 2 U PRBC. EGD was done today. Patient is week and will probably need a SNF. Writer gave daughter a SNF list.  Alexandro Mon will see the patient and daughter to give them resources for Alcohol abuse. Pastoral care is going to complete POA paperwork. Continue to follow.
Private car

## 2023-05-01 NOTE — PROGRESS NOTES
GI Progress notes    8/24/2021   10:45 AM    Name:  Toby Villarreal  MRN:    [id number]     Acct:     [de-identified]   Room:  [facility] Day: 3     Admit Date: 8/21/2021 12:30 PM  PCP: Cuauhtemoc Drummond PA-C    Subjective:     C/C:   Chief Complaint   Patient presents with    Emesis    Diarrhea       Interval History: Status: not changed. Patient seen and examined. No acute events overnight. S/p EGD yesterday for anemia, EtOH abuse, ? Melena - no obvious varices seen; has evidence of portal hypertensive gastropathy throughout stomach, small 2 cm HH. Hgb stable  No overt GI bleeding. No c/o abdominal pain, nausea, vomiting    ROS:  Constitutional: negative for chills, fevers and sweats    Gastrointestinal: negative for abdominal pain, constipation, diarrhea, nausea and vomiting      Medications: Allergies:    Allergies   Allergen Reactions    Ibuprofen Nausea And Vomiting       Current Meds: acetaminophen (TYLENOL) tablet 650 mg, Q4H PRN  pantoprazole (PROTONIX) tablet 40 mg, QAM AC  nicotine (NICODERM CQ) 21 MG/24HR 1 patch, Daily  ipratropium-albuterol (DUONEB) nebulizer solution 1 ampule, 4x daily  folic acid (FOLVITE) tablet 1 mg, Daily  albuterol (PROVENTIL) nebulizer solution 2.5 mg, Q2H PRN  0.9 % sodium chloride infusion, PRN  Arformoterol Tartrate (BROVANA) nebulizer solution 15 mcg, BID  sodium chloride flush 0.9 % injection 5-40 mL, 2 times per day  sodium chloride flush 0.9 % injection 5-40 mL, PRN  0.9 % sodium chloride infusion, PRN  ondansetron (ZOFRAN-ODT) disintegrating tablet 4 mg, Q8H PRN   Or  ondansetron (ZOFRAN) injection 4 mg, Q6H PRN  0.9% NaCl with KCl 40 mEq infusion, Continuous  pantoprazole (PROTONIX) 80 mg in sodium chloride 0.9 % 50 mL bolus, Once  sodium chloride (PF) 0.9 % injection 10 mL, Daily  potassium chloride (KLOR-CON M) extended release tablet 40 mEq, PRN   Or  potassium bicarb-citric acid (EFFER-K) effervescent tablet 40 mEq, PRN   Or  potassium chloride [employer] message sent:     Elevated total serum IgE, which is not uncommon for the patients with allergic rhinitis, asthma, food allergies, and/or eczema.  Sensitivity to Alternaria mold, cat, dog, grass pollen, and tree pollen noted.  Also, mild sensitivity to weed pollen noted as well.  - I will order allergen immunotherapy including all aeroallergens tested positive, even with slight sensitivity.  It will likely increase the number of vials for allergen immunotherapy. 10 mEq/100 mL IVPB (Peripheral Line), PRN  magnesium sulfate 1000 mg in dextrose 5% 100 mL IVPB, PRN  thiamine (B-1) injection 100 mg, Daily  LORazepam (ATIVAN) tablet 1 mg, Q3H PRN   Or  LORazepam (ATIVAN) injection 1 mg, Q3H PRN   Or  LORazepam (ATIVAN) tablet 2 mg, Q3H PRN   Or  LORazepam (ATIVAN) injection 2 mg, Q3H PRN        Data:     Code Status:  Full Code    Family History   Problem Relation Age of Onset    Heart Disease Mother     Diabetes Father     Asthma Sister     Asthma Brother        Social History     Socioeconomic History    Marital status: Single     Spouse name: Not on file    Number of children: Not on file    Years of education: Not on file    Highest education level: Not on file   Occupational History    Not on file   Tobacco Use    Smoking status: Current Every Day Smoker     Packs/day: 1.00     Years: 35.00     Pack years: 35.00     Types: Cigarettes    Smokeless tobacco: Never Used   Substance and Sexual Activity    Alcohol use: Yes     Comment: daily    Drug use: No    Sexual activity: Not on file   Other Topics Concern    Not on file   Social History Narrative    Not on file     Social Determinants of Health     Financial Resource Strain:     Difficulty of Paying Living Expenses:    Food Insecurity:     Worried About Running Out of Food in the Last Year:     Ran Out of Food in the Last Year:    Transportation Needs:     Lack of Transportation (Medical):      Lack of Transportation (Non-Medical):    Physical Activity:     Days of Exercise per Week:     Minutes of Exercise per Session:    Stress:     Feeling of Stress :    Social Connections:     Frequency of Communication with Friends and Family:     Frequency of Social Gatherings with Friends and Family:     Attends Sabianist Services:     Active Member of Clubs or Organizations:     Attends Club or Organization Meetings:     Marital Status:    Intimate Partner Violence:     Fear of Current or Ex-Partner:  Emotionally Abused:     Physically Abused:     Sexually Abused:        Vitals:  BP (!) 143/92   Pulse 103   Temp 98.5 °F (36.9 °C) (Oral)   Resp 27   Ht 5' 4\" (1.626 m)   Wt 105 lb (47.6 kg)   SpO2 100%   BMI 18.02 kg/m²   Temp (24hrs), Av.7 °F (37.1 °C), Min:97.7 °F (36.5 °C), Max:99.7 °F (37.6 °C)    Recent Labs     21  1057   POCGLU 115*       I/O (24Hr):     Intake/Output Summary (Last 24 hours) at 2021 1045  Last data filed at 2021 0800  Gross per 24 hour   Intake 3292.36 ml   Output 150 ml   Net 3142.36 ml       Labs:      CBC:   Lab Results   Component Value Date    WBC 4.2 2021    RBC 2.31 2021    HGB 8.4 2021    HCT 25.4 2021    MCV 98.7 2021    MCH 32.9 2021    MCHC 33.3 2021    RDW 16.7 2021     2021    MPV 10.8 2021     CBC with Differential:    Lab Results   Component Value Date    WBC 4.2 2021    RBC 2.31 2021    HGB 8.4 2021    HCT 25.4 2021     2021    MCV 98.7 2021    MCH 32.9 2021    MCHC 33.3 2021    RDW 16.7 2021    METASPCT 1 10/05/2015    LYMPHOPCT 11 2021    LYMPHOPCT 24 07/10/2013    MONOPCT 10 2021    MONOPCT 5 07/10/2013    EOSPCT 4 07/10/2013    BASOPCT 1 2021    BASOPCT 1 07/10/2013    MONOSABS 0.42 2021    MONOSABS 0.46 07/10/2013    LYMPHSABS 0.46 2021    LYMPHSABS 2.19 07/10/2013    EOSABS 0.21 2021    EOSABS 0.36 07/10/2013    BASOSABS 0.04 2021    DIFFTYPE NOT REPORTED 2021     Hemoglobin/Hematocrit:    Lab Results   Component Value Date    HGB 8.4 2021    HCT 25.4 2021     CMP:    Lab Results   Component Value Date     2021    K 4.9 2021     2021    CO2 19 2021    BUN 2 2021    CREATININE <0.40 2021    GFRAA CANNOT BE CALCULATED 2021    LABGLOM CANNOT BE CALCULATED 2021    GLUCOSE 122 2021    PROT 5.1 08/24/2021    LABALBU 2.4 08/24/2021    CALCIUM 7.0 08/23/2021    BILITOT 4.15 08/24/2021    ALKPHOS 299 08/24/2021     08/24/2021    ALT 65 08/24/2021     BMP:    Lab Results   Component Value Date     08/23/2021    K 4.9 08/23/2021     08/23/2021    CO2 19 08/23/2021    BUN 2 08/23/2021    LABALBU 2.4 08/24/2021    CREATININE <0.40 08/23/2021    CALCIUM 7.0 08/23/2021    GFRAA CANNOT BE CALCULATED 08/23/2021    LABGLOM CANNOT BE CALCULATED 08/23/2021    GLUCOSE 122 08/23/2021     PT/INR:    Lab Results   Component Value Date    PROTIME 14.0 08/22/2021    INR 1.1 08/22/2021     PTT:    Lab Results   Component Value Date    APTT 28.0 08/22/2021   [APTT}    Physical Examination:        General appearance: alert, cooperative and no distress  Mental Status: oriented to person, place and time and normal affect  Abdomen: soft, nontender, nondistended, bowel sounds present    No asterixis  No evidence of DTs    Assessment:        Primary Problem  GIB (gastrointestinal bleeding)     Active Hospital Problems    Diagnosis Date Noted    Portal hypertensive gastropathy ([facility] [K76.6, K31.89] 08/23/2021    Moderate malnutrition ([address] [E44.0] 08/23/2021    Dietary folate deficiency anemia [D52.0] 08/22/2021    Alcohol dependence with unspecified alcohol-induced disorder ([address] [F10.29]     GIB (gastrointestinal bleeding) [K92.2] 08/21/2021    Alcohol abuse [F10.10] 08/21/2021    Hepatic steatosis [K76.0] 08/21/2021    Elevated LFTs [R79.89] 08/21/2021    Macrocytic anemia [D53.9]     Noncompliance [Z91.19] 10/02/2015    Hypokalemia [E87.6]     COPD without exacerbation ([address] [J44.9]     Tobacco abuse [Z72.0]      Past Medical History:   Diagnosis Date    Alcohol withdrawal ([facility]     Alcohol withdrawal seizure with complication ([facility] 72/0/0922    Alcohol-induced chronic pancreatitis ([facility] 47/6/5169    Alcoholic hepatitis 00/3/5040    Cellulitis of right hip 12/18/2016    COPD (chronic obstructive pulmonary disease) ([facility] [address] 10/2/2015    Diabetes mellitus ([facility] [address]     \"borderline\"    DM type 2 (diabetes mellitus, type 2) ([facility] [address] 10/2/2015    Dysphagia     Eczema     Elevated liver enzymes 10/2/2015    FTT (failure to thrive) in adult 10/2/2015    Hoarseness     Hypertension     denies,used to take bp meds    Hypomagnesemia     Hyponatremia 12/7/2016    Intertrochanteric fracture of right femur ([address] 12/7/2016    Iron deficiency anemia 12/18/2016    Lesion of hard palate     rt side,dx with laryngoscopy 8/11/16    Moderate malnutrition ([address] 10/2/2015    New onset seizure ([facility] [address]     states last one was oct 2015, but states told she had an \"alcohol seizure\" this past month    Poor historian     Smoker 10/2/2015        Plan:        1. Anemia, ? Melena, EtOH abuse  1. EGD revealed portal hypertensive gastropathy  2. Recommend nonselective beta blocker  3. PO PPI  4. Soft diet as tolerated  5. Thiamine, folic acid, multivitamin  6.  EtOH rehab      Explained to the patient and d/W Nursing Staff  Will F/U with you  Please call or Page for any issues or change in status  Thanks    Electronically signed by RONI Guerrero NP on 8/24/2021 at 10:45 AM

## 2023-09-21 NOTE — PLAN OF CARE
Nutrition Problem #1: In context of social or environmental circumstances, Moderate malnutrition  Intervention: Food and/or Nutrient Delivery: Continue Current Diet, Continue Oral Nutrition Supplement  Nutritional Goals: PO intakes are greater than 50% at meals equal bilaterally

## (undated) DEVICE — FORCEPS BX L240CM JAW DIA2.8MM L CAP W/ NDL MIC MESH TOOTH

## (undated) DEVICE — CO2 CANNULA,SUPERSOFT, ADLT,7'O2,7'CO2: Brand: MEDLINE

## (undated) DEVICE — CUP MED 1OZ CLR POLYPR FEED GRAD W/O LID

## (undated) DEVICE — GLOVE SURG 8 11.7IN BEAD CUF LIGHT BRN SENSICARE LTX FREE

## (undated) DEVICE — BLOCK BITE 60FR RUBBER ADLT DENTAL

## (undated) DEVICE — JELLY,LUBE,STERILE,FLIP TOP,TUBE,2-OZ: Brand: MEDLINE

## (undated) DEVICE — GAUZE,SPONGE,4"X4",16PLY,STRL,LF,10/TRAY: Brand: MEDLINE

## (undated) DEVICE — SYRINGE, LUER SLIP, STERILE, 60ML: Brand: MEDLINE

## (undated) DEVICE — BASIN EMSIS 700ML GRAPHITE PLAS KID SHP GRAD

## (undated) DEVICE — Device: Brand: DEFENDO VALVE AND CONNECTOR KIT

## (undated) DEVICE — CANISTER, RIGID, 3000CC: Brand: MEDLINE INDUSTRIES, INC.

## (undated) DEVICE — MEDICINE CUP, GRADUATED, STER: Brand: MEDLINE